# Patient Record
Sex: FEMALE | Race: BLACK OR AFRICAN AMERICAN | Employment: FULL TIME | RURAL
[De-identification: names, ages, dates, MRNs, and addresses within clinical notes are randomized per-mention and may not be internally consistent; named-entity substitution may affect disease eponyms.]

---

## 2017-01-06 ENCOUNTER — OFFICE VISIT (OUTPATIENT)
Dept: FAMILY MEDICINE CLINIC | Age: 32
End: 2017-01-06

## 2017-01-06 VITALS
HEIGHT: 68 IN | TEMPERATURE: 98.7 F | WEIGHT: 236 LBS | DIASTOLIC BLOOD PRESSURE: 67 MMHG | RESPIRATION RATE: 16 BRPM | OXYGEN SATURATION: 99 % | SYSTOLIC BLOOD PRESSURE: 120 MMHG | BODY MASS INDEX: 35.77 KG/M2 | HEART RATE: 59 BPM

## 2017-01-06 DIAGNOSIS — E87.6 HYPOKALEMIA: ICD-10-CM

## 2017-01-06 DIAGNOSIS — Z92.3 HISTORY OF THERAPEUTIC RADIATION: ICD-10-CM

## 2017-01-06 DIAGNOSIS — R53.83 FATIGUE, UNSPECIFIED TYPE: Primary | ICD-10-CM

## 2017-01-06 RX ORDER — PNEUMOCOCCAL VACCINE POLYVALENT 25; 25; 25; 25; 25; 25; 25; 25; 25; 25; 25; 25; 25; 25; 25; 25; 25; 25; 25; 25; 25; 25; 25 UG/.5ML; UG/.5ML; UG/.5ML; UG/.5ML; UG/.5ML; UG/.5ML; UG/.5ML; UG/.5ML; UG/.5ML; UG/.5ML; UG/.5ML; UG/.5ML; UG/.5ML; UG/.5ML; UG/.5ML; UG/.5ML; UG/.5ML; UG/.5ML; UG/.5ML; UG/.5ML; UG/.5ML; UG/.5ML; UG/.5ML
INJECTION, SOLUTION INTRAMUSCULAR; SUBCUTANEOUS
COMMUNITY
Start: 2016-12-15 | End: 2017-01-18 | Stop reason: ALTCHOICE

## 2017-01-06 NOTE — MR AVS SNAPSHOT
Visit Information Date & Time Provider Department Dept. Phone Encounter #  
 1/6/2017  1:40 PM Aileen Nye  [facility] [phone] [id number] Your Appointments 1/10/2017  1:20 PM  
ESTABLISHED PATIENT with Melida Gonzales MD  
Jef Negrete ([facility]) Appt Note: ae  
 [address] [facility] [address] [place] [address]  
[phone]  
  
   
 [address]  
  
    
 1/27/2017  9:00 AM  
ECHO CARDIOGRAMS 2D with ECHONOAH  
[facility] Appt Note: 1 MO FUP ECHO AT 9 AT 10 20  [address] [facility]  
[phone]  
  
   
 [address] [address]  
  
    
 1/27/2017 10:20 AM  
ESTABLISHED PATIENT with Balta Adams MD  
[facility] ([facility]) Appt Note: 1 MO FUP ECHO AT 9 AT 10 20  [address] [address]  
[phone]  
  
   
 [address] [address]  
  
    
 2/13/2017  9:30 AM  
ROUTINE CARE with Willem De Dios MD  
[address] [facility] ([facility]) Appt Note: Hypertension-letter mailed [address]  
[phone]  
  
   
 [place] [id number]  
  
    
 2/24/2017 10:00 AM  
ESTABLISHED PATIENT with Isabel Dukes MD  
[facility]) Appt Note: MIGUE Sanders surveillance [address] [address]  
[phone]  
  
   
 [address] [facility] [address] [place] [address] Upcoming Health Maintenance Date Due Pneumococcal 19-64 Highest Risk (2 of 3 - PCV13) 12/15/2017 PAP AKA CERVICAL CYTOLOGY 11/11/2018 DTaP/Tdap/Td series (2 - Td) 4/12/2024 Allergies as of 1/6/2017  Review Complete On: 1/6/2017 By: Rosalie Mcgraw MD  
  
 Severity Noted Reaction Type Reactions Clonidine (Pf)  11/06/2013   Intolerance Vertigo Current Immunizations  Never Reviewed Name Date Pneumococcal Polysaccharide (PPSV-23) 12/15/2016 Tdap 4/12/2014 12:26 PM  
  
 Not reviewed this visit You Were Diagnosed With   
  
 Codes Comments Fatigue, unspecified type    -  Primary ICD-10-CM: R53.83 ICD-9-CM: 780.79 Hypokalemia     ICD-10-CM: E87.6 ICD-9-CM: 276.8 History of therapeutic radiation     ICD-10-CM: Z92.3 ICD-9-CM: V15.3 Vitals BP Pulse Temp Resp Height(growth percentile) Weight(growth percentile) 120/67 (BP 1 Location: Right arm, BP Patient Position: Sitting) (!) 59 98.7 °F (37.1 °C) (Oral) 16 5' 8\" (1.727 m) 236 lb (107 kg) LMP SpO2 Breastfeeding? BMI OB Status Smoking Status 11/23/2016 99% No 35.88 kg/m2 Having regular periods Never Smoker Vitals History BMI and BSA Data Body Mass Index Body Surface Area  
 35.88 kg/m 2 2.27 m 2 Preferred Pharmacy Pharmacy Name Phone [address] [place], [place] - [address] [phone] Your Updated Medication List  
  
   
This list is accurate as of: 1/6/17  2:06 PM.  Always use your most recent med list.  
  
  
  
  
 bisoprolol-hydroCHLOROthiazide 5-6.25 mg per tablet Commonly known as:  [facility] Take 1 Tab by mouth daily. Iqrqkldd7-Keuqtm5-Zhzvc therm. 112.5 billion cell Cap Take 1 Cap by mouth daily. norethindrone-ethinyl estradiol 1 mg-20 mcg (21)/75 mg (7) Tab Commonly known as:  Manuel Robles FE 1/20 Take 1 Tab by mouth daily. PNEUMOVAX 23 25 mcg/0.5 mL injection Generic drug:  pneumococcal 23-paula ps vaccine XANAX 0.25 mg tablet Generic drug:  ALPRAZolam  
Take  by mouth as needed for Anxiety. We Performed the Following CBC WITH AUTOMATED DIFF [28736 CPT(R)] METABOLIC PANEL, COMPREHENSIVE [23801 CPT(R)] T4, FREE [id number] CPT(R)] TSH 3RD GENERATION [15922 CPT(R)] Introducing [facility]! Dear Carolina Oneill: Thank you for requesting a [employer] account. Our records indicate that you already have an active [employer] account. You can access your account anytime at [email or web address] Did you know that you can access your hospital and ER discharge instructions at any time in [employer]? You can also review all of your test results from your hospital stay or ER visit. Additional Information If you have questions, please visit the Frequently Asked Questions section of the [employer] website at [email or web address] Remember, [employer] is NOT to be used for urgent needs. For medical emergencies, dial 911. Now available from your iPhone and Android! Please provide this summary of care documentation to your next provider. Your primary care clinician is listed as Erik Grates. If you have any questions after today's visit, please call [phone].

## 2017-01-06 NOTE — ACP (ADVANCE CARE PLANNING)
Information was given to pt on Advanced Directives, Living Will  opportunity was given for questions

## 2017-01-06 NOTE — PROGRESS NOTES
Reviewed record in preparation for visit and have necessary documentation  Pt did not bring medication to office visit for review  Information was given to pt on Advanced Directives, Living Will  opportunity was given for questions  Goals that were addressed and/or need to be completed during or after this appointment include   There are no preventive care reminders to display for this patient.

## 2017-01-07 LAB
ALBUMIN SERPL-MCNC: 3.9 G/DL (ref 3.5–5.5)
ALBUMIN/GLOB SERPL: 1.5 {RATIO} (ref 1.1–2.5)
ALP SERPL-CCNC: 76 IU/L (ref 39–117)
ALT SERPL-CCNC: 19 IU/L (ref 0–32)
AST SERPL-CCNC: 17 IU/L (ref 0–40)
BASOPHILS # BLD AUTO: 0 X10E3/UL (ref 0–0.2)
BASOPHILS NFR BLD AUTO: 1 %
BILIRUB SERPL-MCNC: <0.2 MG/DL (ref 0–1.2)
BUN SERPL-MCNC: 8 MG/DL (ref 6–20)
BUN/CREAT SERPL: 10 (ref 8–20)
CALCIUM SERPL-MCNC: 8.9 MG/DL (ref 8.7–10.2)
CHLORIDE SERPL-SCNC: 105 MMOL/L (ref 96–106)
CO2 SERPL-SCNC: 21 MMOL/L (ref 18–29)
CREAT SERPL-MCNC: 0.77 MG/DL (ref 0.57–1)
EOSINOPHIL # BLD AUTO: 0.1 X10E3/UL (ref 0–0.4)
EOSINOPHIL NFR BLD AUTO: 2 %
ERYTHROCYTE [DISTWIDTH] IN BLOOD BY AUTOMATED COUNT: 13.6 % (ref 12.3–15.4)
GLOBULIN SER CALC-MCNC: 2.6 G/DL (ref 1.5–4.5)
GLUCOSE SERPL-MCNC: 74 MG/DL (ref 65–99)
HCT VFR BLD AUTO: 33.9 % (ref 34–46.6)
HGB BLD-MCNC: 12.1 G/DL (ref 11.1–15.9)
IMM GRANULOCYTES # BLD: 0 X10E3/UL (ref 0–0.1)
IMM GRANULOCYTES NFR BLD: 0 %
LYMPHOCYTES # BLD AUTO: 0.8 X10E3/UL (ref 0.7–3.1)
LYMPHOCYTES NFR BLD AUTO: 17 %
MCH RBC QN AUTO: 29.6 PG (ref 26.6–33)
MCHC RBC AUTO-ENTMCNC: 35.7 G/DL (ref 31.5–35.7)
MCV RBC AUTO: 83 FL (ref 79–97)
MONOCYTES # BLD AUTO: 0.4 X10E3/UL (ref 0.1–0.9)
MONOCYTES NFR BLD AUTO: 8 %
NEUTROPHILS # BLD AUTO: 3.3 X10E3/UL (ref 1.4–7)
NEUTROPHILS NFR BLD AUTO: 72 %
PLATELET # BLD AUTO: 334 X10E3/UL (ref 150–379)
POTASSIUM SERPL-SCNC: 3.7 MMOL/L (ref 3.5–5.2)
PROT SERPL-MCNC: 6.5 G/DL (ref 6–8.5)
RBC # BLD AUTO: 4.09 X10E6/UL (ref 3.77–5.28)
SODIUM SERPL-SCNC: 141 MMOL/L (ref 134–144)
T4 FREE SERPL-MCNC: 1.1 NG/DL (ref 0.82–1.77)
TSH SERPL DL<=0.005 MIU/L-ACNC: 1.22 UIU/ML (ref 0.45–4.5)
WBC # BLD AUTO: 4.5 X10E3/UL (ref 3.4–10.8)

## 2017-01-08 NOTE — PROGRESS NOTES
Patient: Jeffery Adrian MRN: [id number]  SSN: [id number]    YOB: 1985  Age: 36 y.o. Sex: female        Subjective:     Chief Complaint   Patient presents with    Thyroid Problem    Fatigue       HPI: she is a 36y.o. year old female who presents with complaint of fatigue and concern about hypothyroidism. Patient with radiation treatment for lymphoma of neck. Patient says she feel fatigued upon awakening and throughout the day. Patient with hx of HTN, palpitations, hypokalemia, depression and anxiety. Patient denies HA, dizziness, SOB, CP, abdominal pain, dysuria, acute myalgias or arthralgias. Encounter Diagnoses   Name Primary?  Fatigue, unspecified type Yes    Hypokalemia     History of therapeutic radiation        BP Readings from Last 3 Encounters:   01/06/17 120/67   12/13/16 106/72   12/12/16 (!) 135/91       Wt Readings from Last 3 Encounters:   01/06/17 236 lb (107 kg)   12/13/16 237 lb 3.2 oz (107.6 kg)   12/12/16 238 lb (108 kg)     Body mass index is 35.88 kg/(m^2). Current and past medical information:    Current Medications after this visit[de-identified]   Current Outpatient Prescriptions   Medication Sig    Sebwyejh1-Oyojhs0-Tokvr therm. 112.5 billion cell cap Take 1 Cap by mouth daily.  bisoprolol-hydroCHLOROthiazide (ZIAC) 5-6.25 mg per tablet Take 1 Tab by mouth daily.  norethindrone-ethinyl estradiol (JUNEL FE 1/20) 1 mg-20 mcg (21)/75 mg (7) tab Take 1 Tab by mouth daily.  ALPRAZolam (XANAX) 0.25 mg tablet Take  by mouth as needed for Anxiety.  PNEUMOVAX 23 25 mcg/0.5 mL injection      No current facility-administered medications for this visit.         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 ([facility] [address] 08/10/2016    Depression 05/12/2015    Headache(784.0) 05/22/2014    HTN (hypertension) 08/27/2012    KAMERON I (cervical intraepithelial neoplasia I) 09/02/2011       Past Medical History   Diagnosis Date    Abnormal Pap smear 2010     FU with colpo, negative per patient    Cancer [facility])     Chest pain     Depression 5/12/2015    Encounter for IUD insertion 3/5/15     Mirena    Essential hypertension     Hypertension     Implanon removal 9/6/2013    Pap smear for cervical cancer screening 11/11/15     Negative    Trouble in sleeping        Allergies   Allergen Reactions    Clonidine (Pf) Vertigo       Past Surgical History   Procedure Laterality Date    Hx colposcopy  2010     per pt WNL    Hx wisdom teeth extraction         Social History     Social History    Marital status:      Spouse name: N/A    Number of children: N/A    Years of education: N/A     Social History Main Topics    Smoking status: Never Smoker    Smokeless tobacco: Never Used    Alcohol use Yes      Comment: occassionally    Drug use: No    Sexual activity: Yes     Partners: Male     Birth control/ protection: Pill     Other Topics Concern    None     Social History Narrative         Objective:     Review of Systems:  Constitutional: Positive for fatigue, denies malaise  Derm: Negative for rash or lesion  HEENT: Negative for acute hearing or vision changes  Cardiovascular: Negative for dizziness, chest pain or palpitations  Respiratory: Negative for cough, wheezing or SOB  Gastreintestinal: Negative for nausea or abdominal pain  Genital/urinary: Negative for dysuria or voiding dysfunction  Muscoloskeletal: Negative for acute myalgias or arthralgias   Neurological: Negative for headache, weakness or paresthesia  Psychological: Negative for depression or anxiety      Vitals:    01/06/17 1344   BP: 120/67   Pulse: (!) 59   Resp: 16   Temp: 98.7 °F (37.1 °C)   TempSrc: Oral   SpO2: 99%   Weight: 236 lb (107 kg)   Height: 5' 8\" (1.727 m)      Body mass index is 35.88 kg/(m^2).     Physical Exam:  Constitutional: well developed, well nourished, in no acute distress  Skin: warm and dry, normal tone and turgor  Head: normocephalic, atraumatic  Eyes: sclera clear, EOMI  Neck: normal range of motion  Cardiovascular: normal S1, S2, regular rate and rhythm  Respiratory: clear to auscultation bilaterally with symmetrical effort  Extremities: full range of motion  Neurology: normal strength and sensation  Psych: active, alert and oriented, affect appropriate       There are no preventive care reminders to display for this patient. Risk, benefits and potential costs of recommended health maintenance discussed. Patient expressed understanding and deferred at this time. Assessment and orders:       ICD-10-CM ICD-9-CM    1. Fatigue, unspecified type R53.83 780.79 CBC WITH AUTOMATED DIFF      TSH 3RD GENERATION      T4, FREE   2. Hypokalemia S72.9 485.0 METABOLIC PANEL, COMPREHENSIVE   3. History of therapeutic radiation I51.5 B64.1 METABOLIC PANEL, COMPREHENSIVE      TSH 3RD GENERATION      T4, FREE         Plan of care:  Diagnoses were discussed in detail with patient. Medication risks/benefits/side effects discussed with patient. All of the patient's questions were addressed and answered to apparent satisfaction. The patient understands and agrees with our plan of care. The patient knows to call back if they have questions about the plan of care or if symptoms change. The patient received an After-Visit Summary which contains VS, diagnoses, orders, allergy and medication lists.       Patient Care Team:  Sharon Paige MD as PCP - General (Family Practice)  Melida Gonzales MD as Physician (Obstetrics & Gynecology)  Isabel Dukes MD (Hematology and Oncology)  Blake Slaughter MD (Otolaryngology)  Balta Adams MD (Cardiology)  Ebonie Garrison MD (Radiation Oncology)    Follow-up Disposition: Not on File    Future Appointments  Date Time Provider Ann Stone   1/10/2017 1:20 PM Melida Gonzales  [facility]   1/27/2017 9:00 AM NOAH MEJIA   1/27/2017 10:20 AM Daryl Wild MD [facility] SCHED   2/13/2017 9:30 AM Arturo Yarbrough MD [facility] SCHED   2/24/2017 10:00 AM Juan Antonio Drake MD [address] [place] [address], [address]       Signed By: Alem Porras MD     January 8, 2017

## 2017-01-18 ENCOUNTER — OFFICE VISIT (OUTPATIENT)
Dept: FAMILY MEDICINE CLINIC | Age: 32
End: 2017-01-18

## 2017-01-18 VITALS
SYSTOLIC BLOOD PRESSURE: 121 MMHG | WEIGHT: 235 LBS | DIASTOLIC BLOOD PRESSURE: 76 MMHG | TEMPERATURE: 97.3 F | HEART RATE: 52 BPM | BODY MASS INDEX: 35.61 KG/M2 | OXYGEN SATURATION: 98 % | HEIGHT: 68 IN | RESPIRATION RATE: 16 BRPM

## 2017-01-18 DIAGNOSIS — Z92.3 HISTORY OF THERAPEUTIC RADIATION: ICD-10-CM

## 2017-01-18 DIAGNOSIS — E66.9 OBESITY, CLASS II, BMI 35-39.9: ICD-10-CM

## 2017-01-18 DIAGNOSIS — Z71.82 EXERCISE COUNSELING: ICD-10-CM

## 2017-01-18 DIAGNOSIS — B00.2 HERPES GINGIVOSTOMATITIS: Primary | ICD-10-CM

## 2017-01-18 DIAGNOSIS — Z71.3 DIETARY COUNSELING AND SURVEILLANCE: ICD-10-CM

## 2017-01-18 RX ORDER — VALACYCLOVIR HYDROCHLORIDE 1 G/1
1000 TABLET, FILM COATED ORAL 3 TIMES DAILY
Qty: 21 TAB | Refills: 2 | Status: SHIPPED | OUTPATIENT
Start: 2017-01-18 | End: 2017-01-25

## 2017-01-18 NOTE — PROGRESS NOTES
Reviewed record in preparation for visit and have obtained necessary documentation. Patient did not bring medications to visit for review. Information provided on Advanced Directive, Living Will. Body mass index is 35.73 kg/(m^2). There are no preventive care reminders to display for this patient.

## 2017-01-18 NOTE — PROGRESS NOTES
Progress Note    Patient: Raúl Payne MRN: [id number]  SSN: [id number]    YOB: 1985  Age: 36 y.o. Sex: female        Chief Complaint   Patient presents with    Vaginal Pain     lesion         Subjective:   Rash  Patient complains of rash involving the genitalia, mouth. Rash started 7 days ago. Appearance of rash at onset: Color of lesion(s): pink, Texture of lesion(s): blistering, Size of lesion(s): size of a pea. Rash has not changed over time Initial distribution: genitalia, mouth. Discomfort associated with rash: painful. Associated symptoms: no associated symptoms. Denies: no associated symptoms. Patient has not had previous evaluation of rash. Patient has not had previous treatment. Response to treatment: N/A. Patient has not had contacts with similar rash. Patient has not identified precipitant. Patient has not had new exposures (soaps, lotions, laundry detergents, foods, medications, plants, insects or animals.)  Patient has a blistering lesion on the genitalia and a lesion on the mouth. Patient has completed her radiation at this visit. States that the lesion on the vagina is painful and burning. Encounter Diagnoses   Name Primary?  Herpes gingivostomatitis Yes    History of therapeutic radiation     Exercise counseling     Dietary counseling and surveillance     Obesity, Class II, BMI 35-39.9 ([facility])              Current and past medical information:    Current Medications after this visit[de-identified]   Current Outpatient Prescriptions   Medication Sig    Qguypvsq6-Xxjrbq0-Zbojo therm. 112.5 billion cell cap Take 1 Cap by mouth daily.  bisoprolol-hydroCHLOROthiazide (ZIAC) 5-6.25 mg per tablet Take 1 Tab by mouth daily.  PNEUMOVAX 23 25 mcg/0.5 mL injection     norethindrone-ethinyl estradiol (JUNEL FE 1/20) 1 mg-20 mcg (21)/75 mg (7) tab Take 1 Tab by mouth daily.  ALPRAZolam (XANAX) 0.25 mg tablet Take  by mouth as needed for Anxiety.      No current facility-administered medications for this visit. 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)     Chest pain     Depression 5/12/2015    Encounter for IUD insertion 3/5/15     Mirena    Essential hypertension     Hypertension     Implanon removal 9/6/2013    Pap smear for cervical cancer screening 11/11/15     Negative    Trouble in sleeping        Allergies   Allergen Reactions    Clonidine (Pf) Vertigo       Past Surgical History   Procedure Laterality Date    Hx colposcopy  2010     per pt WNL    Hx wisdom teeth extraction         Social History     Social History    Marital status:      Spouse name: N/A    Number of children: N/A    Years of education: N/A     Social History Main Topics    Smoking status: Never Smoker    Smokeless tobacco: Never Used    Alcohol use Yes      Comment: occassionally    Drug use: No    Sexual activity: Yes     Partners: Male     Birth control/ protection: Pill     Other Topics Concern    None     Social History Narrative       Review of Systems   Constitutional: Negative for chills, diaphoresis, fever, malaise/fatigue and weight loss. HENT: Negative. Negative for congestion and sore throat. Eyes: Negative. Negative for blurred vision, double vision and photophobia. Respiratory: Negative for cough, hemoptysis, sputum production, shortness of breath and wheezing. Cardiovascular: Negative. Negative for chest pain, palpitations, orthopnea, claudication, leg swelling and PND. Gastrointestinal: Negative.   Negative for abdominal pain, blood in stool, constipation, diarrhea, heartburn, melena, nausea and vomiting. Genitourinary: Negative. Negative for dysuria, flank pain, frequency, hematuria and urgency. Musculoskeletal: Negative. Negative for back pain, falls, joint pain, myalgias and neck pain. Skin: Positive for rash. Negative for itching. Vaginal lesion and mouth lesion   Neurological: Negative. Negative for dizziness, tingling, tremors, sensory change, speech change, focal weakness, seizures, loss of consciousness, weakness and headaches. Endo/Heme/Allergies: Negative. Negative for environmental allergies and polydipsia. Does not bruise/bleed easily. Psychiatric/Behavioral: Negative. Negative for depression, hallucinations, memory loss, substance abuse and suicidal ideas. The patient is not nervous/anxious and does not have insomnia. Objective:     Vitals:    01/18/17 0919   BP: 121/76   Pulse: (!) 52   Resp: 16   Temp: 97.3 °F (36.3 °C)   TempSrc: Oral   SpO2: 98%   Weight: 235 lb (106.6 kg)   Height: 5' 8\" (1.727 m)      Body mass index is 35.73 kg/(m^2). Physical Exam   Constitutional: She is oriented to person, place, and time and well-developed, well-nourished, and in no distress. No distress. HENT:   Head: Normocephalic and atraumatic. Right Ear: External ear normal.   Left Ear: External ear normal.   Nose: Nose normal.   Mouth/Throat: Oropharynx is clear and moist. No oropharyngeal exudate. Eyes: Conjunctivae and EOM are normal. Pupils are equal, round, and reactive to light. Right eye exhibits no discharge. Left eye exhibits no discharge. No scleral icterus. Neck: Trachea normal, normal range of motion and full passive range of motion without pain. Neck supple. Normal carotid pulses, no hepatojugular reflux and no JVD present. Muscular tenderness present. No tracheal tenderness and no spinous process tenderness present. Carotid bruit is not present. No rigidity.  No tracheal deviation, no edema, no erythema and normal range of motion present. No thyroid mass and no thyromegaly present. Cardiovascular: Normal rate, regular rhythm, normal heart sounds and intact distal pulses. Exam reveals no gallop and no friction rub. No murmur heard. Pulmonary/Chest: Effort normal and breath sounds normal. No stridor. No respiratory distress. She has no wheezes. She has no rales. She exhibits no tenderness. Abdominal: Soft. Bowel sounds are normal. She exhibits no distension and no mass. There is no tenderness. There is no rebound and no guarding. Genitourinary: Vulva exhibits lesion (slightly rasied and tender lesion the size of a pea is noted on the right. no drainage is noted. ). Musculoskeletal: Normal range of motion. She exhibits no edema, tenderness or deformity. Lymphadenopathy:     She has no cervical adenopathy. Neurological: She is alert and oriented to person, place, and time. She displays normal reflexes. No cranial nerve deficit. She exhibits normal muscle tone. Gait normal. Coordination normal. GCS score is 15. Skin: Skin is warm and dry. No rash noted. She is not diaphoretic. No erythema. No pallor. Psychiatric: Mood, memory, affect and judgment normal.   Nursing note and vitals reviewed. There are no preventive care reminders to display for this patient. Assessment and orders:       ICD-10-CM ICD-9-CM    1. Herpes gingivostomatitis B00.2 054. 2 Will start on valcyclovir. Patient is in agreement with treatment plan at this time. Information printed and given to the patient for review. 2. History of therapeutic radiation Z92.3 V15.3 Stable at this visit. 3. Exercise counseling Z71.89 V65.41 Physical activity information given to patient and printed for review. 4. Dietary counseling and surveillance Z71.3 V65.3 Dietary information discussed with patient and printed for review.     5. Obesity, Class II, BMI 35-39.9 ([facility]) E66.01 278.01 I have reviewed/discussed the above normal BMI with the patient. I have recommended the following interventions: dietary management education, guidance, and counseling, dietary needs education, encourage exercise, feeding regime, lifestyle education regarding diet, monitor weight and nutrition therapy . The plan is as follows: I have counseled this patient on diet and exercise regimens. Plan of care:  Discussed diagnoses in detail with patient. Medication risks/benefits/side effects discussed with patient. All of the patient's questions were addressed. The patient understands and agrees with our plan of care. The patient knows to call back if they are unsure of or forget any changes we discussed today or if the symptoms change. The patient received an After-Visit Summary which contains VS, orders, medication list and allergy list. This can be used as a \"mini-medical record\" should they have to seek medical care while out of town.     Patient Care Team:  Marie De La Garza MD as PCP - General (Family Practice)  Yg Boo MD as Physician (Obstetrics & Gynecology)  Alka Loera MD (Hematology and Oncology)  Nell Borden MD (Otolaryngology)  Zuleima Quan MD (Cardiology)  Maryam Houston MD (Radiation Oncology)    Follow-up Disposition: Not on File    Future Appointments  Date Time Provider Ann Piersoni   1/24/2017 1:20 PM Yg Boo MD [address] [place] [address]   1/27/2017 9:00 AM NOAH MEJIA [facility] SCH[facility]   1/27/2017 10:20 AM MD ZAID Arizmendi [facility] SCHED   2/13/2017 9:30 AM Vesta Silva MD [facility] SCH[facility]   2/24/2017 10:00 AM Alka Loera MD [address] [place] [address], [place] [address]       Signed By: Khushboo Green NP     January 18, 2017

## 2017-01-18 NOTE — PATIENT INSTRUCTIONS

## 2017-01-18 NOTE — MR AVS SNAPSHOT
Visit Information Date & Time Provider Department Dept. Phone Encounter #  
 1/18/2017  9:20 AM Aubree Shukla  [facility] [phone] [id number] Your Appointments 1/24/2017  1:20 PM  
ESTABLISHED PATIENT with Payton Prado MD  
Fuchs Caden ([facility]) Appt Note: ae  
 [address] [facility] [address]  
[phone]  
  
   
 [address] [address]  
  
    
 1/27/2017  9:00 AM  
ECHO CARDIOGRAMS 2D with ECHONOAH  
[facility] Appt Note: 1 MO FUP ECHO AT 9 AT 10 20  [address] [facility]  
[phone]  
  
   
 [address] [facility] [address] [address]  
  
    
 1/27/2017 10:20 AM  
ESTABLISHED PATIENT with Bebeto Kaufman MD  
[facility] ([facility]) Appt Note: 1 MO FUP ECHO AT 9 AT 10 20  [address] [address]  
[phone]  
  
   
 [address] Foreign [address] [address]  
  
    
 2/13/2017  9:30 AM  
ROUTINE CARE with Fernando Rodriguez MD  
704 [facility] ([facility]) Appt Note: Hypertension-letter mailed 2005 [address]  
[phone]  
  
   
 [facility] [address]  
  
    
 2/24/2017 10:00 AM  
ESTABLISHED PATIENT with Henrique Reyes MD  
[facility] Appt Note: MIGUE Sanders surveillance [address] [facility] [address] [address]  
[phone]  
  
   
 301 [facility] [address] 1007 [facility] Upcoming Health Maintenance Date Due Pneumococcal 19-64 Highest Risk (2 of 3 - PCV13) 12/15/2017 PAP AKA CERVICAL CYTOLOGY 11/11/2018 DTaP/Tdap/Td series (2 - Td) 4/12/2024 Allergies as of 1/18/2017  Review Complete On: 1/18/2017 By: Angelita Vang LPN Severity Noted Reaction Type Reactions Clonidine (Pf)  11/06/2013   Intolerance Vertigo Current Immunizations  Never Reviewed Name Date Pneumococcal Polysaccharide (PPSV-23) 12/15/2016 Tdap 4/12/2014 12:26 PM  
  
 Not reviewed this visit You Were Diagnosed With   
  
 Codes Comments Herpes gingivostomatitis    -  Primary ICD-10-CM: B00.2 ICD-9-CM: 054.2 History of therapeutic radiation     ICD-10-CM: Z92.3 ICD-9-CM: V15.3 Exercise counseling     ICD-10-CM: Z71.89 ICD-9-CM: V65.41 Dietary counseling and surveillance     ICD-10-CM: Z71.3 ICD-9-CM: V65.3 Obesity, Class II, BMI 35-39.9 (HCC)     ICD-10-CM: E66.01 
ICD-9-CM: 278.01 Vitals BP Pulse Temp Resp Height(growth percentile) Weight(growth percentile) 121/76 (BP 1 Location: Right arm, BP Patient Position: Sitting) (!) 52 97.3 °F (36.3 °C) (Oral) 16 5' 8\" (1.727 m) 235 lb (106.6 kg) LMP SpO2 BMI OB Status Smoking Status 11/23/2016 98% 35.73 kg/m2 Having regular periods Never Smoker Vitals History BMI and BSA Data Body Mass Index Body Surface Area 35.73 kg/m 2 2.26 m 2 Preferred Pharmacy Pharmacy Name Phone [address] [address] [facility] [phone] Your Updated Medication List  
  
   
This list is accurate as of: 1/18/17  9:57 AM.  Always use your most recent med list.  
  
  
  
  
 bisoprolol-hydroCHLOROthiazide 5-6.25 mg per tablet Commonly known as:  [facility] Take 1 Tab by mouth daily. Kblgoamw8-Dehtkk7-Ttaea therm. 112.5 billion cell Cap Take 1 Cap by mouth daily. norethindrone-ethinyl estradiol 1 mg-20 mcg (21)/75 mg (7) Tab Commonly known as:  Sabina Moritz FE 1/20 Take 1 Tab by mouth daily. valACYclovir 1 gram tablet Commonly known as:  VALTREX Take 1 Tab by mouth three (3) times daily for 7 days. Indications: SUPPRESSION OF RECURRENT HERPES SIMPLEX INFECTION  
  
 XANAX 0.25 mg tablet Generic drug:  ALPRAZolam  
Take  by mouth as needed for Anxiety. Prescriptions Sent to Pharmacy Refills  
 valACYclovir (VALTREX) 1 gram tablet 2 Sig: Take 1 Tab by mouth three (3) times daily for 7 days. Indications: SUPPRESSION OF RECURRENT HERPES SIMPLEX INFECTION Class: Normal  
 Pharmacy: [address] [place] [address] #: [phone] Route: Oral  
  
Patient Instructions A Healthy Lifestyle: Care Instructions Your Care Instructions A healthy lifestyle can help you feel good, stay at a healthy weight, and have plenty of energy for both work and play. A healthy lifestyle is something you can share with your whole family. A healthy lifestyle also can lower your risk for serious health problems, such as high blood pressure, heart disease, and diabetes. You can follow a few steps listed below to improve your health and the health of your family. Follow-up care is a key part of your treatment and safety. Be sure to make and go to all appointments, and call your doctor if you are having problems. Its also a good idea to know your test results and keep a list of the medicines you take. How can you care for yourself at home? · Do not eat too much sugar, fat, or fast foods. You can still have dessert and treats now and then. The goal is moderation. · Start small to improve your eating habits. Pay attention to portion sizes, drink less juice and soda pop, and eat more fruits and vegetables. ¨ Eat a healthy amount of food. A 3-ounce serving of meat, for example, is about the size of a deck of cards. Fill the rest of your plate with vegetables and whole grains. ¨ Limit the amount of soda and sports drinks you have every day. Drink more water when you are thirsty. ¨ Eat at least 5 servings of fruits and vegetables every day. It may seem like a lot, but it is not hard to reach this goal. A serving or helping is 1 piece of fruit, 1 cup of vegetables, or 2 cups of leafy, raw vegetables. Have an apple or some carrot sticks as an afternoon snack instead of a candy bar. Try to have fruits and/or vegetables at every meal. 
· Make exercise part of your daily routine. You may want to start with simple activities, such as walking, bicycling, or slow swimming. Try to be active 30 to 60 minutes every day. You do not need to do all 30 to 60 minutes all at once. For example, you can exercise 3 times a day for 10 or 20 minutes. Moderate exercise is safe for most people, but it is always a good idea to talk to your doctor before starting an exercise program. 
· Keep moving. Paola Casasthers the lawn, work in the garden, or excentos. Take the stairs instead of the elevator at work. · If you smoke, quit. People who smoke have an increased risk for heart attack, stroke, cancer, and other lung illnesses. Quitting is hard, but there are ways to boost your chance of quitting tobacco for good. ¨ Use nicotine gum, patches, or lozenges. ¨ Ask your doctor about stop-smoking programs and medicines. ¨ Keep trying. In addition to reducing your risk of diseases in the future, you will notice some benefits soon after you stop using tobacco. If you have shortness of breath or asthma symptoms, they will likely get better within a few weeks after you quit. · Limit how much alcohol you drink. Moderate amounts of alcohol (up to 2 drinks a day for men, 1 drink a day for women) are okay. But drinking too much can lead to liver problems, high blood pressure, and other health problems. Family health If you have a family, there are many things you can do together to improve your health. · Eat meals together as a family as often as possible. · Eat healthy foods.  This includes fruits, vegetables, lean meats and dairy, and whole grains. · Include your family in your fitness plan. Most people think of activities such as jogging or tennis as the way to fitness, but there are many ways you and your family can be more active. Anything that makes you breathe hard and gets your heart pumping is exercise. Here are some tips: 
¨ Walk to do errands or to take your child to school or the bus. ¨ Go for a family bike ride after dinner instead of watching TV. Where can you learn more? Go to http://srini-jeny.info/. Enter G252 in the search box to learn more about \"A Healthy Lifestyle: Care Instructions. \" Current as of: July 26, 2016 Content Version: 11.1 © 0622-1093 oDesk. Care instructions adapted under license by Pearl.com (which disclaims liability or warranty for this information). If you have questions about a medical condition or this instruction, always ask your healthcare professional. [place] [address] any warranty or liability for your use of this information. Introducing [facility] & HEALTH SERVICES! Dear Syl Horn: Thank you for requesting a Cargo.[email or web address] account. Our records indicate that you already have an active [email or web address] account. You can access your account anytime at [email or web address] Did you know that you can access your hospital and ER discharge instructions at any time in [email or web address]? You can also review all of your test results from your hospital stay or ER visit. Additional Information If you have questions, please visit the Frequently Asked Questions section of the [email or web address] website at [email or web address] Remember, [email or web address] is NOT to be used for urgent needs. For medical emergencies, dial 911. Now available from your iPhone and Android! Please provide this summary of care documentation to your next provider. Your primary care clinician is listed as Lucita Bryson.  If you have any questions after today's visit, please call [phone].

## 2017-01-22 LAB
A VAGINAE DNA VAG QL NAA+PROBE: NORMAL SCORE
BVAB2 DNA VAG QL NAA+PROBE: NORMAL SCORE
C ALBICANS DNA VAG QL NAA+PROBE: NEGATIVE
C GLABRATA DNA VAG QL NAA+PROBE: NEGATIVE
C TRACH RRNA SPEC QL NAA+PROBE: NEGATIVE
HSV1 DNA SPEC QL NAA+PROBE: NEGATIVE
HSV2 DNA SPEC QL NAA+PROBE: NEGATIVE
MEGA1 DNA VAG QL NAA+PROBE: NORMAL SCORE
N GONORRHOEA RRNA SPEC QL NAA+PROBE: NEGATIVE
T VAGINALIS RRNA SPEC QL NAA+PROBE: NEGATIVE

## 2017-01-24 ENCOUNTER — OFFICE VISIT (OUTPATIENT)
Dept: OBGYN CLINIC | Age: 32
End: 2017-01-24

## 2017-01-24 VITALS
BODY MASS INDEX: 35.46 KG/M2 | DIASTOLIC BLOOD PRESSURE: 77 MMHG | HEIGHT: 68 IN | SYSTOLIC BLOOD PRESSURE: 116 MMHG | WEIGHT: 234 LBS

## 2017-01-24 DIAGNOSIS — Z01.411 ENCOUNTER FOR GYNECOLOGICAL EXAMINATION (GENERAL) (ROUTINE) WITH ABNORMAL FINDINGS: Primary | ICD-10-CM

## 2017-01-24 DIAGNOSIS — N92.6 IRREGULAR MENSES: ICD-10-CM

## 2017-01-24 DIAGNOSIS — R00.2 PALPITATION: ICD-10-CM

## 2017-01-24 DIAGNOSIS — I10 ESSENTIAL HYPERTENSION: ICD-10-CM

## 2017-01-24 DIAGNOSIS — N90.89 VULVAR LESION: ICD-10-CM

## 2017-01-24 DIAGNOSIS — R63.0 DECREASED APPETITE: ICD-10-CM

## 2017-01-24 LAB
HCG URINE, QL. (POC): NEGATIVE
VALID INTERNAL CONTROL?: YES

## 2017-01-24 RX ORDER — MEDROXYPROGESTERONE ACETATE 10 MG/1
10 TABLET ORAL DAILY
Qty: 10 TAB | Refills: 0 | Status: SHIPPED | OUTPATIENT
Start: 2017-01-24 | End: 2017-02-03

## 2017-01-24 NOTE — PATIENT INSTRUCTIONS

## 2017-01-24 NOTE — MR AVS SNAPSHOT
Visit Information Date & Time Provider Department Dept. Phone Encounter #  
 1/24/2017  1:20 PM Brissa Lebron MD Jef Negrete [phone] [id number] Your Appointments 1/27/2017  9:00 AM  
ECHO CARDIOGRAMS 2D with ECHO, STFRANCIS  
[facility] Appt Note: 1 MO FUP ECHO AT 9 AT 10 20 DR Betzy Bagley [address] [facility]  
[phone]  
  
   
 [address] [address]  
  
    
 1/27/2017 10:20 AM  
ESTABLISHED PATIENT with Urmila Pineda MD  
[facility] ([facility] Appt Note: 1 MO FUP ECHO AT 9 AT 10 20 DR Betzy Bagley [address] [facility] [address]  
[phone]  
  
   
 [address] [address]  
  
    
 2/13/2017  9:30 AM  
ROUTINE CARE with Smiley Chacko MD  
[address] [place] [address] ([facility]) Appt Note: Hypertension-letter mailed 2005 [address]   
[phone]  
  
   
 [facility] [address]  
  
    
 2/24/2017 10:00 AM  
ESTABLISHED PATIENT with Mik Manuel MD  
[facility] Appt Note: MIGUE Sanders surveillance [address] [facility], [address] [address]  
[phone]  
  
   
 [address] [facility], [address] 100[address] [facility] Upcoming Health Maintenance Date Due  
 PAP AKA CERVICAL CYTOLOGY 11/11/2018 Allergies as of 1/24/2017  Review Complete On: 1/24/2017 By: Vasu Yadav LPN Severity Noted Reaction Type Reactions Clonidine (Pf)  11/06/2013   Intolerance Vertigo Current Immunizations  Never Reviewed Name Date Pneumococcal Polysaccharide (PPSV-23) 12/15/2016 Tdap 4/12/2014 12:26 PM  
  
 Not reviewed this visit Vitals Height(growth percentile) Weight(growth percentile) LMP BMI OB Status Smoking Status 5' 8\" (1.727 m) 234 lb (106.1 kg) 11/23/2016 35.58 kg/m2 Having regular periods Never Smoker BMI and BSA Data Body Mass Index Body Surface Area 35.58 kg/m 2 2.26 m 2 Preferred Pharmacy Pharmacy Name Phone [address] [facility] [address] [address] [place] [facility] [phone] Your Updated Medication List  
  
   
This list is accurate as of: 1/24/17  1:30 PM.  Always use your most recent med list.  
  
  
  
  
 bisoprolol-hydroCHLOROthiazide 5-6.25 mg per tablet Commonly known as:  [facility] Take 1 Tab by mouth daily. Tcfmdaei3-Bmtyjr0-Uqged therm. 112.5 billion cell Cap Take 1 Cap by mouth daily. norethindrone-ethinyl estradiol 1 mg-20 mcg (21)/75 mg (7) Tab Commonly known as:  Keiko Bruce FE 1/20 Take 1 Tab by mouth daily. valACYclovir 1 gram tablet Commonly known as:  VALTREX Take 1 Tab by mouth three (3) times daily for 7 days. Indications: SUPPRESSION OF RECURRENT HERPES SIMPLEX INFECTION  
  
 XANAX 0.25 mg tablet Generic drug:  ALPRAZolam  
Take  by mouth as needed for Anxiety. Patient Instructions Well Visit, Ages 25 to 48: Care Instructions Your Care Instructions Physical exams can help you stay healthy. Your doctor has checked your overall health and may have suggested ways to take good care of yourself. He or she also may have recommended tests. At home, you can help prevent illness with healthy eating, regular exercise, and other steps. Follow-up care is a key part of your treatment and safety. Be sure to make and go to all appointments, and call your doctor if you are having problems. It's also a good idea to know your test results and keep a list of the medicines you take. How can you care for yourself at home? · Reach and stay at a healthy weight.  This will lower your risk for many problems, such as obesity, diabetes, heart disease, and high blood pressure. · Get at least 30 minutes of physical activity on most days of the week. Walking is a good choice. You also may want to do other activities, such as running, swimming, cycling, or playing tennis or team sports. Discuss any changes in your exercise program with your doctor. · Do not smoke or allow others to smoke around you. If you need help quitting, talk to your doctor about stop-smoking programs and medicines. These can increase your chances of quitting for good. · Talk to your doctor about whether you have any risk factors for sexually transmitted infections (STIs). Having one sex partner (who does not have STIs and does not have sex with anyone else) is a good way to avoid these infections. · Use birth control if you do not want to have children at this time. Talk with your doctor about the choices available and what might be best for you. · Protect your skin from too much sun. When you're outdoors from 10 a.m. to 4 p.m., stay in the shade or cover up with clothing and a hat with a wide brim. Wear sunglasses that block UV rays. Even when it's cloudy, put broad-spectrum sunscreen (SPF 30 or higher) on any exposed skin. · See a dentist one or two times a year for checkups and to have your teeth cleaned. · Wear a seat belt in the car. · Drink alcohol in moderation, if at all. That means no more than 2 drinks a day for men and 1 drink a day for women. Follow your doctor's advice about when to have certain tests. These tests can spot problems early. For everyone · Cholesterol. Have the fat (cholesterol) in your blood tested after age 21. Your doctor will tell you how often to have this done based on your age, family history, or other things that can increase your risk for heart disease. · Blood pressure.  Have your blood pressure checked during a routine doctor visit. Your doctor will tell you how often to check your blood pressure based on your age, your blood pressure results, and other factors. · Vision. Talk with your doctor about how often to have a glaucoma test. 
· Diabetes. Ask your doctor whether you should have tests for diabetes. · Colon cancer. Have a test for colon cancer at age 48. You may have one of several tests. If you are younger than 48, you may need a test earlier if you have any risk factors. Risk factors include whether you already had a precancerous polyp removed from your colon or whether your parent, brother, sister, or child has had colon cancer. For women · Breast exam and mammogram. Talk to your doctor about when you should have a clinical breast exam and a mammogram. Medical experts differ on whether and how often women under 50 should have these tests. Your doctor can help you decide what is right for you. · Pap test and pelvic exam. Begin Pap tests at age 24. A Pap test is the best way to find cervical cancer. The test often is part of a pelvic exam. Ask how often to have this test. 
· Tests for sexually transmitted infections (STIs). Ask whether you should have tests for STIs. You may be at risk if you have sex with more than one person, especially if your partners do not wear condoms. For men · Tests for sexually transmitted infections (STIs). Ask whether you should have tests for STIs. You may be at risk if you have sex with more than one person, especially if you do not wear a condom. · Testicular cancer exam. Ask your doctor whether you should check your testicles regularly. · Prostate exam. Talk to your doctor about whether you should have a blood test (called a PSA test) for prostate cancer. Experts differ on whether and when men should have this test. Some experts suggest it if you are older than 39 and are -American or have a father or brother who got prostate cancer when he was younger than 72. When should you call for help? Watch closely for changes in your health, and be sure to contact your doctor if you have any problems or symptoms that concern you. Where can you learn more? Go to http://srini-jeny.info/. Enter P072 in the search box to learn more about \"Well Visit, Ages 25 to 48: Care Instructions. \" Current as of: July 19, 2016 Content Version: 11.1 © 4095-5484 RealTravel. Care instructions adapted under license by gulu.com (which disclaims liability or warranty for this information). If you have questions about a medical condition or this instruction, always ask your healthcare professional. [address] any warranty or liability for your use of this information. Introducing \[facility]\"" & HEALTH SERVICES! Dear Salena [place]: Thank you for requesting a Excel PharmaStudies account. Our records indicate that you already have an active Excel PharmaStudies account. You can access your account anytime at [email or web address] Did you know that you can access your hospital and ER discharge instructions at any time in Excel PharmaStudies? You can also review all of your test results from your hospital stay or ER visit. Additional Information If you have questions, please visit the Frequently Asked Questions section of the Excel PharmaStudies website at [email or web address] Remember, Excel PharmaStudies is NOT to be used for urgent needs. For medical emergencies, dial 911. Now available from your iPhone and Android! Please provide this summary of care documentation to your next provider. Your primary care clinician is listed as Sebastian Hardwick. If you have any questions after today's visit, please call [phone].

## 2017-01-24 NOTE — PROGRESS NOTES
[address] [facility]  [email or web address]  [phone]    Sidney Lin MD, FACOG       Annual Gynecologic Exam:  [facility] <40  Chief Complaint   Patient presents with    Well Woman    Vulvar Abnormaility    Irregular Menses         Mike Yuen is a 36 y.o.  [address] female who presents for an annual well woman exam.  Patient's last menstrual period was 2016. .    With regard to the Gardisil vaccine, she is older than the FDA approved age to receive it. She does report additional concerns today. Patient reports she found a small bump in vagina area. Did have pain x 1 week. Currently, does not have pain. Reports no issues with change of size or vaginal discharge. Patient also reports lack of appetite, visual changes, irregular heartbeat, back pain, concerns about STD's. She reports no new sexual partners. Has had oral/herpes sore in past.  Did hsv and vaginitis swab on self. Started valtrex. History of Present Illness: The patient is reporting having vulvar lesion  for several weeks. She reports the symptoms are has improved. Aggravating factors include none. And alleviating factors include valtrex. She was worried about STD. She reports no new sexual partners. History of Present Illness: The patient is reporting having irregular menses for several months. She reports the symptoms are is unchanged. Aggravating factors include none/radiation  And alleviating factors include none. Menstrual status:  Her periods are irregular. She does not report dysmenorrhea/painful menses. She does report irregular bleeding. Sexual history and Contraception:  History   Sexual Activity    Sexual activity: Yes    Partners: Male    Birth control/ protection: None     She never use condoms with sexual activity  She does not reports new sexual partner(s) in the last year. The patient does request STD testing.     We recommended testing per CDC guidelines and at patient request.     Preventive Medicine History:  Her last annual GYN exam was about two years ago. Her most recent Pap smear result: normal was obtained in 2015. Her most recent HR HPV screen was Negative obtained 2 year(s) ago. She does not have a history of KAMERON 2, 3 or cervical cancer. Past Medical History   Diagnosis Date    Abnormal Pap smear      FU with colpo, negative per patient    Cancer [facility])      nodular lymphocytic hodgkins lymphoma    Chest pain     Depression 2015    Encounter for IUD insertion 3/5/15     Mirena    Essential hypertension     Hypertension     Implanon removal 2013    Pap smear for cervical cancer screening 11/11/15     Negative    Trouble in sleeping      OB History    Para Term  AB SAB TAB Ectopic Multiple Living   5 3 3 0 2 1 1 0 0 3      # Outcome Date GA Lbr Osorio/2nd Weight Sex Delivery Anes PTL Lv   5 Term 04/10/14 38w4d  7 lb 15.7 oz (3.62 kg) F VAGINAL DELI EPIDURAL AN N Y   4 Term 13 38w0d 08:00 7 lb (3.175 kg) M VAGINAL DELI EPI N Y   3 Term 07 40w0d 12:00 6 lb 7 oz (2.92 kg) F VAGINAL DELI EPI N Y   2 TAB            1 SAB                 Past Surgical History   Procedure Laterality Date    Hx colposcopy       per pt WNL    Hx wisdom teeth extraction       Family History   Problem Relation Age of Onset    HIV/AIDS Mother     Hypertension Maternal Aunt     Hypertension Maternal Grandmother      Social History     Social History    Marital status:      Spouse name: N/A    Number of children: N/A    Years of education: N/A     Occupational History    Not on file.      Social History Main Topics    Smoking status: Never Smoker    Smokeless tobacco: Never Used    Alcohol use Yes      Comment: occassionally    Drug use: No    Sexual activity: Yes     Partners: Male     Birth control/ protection: None     Other Topics Concern    Not on file     Social History Narrative       Allergies   Allergen Reactions    Clonidine (Pf) Vertigo       Current Outpatient Prescriptions   Medication Sig    medroxyPROGESTERone (PROVERA) 10 mg tablet Take 1 Tab by mouth daily for 10 days. Take pregnancy test prior to taking provera as needed.  valACYclovir (VALTREX) 1 gram tablet Take 1 Tab by mouth three (3) times daily for 7 days. Indications: SUPPRESSION OF RECURRENT HERPES SIMPLEX INFECTION    Hwsqywmt4-Bmouop4-Zdtuc therm. 112.5 billion cell cap Take 1 Cap by mouth daily.  bisoprolol-hydroCHLOROthiazide (ZIAC) 5-6.25 mg per tablet Take 1 Tab by mouth daily.  ALPRAZolam (XANAX) 0.25 mg tablet Take  by mouth as needed for Anxiety. No current facility-administered medications for this visit.         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       Review of Systems - History obtained from the patient  Constitutional: negative for weight loss, fever, night sweats  HEENT: negative for hearing loss, earache, congestion, snoring, sorethroat  CV:see HPI  Resp: negative for cough, shortness of breath, wheezing  GI: negative for change in bowel habits, abdominal pain, black or bloody stools  : negative for frequency, dysuria, hematuria  GYN: see HPI  MSK: negative for back pain, joint pain, muscle pain  Breast: negative for breast lumps, nipple discharge, galactorrhea  Skin :negative for itching, rash, hives  Neuro: negative for dizziness, headache, confusion, weakness  Psych: negative for anxiety, depression, change in mood  Heme/lymph: negative for bleeding, bruising, pallor    Physical Exam  Visit Vitals    /77    Ht 5' 8\" (1.727 m)    Wt 234 lb (106.1 kg)    LMP 11/23/2016    BMI 35.58 kg/m2       Constitutional  · Appearance: well-nourished, well developed, alert, in no acute distress    HENT  · Head and Face: appears normal    Neck  · Inspection/Palpation: normal appearance, no masses or tenderness  · Lymph Nodes: no lymphadenopathy present  · Thyroid: gland size normal, nontender, no nodules or masses present on palpation    Chest  · Respiratory Effort: breathing labored  · Auscultation: normal breath sounds    Cardiovascular  · Heart:  · Auscultation: regular rate and rhythm without murmur    Breasts  · Inspection of Breasts: breasts symmetrical, no skin changes, no discharge present, nipple appearance normal, no skin retraction present  · Palpation of Breasts and Axillae: no masses present on palpation, no breast tenderness  · Axillary Lymph Nodes: no lymphadenopathy present    Gastrointestinal  · Abdominal Examination: abdomen non-tender to palpation, normal bowel sounds, no masses present  · Liver and spleen: no hepatomegaly present, spleen not palpable  · Hernias: no hernias identified    Genitourinary  · External Genitalia: normal appearance for age, no discharge present, no tenderness present, small area of ? Erythema left labia, NT, no masses present  · Vagina: normal vaginal vault without central or paravaginal defects, no discharge present, no inflammatory lesions present, no masses present  · Bladder: non-tender to palpation  · Urethra: appears normal  · Cervix: normal   · Uterus: normal size, shape and consistency  · Adnexa: no adnexal tenderness present, no adnexal masses present  · Perineum: perineum within normal limits, no evidence of trauma, no rashes or skin lesions present  · Anus: anus within normal limits, no hemorrhoids present  · Inguinal Lymph Nodes: no lymphadenopathy present    Skin  · General Inspection: no rash, no lesions identified    Neurologic/Psychiatric  · Mental Status:  · Orientation: grossly oriented to person, place and time  · Mood and Affect: mood normal, affect appropriate    Assessment:  36 y.o.   for well woman exam  Encounter Diagnoses   Name Primary?  Irregular menses     Encounter for gynecological examination (general) (routine) with abnormal findings Yes    Essential hypertension     Vulvar lesion     Decreased appetite     Palpitation        Plan:  The patient was counseled about diet, exercise, healthy lifestyle  We discussed self breast exam  We discussed safer sex practices, condom use and risk factors for sexually transmitted diseases. We discussed current pap smear and HR HPV testing guidelines. We recommend follow up one year for routine annual gynecologic exam or sooner prn  We recommend routine follow up with her primary care doctor for management of chronic medical problems and non-gynecologic concerns  Handouts were given to the patient  We discussed calcium/vitamin D/weight bearing exercise and osteoporosis prevention  We discussed progesterone only and non hormonal options for contraception including but not limited to condoms, IUDs, Nexplanon, and depo provera. Discussed risks, benefits and alternatives of OCP: including but not limited to dvt/pe/mi/cva/ca/gi risks.    Disc inc risks with OCP and HTN    Disc causes of cycle changes including radiation/medical conditions  Reviewed prior labs: tsh/vaginitis/hsv negative and prior negative serum STD tests  Provera h/o  Provera rx  RTC on menses, no unprotected intercourse x 2 wks prior  Disc hsv swab neg: disc option of serum testing vs retesting recurrent lesions  Rec PCP fu for palpitations/back pain/appetite changes or to ER if sx severe  Pt declines add'l lab work/std serum testing      Folllow up:  [x] return for annual well woman exam in one year or sooner if she is having problems  [] follow up and ultrasound  [] 6 months  [] 3 months  [] 6 weeks   [] 1 month    Orders Placed This Encounter    AMB POC URINE PREGNANCY TEST, VISUAL COLOR COMPARISON    medroxyPROGESTERone (PROVERA) 10 mg tablet       Results for orders placed or performed in visit on 01/24/17   AMB POC URINE PREGNANCY TEST, VISUAL COLOR COMPARISON   Result Value Ref Range    VALID INTERNAL CONTROL POC Yes     HCG urine, Ql. (POC) Negative Negative

## 2017-01-27 ENCOUNTER — OFFICE VISIT (OUTPATIENT)
Dept: CARDIOLOGY CLINIC | Age: 32
End: 2017-01-27

## 2017-01-27 ENCOUNTER — CLINICAL SUPPORT (OUTPATIENT)
Dept: CARDIOLOGY CLINIC | Age: 32
End: 2017-01-27

## 2017-01-27 VITALS
BODY MASS INDEX: 35.01 KG/M2 | RESPIRATION RATE: 20 BRPM | HEIGHT: 68 IN | SYSTOLIC BLOOD PRESSURE: 120 MMHG | OXYGEN SATURATION: 98 % | HEART RATE: 62 BPM | DIASTOLIC BLOOD PRESSURE: 84 MMHG | WEIGHT: 231 LBS

## 2017-01-27 DIAGNOSIS — R07.9 CHEST PAIN, UNSPECIFIED TYPE: ICD-10-CM

## 2017-01-27 DIAGNOSIS — R06.02 SHORTNESS OF BREATH: Primary | ICD-10-CM

## 2017-01-27 DIAGNOSIS — I10 ESSENTIAL HYPERTENSION: ICD-10-CM

## 2017-01-27 DIAGNOSIS — R00.2 PALPITATIONS: Primary | ICD-10-CM

## 2017-01-27 NOTE — MR AVS SNAPSHOT
Visit Information Date & Time Provider Department Dept. Phone Encounter #  
 1/27/2017 10:20 AM Zuleima Quan MD [facility] [phone] [id number] Your Appointments 2/13/2017  9:30 AM  
ROUTINE CARE with Vesta Silva MD  
[facility] ([facility]) Appt Note: Hypertension-letter mailed 2005 A [facility] [address] [facility]   
[phone]  
  
   
 [place] [address]  
  
    
 2/24/2017 10:00 AM  
ESTABLISHED PATIENT with Alka Loera MD  
[facility] at [address] [facility]) Appt Note: MIGUE Sanders surveillance [address] [facility], [address] [facility] [address] [facility]  
[phone]  
  
   
 [address] [place] [address], [address] [facility],[address]  
  
    
 2/2/2018 10:00 AM  
ESTABLISHED PATIENT with Zuleima Quan MD  
[facility] ([facility]) [address] [facility] [address] [facility]  
[address] [facility] [address] [address] Upcoming Health Maintenance Date Due Pneumococcal 19-64 Highest Risk (2 of 3 - PCV13) 12/15/2017 PAP AKA CERVICAL CYTOLOGY 11/11/2018 DTaP/Tdap/Td series (2 - Td) 4/12/2024 Allergies as of 1/27/2017  Review Complete On: 1/27/2017 By: Facundo Castro LPN Severity Noted Reaction Type Reactions Clonidine (Pf)  11/06/2013   Intolerance Vertigo Current Immunizations  Never Reviewed Name Date Pneumococcal Polysaccharide (PPSV-23) 12/15/2016 Tdap 4/12/2014 12:26 PM  
  
 Not reviewed this visit You Were Diagnosed With   
  
 Codes Comments Palpitations    -  Primary ICD-10-CM: R00.2 ICD-9-CM: 785.1 Essential hypertension     ICD-10-CM: I10 
ICD-9-CM: 401.9 Dizziness     ICD-10-CM: N24 ICD-9-CM: 780.4 Vitals BP Pulse Resp Height(growth percentile) Weight(growth percentile) LMP  
 120/84 (BP Patient Position: Lying left side) 62 20 5' 8\" (1.727 m) 231 lb (104.8 kg) 11/23/2016 SpO2 BMI OB Status Smoking Status 98% 35.12 kg/m2 Having regular periods Never Smoker Vitals History BMI and BSA Data Body Mass Index Body Surface Area  
 35.12 kg/m 2 2.24 m 2 Preferred Pharmacy Pharmacy Name Phone [address] [facility] [place] [phone] Your Updated Medication List  
  
   
This list is accurate as of: 1/27/17 10:22 AM.  Always use your most recent med list.  
  
  
  
  
 bisoprolol-hydroCHLOROthiazide 5-6.25 mg per tablet Commonly known as:  [facility] Take 1 Tab by mouth daily. Uwixlflo8-Cgznyg9-Dqzoq therm. 112.5 billion cell Cap Take 1 Cap by mouth daily. medroxyPROGESTERone 10 mg tablet Commonly known as:  PROVERA Take 1 Tab by mouth daily for 10 days. Take pregnancy test prior to taking provera as needed. XANAX 0.25 mg tablet Generic drug:  ALPRAZolam  
Take  by mouth as needed for Anxiety. Introducing [facility]! Dear Scott Macias: Thank you for requesting a [employer] account. Our records indicate that you already have an active [employer] account. You can access your account anytime at [email or web address]. [email or web address] Did you know that you can access your hospital and ER discharge instructions at any time in [employer]? You can also review all of your test results from your hospital stay or ER visit. Additional Information If you have questions, please visit the Frequently Asked Questions section of the [employer] website at [email or web address] Remember, [employer] is NOT to be used for urgent needs. For medical emergencies, dial 911. Now available from your iPhone and Android! Please provide this summary of care documentation to your next provider. Your primary care clinician is listed as Osman Voss. If you have any questions after today's visit, please call [phone].

## 2017-01-27 NOTE — PROGRESS NOTES
Cesario Russell MD    [address] [place], [address] [facility]    Office [phone],[facility] [phone]  Pager [phone]    Laney Herrera is a 36 y.o. 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 No chemotherapy. Obesity. Hypertension        Plan:     E cardio event monitor -12/2017 ;no significant arrhythmia. Aggressive cardiovascular risk factor modification. Follow-up in 1 year or earlier as needed    Patient understands the plan. All questions were answered to the patient's satisfaction. Medication Side Effects and Warnings were discussed with patient: yes  Patient Labs were reviewed and or requested:  yes  Patient Past Records were reviewed and or requested: yes    I appreciate the opportunity to be involved in Ms. Fung. See note below for details. Please do not hesitate to contact us with questions or concerns. Cesario Russell MD    Cardiac Testing/ Procedures: A. Cardiac Cath/PCI:    B.ECHO/BELINDA: 1/27/17 Left ventricle: Systolic function was normal. Ejection fraction was  estimated in the range of 60 % to 65 %. There were no regional wall motion  abnormalities. Tricuspid valve: There was mild regurgitation. Pulmonic valve: There was mild regurgitation.      9/2016Left ventricle: Systolic function was normal. Ejection fraction was  estimated to be 65 %. There were no regional wall motion abnormalities. Tricuspid valve: There was moderate regurgitation. Pericardium: A small pericardial effusion was identified posterior to the  heart. There was no evidence of hemodynamic compromise. C.StressNuclear/Stress ECHO/Stress test:    D.Vascular:    E. EP: 12/2016  Event monitor - SR/Sinus arrhythmia/Occ transient bradycardia ( 50bpm)    F. Miscellaneous:    Subjective:  Tahira Santoro is a 36 y.o. female who returns for follow up visit. Patient states that she continues to have inermittent episodes of \"fluttering sensation in her heart\". No dizziness, chest pain. No history of syncope. States that she is not on any treatment for her non-Hodgkin's lymphoma currently-in surveillance mode. .      ROS:  (bold if positive, if negative)             Medications before admission:    Current Outpatient Prescriptions   Medication Sig Dispense    medroxyPROGESTERone (PROVERA) 10 mg tablet Take 1 Tab by mouth daily for 10 days. Take pregnancy test prior to taking provera as needed. 10 Tab    Lallcyrw6-Jcqnjw1-Dofet therm. 112.5 billion cell cap Take 1 Cap by mouth daily. 30 Cap    bisoprolol-hydroCHLOROthiazide (ZIAC) 5-6.25 mg per tablet Take 1 Tab by mouth daily. 30 Tab    ALPRAZolam (XANAX) 0.25 mg tablet Take  by mouth as needed for Anxiety. No current facility-administered medications for this visit. Physical Exam:  Visit Vitals    /84 (BP Patient Position: Lying left side)    Pulse 62    Resp 20    Ht 5' 8\" (1.727 m)    Wt 231 lb (104.8 kg)    LMP 11/23/2016    SpO2 98%    BMI 35.12 kg/m2          Gen: Well-developed, well-nourished, in no acute distress  Neck: Supple,No JVD, No Carotid Bruit,   Resp: No accessory muscle use, Clear breath sounds, No rales or rhonchi  Card: Regular Rate,Rythm,Normal S1, S2, No murmurs, rubs or gallop. No thrills.    Abd:  Soft, non-tender, non-distended,BS+,   MSK: No cyanosis  Skin: No rashes Neuro: moving all four extremities , follows commands appropriately  Psych:  Good insight, oriented to person, place , alert, Nml Affect  LE: No edema    EKG:      LABS:        Lab Results   Component Value Date/Time    WBC 4.5 01/06/2017 02:18 PM    HGB 12.1 01/06/2017 02:18 PM    HCT 33.9 01/06/2017 02:18 PM    PLATELET 852 83/77/4976 02:18 PM    MCV 83 01/06/2017 02:18 PM    Hgb, External 12.3 02/07/2014    Hct, External 35.6 02/07/2014    Platelet cnt., External 272 02/07/2014     Lab Results   Component Value Date/Time    Sodium 141 01/06/2017 02:18 PM    Potassium 3.7 01/06/2017 02:18 PM    Chloride 105 01/06/2017 02:18 PM    CO2 21 01/06/2017 02:18 PM    Anion gap 12 11/11/2016 10:35 AM    Glucose 74 01/06/2017 02:18 PM    BUN 8 01/06/2017 02:18 PM    Creatinine 0.77 01/06/2017 02:18 PM    BUN/Creatinine ratio 10 01/06/2017 02:18 PM    GFR est  01/06/2017 02:18 PM    GFR est non- 01/06/2017 02:18 PM    Calcium 8.9 01/06/2017 02:18 PM       No results found for: APTT  No results found for: INR, PTMR, PTP, PT1, PT2  No components found for: Kellee Lopez MD

## 2017-02-03 ENCOUNTER — TELEPHONE (OUTPATIENT)
Dept: OBGYN CLINIC | Age: 32
End: 2017-02-03

## 2017-02-03 NOTE — TELEPHONE ENCOUNTER
36year old patient last seen in the office on 1/24/17. Patient calling to say that she completed the course of Provera 10 mg yesterday and has not had any cramping or bleeding. Patient advised that bleeding may not come immediately. Patient wondering how to proceed . Please advise.

## 2017-02-08 ENCOUNTER — OFFICE VISIT (OUTPATIENT)
Dept: FAMILY MEDICINE CLINIC | Age: 32
End: 2017-02-08

## 2017-02-08 VITALS
WEIGHT: 229 LBS | HEART RATE: 78 BPM | DIASTOLIC BLOOD PRESSURE: 75 MMHG | OXYGEN SATURATION: 97 % | HEIGHT: 68 IN | SYSTOLIC BLOOD PRESSURE: 123 MMHG | TEMPERATURE: 99.2 F | BODY MASS INDEX: 34.71 KG/M2 | RESPIRATION RATE: 16 BRPM

## 2017-02-08 DIAGNOSIS — J10.1 INFLUENZA A: Primary | ICD-10-CM

## 2017-02-08 DIAGNOSIS — R68.89 FLU-LIKE SYMPTOMS: ICD-10-CM

## 2017-02-08 LAB
QUICKVUE INFLUENZA TEST: POSITIVE
VALID INTERNAL CONTROL?: YES

## 2017-02-08 RX ORDER — OSELTAMIVIR PHOSPHATE 75 MG/1
75 CAPSULE ORAL 2 TIMES DAILY
Qty: 10 CAP | Refills: 0 | Status: SHIPPED | OUTPATIENT
Start: 2017-02-08 | End: 2017-02-13

## 2017-02-08 NOTE — LETTER
NOTIFICATION RETURN TO WORK  
 
2/8/2017 2:53 PM 
 
Ms. Castillo [facility] [address] To Whom It May Concern: 
 
Annamarie Neely is currently under the care of Marilia Quijano. She will return to work on: 2/13/2017. If there are questions or concerns please have the patient contact our office. Sincerely, Ariana Lu MD

## 2017-02-08 NOTE — MR AVS SNAPSHOT
Visit Information Date & Time Provider Department Dept. Phone Encounter #  
 2/8/2017  2:20 PM Melissa Dubon  [facility] [phone] [id number] Your Appointments 2/13/2017  9:30 AM  
ROUTINE CARE with Cassell Moritz, MD  
[address] [place] [address] ([address]) Appt Note: Hypertension-letter mailed 2005 [facility] [address] [place] [address]   
[phone]  
  
   
 [facility] [address]  
  
    
 2/24/2017 10:00 AM  
ESTABLISHED PATIENT with Esau Bustamante MD  
[facility] at [address] [facility] [address] [facility]) Appt Note: MIGUE Sanders surveillance [address] [facility], [address] [facility] [address] [facility]  
[phone]  
  
   
 [address] [facility], [address] [facility],4Th [facility]  
  
    
 2/2/2018 10:00 AM  
ESTABLISHED PATIENT with Moe Villa MD  
[facility] ([address]) 354 [facility] [address] [place] [address] Road  
54 [facility] [address] [address] Upcoming Health Maintenance Date Due Pneumococcal 19-64 Highest Risk (2 of 3 - PCV13) 12/15/2017 PAP AKA CERVICAL CYTOLOGY 11/11/2018 DTaP/Tdap/Td series (2 - Td) 4/12/2024 Allergies as of 2/8/2017  Review Complete On: 2/8/2017 By: Armando Wheeler LPN Severity Noted Reaction Type Reactions Clonidine (Pf)  11/06/2013   Intolerance Vertigo Current Immunizations  Never Reviewed Name Date Pneumococcal Polysaccharide (PPSV-23) 12/15/2016 Tdap 4/12/2014 12:26 PM  
  
 Not reviewed this visit You Were Diagnosed With   
  
 Codes Comments Influenza A    -  Primary ICD-10-CM: J10.1 ICD-9-CM: 757.1 Flu-like symptoms     ICD-10-CM: R68.89 ICD-9-CM: 780.99 Vitals BP Pulse Temp Resp Height(growth percentile) Weight(growth percentile) 123/75 (BP 1 Location: Left arm, BP Patient Position: Sitting) 78 99.2 °F (37.3 °C) (Oral) 16 5' 8\" (1.727 m) 229 lb (103.9 kg) SpO2 BMI OB Status Smoking Status 97% 34.82 kg/m2 Having regular periods Never Smoker Vitals History BMI and BSA Data Body Mass Index Body Surface Area 34.82 kg/m 2 2.23 m 2 Preferred Pharmacy Pharmacy Name Phone [address] [place], [place] - [address] -[phone] Your Updated Medication List  
  
   
This list is accurate as of: 2/8/17  2:55 PM.  Always use your most recent med list.  
  
  
  
  
 bisoprolol-hydroCHLOROthiazide 5-6.25 mg per tablet Commonly known as:  [facility] Take 1 Tab by mouth daily. Sfjiepsb0-Xssatv7-Crewc therm. 112.5 billion cell Cap Take 1 Cap by mouth daily. oseltamivir 75 mg capsule Commonly known as:  TAMIFLU Take 1 Cap by mouth two (2) times a day for 5 days. XANAX 0.25 mg tablet Generic drug:  ALPRAZolam  
Take  by mouth as needed for Anxiety. Prescriptions Sent to Pharmacy Refills  
 oseltamivir (TAMIFLU) 75 mg capsule 0 Sig: Take 1 Cap by mouth two (2) times a day for 5 days. Class: Normal  
 Pharmacy: [address] [place] [address] #: [phone] Route: Oral  
  
We Performed the Following AMB POC RAPID INFLUENZA TEST [18736 CPT(R)] Patient Instructions Influenza (Flu): Care Instructions Your Care Instructions Influenza (flu) is an infection in the lungs and breathing passages. It is caused by the influenza virus. There are different strains, or types, of the flu virus from year to year. Unlike the common cold, the flu comes on suddenly and the symptoms, such as a cough, congestion, fever, chills, fatigue, aches, and pains, are more severe. These symptoms may last up to 10 days. Although the flu can make you feel very sick, it usually doesn't cause serious health problems. Home treatment is usually all you need for flu symptoms. But your doctor may prescribe antiviral medicine to prevent other health problems, such as pneumonia, from developing. Older people and those who have a long-term health condition, such as lung disease, are most at risk for having pneumonia or other health problems. Follow-up care is a key part of your treatment and safety. Be sure to make and go to all appointments, and call your doctor if you are having problems. Its also a good idea to know your test results and keep a list of the medicines you take. How can you care for yourself at home? · Get plenty of rest. 
· Drink plenty of fluids, enough so that your urine is light yellow or clear like water. If you have kidney, heart, or liver disease and have to limit fluids, talk with your doctor before you increase the amount of fluids you drink. · Take an over-the-counter pain medicine if needed, such as acetaminophen (Tylenol), ibuprofen (Advil, Motrin), or naproxen (Aleve), to relieve fever, headache, and muscle aches. Read and follow all instructions on the label. No one younger than 20 should take aspirin. It has been linked to Reye syndrome, a serious illness. · Do not smoke. Smoking can make the flu worse. If you need help quitting, talk to your doctor about stop-smoking programs and medicines. These can increase your chances of quitting for good. · Breathe moist air from a hot shower or from a sink filled with hot water to help clear a stuffy nose. · Before you use cough and cold medicines, check the label. These medicines may not be safe for young children or for people with certain health problems. · If the skin around your nose and lips becomes sore, put some petroleum jelly on the area. · To ease coughing: ¨ Drink fluids to soothe a scratchy throat. ¨ Suck on cough drops or plain hard candy.  
¨ Take an over-the-counter cough medicine that contains dextromethorphan to help you get some sleep. Read and follow all instructions on the label. ¨ Raise your head at night with an extra pillow. This may help you rest if coughing keeps you awake. · Take any prescribed medicine exactly as directed. Call your doctor if you think you are having a problem with your medicine. To avoid spreading the flu · Wash your hands regularly, and keep your hands away from your face. · Stay home from school, work, and other public places until you are feeling better and your fever has been gone for at least 24 hours. The fever needs to have gone away on its own without the help of medicine. · Ask people living with you to talk to their doctors about preventing the flu. They may get antiviral medicine to keep from getting the flu from you. · To prevent the flu in the future, get a flu vaccine every fall. Encourage people living with you to get the vaccine. · Cover your mouth when you cough or sneeze. When should you call for help? Call 911 anytime you think you may need emergency care. For example, call if: 
· You have severe trouble breathing. Call your doctor now or seek immediate medical care if: 
· You have new or worse trouble breathing. · You seem to be getting much sicker. · You feel very sleepy or confused. · You have a new or higher fever. · You get a new rash. Watch closely for changes in your health, and be sure to contact your doctor if: 
· You begin to get better and then get worse. · You are not getting better after 1 week. Where can you learn more? Go to http://srini-jeny.info/. Enter W806 in the search box to learn more about \"Influenza (Flu): Care Instructions. \" Current as of: May 23, 2016 Content Version: 11.1 © 1459-0994 [employer]. Care instructions adapted under license by [employer] (which disclaims liability or warranty for this information).  If you have questions about a medical condition or this instruction, always ask your healthcare professional. [address] any warranty or liability for your use of this information. Introducing hospitals & HEALTH SERVICES! Dear Justin Tay: Thank you for requesting a [employer] account. Our records indicate that you already have an active [employer] account. You can access your account anytime at [email or web address] Did you know that you can access your hospital and ER discharge instructions at any time in [employer]? You can also review all of your test results from your hospital stay or ER visit. Additional Information If you have questions, please visit the Frequently Asked Questions section of the [employer] website at [email or web address] Remember, [employer] is NOT to be used for urgent needs. For medical emergencies, dial 911. Now available from your iPhone and Android! Please provide this summary of care documentation to your next provider. Your primary care clinician is listed as Nicolas Drake. If you have any questions after today's visit, please call [phone].

## 2017-02-08 NOTE — PATIENT INSTRUCTIONS
Influenza (Flu): Care Instructions  Your Care Instructions  Influenza (flu) is an infection in the lungs and breathing passages. It is caused by the influenza virus. There are different strains, or types, of the flu virus from year to year. Unlike the common cold, the flu comes on suddenly and the symptoms, such as a cough, congestion, fever, chills, fatigue, aches, and pains, are more severe. These symptoms may last up to 10 days. Although the flu can make you feel very sick, it usually doesn't cause serious health problems. Home treatment is usually all you need for flu symptoms. But your doctor may prescribe antiviral medicine to prevent other health problems, such as pneumonia, from developing. Older people and those who have a long-term health condition, such as lung disease, are most at risk for having pneumonia or other health problems. Follow-up care is a key part of your treatment and safety. Be sure to make and go to all appointments, and call your doctor if you are having problems. Its also a good idea to know your test results and keep a list of the medicines you take. How can you care for yourself at home? · Get plenty of rest.  · Drink plenty of fluids, enough so that your urine is light yellow or clear like water. If you have kidney, heart, or liver disease and have to limit fluids, talk with your doctor before you increase the amount of fluids you drink. · Take an over-the-counter pain medicine if needed, such as acetaminophen (Tylenol), ibuprofen (Advil, Motrin), or naproxen (Aleve), to relieve fever, headache, and muscle aches. Read and follow all instructions on the label. No one younger than 20 should take aspirin. It has been linked to Reye syndrome, a serious illness. · Do not smoke. Smoking can make the flu worse. If you need help quitting, talk to your doctor about stop-smoking programs and medicines. These can increase your chances of quitting for good.   · Breathe moist air from a hot shower or from a sink filled with hot water to help clear a stuffy nose. · Before you use cough and cold medicines, check the label. These medicines may not be safe for young children or for people with certain health problems. · If the skin around your nose and lips becomes sore, put some petroleum jelly on the area. · To ease coughing:  ¨ Drink fluids to soothe a scratchy throat. ¨ Suck on cough drops or plain hard candy. ¨ Take an over-the-counter cough medicine that contains dextromethorphan to help you get some sleep. Read and follow all instructions on the label. ¨ Raise your head at night with an extra pillow. This may help you rest if coughing keeps you awake. · Take any prescribed medicine exactly as directed. Call your doctor if you think you are having a problem with your medicine. To avoid spreading the flu  · Wash your hands regularly, and keep your hands away from your face. · Stay home from school, work, and other public places until you are feeling better and your fever has been gone for at least 24 hours. The fever needs to have gone away on its own without the help of medicine. · Ask people living with you to talk to their doctors about preventing the flu. They may get antiviral medicine to keep from getting the flu from you. · To prevent the flu in the future, get a flu vaccine every fall. Encourage people living with you to get the vaccine. · Cover your mouth when you cough or sneeze. When should you call for help? Call 911 anytime you think you may need emergency care. For example, call if:  · You have severe trouble breathing. Call your doctor now or seek immediate medical care if:  · You have new or worse trouble breathing. · You seem to be getting much sicker. · You feel very sleepy or confused. · You have a new or higher fever. · You get a new rash.   Watch closely for changes in your health, and be sure to contact your doctor if:  · You begin to get better and then get worse. · You are not getting better after 1 week. Where can you learn more? Go to http://srini-jeny.info/. Enter E196 in the search box to learn more about \"Influenza (Flu): Care Instructions. \"  Current as of: May 23, 2016  Content Version: 11.1  © 8391-6093 [employer]. Care instructions adapted under license by [employer] (which disclaims liability or warranty for this information). If you have questions about a medical condition or this instruction, always ask your healthcare professional. [address] any warranty or liability for your use of this information.

## 2017-02-08 NOTE — PROGRESS NOTES
Reviewed record in preparation for visit and have necessary documentation  Goals that were addressed and/or need to be completed after this appointment include   There are no preventive care reminders to display for this patient.

## 2017-02-09 ENCOUNTER — OFFICE VISIT (OUTPATIENT)
Dept: OBGYN CLINIC | Age: 32
End: 2017-02-09

## 2017-02-09 VITALS
DIASTOLIC BLOOD PRESSURE: 70 MMHG | BODY MASS INDEX: 33.95 KG/M2 | WEIGHT: 224 LBS | SYSTOLIC BLOOD PRESSURE: 120 MMHG | HEIGHT: 68 IN

## 2017-02-09 DIAGNOSIS — Z32.02 NEGATIVE PREGNANCY TEST: ICD-10-CM

## 2017-02-09 DIAGNOSIS — Z30.017 NEXPLANON INSERTION: Primary | ICD-10-CM

## 2017-02-09 LAB
HCG URINE, QL. (POC): NEGATIVE
VALID INTERNAL CONTROL?: YES

## 2017-02-09 NOTE — PROGRESS NOTES
[facility]  OFFICE PROCEDURE PROGRESS NOTE        Chart reviewed for the following:   Popeye SIDDIQI MD, have reviewed the History, Physical and updated the Allergic reactions for Tamme 63 performed immediately prior to start of procedure:   Popeye SIDDIQI MD, have performed the following reviews on [address] [place] [address] prior to the start of the procedure:            * Patient was identified by name and date of birth   * Agreement on procedure being performed was verified  * Risks and Benefits explained to the patient  * Procedure site verified and marked as necessary  * Patient was positioned for comfort  * Consent was signed and verified     Time: 1:15pm      Date of procedure: 2017    Procedure performed by: Popeye Dumont MD    Provider assisted by: Harris Hooker MA    Patient assisted by: self    How tolerated by patient: tolerated the procedure well with no complications    Post Procedural Pain Scale: 0 - No Hurt    Comments: none      Procedure note: Nexplanon insertion    [address] [place] [address] is a ,  36 y.o. female [address] whose Patient's last menstrual period was 2017 (exact date). was on 2017 presents for office insertion of an [address] sub-dermal contraceptive implant. She has had an opportunity to read the [address] \"Patient Labeling and Consent Form\". We counseled Herbertcarol about the insertion and removal procedures as well as potential side-effects, benefits and risks. She state she had no further questions and signed the consent form. She has been using none to the present time. She confirmed that she has no allergies to Betadine or Xylocaine. She reclined on the examination table in the supine position with her left arm flexed at the elbow and externally rotated.  The insertion site was identified and marked approximately 6-8 cm proximal to the elbow crease at the inner side of the upper arm overlying the groove between the biceps and triceps muscles. A second natalie was placed 6-8 cm above the first natalie. The insertion site was cleansed with Betadine antiseptic. Approximately 5 ml of 2% xylocaine without epinephrine were injected just under the skin along the planned insertion canal in the LEFT ARM. The NEXPLANON sterile applicator was carefully removed from its blister pack and kept sterile. I removed the needle cap. I visually verified the presence of NEXPLANON inside the needle tip. The skin at the insertion site was then stretched by my thumb and index finger. I then inserted the needle tip through the skin at the appropriate angle to the skin surface, just until the skin has been penetrated. The needle was gently inserted to its full length. The slider was then pushed all the way and then released. I then removed the needle and palpated the implant in the appropriate location. The patient also palpated the implant in place. Both Ena and I were able to confirm the presence of the Dinora Shy in its subdermal location by palpation. I placed a small adhesive bandage over the insertion site then wrapped a pressure bandage with sterile gauze. The patient User Card and Patient Chart Label were filled in. She was given the User Card for her records after explaining it to her in detail. I stressed to her that she must have the Dinora Shy removed before three years from today's date. She was then given the Personal Calendar (Bleeding Diary) with instructions in it's use. The patient received Nexplanon lot number [id number] - [id number] and EXP: 03/2019.

## 2017-02-09 NOTE — PATIENT INSTRUCTIONS
Implant for Birth Control: Care Instructions  Your Care Instructions    The implant is used to prevent pregnancy. It's a thin kasi about the size of a matchstick that is inserted under the skin (subdermal) on the inside of your arm. The implant prevents pregnancy for 3 years. After it is put in, you don't have to do anything else to prevent pregnancy. Follow-up care is a key part of your treatment and safety. Be sure to make and go to all appointments, and call your doctor if you are having problems. It's also a good idea to know your test results and keep a list of the medicines you take. How can you care for yourself at home? How do you use the subdermal implant? · The implant is put in and taken out by your doctor or another trained health professional. This is done in your doctor's office. It only takes a few minutes. · Ask your doctor if you need to use backup birth control, such as a condom. And ask if (and how long) you should avoid intercourse after you get the implant. You may need to do this, depending on where you are in your cycle. What else do you need to know? · The implant has side effects. ¨ You may have changes in your period. Your period may stop. You may also have spotting or bleeding between periods. ¨ You may have mood changes, less interest in sex, or weight gain. · Remember that 3 years after you receive the implant, you must have it removed or get a new one. ¨ If you don't replace the implant and don't use another form of birth control, you could get pregnant. ¨ If you have the implant removed, you'll have to find another method of birth control. If you don't, you may get pregnant. ¨ Even if you are planning to get pregnant, you have to have the implant removed. · Check with your doctor before you use any other medicines. This includes over-the-counter medicines, vitamins, herbal products, and supplements.  Birth control hormones may not work as well to prevent pregnancy when combined with other medicines. · The implant doesn't protect against sexually transmitted infections (STIs), such as herpes or HIV/AIDS. If you're not sure if your sex partner might have an STI, use a condom to protect against infection. When should you call for help? Call your doctor now or seek immediate medical care if:  · You have severe belly pain. Watch closely for changes in your health, and be sure to contact your doctor if:  · You think you might be pregnant. · You have any problems with your birth control method. · You think you may be depressed. · You regularly have spotting. · You think you may have been exposed to or have a sexually transmitted infection. Where can you learn more? Go to http://srini-jeny.info/. Enter M076 in the search box to learn more about \"Implant for Birth Control: Care Instructions. \"  Current as of: May 30, 2016  Content Version: 11.1  © 3687-0151 [employer]. Care instructions adapted under license by [employer] (which disclaims liability or warranty for this information). If you have questions about a medical condition or this instruction, always ask your healthcare professional. [address] any warranty or liability for your use of this information.

## 2017-02-13 ENCOUNTER — OFFICE VISIT (OUTPATIENT)
Dept: FAMILY MEDICINE CLINIC | Age: 32
End: 2017-02-13

## 2017-02-13 VITALS
TEMPERATURE: 97.4 F | WEIGHT: 226 LBS | HEART RATE: 58 BPM | RESPIRATION RATE: 16 BRPM | DIASTOLIC BLOOD PRESSURE: 59 MMHG | HEIGHT: 68 IN | OXYGEN SATURATION: 98 % | SYSTOLIC BLOOD PRESSURE: 111 MMHG | BODY MASS INDEX: 34.25 KG/M2

## 2017-02-13 DIAGNOSIS — L20.9 ATOPIC DERMATITIS, UNSPECIFIED TYPE: ICD-10-CM

## 2017-02-13 DIAGNOSIS — I10 ESSENTIAL HYPERTENSION: Primary | ICD-10-CM

## 2017-02-13 NOTE — PROGRESS NOTES
Reviewed record in preparation for visit and have obtained necessary documentation. Patient did not bring medications to visit for review. Information provided on Advanced Directive, Living Will. Body mass index is 34.36 kg/(m^2). There are no preventive care reminders to display for this patient.

## 2017-02-13 NOTE — MR AVS SNAPSHOT
Visit Information Date & Time Provider Department Dept. Phone Encounter #  
 2/13/2017  9:30 AM Lu Guerra MD [address] [place] [address] [id number] Follow-up Instructions Return in about 6 months (around 8/13/2017) for f/u HTN. Your Appointments 2/24/2017 10:00 AM  
ESTABLISHED PATIENT with Nathan Velázquez MD  
[facility] at [address] [facility] [address] [facility]) Appt Note: MIGUE Sanders surveillance [address] [facility], [address] [facility] [address] [facility]  
[phone]  
  
   
 [address] [facility], [address] [facility],[address]  
  
    
 2/2/2018 10:00 AM  
ESTABLISHED PATIENT with Cesar Cain MD  
[facility] ([address]) [address] [facility] 600 [address] [facility]  
54 [facility] [address] [address] Upcoming Health Maintenance Date Due Pneumococcal 19-64 Highest Risk (2 of 3 - PCV13) 12/15/2017 PAP AKA CERVICAL CYTOLOGY 11/11/2018 DTaP/Tdap/Td series (2 - Td) 4/12/2024 Allergies as of 2/13/2017  Review Complete On: 2/13/2017 By: Bal Hensley LPN Severity Noted Reaction Type Reactions Clonidine (Pf)  11/06/2013   Intolerance Vertigo Current Immunizations  Never Reviewed Name Date Pneumococcal Polysaccharide (PPSV-23) 12/15/2016 Tdap 4/12/2014 12:26 PM  
  
 Not reviewed this visit Vitals BP Pulse Temp Resp Height(growth percentile) Weight(growth percentile) 111/59 (BP 1 Location: Right arm, BP Patient Position: Sitting) (!) 58 97.4 °F (36.3 °C) (Oral) 16 5' 8\" (1.727 m) 226 lb (102.5 kg) LMP SpO2 BMI OB Status Smoking Status 02/04/2017 (Exact Date) 98% 34.36 kg/m2 Having regular periods Never Smoker Vitals History BMI and BSA Data Body Mass Index Body Surface Area  
 34.36 kg/m 2 2.22 m 2 Preferred Pharmacy Pharmacy Name Phone [address] [facility] [address] [place] [address] [address]  [phone] Your Updated Medication List  
  
   
This list is accurate as of: 2/13/17 10:37 AM.  Always use your most recent med list.  
  
  
  
  
 bisoprolol-hydroCHLOROthiazide 5-6.25 mg per tablet Commonly known as:  [facility] Take 1 Tab by mouth daily. Iduqjlce1-Icaaqx0-Ygpfn therm. 112.5 billion cell Cap Take 1 Cap by mouth daily. oseltamivir 75 mg capsule Commonly known as:  TAMIFLU Take 1 Cap by mouth two (2) times a day for 5 days. XANAX 0.25 mg tablet Generic drug:  ALPRAZolam  
Take  by mouth as needed for Anxiety. Follow-up Instructions Return in about 6 months (around 8/13/2017) for f/u HTN. Patient Instructions Hydrocortisone 1% cream. Apply to area of rash twice a day. High Blood Pressure: Care Instructions Your Care Instructions If your blood pressure is usually above 140/90, you have high blood pressure, or hypertension. That means the top number is 140 or higher or the bottom number is 90 or higher, or both. Despite what a lot of people think, high blood pressure usually doesn't cause headaches or make you feel dizzy or lightheaded. It usually has no symptoms. But it does increase your risk for heart attack, stroke, and kidney or eye damage. The higher your blood pressure, the more your risk increases. Your doctor will give you a goal for your blood pressure. Your goal will be based on your health and your age. An example of a goal is to keep your blood pressure below 140/90. Lifestyle changes, such as eating healthy and being active, are always important to help lower blood pressure. You might also take medicine to reach your blood pressure goal. 
Follow-up care is a key part of your treatment and safety.  Be sure to make and go to all appointments, and call your doctor if you are having problems. It's also a good idea to know your test results and keep a list of the medicines you take. How can you care for yourself at home? Medical treatment · If you stop taking your medicine, your blood pressure will go back up. You may take one or more types of medicine to lower your blood pressure. Be safe with medicines. Take your medicine exactly as prescribed. Call your doctor if you think you are having a problem with your medicine. · Talk to your doctor before you start taking aspirin every day. Aspirin can help certain people lower their risk of a heart attack or stroke. But taking aspirin isn't right for everyone, because it can cause serious bleeding. · See your doctor regularly. You may need to see the doctor more often at first or until your blood pressure comes down. · If you are taking blood pressure medicine, talk to your doctor before you take decongestants or anti-inflammatory medicine, such as ibuprofen. Some of these medicines can raise blood pressure. · Learn how to check your blood pressure at home. Lifestyle changes · Stay at a healthy weight. This is especially important if you put on weight around the waist. Losing even 10 pounds can help you lower your blood pressure. · If your doctor recommends it, get more exercise. Walking is a good choice. Bit by bit, increase the amount you walk every day. Try for at least 30 minutes on most days of the week. You also may want to swim, bike, or do other activities. · Avoid or limit alcohol. Talk to your doctor about whether you can drink any alcohol. · Try to limit how much sodium you eat to less than 2,300 milligrams (mg) a day. Your doctor may ask you to try to eat less than 1,500 mg a day. · Eat plenty of fruits (such as bananas and oranges), vegetables, legumes, whole grains, and low-fat dairy products. · Lower the amount of saturated fat in your diet.  Saturated fat is found in animal products such as milk, cheese, and meat. Limiting these foods may help you lose weight and also lower your risk for heart disease. · Do not smoke. Smoking increases your risk for heart attack and stroke. If you need help quitting, talk to your doctor about stop-smoking programs and medicines. These can increase your chances of quitting for good. When should you call for help? Call 911 anytime you think you may need emergency care. This may mean having symptoms that suggest that your blood pressure is causing a serious heart or blood vessel problem. Your blood pressure may be over 180/110. For example, call 911 if: 
· You have symptoms of a heart attack. These may include: ¨ Chest pain or pressure, or a strange feeling in the chest. 
¨ Sweating. ¨ Shortness of breath. ¨ Nausea or vomiting. ¨ Pain, pressure, or a strange feeling in the back, neck, jaw, or upper belly or in one or both shoulders or arms. ¨ Lightheadedness or sudden weakness. ¨ A fast or irregular heartbeat. · You have symptoms of a stroke. These may include: 
¨ Sudden numbness, tingling, weakness, or loss of movement in your face, arm, or leg, especially on only one side of your body. ¨ Sudden vision changes. ¨ Sudden trouble speaking. ¨ Sudden confusion or trouble understanding simple statements. ¨ Sudden problems with walking or balance. ¨ A sudden, severe headache that is different from past headaches. · You have severe back or belly pain. Do not wait until your blood pressure comes down on its own. Get help right away. Call your doctor now or seek immediate care if: 
· Your blood pressure is much higher than normal (such as 180/110 or higher), but you don't have symptoms. · You think high blood pressure is causing symptoms, such as: ¨ Severe headache. ¨ Blurry vision. Watch closely for changes in your health, and be sure to contact your doctor if: · Your blood pressure measures 140/90 or higher at least 2 times. That means the top number is 140 or higher or the bottom number is 90 or higher, or both. · You think you may be having side effects from your blood pressure medicine. · Your blood pressure is usually normal, but it goes above normal at least 2 times. Where can you learn more? Go to http://srini-jeny.info/. Enter B882 in the search box to learn more about \"High Blood Pressure: Care Instructions. \" Current as of: August 8, 2016 Content Version: 11.1 © 9622-1751 [employer]. Care instructions adapted under license by [employer] (which disclaims liability or warranty for this information). If you have questions about a medical condition or this instruction, always ask your healthcare professional. [address] any warranty or liability for your use of this information. Introducing [facility]! Deam[employer]i Acostaing: Thank you for requesting a Sensitive Object account. Our records indicate that you already have an active Sensitive Object account. You can access your account anytime at [email or web address] Did you know that you can access your hospital and ER discharge instructions at any time in Sensitive Object? You can also review all of your test results from your hospital stay or ER visit. Additional Information If you have questions, please visit the Frequently Asked Questions section of the Sensitive Object website at [email or web address] Remember, Sensitive Object is NOT to be used for urgent needs. For medical emergencies, dial 911. Now available from your iPhone and Android! Please provide this summary of care documentation to your next provider. Your primary care clinician is listed as Марина Garrett. If you have any questions after today's visit, please call [phone].

## 2017-02-13 NOTE — PROGRESS NOTES
CC: f/u HTN    HPI: Pt is a 29 y.o. female who presents for f/u HTN. She is doing well with no concerns. She was diagnosed with the flu last week but is feeling much better. She has noticed a dry, mildly red rash on her right shoulder. It is not itchy or painful. HTN:  Checking BPs at home?: NO  Adding salt to foods?: NO  Headaches?: NO  Blurry vision?: NO  Lower extremity edema?: NO  Smoking?: NO      Past Medical History   Diagnosis Date    Abnormal Pap smear 2010     FU with colpo, negative per patient    Cancer ([facility] [address]      nodular lymphocytic hodgkins lymphoma    Chest pain     Depression 5/12/2015    Encounter for IUD insertion 3/5/15     Mirena    Essential hypertension     Hypertension     Implanon removal 9/6/2013    Pap smear for cervical cancer screening 11/11/15     Negative    Trouble in sleeping        Family History   Problem Relation Age of Onset    HIV/AIDS Mother     Hypertension Maternal Aunt     Hypertension Maternal Grandmother        Social History   Substance Use Topics    Smoking status: Never Smoker    Smokeless tobacco: Never Used    Alcohol use Yes      Comment: occassionally       ROS:  Positive only when bolded  Constitutional: HA  Eyes: Changes in vision  Respiratory: SOB, wheezing, cough  Cardiovascular: CP, palpitations  Hematologic/lymphatic: BOBBY    PE:  Visit Vitals    /59 (BP 1 Location: Right arm, BP Patient Position: Sitting)    Pulse (!) 58    Temp 97.4 °F (36.3 °C) (Oral)    Resp 16    Ht 5' 8\" (1.727 m)    Wt 226 lb (102.5 kg)    LMP 02/04/2017 (Exact Date)    SpO2 98%    BMI 34.36 kg/m2     Gen: Pt sitting in chair, in NAD  Head: Normocephalic, atraumatic  Eyes: Sclera anicteric, EOM grossly intact, PERRL  Throat: MMM, normal lips, tongue, teeth and gums  Neck: Supple, no thyromegaly or carotid bruits  CVS: Normal S1, S2, no m/r/g  Resp: CTAB, no wheezes or rales  Extrem: Atraumatic, no cyanosis or edema  Pulses: 2+   Skin: Warm, dry.  2x7cm streak of mildly erythematous macules and dry skin over right shoulder  Neuro: Alert, oriented, appropriate      A/P: Pt is a 29 y.o. female who presents for f/u HTN and rash, likely atopic dermatitis of shoulder  - Hydrocortisone cream for shoulder  - Will skip labs for now as getting CMP for Dr. Loraine Mccullough and lipid panel in August looked good. Will plan to recheck in 6 months  - RTC in 6 months for f/u chronic conditions or sooner prn      Discussed diagnoses in detail with patient. Medication risks/benefits/side effects discussed with patient. All of the patient's questions were addressed. The patient understands and agrees with our plan of care. The patient knows to call back if they are unsure of or forget any changes we discussed today or if the symptoms change. The patient received an After-Visit Summary which contains VS, orders, medication list and allergy list. This can be used as a \"mini-medical record\" should they have to seek medical care while out of town. Current Outpatient Prescriptions on File Prior to Visit   Medication Sig Dispense Refill    Lfdmjmlp4-Muxkqa8-Obfyj therm. 112.5 billion cell cap Take 1 Cap by mouth daily. 30 Cap 6    bisoprolol-hydroCHLOROthiazide (ZIAC) 5-6.25 mg per tablet Take 1 Tab by mouth daily. 30 Tab 3    oseltamivir (TAMIFLU) 75 mg capsule Take 1 Cap by mouth two (2) times a day for 5 days. 10 Cap 0    ALPRAZolam (XANAX) 0.25 mg tablet Take  by mouth as needed for Anxiety. No current facility-administered medications on file prior to visit.

## 2017-02-13 NOTE — PATIENT INSTRUCTIONS
Hydrocortisone 1% cream. Apply to area of rash twice a day. High Blood Pressure: Care Instructions  Your Care Instructions  If your blood pressure is usually above 140/90, you have high blood pressure, or hypertension. That means the top number is 140 or higher or the bottom number is 90 or higher, or both. Despite what a lot of people think, high blood pressure usually doesn't cause headaches or make you feel dizzy or lightheaded. It usually has no symptoms. But it does increase your risk for heart attack, stroke, and kidney or eye damage. The higher your blood pressure, the more your risk increases. Your doctor will give you a goal for your blood pressure. Your goal will be based on your health and your age. An example of a goal is to keep your blood pressure below 140/90. Lifestyle changes, such as eating healthy and being active, are always important to help lower blood pressure. You might also take medicine to reach your blood pressure goal.  Follow-up care is a key part of your treatment and safety. Be sure to make and go to all appointments, and call your doctor if you are having problems. It's also a good idea to know your test results and keep a list of the medicines you take. How can you care for yourself at home? Medical treatment  · If you stop taking your medicine, your blood pressure will go back up. You may take one or more types of medicine to lower your blood pressure. Be safe with medicines. Take your medicine exactly as prescribed. Call your doctor if you think you are having a problem with your medicine. · Talk to your doctor before you start taking aspirin every day. Aspirin can help certain people lower their risk of a heart attack or stroke. But taking aspirin isn't right for everyone, because it can cause serious bleeding. · See your doctor regularly. You may need to see the doctor more often at first or until your blood pressure comes down.   · If you are taking blood pressure medicine, talk to your doctor before you take decongestants or anti-inflammatory medicine, such as ibuprofen. Some of these medicines can raise blood pressure. · Learn how to check your blood pressure at home. Lifestyle changes  · Stay at a healthy weight. This is especially important if you put on weight around the waist. Losing even 10 pounds can help you lower your blood pressure. · If your doctor recommends it, get more exercise. Walking is a good choice. Bit by bit, increase the amount you walk every day. Try for at least 30 minutes on most days of the week. You also may want to swim, bike, or do other activities. · Avoid or limit alcohol. Talk to your doctor about whether you can drink any alcohol. · Try to limit how much sodium you eat to less than 2,300 milligrams (mg) a day. Your doctor may ask you to try to eat less than 1,500 mg a day. · Eat plenty of fruits (such as bananas and oranges), vegetables, legumes, whole grains, and low-fat dairy products. · Lower the amount of saturated fat in your diet. Saturated fat is found in animal products such as milk, cheese, and meat. Limiting these foods may help you lose weight and also lower your risk for heart disease. · Do not smoke. Smoking increases your risk for heart attack and stroke. If you need help quitting, talk to your doctor about stop-smoking programs and medicines. These can increase your chances of quitting for good. When should you call for help? Call 911 anytime you think you may need emergency care. This may mean having symptoms that suggest that your blood pressure is causing a serious heart or blood vessel problem. Your blood pressure may be over 180/110. For example, call 911 if:  · You have symptoms of a heart attack. These may include:  ¨ Chest pain or pressure, or a strange feeling in the chest.  ¨ Sweating. ¨ Shortness of breath. ¨ Nausea or vomiting.   ¨ Pain, pressure, or a strange feeling in the back, neck, jaw, or upper belly or in one or both shoulders or arms. ¨ Lightheadedness or sudden weakness. ¨ A fast or irregular heartbeat. · You have symptoms of a stroke. These may include:  ¨ Sudden numbness, tingling, weakness, or loss of movement in your face, arm, or leg, especially on only one side of your body. ¨ Sudden vision changes. ¨ Sudden trouble speaking. ¨ Sudden confusion or trouble understanding simple statements. ¨ Sudden problems with walking or balance. ¨ A sudden, severe headache that is different from past headaches. · You have severe back or belly pain. Do not wait until your blood pressure comes down on its own. Get help right away. Call your doctor now or seek immediate care if:  · Your blood pressure is much higher than normal (such as 180/110 or higher), but you don't have symptoms. · You think high blood pressure is causing symptoms, such as:  ¨ Severe headache. ¨ Blurry vision. Watch closely for changes in your health, and be sure to contact your doctor if:  · Your blood pressure measures 140/90 or higher at least 2 times. That means the top number is 140 or higher or the bottom number is 90 or higher, or both. · You think you may be having side effects from your blood pressure medicine. · Your blood pressure is usually normal, but it goes above normal at least 2 times. Where can you learn more? Go to http://srini-jeny.info/. Enter N867 in the search box to learn more about \"High Blood Pressure: Care Instructions. \"  Current as of: August 8, 2016  Content Version: 11.1  © 7351-8631 [employer]. Care instructions adapted under license by [employer] (which disclaims liability or warranty for this information). If you have questions about a medical condition or this instruction, always ask your healthcare professional. [address] any warranty or liability for your use of this information.

## 2017-02-24 ENCOUNTER — OFFICE VISIT (OUTPATIENT)
Dept: ONCOLOGY | Age: 32
End: 2017-02-24

## 2017-02-24 VITALS
OXYGEN SATURATION: 99 % | BODY MASS INDEX: 33.83 KG/M2 | TEMPERATURE: 98.4 F | HEIGHT: 68 IN | SYSTOLIC BLOOD PRESSURE: 120 MMHG | DIASTOLIC BLOOD PRESSURE: 56 MMHG | WEIGHT: 223.2 LBS | HEART RATE: 66 BPM

## 2017-02-24 DIAGNOSIS — D72.819 LEUKOPENIA, UNSPECIFIED TYPE: ICD-10-CM

## 2017-02-24 DIAGNOSIS — C81.01 NODULAR LYMPHOCYTE PREDOMINANT HODGKIN LYMPHOMA OF LYMPH NODES OF NECK (HCC): Primary | ICD-10-CM

## 2017-02-24 DIAGNOSIS — R53.83 FATIGUE, UNSPECIFIED TYPE: ICD-10-CM

## 2017-02-24 DIAGNOSIS — J11.1 INFLUENZA: ICD-10-CM

## 2017-02-24 NOTE — PROGRESS NOTES
[address] [facility] at [address] [place] [address]  [phone]    Hematology / Oncology Followup    Reason for Visit:   Osvaldo Olivo is a 29 y.o. female who is seen for follow up of lymphoma. Treatment History:   · US neck 5/26/2016: 2.5cm right posterior auricular mass  · US guided biopsy cervical node 6/30/2016: suspicious for large b-cell lymphoma  · CT Neck/C/A/P 7/25/2016: Enlarged right level 5 lymph node measuring 2 x 1 cm and slightly inferior to this there is a slightly enlarged lymph node measuring 1.2 x 0.8 cm. There is a slightly enlarged right level 2 lymph node measuring 1.3 x 0.9 cm. There is a slightly enlarged left level 2 lymph node measuring 1.7 x 1.1 cm. Lymph nodes more inferior at level 3 and level 4 are  normal in size. No supraclavicular adenopathy is identified. No adenopathy in chest, abdomen, or pelvis  · PET-CT 7/27/2016: No areas of hypermetabolic activity  · Excisional biopsy of right level 2 cervical node by Dr. 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 Aden Crandall completed 10/10/2016    History of Present Illness:   Earlier this month she had influenza, treated with tamiflu. Most symptoms have improved, but still some myalgias and cough. No fevers. No night sweats. No adenopathy. PAST HISTORY: The following sections were reviewed and updated in the EMR as appropriate: PMH, SH, FH, Medications, Allergies. Allergies   Allergen Reactions    Clonidine (Pf) Vertigo      Review of Systems: A complete review of systems was obtained, reviewed, and scanned into the EMR. Pertinent findings reviewed above.     Physical Exam:     Visit Vitals    /56 (BP 1 Location: Right arm, BP Patient Position: Sitting)    Pulse 66    Temp 98.4 °F (36.9 °C) (Oral)    Ht 5' 8\" (1.727 m)    Wt 223 lb 3.2 oz (101.2 kg)    LMP 02/09/2017    SpO2 99%  BMI 33.94 kg/m2     ECOG PS: 0  General: No distress  Eyes: PERRLA, anicteric sclerae  HENT: Atraumatic, OP clear  Neck: Supple  Lymphatic: No palpable cervical, supraclavicular, or inguinal adenopathy. Respiratory: CTAB, normal respiratory effort  CV: Normal rate, regular rhythm, no murmurs, no peripheral edema  GI: Soft, nontender, nondistended, no masses, no hepatomegaly, no splenomegaly  MS: Normal gait and station. Digits without clubbing or cyanosis. Skin: No rashes, ecchymoses, or petechiae. Normal temperature, turgor, and texture. Psych: Alert, oriented, appropriate affect, normal judgment/insight    Results:     Lab Results   Component Value Date/Time    WBC 3.1 02/13/2017 10:56 AM    HGB 12.5 02/13/2017 10:56 AM    HCT 36.2 02/13/2017 10:56 AM    PLATELET 965 61/78/0167 10:56 AM    MCV 86 02/13/2017 10:56 AM    ABS. NEUTROPHILS 1.9 02/13/2017 10:56 AM    Hgb, External 12.3 02/07/2014    Hct, External 35.6 02/07/2014    Platelet cnt., External 272 02/07/2014     Lab Results   Component Value Date/Time    Sodium 141 02/13/2017 10:56 AM    Potassium 4.0 02/13/2017 10:56 AM    Chloride 104 02/13/2017 10:56 AM    CO2 24 02/13/2017 10:56 AM    Glucose 75 02/13/2017 10:56 AM    BUN 11 02/13/2017 10:56 AM    Creatinine 0.74 02/13/2017 10:56 AM    GFR est  02/13/2017 10:56 AM    GFR est non- 02/13/2017 10:56 AM    Calcium 9.3 02/13/2017 10:56 AM     Lab Results   Component Value Date/Time    Bilirubin, total <0.2 02/13/2017 10:56 AM    ALT (SGPT) 14 02/13/2017 10:56 AM    AST (SGOT) 17 02/13/2017 10:56 AM    Alk.  phosphatase 77 02/13/2017 10:56 AM    Protein, total 6.5 02/13/2017 10:56 AM    Albumin 4.1 02/13/2017 10:56 AM    Globulin 4.1 11/11/2016 10:35 AM     Sed rate (ESR) (mm/hr)   Date Value   02/13/2017 6     Sed rate, automated (mm/hr)   Date Value   11/11/2016 34 (H)   08/10/2016 44 (H)         CT N/C/A/P 11/18/16:  Resolved cervical lymph lymphadenopathy with no enlarged lymph nodes on the current exam.  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 Dorn Olszewski with resolution of cervical adenopathy on post-treatment CT scan. She is now in remission. She has no evidence of disease recurrence at this time, based on history, exam, and labwork. I will continue to follow with surveillance. Follow up after completion of therapy for Hodgkin Lymphoma, per NCCN:  · Year 1&2: H&P every 3-6 months  · Year 3: H&P every 6-12 months  · Year 4+: H&P every 12 months  · CBC, CMP, ESR as indicated  · TSH annually if RT to neck  · CT at 6, 12, and 24 months, or as clinically indicated  · Annual influenza vaccine    I have cautioned her to ensure she is not pregnant at the time of her surveillance CT scans. She is currently on birth control. 2) Leukopenia  Mild, with normal differential.  Uncertain etiology, normal ESR. Possible from recent influenza. Monitor for now, further evaluation if this persists/worsens. 3) Fatigue  Resolved    4) Influenza  Symptoms gradually resolving. Monitor.     Plan:     · Labs in 3 months: CBC, CMP, ESR  · CT N/C/A/P in 3 months  · Return to see me in 3 months      Signed By: Pranav Bowie MD     February 24, 2017

## 2017-02-24 NOTE — PROGRESS NOTES
Patient: Concepcion Merino MRN: [id number]  SSN: [id number]    YOB: 1985  Age: 29 y.o. Sex: female      Chief Complaint   Patient presents with    Generalized Body Aches     fatigue, chills, cough x2 days    Dizziness     Concepcion Merino is a 29 y.o. female presents with complaints of dry cough, myalgias, fever and chills for 2 days. There has been no nausea and no vomiting . she has not had  sore throat and headache. Symptoms are moderate. Patient is drinking plenty of fluids. There is not a hx of asthma. There is not a hx of allergic rhinitis. There is not a hx of tobacco use. There have not been contacts with similar infections. Medications:     Current Outpatient Prescriptions   Medication Sig    Sphslnft6-Qsogcl1-Jaasn therm. 112.5 billion cell cap Take 1 Cap by mouth daily.  bisoprolol-hydroCHLOROthiazide (ZIAC) 5-6.25 mg per tablet Take 1 Tab by mouth daily. No current facility-administered medications for this visit. 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 ([facility] 08/10/2016    Depression 05/12/2015    Headache(784.0) 05/22/2014    HTN (hypertension) 08/27/2012    KAMERON I (cervical intraepithelial neoplasia I) 09/02/2011       Medical History:     Past Medical History:   Diagnosis Date    Abnormal Pap smear 2010    FU with colpo, negative per patient    Cancer ([facility]     nodular lymphocytic hodgkins lymphoma    Chest pain     Depression 5/12/2015    Encounter for IUD insertion 3/5/15    Mirena    Essential hypertension     Hypertension     Implanon removal 9/6/2013    Pap smear for cervical cancer screening 11/11/15    Negative    Trouble in sleeping        Allergies:      Allergies   Allergen Reactions    Clonidine (Pf) Vertigo       Surgical History:     Past Surgical History:   Procedure Laterality Date    HX COLPOSCOPY  2010    per pt WNL    HX WISDOM TEETH EXTRACTION         Social History:     Social History     Social History    Marital status:      Spouse name: N/A    Number of children: N/A    Years of education: N/A     Social History Main Topics    Smoking status: Never Smoker    Smokeless tobacco: Never Used    Alcohol use Yes      Comment: occassionally    Drug use: No    Sexual activity: Yes     Partners: Male     Birth control/ protection: None     Other Topics Concern    None     Social History Narrative       Review of Symptoms:  Constitutional: c/o malaise, denies fever or chills  Skin: Negative for rash or lesion  Head: Negative for facial swelling or tenderness  Eyes: Negative for redness or discharge  Ears: Negative for otalgia or decreased hearing  Nose: c/o nasal congestion, denies sinus pressure  Neck: c/o sore throat, denies lymphadenopathy   Cardiovascular: Negative for chest pain or palpitations  Respiratory: c/o non-productive cough, denies wheezing or SOB  Gastrointestinal: Negative for nausea or abdominal pain  Neurologic: Negative for headache or dizziness      Visit Vitals    /75 (BP 1 Location: Left arm, BP Patient Position: Sitting)    Pulse 78    Temp 99.2 °F (37.3 °C) (Oral)    Resp 16    Ht 5' 8\" (1.727 m)    Wt 229 lb (103.9 kg)    SpO2 97%    BMI 34.82 kg/m2       Physical Examination:  General: Well developed, well nourished, in no acute distress  Skin: Warm and dry sans rash or lesion  Head: Normocephalic, atraumatic  Eyes: Sclera clear, EOMI, PERRL  Ears: tympanic membranes normal in appearance  Nose: mucosal edema with rhinorrhea  Oropharynx: posterior erythema, no exudate   Neck: Normal range of motion, no lymphadenopathy  Cardiovascular: normal S1, S2, regular rate and rhythm  Respiratory: Clear to auscultation bilaterally with symmetrical, unlabored effort  Abdomen: Soft, Normal BS  Extremities: Full range of motion  Neurologic: Active, alert and oriented      Ena was seen today for generalized body aches and dizziness. Diagnoses and all orders for this visit:    Influenza A  -     oseltamivir (TAMIFLU) 75 mg capsule; Take 1 Cap by mouth two (2) times a day for 5 days. Flu-like symptoms  -     AMB POC RAPID INFLUENZA TEST        Symptomatic therapy suggested: rest, increase fluids and call prn if symptoms persist or worsen. I have discussed the diagnosis with the patient and the intended plan as seen in the above orders. The patient expresses understanding and agreement with our plan of care. All of the patient's questions were answered to apparent satisfaction. The patient has received an after-visit summary. The patient knows to call our office if there are any questions or concerns regarding diagnosis and treatment plans. I have discussed medication side effects and warnings with the patient as well. Follow-up Disposition:  Return if symptoms worsen or fail to improve.

## 2017-02-24 NOTE — MR AVS SNAPSHOT
Visit Information Date & Time Provider Department Dept. Phone Encounter #  
 2/24/2017 10:00 AM Caroline Eason MD [facility] at [address] [place] [address] Rd [id number] Follow-up Instructions Return in about 6 months (around 8/24/2017) for surya Sanders HL. Follow-up and Disposition History Your Appointments 2/2/2018 10:00 AM  
ESTABLISHED PATIENT with Cesario Russell MD  
[facility] ([facility] [address] [facility] 600 1007 [facility]  
54 [facility] [address] [address] Upcoming Health Maintenance Date Due Pneumococcal 19-64 Highest Risk (2 of 3 - PCV13) 12/15/2017 PAP AKA CERVICAL CYTOLOGY 11/11/2018 DTaP/Tdap/Td series (2 - Td) 4/12/2024 Allergies as of 2/24/2017  Review Complete On: 2/24/2017 By: Caroline Eason MD  
  
 Severity Noted Reaction Type Reactions Clonidine (Pf)  11/06/2013   Intolerance Vertigo Current Immunizations  Never Reviewed Name Date Pneumococcal Polysaccharide (PPSV-23) 12/15/2016 Tdap 4/12/2014 12:26 PM  
  
 Not reviewed this visit You Were Diagnosed With   
  
 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 [address]    -  Primary ICD-10-CM: C81.01 
ICD-9-CM: 202.41 Leukopenia, unspecified type     ICD-10-CM: D72.819 ICD-9-CM: 288.50 Fatigue, unspecified type     ICD-10-CM: R53.83 ICD-9-CM: 780.79 Influenza     ICD-10-CM: J11.1 ICD-9-CM: 487. 1 Vitals BP  
  
  
  
  
  
 120/56 (BP 1 Location: Right arm, BP Patient Position: Sitting) Vitals History BMI and BSA Data Body Mass Index Body Surface Area 33.94 kg/m 2 2.2 m 2 Preferred Pharmacy Pharmacy Name Phone [address] [place], [place] - [address] [phone] Your Updated Medication List  
  
   
 This list is accurate as of: 2/24/17 10:28 AM.  Always use your most recent med list.  
  
  
  
  
 bisoprolol-hydroCHLOROthiazide 5-6.25 mg per tablet Commonly known as:  [facility] Take 1 Tab by mouth daily. Tjxsldcv8-Atgxvn3-Mmncn therm. 112.5 billion cell Cap Take 1 Cap by mouth daily. We Performed the Following CBC WITH AUTOMATED DIFF [41259 CPT(R)] METABOLIC PANEL, COMPREHENSIVE [60043 CPT(R)] SED RATE (ESR) [id number] CPT(R)] Follow-up Instructions Return in about 6 months (around 8/24/2017) for surya Sanders HL. To-Do List   
 Around 05/22/2017 Imaging:  CT CHEST ABD PELV W CONT Around 05/22/2017 Imaging:  CT NECK SOFT TISSUE W CONT Introducing [facility] & [facility] SERVICES! Dear Bisi Shi: Thank you for requesting a Web[employer] account. Our records indicate that you already have an active Webalo account. You can access your account anytime at [email or web address] Did you know that you can access your hospital and ER discharge instructions at any time in Webalo? You can also review all of your test results from your hospital stay or ER visit. Additional Information If you have questions, please visit the Frequently Asked Questions section of the Webalo website at [email or web address] Remember, Webalo is NOT to be used for urgent needs. For medical emergencies, dial 911. Now available from your iPhone and Android! Please provide this summary of care documentation to your next provider. Your primary care clinician is listed as Kellie Salas. If you have any questions after today's visit, please call [phone].

## 2017-04-20 ENCOUNTER — OFFICE VISIT (OUTPATIENT)
Dept: FAMILY MEDICINE CLINIC | Age: 32
End: 2017-04-20

## 2017-04-20 VITALS
HEART RATE: 62 BPM | SYSTOLIC BLOOD PRESSURE: 135 MMHG | BODY MASS INDEX: 33.37 KG/M2 | HEIGHT: 68 IN | DIASTOLIC BLOOD PRESSURE: 83 MMHG | RESPIRATION RATE: 20 BRPM | OXYGEN SATURATION: 99 % | TEMPERATURE: 98.7 F | WEIGHT: 220.2 LBS

## 2017-04-20 DIAGNOSIS — H65.93 MIDDLE EAR EFFUSION, BILATERAL: Primary | ICD-10-CM

## 2017-04-20 RX ORDER — FLUTICASONE PROPIONATE 50 MCG
2 SPRAY, SUSPENSION (ML) NASAL DAILY
Qty: 1 BOTTLE | Refills: 2 | Status: SHIPPED | OUTPATIENT
Start: 2017-04-20 | End: 2017-10-25

## 2017-04-20 RX ORDER — CETIRIZINE HCL 10 MG
10 TABLET ORAL DAILY
Qty: 30 TAB | Refills: 2 | Status: SHIPPED | OUTPATIENT
Start: 2017-04-20 | End: 2017-10-25

## 2017-04-20 NOTE — MR AVS SNAPSHOT
Visit Information Date & Time Provider Department Dept. Phone Encounter #  
 4/20/2017 11:00 AM [facility] Drake [facility] [place] [id number] Follow-up Instructions Return if symptoms worsen or fail to improve. Your Appointments 2/2/2018 10:00 AM  
ESTABLISHED PATIENT with Christiano Álvarez MD  
[facility] ([facility]) [address] [facility] 600 [address]7 [address] [address] [address] Upcoming Health Maintenance Date Due Pneumococcal 19-64 Highest Risk (2 of 3 - PCV13) 12/15/2017 PAP AKA CERVICAL CYTOLOGY 11/11/2018 DTaP/Tdap/Td series (2 - Td) 4/12/2024 Allergies as of 4/20/2017  Review Complete On: 4/20/2017 By: Hilda Drake MD  
  
 Severity Noted Reaction Type Reactions Clonidine (Pf)  11/06/2013   Intolerance Vertigo Current Immunizations  Never Reviewed Name Date Pneumococcal Polysaccharide (PPSV-23) 12/15/2016 Tdap 4/12/2014 12:26 PM  
  
 Not reviewed this visit You Were Diagnosed With   
  
 Codes Comments Middle ear effusion, bilateral    -  Primary ICD-10-CM: H65.93 
ICD-9-CM: 381. 4 Vitals BP Pulse Temp Resp Height(growth percentile) Weight(growth percentile) 135/83 62 98.7 °F (37.1 °C) (Oral) 20 5' 8\" (1.727 m) 220 lb 3.2 oz (99.9 kg) SpO2 BMI OB Status Smoking Status 99% 33.48 kg/m2 Implant Never Smoker Vitals History BMI and BSA Data Body Mass Index Body Surface Area  
 33.48 kg/m 2 2.19 m 2 Preferred Pharmacy Pharmacy Name Phone [address] [address] [facility] [phone] Your Updated Medication List  
  
   
This list is accurate as of: 4/20/17 11:22 AM.  Always use your most recent med list.  
  
  
  
  
 bisoprolol-hydroCHLOROthiazide 5-6.25 mg per tablet Commonly known as:  [facility] Take 1 Tab by mouth daily. cetirizine 10 mg tablet Commonly known as:  ZYRTEC Take 1 Tab by mouth daily. fluticasone 50 mcg/actuation nasal spray Commonly known as:  Roderick Godinezver 2 Sprays by Both Nostrils route daily. Zipkpjax2-Hkcmmk1-Cqhvi therm. 112.5 billion cell Cap Take 1 Cap by mouth daily. Prescriptions Sent to Pharmacy Refills  
 fluticasone (FLONASE) 50 mcg/actuation nasal spray 2 Si Sprays by Both Nostrils route daily. Class: Normal  
 Pharmacy: [address] [place] [address] #: [phone] Route: Both Nostrils  
 cetirizine (ZYRTEC) 10 mg tablet 2 Sig: Take 1 Tab by mouth daily. Class: Normal  
 Pharmacy: [address] [place] [address] Ph #: [phone] Route: Oral  
  
Follow-up Instructions Return if symptoms worsen or fail to improve. To-Do List   
 2017 12:00 PM  
  Appointment with [facility] CT 1 at [facility] ([phone]) CONTRAST STUDY: 1. The patient should not eat solid food four hours before the appointment but should be encouraged to drink clear liquids. 2.  If you have to drink oral contrast, please pick it up any weekday prior to your appointment, if you cannot please check in 2 hrs before appt time. 3.  The patient will require IV access for contrast administration. 4.  The patient should not take Ibuprofen (Advil, Motrin, etc.) and Naproxen Sodium (Aleve, etc.)  on the day of the exam. Stopping non-steroidal anti-inflammatory agents (NSAIDs) like Ibuprofen decreases the risk of kidney damage from the x-ray contrast (dye). 5.  Bring any non [facility] facility films/images pertaining to the area of interest with you on the day of appointment.  6.  Bring current lab work if available (within last 90 days [facility]) ***If scheduled at [facility], iSTAT is not available, labs will need to be done before appointment*** 7. Check in at registration at least 30 minutes before appt time unless you were instructed to do otherwise. 05/04/2017 12:30 PM  
  Appointment [facility] CT 1 at [facility] ([phone]) CONTRAST STUDY: 1. The patient should not eat solid food four hours before the appointment but should be encouraged to drink clear liquids. 2. The patient will require IV access for contrast administration. 3. The patient should not take  Ibuprofen (Advil, Motrin, etc.) and Naproxen Sodium (Aleve, etc.)  on the day of the exam. Stopping non-steroidal anti-inflammatory agents (NSAIDs) like Ibuprofen decreases the risk of kidney damage from the x-ray contrast (dye). 4. Bring any non [facility] facility films/images pertaining to the area of interest with you on the day of appointment. 5. Bring current lab work if available(within last 90 days [facility]) ***[facility], labs will need to be done before appointment*** 6. Check in at registration at least 30 minutes before appt time unless you were instructed to do otherwise. 7. If you have to drink oral contrast please pick it up any weekday prior to your appointment, if you cannot please check in 2 hrs  before appt time. Patient Instructions Try medication for 2 weeks if symptoms have not improved please return to the office. Middle Ear Fluid: Care Instructions Your Care Instructions Fluid often builds up inside the ear during a cold or allergies. Usually the fluid drains away, but sometimes a small tube in the ear, called the eustachian tube, stays blocked for months. Symptoms of fluid buildup may include: · Popping, ringing, or a feeling of fullness or pressure in the ear. · Trouble hearing. · Balance problems and dizziness. In most cases, you can treat yourself at home. Follow-up care is a key part of your treatment and safety.  Be sure to make and go to all appointments, and call your doctor if you are having problems. It's also a good idea to know your test results and keep a list of the medicines you take. How can you care for yourself at home? · In most cases, the fluid clears up within a few months without treatment. You may need more tests if the fluid does not clear up after 3 months. · If your doctor prescribed antibiotics, take them as directed. Do not stop taking them just because you feel better. You need to take the full course of antibiotics. When should you call for help? Watch closely for changes in your health, and be sure to contact your doctor if: 
· You have pain or a fever. · You have any new symptoms, such as hearing problems. · You do not get better as expected. Where can you learn more? Go to http://srini-jeny.info/. Enter V331 in the search box to learn more about \"Middle Ear Fluid: Care Instructions. \" Current as of: July 29, 2016 Content Version: 11.2 © 2862-8856 [employer]. Care instructions adapted under license by [employer] (which disclaims liability or warranty for this information). If you have questions about a medical condition or this instruction, always ask your healthcare professional. [address] any warranty or liability for your use of this information. Introducing [facility] & HEALTH SERVICES! Dear [place] Oliva: Thank you for requesting a [employer] account. Our records indicate that you already have an active [employer] account. You can access your account anytime at [email or web address] Did you know that you can access your hospital and ER discharge instructions at any time in [employer] ONE [employer]? You can also review all of your test results from your hospital stay or ER visit. Additional Information If you have questions, please visit the Frequently Asked Questions section of the [employer] website at [email or web address] Remember, [employer] is NOT to be used for urgent needs. For medical emergencies, dial 911. Now available from your iPhone and Android! Please provide this summary of care documentation to your next provider. Your primary care clinician is listed as Grecia Jett. If you have any questions after today's visit, please call [phone].

## 2017-04-20 NOTE — PROGRESS NOTES
Progress Note    Patient: Adalberto Her MRN: [id number]  SSN: [id number]    YOB: 1985  Age: 29 y.o. Sex: female        Chief Complaint   Patient presents with    Ear Fullness         Subjective:     Encounter Diagnoses   Name Primary?  Middle ear effusion, bilateral Yes       Bilateral ear fullness  For the past several weeks, patient has noted bilateral ear fullness and feels that she unable to \"pop\" them. Has not tried an OTC remedies. Denies N/V/D, ear discharge. Concerned that ear fullness as it is a symptoms that she had been diagnosed with Hogkins lymphoma now currently in remission. Denies fever, chills. Current and past medical information:    Current Medications after this visit[de-identified]     Current Outpatient Prescriptions   Medication Sig    fluticasone (FLONASE) 50 mcg/actuation nasal spray 2 Sprays by Both Nostrils route daily.  cetirizine (ZYRTEC) 10 mg tablet Take 1 Tab by mouth daily.  Wplccolh6-Ulavkf2-Fiuxb therm. 112.5 billion cell cap Take 1 Cap by mouth daily.  bisoprolol-hydroCHLOROthiazide (ZIAC) 5-6.25 mg per tablet Take 1 Tab by mouth daily. No current facility-administered medications for this visit.         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 08/10[facility]2016    Depression 05/12/2015    Headache 05/22/2014    HTN (hypertension) 08/27/2012    KAMERON I (cervical intraepithelial neoplasia I) 09/02/2011       Past Medical History:   Diagnosis Date    Abnormal Pap smear 2010    FU with colpo, negative per patient    Cancer ([facility] [address]     nodular lymphocytic hodgkins lymphoma    Chest pain     Depression 5/12/2015    Encounter for IUD insertion 3/5/15    Mirena    Essential hypertension     Hypertension     Implanon removal 9/6/2013    Pap smear for cervical cancer screening 11/11/15    Negative    Trouble in sleeping        Allergies   Allergen Reactions    Clonidine (Pf) Vertigo       Past Surgical History:   Procedure Laterality Date    HX COLPOSCOPY  2010    per pt WNL    HX WISDOM TEETH EXTRACTION         Social History     Social History    Marital status:      Spouse name: N/A    Number of children: N/A    Years of education: N/A     Social History Main Topics    Smoking status: Never Smoker    Smokeless tobacco: Never Used    Alcohol use Yes      Comment: occassionally    Drug use: No    Sexual activity: Yes     Partners: Male     Birth control/ protection: None     Other Topics Concern    None     Social History Narrative       {Review of Systems   Constitutional: Negative for chills and fever. HENT: Negative for congestion, ear discharge, ear pain, hearing loss, sore throat and tinnitus. Eyes: Negative for pain, discharge and redness. Objective:     Vitals:    04/20/17 1059   BP: 135/83   Pulse: 62   Resp: 20   Temp: 98.7 °F (37.1 °C)   TempSrc: Oral   SpO2: 99%   Weight: 220 lb 3.2 oz (99.9 kg)   Height: 5' 8\" (1.727 m)      Body mass index is 33.48 kg/(m^2). Physical Exam   Constitutional: She appears well-developed and well-nourished. HENT:   Head: Normocephalic and atraumatic. Right Ear: No drainage, swelling or tenderness. No mastoid tenderness. A middle ear effusion is present. No hemotympanum. No decreased hearing is noted. Left Ear: No drainage, swelling or tenderness. No mastoid tenderness. Tympanic membrane mobility is abnormal. A middle ear effusion is present. No hemotympanum. No decreased hearing is noted. Nose: Mucosal edema present. No rhinorrhea, nose lacerations or sinus tenderness. Right sinus exhibits no maxillary sinus tenderness and no frontal sinus tenderness. Left sinus exhibits no maxillary sinus tenderness and no frontal sinus tenderness. Mouth/Throat: Oropharynx is clear and moist. No oropharyngeal exudate. Tympanogram completed in both ears. Left side flat and normal on right.     Eyes: Conjunctivae and EOM are normal. Pupils are equal, round, and reactive to light. Right eye exhibits no discharge. Left eye exhibits no discharge. Lymphadenopathy:     She has no cervical adenopathy. There are no preventive care reminders to display for this patient. Assessment and orders:     Encounter Diagnoses     ICD-10-CM ICD-9-CM   1. Middle ear effusion, bilateral H65.93 381.4       1. Middle ear effusion, bilateral  Will treat ffor allergic component of effusion if does not clear in 2 weeks and after follow up scans with Oncology for surveillance will consider other ddx. - fluticasone (FLONASE) 50 mcg/actuation nasal spray; 2 Sprays by Both Nostrils route daily. Dispense: 1 Bottle; Refill: 2  - cetirizine (ZYRTEC) 10 mg tablet; Take 1 Tab by mouth daily. Dispense: 30 Tab; Refill: 2        Plan of care:  Discussed diagnoses in detail with patient. Medication risks/benefits/side effects discussed with patient. All of the patient's questions were addressed. The patient understands and agrees with our plan of care. The patient knows to call back if they are unsure of or forget any changes we discussed today or if the symptoms change. The patient received an After-Visit Summary which contains VS, orders, medication list and allergy list. This can be used as a \"mini-medical record\" should they have to seek medical care while out of town. Follow-up Disposition:  Return if symptoms worsen or fail to improve.     Future Appointments  Date Time Provider Ann Stone   5/4/2017 12:00 PM [facility] CT 1 [facility]CT [facility]   5/4/2017 12:30 PM [facility] CT 1 SFMRCT [facility] Estella Lucinda   2/2/2018 10:00 AM Tobi Valdez MD [facility] SCHED       Signed By: Madi Mandel MD     April 20, 2017

## 2017-04-20 NOTE — PROGRESS NOTES
Progress Note    Patient: Lili Kulkarni MRN: [id number]  SSN: [id number]    YOB: 1985  Age: 29 y.o. Sex: female        Chief Complaint   Patient presents with    Ear Fullness         Subjective:     No diagnosis found. Tobacco Use {Blank Single Select Template:20061::\"yes\",\"no\"}  The patient {Blank Single Select Template:20061::\"does not use tobacco products. \",\"does use tobacco products. \"} {If yes, qmtobacocessation}      BMI  Discussed the patient's {MU BMI REASON:037662248} BMI with her. I have recommended the following interventions: {MU High/Low BMI Interventions:936372068}. The BMI follow up plan is as follows: {Document your plan here:20552}          Current and past medical information:    Current Medications after this visit[de-identified]   Current Outpatient Prescriptions   Medication Sig    Estpkkhd2-Ddgjog0-Xwztl therm. 112.5 billion cell cap Take 1 Cap by mouth daily.  bisoprolol-hydroCHLOROthiazide (ZIAC) 5-6.25 mg per tablet Take 1 Tab by mouth daily. No current facility-administered medications for this visit.         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 ([facility] [address] 08/10/2016    Depression 05/12/2015    Headache 05/22/2014    HTN (hypertension) 08/27/2012    KAMERON I (cervical intraepithelial neoplasia I) 09/02/2011       Past Medical History:   Diagnosis Date    Abnormal Pap smear 2010    FU with colpo, negative per patient    Cancer ([facility] [address]     nodular lymphocytic hodgkins lymphoma    Chest pain     Depression 5/12/2015    Encounter for IUD insertion 3/5/15    Mirena    Essential hypertension     Hypertension     Implanon removal 9/6/2013    Pap smear for cervical cancer screening 11/11/15    Negative    Trouble in sleeping        Allergies   Allergen Reactions    Clonidine (Pf) Vertigo       Past Surgical History:   Procedure Laterality Date    HX COLPOSCOPY  2010    per pt WNL    HX WISDOM TEETH EXTRACTION         Social History     Social History    Marital status:      Spouse name: N/A    Number of children: N/A    Years of education: N/A     Social History Main Topics    Smoking status: Never Smoker    Smokeless tobacco: Never Used    Alcohol use Yes      Comment: occassionally    Drug use: No    Sexual activity: Yes     Partners: Male     Birth control/ protection: None     Other Topics Concern    None     Social History Narrative       {ROS} ***    Objective:     Vitals:    04/20/17 1059   BP: 135/83   Pulse: 62   Resp: 20   Temp: 98.7 °F (37.1 °C)   TempSrc: Oral   SpO2: 99%   Weight: 220 lb 3.2 oz (99.9 kg)   Height: 5' 8\" (1.727 m)      Body mass index is 33.48 kg/(m^2). Physical Exam ***    There are no preventive care reminders to display for this patient. Assessment and orders:   No diagnosis found. There are no diagnoses linked to this encounter. Plan of care:  Discussed diagnoses in detail with patient. Medication risks/benefits/side effects discussed with patient. All of the patient's questions were addressed. The patient understands and agrees with our plan of care. The patient knows to call back if they are unsure of or forget any changes we discussed today or if the symptoms change. The patient received an After-Visit Summary which contains VS, orders, medication list and allergy list. This can be used as a \"mini-medical record\" should they have to seek medical care while out of town.     Follow-up Disposition: Not on File    Future Appointments  Date Time Provider Ann Stone   5/4/2017 12:00 PM [facility] CT 1 [facility]   5/4/2017 12:30 PM [facility] CT 1 SFMRCT [facility] Fallon Adams   2/2/2018 10:00 AM MD [facility]AID Jaime SCHED       Signed By: Juani Acosta MD     April 20, 2017

## 2017-04-20 NOTE — PATIENT INSTRUCTIONS
Try medication for 2 weeks if symptoms have not improved please return to the office. Middle Ear Fluid: Care Instructions  Your Care Instructions    Fluid often builds up inside the ear during a cold or allergies. Usually the fluid drains away, but sometimes a small tube in the ear, called the eustachian tube, stays blocked for months. Symptoms of fluid buildup may include:  · Popping, ringing, or a feeling of fullness or pressure in the ear. · Trouble hearing. · Balance problems and dizziness. In most cases, you can treat yourself at home. Follow-up care is a key part of your treatment and safety. Be sure to make and go to all appointments, and call your doctor if you are having problems. It's also a good idea to know your test results and keep a list of the medicines you take. How can you care for yourself at home? · In most cases, the fluid clears up within a few months without treatment. You may need more tests if the fluid does not clear up after 3 months. · If your doctor prescribed antibiotics, take them as directed. Do not stop taking them just because you feel better. You need to take the full course of antibiotics. When should you call for help? Watch closely for changes in your health, and be sure to contact your doctor if:  · You have pain or a fever. · You have any new symptoms, such as hearing problems. · You do not get better as expected. Where can you learn more? Go to http://srini-jeny.info/. Enter V813 in the search box to learn more about \"Middle Ear Fluid: Care Instructions. \"  Current as of: July 29, 2016  Content Version: 11.2  © 9893-3352 [employer]. Care instructions adapted under license by [employer] (which disclaims liability or warranty for this information).  If you have questions about a medical condition or this instruction, always ask your healthcare professional. Joelle Quezada disclaims any warranty or liability for your use of this information.

## 2017-04-20 NOTE — PROGRESS NOTES
I saw and evaluated the patient idependently, performing the key elements of the exam and service. I discussed the findings, assessment and plan with the resident and agree with the resident's findings and plan as documented in the resident's note. Deysi Dean M.D.

## 2017-05-10 ENCOUNTER — HOSPITAL ENCOUNTER (OUTPATIENT)
Dept: CT IMAGING | Age: 32
Discharge: HOME | End: 2017-05-10
Attending: INTERNAL MEDICINE
Payer: COMMERCIAL

## 2017-05-10 ENCOUNTER — TELEPHONE (OUTPATIENT)
Dept: ONCOLOGY | Age: 32
End: 2017-05-10

## 2017-05-10 DIAGNOSIS — C81.01 NODULAR LYMPHOCYTE PREDOMINANT HODGKIN LYMPHOMA OF LYMPH NODES OF NECK (HCC): ICD-10-CM

## 2017-05-10 PROCEDURE — 74011636320 HC RX REV CODE- 636/320: Performed by: INTERNAL MEDICINE

## 2017-05-10 PROCEDURE — 74177 CT ABD & PELVIS W/CONTRAST: CPT

## 2017-05-10 PROCEDURE — 70491 CT SOFT TISSUE NECK W/DYE: CPT

## 2017-05-10 RX ADMIN — IOPAMIDOL 99 ML: 755 INJECTION, SOLUTION INTRAVENOUS at 12:16

## 2017-05-10 NOTE — TELEPHONE ENCOUNTER
Called pt and left a voicemail requesting a return call to scheduled her follow up appointment with Dr. Parvin Cowart to review results.

## 2017-05-10 NOTE — TELEPHONE ENCOUNTER
Pt called back and is now scheduled for 5/17 at 1045 with Dr. Haley Winn. No further questions or concerns.

## 2017-05-17 ENCOUNTER — HOSPITAL ENCOUNTER (OUTPATIENT)
Dept: INFUSION THERAPY | Age: 32
Discharge: HOME | End: 2017-05-17
Payer: COMMERCIAL

## 2017-05-17 ENCOUNTER — OFFICE VISIT (OUTPATIENT)
Dept: ONCOLOGY | Age: 32
End: 2017-05-17

## 2017-05-17 VITALS
DIASTOLIC BLOOD PRESSURE: 79 MMHG | OXYGEN SATURATION: 99 % | RESPIRATION RATE: 20 BRPM | HEIGHT: 68 IN | HEART RATE: 51 BPM | BODY MASS INDEX: 33.49 KG/M2 | SYSTOLIC BLOOD PRESSURE: 124 MMHG | TEMPERATURE: 96.7 F | WEIGHT: 221 LBS

## 2017-05-17 VITALS
TEMPERATURE: 98.3 F | HEART RATE: 48 BPM | RESPIRATION RATE: 16 BRPM | SYSTOLIC BLOOD PRESSURE: 119 MMHG | DIASTOLIC BLOOD PRESSURE: 77 MMHG

## 2017-05-17 DIAGNOSIS — R61 NIGHT SWEAT: ICD-10-CM

## 2017-05-17 DIAGNOSIS — C81.01 NODULAR LYMPHOCYTE PREDOMINANT HODGKIN LYMPHOMA OF LYMPH NODES OF NECK (HCC): Primary | ICD-10-CM

## 2017-05-17 LAB
ALBUMIN SERPL BCP-MCNC: 3.5 G/DL (ref 3.5–5)
ALBUMIN/GLOB SERPL: 1 {RATIO} (ref 1.1–2.2)
ALP SERPL-CCNC: 80 U/L (ref 45–117)
ALT SERPL-CCNC: 21 U/L (ref 12–78)
ANION GAP BLD CALC-SCNC: 8 MMOL/L (ref 5–15)
AST SERPL W P-5'-P-CCNC: 17 U/L (ref 15–37)
BASOPHILS # BLD AUTO: 0 K/UL (ref 0–0.1)
BASOPHILS # BLD: 0 % (ref 0–1)
BILIRUB SERPL-MCNC: 0.2 MG/DL (ref 0.2–1)
BUN SERPL-MCNC: 10 MG/DL (ref 6–20)
BUN/CREAT SERPL: 12 (ref 12–20)
CALCIUM SERPL-MCNC: 8.7 MG/DL (ref 8.5–10.1)
CHLORIDE SERPL-SCNC: 107 MMOL/L (ref 97–108)
CO2 SERPL-SCNC: 26 MMOL/L (ref 21–32)
CREAT SERPL-MCNC: 0.83 MG/DL (ref 0.55–1.02)
DIFFERENTIAL METHOD BLD: NORMAL
EOSINOPHIL # BLD: 0.1 K/UL (ref 0–0.4)
EOSINOPHIL NFR BLD: 2 % (ref 0–7)
ERYTHROCYTE [DISTWIDTH] IN BLOOD BY AUTOMATED COUNT: 12.5 % (ref 11.5–14.5)
ERYTHROCYTE [SEDIMENTATION RATE] IN BLOOD: 12 MM/HR (ref 0–20)
GLOBULIN SER CALC-MCNC: 3.4 G/DL (ref 2–4)
GLUCOSE SERPL-MCNC: 81 MG/DL (ref 65–100)
HCT VFR BLD AUTO: 37.1 % (ref 35–47)
HGB BLD-MCNC: 12.7 G/DL (ref 11.5–16)
LYMPHOCYTES # BLD AUTO: 23 % (ref 12–49)
LYMPHOCYTES # BLD: 1.1 K/UL (ref 0.8–3.5)
MCH RBC QN AUTO: 29.8 PG (ref 26–34)
MCHC RBC AUTO-ENTMCNC: 34.2 G/DL (ref 30–36.5)
MCV RBC AUTO: 87.1 FL (ref 80–99)
MONOCYTES # BLD: 0.4 K/UL (ref 0–1)
MONOCYTES NFR BLD AUTO: 8 % (ref 5–13)
NEUTS SEG # BLD: 3.3 K/UL (ref 1.8–8)
NEUTS SEG NFR BLD AUTO: 67 % (ref 32–75)
PLATELET # BLD AUTO: 315 K/UL (ref 150–400)
POTASSIUM SERPL-SCNC: 3.8 MMOL/L (ref 3.5–5.1)
PROT SERPL-MCNC: 6.9 G/DL (ref 6.4–8.2)
RBC # BLD AUTO: 4.26 M/UL (ref 3.8–5.2)
RBC MORPH BLD: NORMAL
SODIUM SERPL-SCNC: 141 MMOL/L (ref 136–145)
WBC # BLD AUTO: 4.9 K/UL (ref 3.6–11)
WBC MORPH BLD: NORMAL

## 2017-05-17 PROCEDURE — 85652 RBC SED RATE AUTOMATED: CPT | Performed by: INTERNAL MEDICINE

## 2017-05-17 PROCEDURE — 80053 COMPREHEN METABOLIC PANEL: CPT | Performed by: INTERNAL MEDICINE

## 2017-05-17 PROCEDURE — 85025 COMPLETE CBC W/AUTO DIFF WBC: CPT | Performed by: INTERNAL MEDICINE

## 2017-05-17 PROCEDURE — 36415 COLL VENOUS BLD VENIPUNCTURE: CPT | Performed by: INTERNAL MEDICINE

## 2017-05-17 NOTE — PROGRESS NOTES
Blood pressure 119/77, pulse (!) 48, temperature 98.3 °F (36.8 °C), resp. rate 16, not currently breastfeeding. Pt arrived at 1150,labs drawn peripherally from left [facility]. Pt tolerated well and left at 1159.

## 2017-05-17 NOTE — MR AVS SNAPSHOT
Visit Information Date & Time Provider Department Dept. Phone Encounter #  
 5/17/2017 10:45 AM Breanna Newell NP [facility] [phone] [id number] Follow-up Instructions Return in about 3 months (around 8/17/2017) for Marilyn. Your Appointments 2/2/2018 10:00 AM  
ESTABLISHED PATIENT with Franklin Rinne, MD  
[facility] ([address]) 354 [facility] 600 1007 [facility]  
54 [facility] [id number] [place] [address] Upcoming Health Maintenance Date Due INFLUENZA AGE 9 TO ADULT 8/1/2017 Pneumococcal 19-64 Highest Risk (2 of 3 - PCV13) 12/15/2017 PAP AKA CERVICAL CYTOLOGY 11/11/2018 DTaP/Tdap/Td series (2 - Td) 4/12/2024 Allergies as of 5/17/2017  Review Complete On: 5/17/2017 By: Breanna Newell NP Severity Noted Reaction Type Reactions Clonidine (Pf)  11/06/2013   Intolerance Vertigo Current Immunizations  Never Reviewed Name Date Pneumococcal Polysaccharide (PPSV-23) 12/15/2016 Tdap 4/12/2014 12:26 PM  
  
 Not reviewed this visit You Were Diagnosed With   
  
 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 
ICD-9-CM: 202.41 Night sweat     ICD-10-CM: R61 
ICD-9-CM: 780.8 Vitals BP Pulse Temp Resp Height(growth percentile) 124/79 (BP 1 Location: Right arm, BP Patient Position: Sitting) (!) 51 96.7 °F (35.9 °C) (Temporal) 20 5' 8\" (1.727 m) Weight(growth percentile) SpO2 BMI OB Status Smoking Status 221 lb (100.2 kg) 99% 33.6 kg/m2 Implant Never Smoker Vitals History BMI and BSA Data Body Mass Index Body Surface Area  
 33.6 kg/m 2 2.19 m 2 Preferred Pharmacy Pharmacy Name Phone [address] [place], [place] - [address] -[phone] Your Updated Medication List  
  
   
This list is accurate as of: 5/17/17 11:33 AM.  Always use your most recent med list.  
  
  
  
  
 bisoprolol-hydroCHLOROthiazide 5-6.25 mg per tablet Commonly known as:  [facility] Take 1 Tab by mouth daily. cetirizine 10 mg tablet Commonly known as:  ZYRTEC Take 1 Tab by mouth daily. fluticasone 50 mcg/actuation nasal spray Commonly known as:  Dea Bough 2 Sprays by Both Nostrils route daily. Vvbtqxun4-Qboczy6-Fzpoy therm. 112.5 billion cell Cap Take 1 Cap by mouth daily. We Performed the Following CBC WITH AUTOMATED DIFF [84180 CPT(R)] METABOLIC PANEL, COMPREHENSIVE [88345 CPT(R)] SED RATE (ESR) [id number] CPT(R)] Follow-up Instructions Return in about 3 months (around 8/17/2017) for Marilyn. Introducing [facility] SERVICES! Dear Neisha Dalton: Thank you for requesting a [facility] account. Our records indicate that you already have an active [facility] account. You can access your account anytime at [email or web address] Did you know that you can access your hospital and ER discharge instructions at any time in [facility]? You can also review all of your test results from your hospital stay or ER visit. Additional Information If you have questions, please visit the Frequently Asked Questions section of the [facility] website at [email or web address]/. Remember, [facility] is NOT to be used for urgent needs. For medical emergencies, dial 911. Now available from your iPhone and Android! Please provide this summary of care documentation to your next provider. Your primary care clinician is listed as Alexandra Perez. If you have any questions after today's visit, please call [phone].

## 2017-05-17 NOTE — PROGRESS NOTES
[address] [facility] Oncology at [address] [place] [address]  [phone]    Hematology / Oncology Followup    Reason for Visit:   Lili Kulkarni is a 29 y.o. female who is seen for follow up of lymphoma. Treatment History:   · US neck 5/26/2016: 2.5cm right posterior auricular mass  · US guided biopsy cervical node 6/30/2016: suspicious for large b-cell lymphoma  · CT Neck/C/A/P 7/25/2016: Enlarged right level 5 lymph node measuring 2 x 1 cm and slightly inferior to this there is a slightly enlarged lymph node measuring 1.2 x 0.8 cm. There is a slightly enlarged right level 2 lymph node measuring 1.3 x 0.9 cm. There is a slightly enlarged left level 2 lymph node measuring 1.7 x 1.1 cm. Lymph nodes more inferior at level 3 and level 4 are  normal in size. No supraclavicular adenopathy is identified. No adenopathy in chest, abdomen, or pelvis  · PET-CT 7/27/2016: No areas of hypermetabolic activity  · Excisional biopsy of right level 2 cervical node by Dr. Macario Oden 8/3/2016: Nodular lymphocyte predominant Hodgkin Lymphoma  · Stage IIA Nodular lymphocyte predominant Hodgkin Lymphoma  · Radiation to cervical nodes by Dr. Ingris Ornelas completed 10/10/2016    History of Present Illness:   Here today for follow up. She reports feeling well. Flu symptoms all resolved after last visit. Reports one episode of night sweats 3 night ago and states she thought initially that one of her children had wet the bed but she realized it was from her sweating. Denies feeling sick or additional symptoms of illness at the time. Denies fevers, chills, weight loss, adenopathy. PAST HISTORY: The following sections were reviewed and updated in the EMR as appropriate: PMH, SH, FH, Medications, Allergies. Allergies   Allergen Reactions    Clonidine (Pf) Vertigo      Review of Systems: A complete review of systems was obtained, reviewed, and scanned into the EMR. Pertinent findings reviewed above.     Physical Exam:     Visit Vitals  /79 (BP 1 Location: Right arm, BP Patient Position: Sitting)    Pulse (!) 51    Temp 96.7 °F (35.9 °C) (Temporal)    Resp 20    Ht 5' 8\" (1.727 m)    Wt 221 lb (100.2 kg)    SpO2 99%    BMI 33.6 kg/m2     ECOG PS: 0  General: No distress  Eyes: PERRLA, anicteric sclerae  HENT: Atraumatic, OP clear  Neck: Supple  Lymphatic: No palpable cervical, supraclavicular, or inguinal adenopathy. Respiratory: CTAB, normal respiratory effort  CV: Normal rate, regular rhythm, no murmurs, no peripheral edema  GI: Soft, nontender, nondistended, no masses, no hepatomegaly, no splenomegaly  MS: Normal gait and station. Digits without clubbing or cyanosis. Skin: No rashes, ecchymoses, or petechiae. Normal temperature, turgor, and texture. Psych: Alert, oriented, appropriate affect, normal judgment/insight    Results:     Lab Results   Component Value Date/Time    WBC 4.9 05/17/2017 11:56 AM    HGB 12.7 05/17/2017 11:56 AM    HCT 37.1 05/17/2017 11:56 AM    PLATELET 059 53/01/9253 11:56 AM    MCV 87.1 05/17/2017 11:56 AM    ABS. NEUTROPHILS 3.3 05/17/2017 11:56 AM    Hgb, External 12.3 02/07/2014    Hct, External 35.6 02/07/2014    Platelet cnt., External 272 02/07/2014     Lab Results   Component Value Date/Time    Sodium 141 05/17/2017 11:56 AM    Potassium 3.8 05/17/2017 11:56 AM    Chloride 107 05/17/2017 11:56 AM    CO2 26 05/17/2017 11:56 AM    Glucose 81 05/17/2017 11:56 AM    BUN 10 05/17/2017 11:56 AM    Creatinine 0.83 05/17/2017 11:56 AM    GFR est AA >60 05/17/2017 11:56 AM    GFR est non-AA >60 05/17/2017 11:56 AM    Calcium 8.7 05/17/2017 11:56 AM     Lab Results   Component Value Date/Time    Bilirubin, total 0.2 05/17/2017 11:56 AM    ALT (SGPT) 21 05/17/2017 11:56 AM    AST (SGOT) 17 05/17/2017 11:56 AM    Alk.  phosphatase 80 05/17/2017 11:56 AM    Protein, total 6.9 05/17/2017 11:56 AM    Albumin 3.5 05/17/2017 11:56 AM    Globulin 3.4 05/17/2017 11:56 AM     Sed rate (ESR) (mm/hr)   Date Value 02/13/2017 6     Sed rate, automated (mm/hr)   Date Value   05/17/2017 12   11/11/2016 34 (H)   08/10/2016 44 (H)         CT N/C/A/P 11/18/16: Resolved cervical lymph lymphadenopathy with no enlarged lymph nodes on the current exam.  No evidence for any recurrent or metastatic disease in the chest, abdomen, or pelvis    CT N/C/A/P 5/10/2017: No evidence for recurrent cervical adenopathy. Stable CT of the chest, abdomen, and pelvis. Assessment:   1) Nodular lymphocyte predominant Hodgkin Lymphoma  Stage IIA  She is s/p radiation with Dr. Live Lozada with resolution of cervical adenopathy on post-treatment CT scan. She is now in remission. She is feeling well and has no evidence of disease recurrence at this time, based on history, exam, and CT scans. We will get labs today and call her with results. I will continue to follow with surveillance. Follow up after completion of therapy for Hodgkin Lymphoma, per NCCN:  · Year 1&2: H&P every 3-6 months  · Year 3: H&P every 6-12 months  · Year 4+: H&P every 12 months  · CBC, CMP, ESR as indicated  · TSH annually if RT to neck  · CT at 6, 12, and 24 months, or as clinically indicated  · Annual influenza vaccine    I have cautioned her to ensure she is not pregnant at the time of her surveillance CT scans. She is currently on birth control. 2) Leukopenia  Mild on last check, with normal differential.  Uncertain etiology, normal ESR. Possible from influenza. Monitor for now, further evaluation if this persists/worsens. Labs today and we will call her with results. 3) Influenza  Resolved. 4) Night Sweat   One episode. Monitor and patient to notify us if this recurs.      Plan:     · Labs today in [facility]: CBC, CMP, ESR   · Labs in 3 months: CBC, CMP, ESR ([facility])   · CT N/C/A/P in 6 months  · Return to clinic in 3 months      Signed By: Alesha Prieto MD     May 17, 2017

## 2017-07-17 RX ORDER — BISOPROLOL FUMARATE AND HYDROCHLOROTHIAZIDE 5; 6.25 MG/1; MG/1
TABLET ORAL
Qty: 30 TAB | Refills: 3 | Status: SHIPPED | OUTPATIENT
Start: 2017-07-17 | End: 2017-10-16 | Stop reason: SDUPTHER

## 2017-08-09 ENCOUNTER — OFFICE VISIT (OUTPATIENT)
Dept: FAMILY MEDICINE CLINIC | Age: 32
End: 2017-08-09

## 2017-08-09 VITALS
HEIGHT: 68 IN | WEIGHT: 228 LBS | RESPIRATION RATE: 16 BRPM | HEART RATE: 62 BPM | OXYGEN SATURATION: 99 % | DIASTOLIC BLOOD PRESSURE: 75 MMHG | SYSTOLIC BLOOD PRESSURE: 122 MMHG | TEMPERATURE: 98.6 F | BODY MASS INDEX: 34.56 KG/M2

## 2017-08-09 DIAGNOSIS — Z11.3 SCREEN FOR STD (SEXUALLY TRANSMITTED DISEASE): ICD-10-CM

## 2017-08-09 DIAGNOSIS — T39.394A: ICD-10-CM

## 2017-08-09 DIAGNOSIS — N76.0 ACUTE VAGINITIS: Primary | ICD-10-CM

## 2017-08-09 RX ORDER — IBUPROFEN 800 MG/1
TABLET ORAL AS NEEDED
COMMUNITY
Start: 2017-06-26 | End: 2018-09-12

## 2017-08-09 RX ORDER — PHENOL/SODIUM PHENOLATE
20 AEROSOL, SPRAY (ML) MUCOUS MEMBRANE DAILY
Qty: 30 TAB | Refills: 6 | Status: SHIPPED | OUTPATIENT
Start: 2017-08-09 | End: 2018-08-30 | Stop reason: SDUPTHER

## 2017-08-09 NOTE — PROGRESS NOTES
Progress Note    Patient: Ariana Bo MRN: [id number]  SSN: [id number]    YOB: 1985  Age: 89[address] [place] y.o. Sex: female        Chief Complaint   Patient presents with    Vaginal Discharge         Subjective:     Encounter Diagnoses   Name Primary?  Acute vaginitis Yes    Screen for STD (sexually transmitted disease)     NSAID induced gastritis, undetermined intent, initial encounter        Vaginal Discharge  Patient endorses vaginal discharge and odor x 1 month ago after stopping probiotics. Had tried pHresh insert which cause cramping intermittently. Taking IBU for cramps but has difficultly with stomach/heart burn after taking IBU. Patient also request STD testing. Endorses unprotected sex with single male partner (). Has implanon. Current and past medical information:    Current Medications after this visit[de-identified]     Current Outpatient Prescriptions   Medication Sig    ibuprofen (MOTRIN) 800 mg tablet     Omeprazole delayed release (PRILOSEC D/R) 20 mg tablet Take 1 Tab by mouth daily.  bisoprolol-hydroCHLOROthiazide (ZIAC) 5-6.25 mg per tablet TAKE 1 TABLET BY MOUTH ONCE A DAY    fluticasone (FLONASE) 50 mcg/actuation nasal spray 2 Sprays by Both Nostrils route daily.  cetirizine (ZYRTEC) 10 mg tablet Take 1 Tab by mouth daily.  Neljiilv6-Dhimyz4-Isvgp therm. 112.5 billion cell cap Take 1 Cap by mouth daily. No current facility-administered medications for this visit.         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 ([facility] [address] 08/10/2016    Depression 05/12/2015    Headache 05/22/2014    HTN (hypertension) 08/27/2012    KAMERON I (cervical intraepithelial neoplasia I) 09/02/2011       Past Medical History:   Diagnosis Date    Abnormal Pap smear 2010    FU with colpo, negative per patient    Cancer ([facility] [address]     nodular lymphocytic hodgkins lymphoma    Chest pain     Depression 5/12/2015  Encounter for IUD insertion 3/5/15    Mirena    Essential hypertension     Hypertension     Implanon removal 9/6/2013    Pap smear for cervical cancer screening 11/11/15    Negative    Trouble in sleeping        Allergies   Allergen Reactions    Clonidine (Pf) Vertigo       Past Surgical History:   Procedure Laterality Date    HX COLPOSCOPY  2010    per pt WNL    HX WISDOM TEETH EXTRACTION         Social History     Social History    Marital status:      Spouse name: N/A    Number of children: N/A    Years of education: N/A     Social History Main Topics    Smoking status: Never Smoker    Smokeless tobacco: Never Used    Alcohol use Yes      Comment: occassionally    Drug use: No    Sexual activity: Yes     Partners: Male     Birth control/ protection: None     Other Topics Concern    None     Social History Narrative       {Review of Systems   Constitutional: Negative for fever. Gastrointestinal: Negative for abdominal pain, blood in stool, constipation, diarrhea and melena. Genitourinary: Negative for dysuria, frequency, hematuria and urgency. Skin: Negative for itching and rash. Objective:     Vitals:    08/09/17 1020   BP: 122/75   Pulse: 62   Resp: 16   Temp: 98.6 °F (37 °C)   TempSrc: Oral   SpO2: 99%   Weight: 228 lb (103.4 kg)   Height: 5' 8\" (1.727 m)      Body mass index is 34.67 kg/(m^2). Physical Exam   Constitutional: She appears well-developed and well-nourished. Cardiovascular: Normal rate and regular rhythm. Pulmonary/Chest: Effort normal and breath sounds normal.   Genitourinary: There is no rash, tenderness, lesion or injury on the right labia. There is no rash, tenderness, lesion or injury on the left labia. No erythema, tenderness or bleeding in the vagina. No foreign body in the vagina. No signs of injury around the vagina. No vaginal discharge found. Genitourinary Comments: Chaperoned by Vickie Casillas LPN   Skin: Skin is warm and dry. Health Maintenance Due   Topic Date Due    INFLUENZA AGE 9 TO ADULT  08/01/2017       Assessment and orders:     Encounter Diagnoses     ICD-10-CM ICD-9-CM   1. Acute vaginitis N76.0 616.10   2. Screen for STD (sexually transmitted disease) Z11.3 V74.5   3. NSAID induced gastritis, undetermined intent, initial encounter T39.394A 965.61     E980.0       1. Acute vaginitis  Minimal vaginal discharge seen on exam. Advised that she should resume probiotics as she reports that it improved odor in the past and that she only stopped because she forgot.   - NUSWAB VAGINITIS PLUS    2. Screen for STD (sexually transmitted disease)  - NUSWAB VAGINITIS PLUS  - T PALLIDUM SCREEN W/REFLEX  - HIV 1/2 AG/AB, 4TH GENERATION,W RFLX CONFIRM  - HEP B SURFACE AG    3. NSAID induced gastritis, undetermined intent, initial encounter  Advised to use PPI while taking IBU for NSAIDs  - Omeprazole delayed release (PRILOSEC D/R) 20 mg tablet; Take 1 Tab by mouth daily. Dispense: 30 Tab; Refill: 0 - [address] of care:  Discussed diagnoses in detail with patient. Medication risks/benefits/side effects discussed with patient. All of the patient's questions were addressed. The patient understands and agrees with our plan of care. The patient knows to call back if they are unsure of or forget any changes we discussed today or if the symptoms change. The patient received an After-Visit Summary which contains VS, orders, medication list and allergy list. This can be used as a \"mini-medical record\" should they have to seek medical care while out of town. Follow-up Disposition:  Return if symptoms worsen or fail to improve.     Future Appointments  Date Time Provider Ann Stone   8/24/2017 9:30 AM Zaynab Lockwood MD [facility] SCHED   2/2/2018 10:00 AM Bushra Bolden MD [address] [place] [address]       Signed By: Jayme Rodriges MD     August 9, 2017

## 2017-08-09 NOTE — MR AVS SNAPSHOT
Visit Information Date & Time Provider Department Dept. Phone Encounter #  
 8/9/2017 10:40 AM Daya Farmer  [facility] [phone] [id number] Follow-up Instructions Return if symptoms worsen or fail to improve. Your Appointments 8/24/2017  9:30 AM  
ESTABLISHED PATIENT with Esperanza Davies MD  
[facility]) Appt Note: 3 mo fu, Marilyn [address] [facility], [address] [facility] [address] [facility]  
[phone]  
  
   
 [address] [facility], [address] [facility],[address]  
  
    
 2/2/2018 10:00 AM  
ESTABLISHED PATIENT with Amanda Orozco MD  
[facility] ([facility]) [address] [facility] [phone] [facility]  
[address] [facility] [phone] [address] Upcoming Health Maintenance Date Due INFLUENZA AGE 9 TO ADULT 8/1/2017 Pneumococcal 19-64 Highest Risk (2 of 3 - PCV13) 12/15/2017 PAP AKA CERVICAL CYTOLOGY 11/11/2018 DTaP/Tdap/Td series (2 - Td) 4/12/2024 Allergies as of 8/9/2017  Review Complete On: 8/9/2017 By: Daya Farmer MD  
  
 Severity Noted Reaction Type Reactions Clonidine (Pf)  11/06/2013   Intolerance Vertigo Current Immunizations  Never Reviewed Name Date Pneumococcal Polysaccharide (PPSV-23) 12/15/2016 Tdap 4/12/2014 12:26 PM  
  
 Not reviewed this visit You Were Diagnosed With   
  
 Codes Comments Acute vaginitis    -  Primary ICD-10-CM: N76.0 ICD-9-CM: 616.10 Screen for STD (sexually transmitted disease)     ICD-10-CM: Z11.3 ICD-9-CM: V74.5 NSAID induced gastritis, undetermined intent, initial encounter     ICD-10-CM: T39.394A ICD-9-CM: 965.61, E980.0 Vitals BP Pulse Temp Resp Height(growth percentile) Weight(growth percentile) 122/75 (BP 1 Location: Right arm, BP Patient Position: Sitting) 62 98.6 °F (37 °C) (Oral) 16 5' 8\" (1.727 m) 228 lb (103.4 kg) SpO2 BMI OB Status Smoking Status 99% 34.67 kg/m2 Implant Never Smoker Vitals History BMI and BSA Data Body Mass Index Body Surface Area  
 34.67 kg/m 2 2.23 m 2 Preferred Pharmacy Pharmacy Name Phone [address] [place], [place] - [address] [phone] Your Updated Medication List  
  
   
This list is accurate as of: 8/9/17 10:45 AM.  Always use your most recent med list.  
  
  
  
  
 bisoprolol-hydroCHLOROthiazide 5-6.25 mg per tablet Commonly known as:  [facility] TAKE 1 TABLET BY MOUTH ONCE A DAY  
  
 cetirizine 10 mg tablet Commonly known as:  ZYRTEC Take 1 Tab by mouth daily. fluticasone 50 mcg/actuation nasal spray Commonly known as:  Arlys Pock 2 Sprays by Both Nostrils route daily. ibuprofen 800 mg tablet Commonly known as:  MOTRIN Sooftmrx9-Zzmodd8-Pgjut therm. 112.5 billion cell Cap Take 1 Cap by mouth daily. Omeprazole delayed release 20 mg tablet Commonly known as:  PRILOSEC D/R Take 1 Tab by mouth daily. Prescriptions Sent to Pharmacy Refills Omeprazole delayed release (PRILOSEC D/R) 20 mg tablet 6 Sig: Take 1 Tab by mouth daily. Class: Normal  
 Pharmacy: [address] [place] [address] #: [phone] Route: Oral  
  
We Performed the Following HEP B SURFACE AG [id number] CPT(R)] HIV 1/2 AG/AB, 4TH GENERATION,W RFLX CONFIRM [[id number] Custom] [address] [id number] Custom] T PALLIDUM SCREEN W/REFLEX [[id number] Custom] Follow-up Instructions Return if symptoms worsen or fail to improve. Patient Instructions Omeprazole (By mouth) Omeprazole (dj-SEZ-mi-zole) Treats heartburn, a damaged esophagus, stomach ulcers, and gastroesophageal reflux disease (GERD). This medicine is a proton pump inhibitor (PPI). Brand Name(s): First-Omeprazole, [facility] Omeprazole, Good Sense Omeprazole, Leader Omeprazole, Omeclamox-Jace, PrevidolRx Analgesic Jace, PriLOSEC, PriLOSEC OTC, Quality Choice Omeprazole Magnesium, [facility] Omeprazole, Sunmark Omeprazole, TopCare Omeprazole There may be other brand names for this medicine. When This Medicine Should Not Be Used: This medicine is not right for everyone. Do not use it if you had an allergic reaction to omeprazole or similar medicines. How to Use This Medicine:  
Delayed Release Capsule, Packet, Powder for Suspension, Delayed Release Tablet · Your doctor will tell you how much medicine to use. Do not use more than directed. · This medicine should come with a Medication Guide. Ask your pharmacist for a copy if you do not have one. · Follow the instructions on the medicine label if you are using this medicine without a prescription. If you are using the over-the-counter medicine, do not take it for more than 14 days or use the treatment more often than every 4 months unless your doctor tells you to. · Take this medicine at least 1 hour before a meal and for the full time of treatment, even if you begin to feel better after a few days. · Capsule or tablet: ¨ Swallow whole. Do not crush or chew it. ¨ If you cannot swallow the capsule, you may open it and pour the medicine into a small amount of applesauce. Stir this mixture well and swallow it without chewing. To help make sure you get the full dose, drink a full glass of water. · Oral packet: ¨ Mix the contents of a 2.5-milligram (mg) packet with 5 milliliters (mL) of water or mix the contents of a 10-mg packet with 15 mL of water. Do not use other liquids or food. ¨ Stir well. Let the mixture thicken for 2 to 3 minutes. ¨ Stir again and drink the medicine within 30 minutes. ¨ If there is any medicine left, add more water, stir, and drink immediately. § To prepare the oral packet for a feeding tube: § Add 5 mL of water to a catheter-tipped syringe. Then add the contents of a 2.5-mg packet (or use 15 mL of water for the 10-mg packet). § Shake the syringe right away. Let the mixture thicken for 2 to 3 minutes. § Shake the syringe once more. Give the medicine through the tube within 30 minutes. § Refill the syringe with an equal amount of water and shake it. Use the water to flush the tube to make sure all the medicine is given. · Missed dose: Take a dose as soon as you remember. If it is almost time for your next dose, wait until then and take a regular dose. Do not take extra medicine to make up for a missed dose. · Store the medicine in a closed container at room temperature, away from heat, moisture, and direct light. Drugs and Foods to Avoid: Ask your doctor or pharmacist before using any other medicine, including over-the-counter medicines, vitamins, and herbal products. · Do not use omeprazole if you are also using medicines that contain rilpivirine. · Some foods and medicines can affect how omeprazole works. Tell your doctor if you are using any of the following: ¨ Amoxicillin, atazanavir, cilostazol, citalopram, clarithromycin, clopidogrel, cyclosporine, dasatinib, digoxin, disulfiram, erlotinib, itraconazole, ketoconazole, methotrexate, mycophenolate mofetil, nelfinavir, nilotinib, phenytoin, rifampin, saquinavir, Karen's wort, tacrolimus, voriconazole ¨ Benzodiazepine medicine (including diazepam) ¨ Blood thinner (including warfarin) ¨ Diuretic (water pill) ¨ Iron supplements Warnings While Using This Medicine: · Tell your doctor if you are pregnant or breastfeeding, or if you have liver disease, lupus, or osteoporosis. · This medicine may cause the following problems: ¨ Kidney problems ¨ Low vitamin B12 or magnesium levels in the blood ¨ Increased risk of broken bones in the hip, wrist, or spine · This medicine can cause diarrhea. Call your doctor if the diarrhea becomes severe, does not stop, or is bloody. Do not take any medicine to stop diarrhea until you have talked to your doctor. Diarrhea can occur 2 months or more after you stop taking this medicine. · Tell any doctor or dentist who treats you that you are using this medicine. This medicine may affect certain medical test results. · Your doctor will check your progress and the effects of this medicine at regular visits. Keep all appointments. · Keep all medicine out of the reach of children. Never share your medicine with anyone. Possible Side Effects While Using This Medicine:  
Call your doctor right away if you notice any of these side effects: · Allergic reaction: Itching or hives, swelling in your face or hands, swelling or tingling in your mouth or throat, chest tightness, trouble breathing · Blistering, peeling, red skin rash · Fever, joint pain, swelling in the body, unusual weight gain, change in how much or how often you urinate · Joint pain, rash on your cheeks or arms that gets worse in the sun · Seizures, dizziness, uneven heartbeat, muscle cramps or twitching · Severe diarrhea, stomach cramps, fever · Stomach pain, nausea, vomiting, weight loss If you notice these less serious side effects, talk with your doctor:  
· Headache · Mild diarrhea or stomach pain If you notice other side effects that you think are caused by this medicine, tell your doctor. Call your doctor for medical advice about side effects. You may report side effects to FDA at 5-414-KEI-6480 © 2017 [address] Information is for End User's use only and may not be sold, redistributed or otherwise used for commercial purposes. The above information is an  only. It is not intended as medical advice for individual conditions or treatments.  Talk to your doctor, nurse or pharmacist before following any medical regimen to see if it is safe and effective for you. Introducing \[facility]\"" & HEALTH SERVICES! Dear Hallie Tapia: Thank you for requesting a [employer] account. Our records indicate that you already have an active [employer] account. You can access your account anytime at [email or web address] Did you know that you can access your hospital and ER discharge instructions at any time in [employer]? You can also review all of your test results from your hospital stay or ER visit. Additional Information If you have questions, please visit the Frequently Asked Questions section of the [employer] website at [email or web address] Remember, [employer] is NOT to be used for urgent needs. For medical emergencies, dial 911. Now available from your iPhone and Android! Please provide this summary of care documentation to your next provider. Your primary care clinician is listed as Jim Grimm. If you have any questions after today's visit, please call [phone].

## 2017-08-09 NOTE — PATIENT INSTRUCTIONS
Omeprazole (By mouth)   Omeprazole (do-NMR-gr-zole)  Treats heartburn, a damaged esophagus, stomach ulcers, and gastroesophageal reflux disease (GERD). This medicine is a proton pump inhibitor (PPI). Brand Name(s): First-Omeprazole, [facility] Omeprazole, Good Sense Omeprazole, Leader Omeprazole, Omeclamox-Jace, PrevidolRx Analgesic Jace, PriLOSEC, PriLOSEC OTC, Quality Choice Omeprazole Magnesium, [facility] Omeprazole, Sunmark Omeprazole, TopCare Omeprazole   There may be other brand names for this medicine. When This Medicine Should Not Be Used: This medicine is not right for everyone. Do not use it if you had an allergic reaction to omeprazole or similar medicines. How to Use This Medicine:   Delayed Release Capsule, Packet, Powder for Suspension, Delayed Release Tablet  · Your doctor will tell you how much medicine to use. Do not use more than directed. · This medicine should come with a Medication Guide. Ask your pharmacist for a copy if you do not have one. · Follow the instructions on the medicine label if you are using this medicine without a prescription. If you are using the over-the-counter medicine, do not take it for more than 14 days or use the treatment more often than every 4 months unless your doctor tells you to. · Take this medicine at least 1 hour before a meal and for the full time of treatment, even if you begin to feel better after a few days. · Capsule or tablet:   ¨ Swallow whole. Do not crush or chew it. ¨ If you cannot swallow the capsule, you may open it and pour the medicine into a small amount of applesauce. Stir this mixture well and swallow it without chewing. To help make sure you get the full dose, drink a full glass of water. · Oral packet:   ¨ Mix the contents of a 2.5-milligram (mg) packet with 5 milliliters (mL) of water or mix the contents of a 10-mg packet with 15 mL of water. Do not use other liquids or food. ¨ Stir well.  Let the mixture thicken for 2 to 3 minutes. ¨ Stir again and drink the medicine within 30 minutes. ¨ If there is any medicine left, add more water, stir, and drink immediately. § To prepare the oral packet for a feeding tube:   § Add 5 mL of water to a catheter-tipped syringe. Then add the contents of a 2.5-mg packet (or use 15 mL of water for the 10-mg packet). § Shake the syringe right away. Let the mixture thicken for 2 to 3 minutes. § Shake the syringe once more. Give the medicine through the tube within 30 minutes. § Refill the syringe with an equal amount of water and shake it. Use the water to flush the tube to make sure all the medicine is given. · Missed dose: Take a dose as soon as you remember. If it is almost time for your next dose, wait until then and take a regular dose. Do not take extra medicine to make up for a missed dose. · Store the medicine in a closed container at room temperature, away from heat, moisture, and direct light. Drugs and Foods to Avoid:   Ask your doctor or pharmacist before using any other medicine, including over-the-counter medicines, vitamins, and herbal products. · Do not use omeprazole if you are also using medicines that contain rilpivirine. · Some foods and medicines can affect how omeprazole works. Tell your doctor if you are using any of the following:  ¨ Amoxicillin, atazanavir, cilostazol, citalopram, clarithromycin, clopidogrel, cyclosporine, dasatinib, digoxin, disulfiram, erlotinib, itraconazole, ketoconazole, methotrexate, mycophenolate mofetil, nelfinavir, nilotinib, phenytoin, rifampin, saquinavir, Karen's wort, tacrolimus, voriconazole  ¨ Benzodiazepine medicine (including diazepam)  ¨ Blood thinner (including warfarin)  ¨ Diuretic (water pill)  ¨ Iron supplements  Warnings While Using This Medicine:   · Tell your doctor if you are pregnant or breastfeeding, or if you have liver disease, lupus, or osteoporosis.   · This medicine may cause the following problems:   ¨ Kidney problems  ¨ Low vitamin B12 or magnesium levels in the blood  ¨ Increased risk of broken bones in the hip, wrist, or spine  · This medicine can cause diarrhea. Call your doctor if the diarrhea becomes severe, does not stop, or is bloody. Do not take any medicine to stop diarrhea until you have talked to your doctor. Diarrhea can occur 2 months or more after you stop taking this medicine. · Tell any doctor or dentist who treats you that you are using this medicine. This medicine may affect certain medical test results. · Your doctor will check your progress and the effects of this medicine at regular visits. Keep all appointments. · Keep all medicine out of the reach of children. Never share your medicine with anyone. Possible Side Effects While Using This Medicine:   Call your doctor right away if you notice any of these side effects:  · Allergic reaction: Itching or hives, swelling in your face or hands, swelling or tingling in your mouth or throat, chest tightness, trouble breathing  · Blistering, peeling, red skin rash  · Fever, joint pain, swelling in the body, unusual weight gain, change in how much or how often you urinate  · Joint pain, rash on your cheeks or arms that gets worse in the sun  · Seizures, dizziness, uneven heartbeat, muscle cramps or twitching  · Severe diarrhea, stomach cramps, fever  · Stomach pain, nausea, vomiting, weight loss  If you notice these less serious side effects, talk with your doctor:   · Headache  · Mild diarrhea or stomach pain  If you notice other side effects that you think are caused by this medicine, tell your doctor. Call your doctor for medical advice about side effects. You may report side effects to FDA at 0-204-FDA-4763  © 2017 [address] [employer] Information is for End User's use only and may not be sold, redistributed or otherwise used for commercial purposes. The above information is an  only.  It is not intended as medical advice for individual conditions or treatments. Talk to your doctor, nurse or pharmacist before following any medical regimen to see if it is safe and effective for you.

## 2017-08-09 NOTE — PROGRESS NOTES
Reviewed record in preparation for visit and have necessary documentation  Pt did not bring medication to office visit for review    Goals that were addressed and/or need to be completed during or after this appointment include   Health Maintenance Due   Topic Date Due    INFLUENZA AGE 9 TO ADULT  08/01/2017

## 2017-08-11 LAB
HBV SURFACE AG SERPL QL IA: NEGATIVE
HIV 1+2 AB+HIV1 P24 AG SERPL QL IA: NON REACTIVE
T PALLIDUM AB SER QL IA: NEGATIVE

## 2017-08-12 LAB
A VAGINAE DNA VAG QL NAA+PROBE: ABNORMAL SCORE
BVAB2 DNA VAG QL NAA+PROBE: ABNORMAL SCORE
C ALBICANS DNA VAG QL NAA+PROBE: NEGATIVE
C GLABRATA DNA VAG QL NAA+PROBE: NEGATIVE
C TRACH RRNA SPEC QL NAA+PROBE: NEGATIVE
MEGA1 DNA VAG QL NAA+PROBE: ABNORMAL SCORE
N GONORRHOEA RRNA SPEC QL NAA+PROBE: NEGATIVE
T VAGINALIS RRNA SPEC QL NAA+PROBE: NEGATIVE

## 2017-08-14 ENCOUNTER — TELEPHONE (OUTPATIENT)
Dept: ONCOLOGY | Age: 32
End: 2017-08-14

## 2017-08-14 DIAGNOSIS — B96.89 BACTERIAL VAGINOSIS: Primary | ICD-10-CM

## 2017-08-14 DIAGNOSIS — N76.0 BACTERIAL VAGINOSIS: Primary | ICD-10-CM

## 2017-08-14 RX ORDER — METRONIDAZOLE 500 MG/1
500 TABLET ORAL 2 TIMES DAILY
Qty: 14 TAB | Refills: 0 | Status: SHIPPED | OUTPATIENT
Start: 2017-08-14 | End: 2017-08-21

## 2017-08-14 NOTE — TELEPHONE ENCOUNTER
[employer]  Medical Oncology at [facility]    08/14/17- Phone call placed to pt to remind pt to have labs drawn prior to her follow up appointment with . Vm left for patient.

## 2017-08-29 LAB
ALBUMIN SERPL-MCNC: 4.1 G/DL (ref 3.5–5.5)
ALBUMIN/GLOB SERPL: 1.6 {RATIO} (ref 1.2–2.2)
ALP SERPL-CCNC: 77 IU/L (ref 39–117)
ALT SERPL-CCNC: 12 IU/L (ref 0–32)
AST SERPL-CCNC: 17 IU/L (ref 0–40)
BASOPHILS # BLD AUTO: 0 X10E3/UL (ref 0–0.2)
BASOPHILS NFR BLD AUTO: 0 %
BILIRUB SERPL-MCNC: <0.2 MG/DL (ref 0–1.2)
BUN SERPL-MCNC: 11 MG/DL (ref 6–20)
BUN/CREAT SERPL: 15 (ref 9–23)
CALCIUM SERPL-MCNC: 9 MG/DL (ref 8.7–10.2)
CHLORIDE SERPL-SCNC: 106 MMOL/L (ref 96–106)
CO2 SERPL-SCNC: 21 MMOL/L (ref 18–29)
CREAT SERPL-MCNC: 0.74 MG/DL (ref 0.57–1)
EOSINOPHIL # BLD AUTO: 0.1 X10E3/UL (ref 0–0.4)
EOSINOPHIL NFR BLD AUTO: 1 %
ERYTHROCYTE [DISTWIDTH] IN BLOOD BY AUTOMATED COUNT: 13.6 % (ref 12.3–15.4)
ERYTHROCYTE [SEDIMENTATION RATE] IN BLOOD BY WESTERGREN METHOD: 3 MM/HR (ref 0–32)
GLOBULIN SER CALC-MCNC: 2.6 G/DL (ref 1.5–4.5)
GLUCOSE SERPL-MCNC: 94 MG/DL (ref 65–99)
HCT VFR BLD AUTO: 37.1 % (ref 34–46.6)
HGB BLD-MCNC: 12.7 G/DL (ref 11.1–15.9)
IMM GRANULOCYTES # BLD: 0 X10E3/UL (ref 0–0.1)
IMM GRANULOCYTES NFR BLD: 0 %
LYMPHOCYTES # BLD AUTO: 1.2 X10E3/UL (ref 0.7–3.1)
LYMPHOCYTES NFR BLD AUTO: 24 %
MCH RBC QN AUTO: 29.4 PG (ref 26.6–33)
MCHC RBC AUTO-ENTMCNC: 34.2 G/DL (ref 31.5–35.7)
MCV RBC AUTO: 86 FL (ref 79–97)
MONOCYTES # BLD AUTO: 0.4 X10E3/UL (ref 0.1–0.9)
MONOCYTES NFR BLD AUTO: 8 %
NEUTROPHILS # BLD AUTO: 3.4 X10E3/UL (ref 1.4–7)
NEUTROPHILS NFR BLD AUTO: 67 %
PLATELET # BLD AUTO: 325 X10E3/UL (ref 150–379)
POTASSIUM SERPL-SCNC: 3.8 MMOL/L (ref 3.5–5.2)
PROT SERPL-MCNC: 6.7 G/DL (ref 6–8.5)
RBC # BLD AUTO: 4.32 X10E6/UL (ref 3.77–5.28)
SODIUM SERPL-SCNC: 141 MMOL/L (ref 134–144)
WBC # BLD AUTO: 5.1 X10E3/UL (ref 3.4–10.8)

## 2017-09-01 ENCOUNTER — TELEPHONE (OUTPATIENT)
Dept: ONCOLOGY | Age: 32
End: 2017-09-01

## 2017-09-01 NOTE — TELEPHONE ENCOUNTER
Called patient and left a voicemail requesting a return call. Called patient at the request of the NP to inform patient that Dr. Renate Hall had an opening for Tuesday 9/5 and see if patient would move to that day.

## 2017-09-06 ENCOUNTER — OFFICE VISIT (OUTPATIENT)
Dept: ONCOLOGY | Age: 32
End: 2017-09-06

## 2017-09-06 VITALS
RESPIRATION RATE: 18 BRPM | OXYGEN SATURATION: 100 % | HEART RATE: 55 BPM | BODY MASS INDEX: 34.56 KG/M2 | HEIGHT: 68 IN | DIASTOLIC BLOOD PRESSURE: 83 MMHG | SYSTOLIC BLOOD PRESSURE: 131 MMHG | WEIGHT: 228 LBS

## 2017-09-06 DIAGNOSIS — C81.01 NODULAR LYMPHOCYTE PREDOMINANT HODGKIN LYMPHOMA OF LYMPH NODES OF NECK (HCC): Primary | ICD-10-CM

## 2017-09-06 NOTE — PROGRESS NOTES
Identified pt with two pt identifiers(name and ). Reviewed record in preparation for visit and have obtained necessary documentation. Chief Complaint   Patient presents with   [place] Maintenance Due   Topic    INFLUENZA AGE 9 TO ADULT      HM reviewed w/patient    Coordination of Care Questionnaire:  :   1) Have you been to an emergency room, urgent care clinic since your last visit? no   Hospitalized since your last visit? no             2. Have seen or consulted any other health care provider since your last visit? NO  If yes, where when, and reason for visit? 3) Do you have an Advanced Directive/ Living Will in place? NO  If yes, do we have a copy on file N/A  If no, would you like information NO    Patient is accompanied by daughter I have received verbal consent from Everardo Ramirez to discuss any/all medical information while they are present in the room.

## 2017-09-28 RX ORDER — FLUCONAZOLE 150 MG/1
150 TABLET ORAL DAILY
Qty: 1 TAB | Refills: 0 | Status: SHIPPED | OUTPATIENT
Start: 2017-09-28 | End: 2017-09-29

## 2017-09-28 NOTE — TELEPHONE ENCOUNTER
Please find out what abx, who prescribed it and for what the patient was taking the abx for?     Didi Bianchi MD

## 2017-10-04 ENCOUNTER — TELEPHONE (OUTPATIENT)
Dept: FAMILY MEDICINE CLINIC | Age: 32
End: 2017-10-04

## 2017-10-04 ENCOUNTER — OFFICE VISIT (OUTPATIENT)
Dept: FAMILY MEDICINE CLINIC | Age: 32
End: 2017-10-04

## 2017-10-04 VITALS
DIASTOLIC BLOOD PRESSURE: 85 MMHG | HEIGHT: 68 IN | TEMPERATURE: 98.8 F | RESPIRATION RATE: 16 BRPM | SYSTOLIC BLOOD PRESSURE: 133 MMHG | BODY MASS INDEX: 34.56 KG/M2 | WEIGHT: 228 LBS | OXYGEN SATURATION: 99 % | HEART RATE: 61 BPM

## 2017-10-04 DIAGNOSIS — N76.0 ACUTE VAGINITIS: ICD-10-CM

## 2017-10-04 DIAGNOSIS — N76.0 BV (BACTERIAL VAGINOSIS): ICD-10-CM

## 2017-10-04 DIAGNOSIS — N89.8 VAGINAL DISCHARGE: Primary | ICD-10-CM

## 2017-10-04 DIAGNOSIS — R30.0 DYSURIA: ICD-10-CM

## 2017-10-04 DIAGNOSIS — B96.89 BV (BACTERIAL VAGINOSIS): ICD-10-CM

## 2017-10-04 LAB
BILIRUB UR QL STRIP: NEGATIVE
GLUCOSE UR-MCNC: NEGATIVE MG/DL
KETONES P FAST UR STRIP-MCNC: NEGATIVE MG/DL
PH UR STRIP: 7 [PH] (ref 4.6–8)
PROT UR QL STRIP: NEGATIVE MG/DL
SP GR UR STRIP: 1.01 (ref 1–1.03)
UA UROBILINOGEN AMB POC: NORMAL (ref 0.2–1)
URINALYSIS CLARITY POC: CLEAR
URINALYSIS COLOR POC: YELLOW
URINE BLOOD POC: NEGATIVE
URINE LEUKOCYTES POC: NEGATIVE
URINE NITRITES POC: NEGATIVE

## 2017-10-04 RX ORDER — CLINDAMYCIN HYDROCHLORIDE 300 MG/1
300 CAPSULE ORAL 2 TIMES DAILY
Qty: 14 CAP | Refills: 0 | Status: SHIPPED | OUTPATIENT
Start: 2017-10-04 | End: 2017-10-11

## 2017-10-04 RX ORDER — OMEPRAZOLE 20 MG/1
CAPSULE, DELAYED RELEASE ORAL
COMMUNITY
Start: 2017-08-09 | End: 2017-11-03 | Stop reason: SDUPTHER

## 2017-10-04 RX ORDER — FLUCONAZOLE 150 MG/1
150 TABLET ORAL EVERY OTHER DAY
Qty: 3 TAB | Refills: 0 | Status: SHIPPED | OUTPATIENT
Start: 2017-10-04 | End: 2017-10-09

## 2017-10-04 NOTE — MR AVS SNAPSHOT
Visit Information Date & Time Provider Department Dept. Phone Encounter #  
 10/4/2017  3:40 PM Erik Gilliam MD 7 [facility] [id number] Follow-up Instructions Return if symptoms worsen or fail to improve. Your Appointments 12/7/2017  9:30 AM  
ESTABLISHED PATIENT with Anand Jason MD  
[facility] at [facility]) Appt Note: [place] - lymphoma f/u.  
 [address] [facility], 2[address] [facility] [id number] [place] [address]  
[phone]  
  
   
 [address] [facility], 8[address] [facility],4Th Floor  
  
    
 2/2/2018 10:00 AM  
ESTABLISHED PATIENT with Martínez Jerez MD  
[facility] ([facility]) [address] [facility] 1007 [facility]  
54 [facility] [address] [address] Upcoming Health Maintenance Date Due Pneumococcal 19-64 Highest Risk (2 of 3 - PCV13) 12/15/2017 PAP AKA CERVICAL CYTOLOGY 11/11/2018 DTaP/Tdap/Td series (2 - Td) 4/12/2024 Allergies as of 10/4/2017  Review Complete On: 10/4/2017 By: Erik Gilliam MD  
  
 Severity Noted Reaction Type Reactions Clonidine (Pf)  11/06/2013   Intolerance Vertigo Current Immunizations  Never Reviewed Name Date Pneumococcal Polysaccharide (PPSV-23) 12/15/2016 Tdap 4/12/2014 12:26 PM  
  
 Not reviewed this visit You Were Diagnosed With   
  
 Codes Comments Vaginal discharge    -  Primary ICD-10-CM: N89.8 ICD-9-CM: 623.5 Acute vaginitis     ICD-10-CM: N76.0 ICD-9-CM: 616.10 Dysuria     ICD-10-CM: R30.0 ICD-9-CM: 788.1 BV (bacterial vaginosis)     ICD-10-CM: N76.0, B96.89 
ICD-9-CM: 616.10, 041.9 Vitals BP Pulse Temp Resp Height(growth percentile) Weight(growth percentile)  133/85 (BP 1 Location: Right arm, BP Patient Position: Sitting) 61 98.8 °F (37.1 °C) (Oral) 16 5' 8\" (1.727 m) 228 lb (103.4 kg) SpO2 BMI OB Status Smoking Status 99% 34.67 kg/m2 Implant Never Smoker Vitals History BMI and BSA Data Body Mass Index Body Surface Area  
 34.67 kg/m 2 2.23 m 2 Preferred Pharmacy Pharmacy Name Phone [address] [facility] [address] [phone] Your Updated Medication List  
  
   
This list is accurate as of: 10/4/17  4:14 PM.  Always use your most recent med list.  
  
  
  
  
 bisoprolol-hydroCHLOROthiazide 5-6.25 mg per tablet Commonly known as:  LIFECARE [facility] TAKE 1 TABLET BY MOUTH ONCE A DAY  
  
 cetirizine 10 mg tablet Commonly known as:  ZYRTEC Take 1 Tab by mouth daily. clindamycin 300 mg capsule Commonly known as:  CLEOCIN Take 1 Cap by mouth two (2) times a day for 7 days. fluconazole 150 mg tablet Commonly known as:  DIFLUCAN Take 1 Tab by mouth every other day for 3 doses. FDA advises cautious prescribing of oral fluconazole in pregnancy. fluticasone 50 mcg/actuation nasal spray Commonly known as:  Dean Blizzard 2 Sprays by Both Nostrils route daily. ibuprofen 800 mg tablet Commonly known as:  MOTRIN Vfbewzvh2-Juerwn0-Dpzzw therm. 112.5 billion cell Cap Take 1 Cap by mouth daily. * Omeprazole delayed release 20 mg tablet Commonly known as:  PRILOSEC D/R Take 1 Tab by mouth daily. * omeprazole 20 mg capsule Commonly known as:  PRILOSEC * Notice: This list has 2 medication(s) that are the same as other medications prescribed for you. Read the directions carefully, and ask your doctor or other care provider to review them with you. Prescriptions Sent to Pharmacy Refills  
 clindamycin (CLEOCIN) 300 mg capsule 0 Sig: Take 1 Cap by mouth two (2) times a day for 7 days. Class: Normal  
 Pharmacy: [facility] #: [phone] Route: Oral  
 fluconazole (DIFLUCAN) 150 mg tablet 0 Sig: Take 1 Tab by mouth every other day for 3 doses. FDA advises cautious prescribing of oral fluconazole in pregnancy. Class: Normal  
 Pharmacy: [address] [place] [address] #: [phone] Route: Oral  
  
We Performed the Following AMB POC URINALYSIS DIP STICK AUTO W/O MICRO [31752 CPT(R)] [address] [id number] Custom] Follow-up Instructions Return if symptoms worsen or fail to improve. To-Do List   
 12/04/2017 12:00 PM  
  [facility] at [facility] RAD [facility] ([phone]) CONTRAST STUDY: 1. The patient should not eat solid food four hours before the appointment but should be encouraged to drink clear liquids. 2.  If you have to drink oral contrast, please pick it up any weekday prior to your appointment, if you cannot please check in 2 hrs before appt time. 3.  The patient will require IV access for contrast administration. 4.  The patient should not take Ibuprofen (Advil, Motrin, etc.) and Naproxen Sodium (Aleve, etc.)  on the day of the exam. Stopping non-steroidal anti-inflammatory agents (NSAIDs) like Ibuprofen decreases the risk of kidney damage from the x-ray contrast (dye). 5.  Bring any non [facility] facility films/images pertaining to the area of interest with you on the day of appointment. 6.  Bring current lab work if available (within last 90 days CMP) ***If scheduled at [facility], iSTAT is not available, labs will need to be done before appointment*** 7. Check in at registration at least 30 minutes before appt time unless you were instructed to do otherwise. 12/04/2017 12:30 PM  
  [facility]oint[facility] at [facility] RAD CT ([phone]) CONTRAST STUDY: 1. The patient should not eat solid food four hours before the appointment but should be encouraged to drink clear liquids.  2. The patient will require IV access for contrast administration. 3. The patient should not take  Ibuprofen (Advil, Motrin, etc.) and Naproxen Sodium (Aleve, etc.)  on the day of the exam. Stopping non-steroidal anti-inflammatory agents (NSAIDs) like Ibuprofen decreases the risk of kidney damage from the x-ray contrast (dye). 4. Bring any non [facility] facility films/images pertaining to the area of interest with you on the day of appointment. 5. Bring current lab work if available(within last 90 days CMP) ***If scheduled at [facility], iSTAT is not available, labs will need to be done before appointment*** 6. Check in at registration at least 30 minutes before appt time unless you were instructed to do otherwise. 7. If you have to drink oral contrast please pick it up any weekday prior to your appointment, if you cannot please check in 2 hrs  before appt time. Patient Instructions Bacterial Vaginosis: Care Instructions Your Care Instructions Bacterial vaginosis is a type of vaginal infection. It is caused by excess growth of certain bacteria that are normally found in the vagina. Symptoms can include itching, swelling, pain when you urinate or have sex, and a gray or yellow discharge with a \"fishy\" odor. It is not considered an infection that is spread through sexual contact. Although symptoms can be annoying and uncomfortable, bacterial vaginosis does not usually cause other health problems. However, if you have it while you are pregnant, it can cause complications. While the infection may go away on its own, most doctors use antibiotics to treat it. You may have been prescribed pills or vaginal cream. With treatment, bacterial vaginosis usually clears up in 5 to 7 days. Follow-up care is a key part of your treatment and safety. Be sure to make and go to all appointments, and call your doctor if you are having problems.  It's also a good idea to know your test results and keep a list of the medicines you take. How can you care for yourself at home? · Take your antibiotics as directed. Do not stop taking them just because you feel better. You need to take the full course of antibiotics. · Do not eat or drink anything that contains alcohol if you are taking metronidazole (Flagyl). · Keep using your medicine if you start your period. Use pads instead of tampons while using a vaginal cream or suppository. Tampons can absorb the medicine. · Wear loose cotton clothing. Do not wear nylon and other materials that hold body heat and moisture close to the skin. · Do not scratch. Relieve itching with a cold pack or a cool bath. · Do not wash your vaginal area more than once a day. Use plain water or a mild, unscented soap. Do not douche. When should you call for help? Watch closely for changes in your health, and be sure to contact your doctor if: 
· You have unexpected vaginal bleeding. · You have a fever. · You have new or increased pain in your vagina or pelvis. · You are not getting better after 1 week. · Your symptoms return after you finish the course of your medicine. Where can you learn more? Go to http://srini-jeny.info/. Sally Garrett in the search box to learn more about \"Bacterial Vaginosis: Care Instructions. \" Current as of: October 13, 2016 Content Version: 11.3 © 5872-0455 [employer]. Care instructions adapted under license by [employer] (which disclaims liability or warranty for this information). If you have questions about a medical condition or this instruction, always ask your healthcare professional. [place] [address] any warranty or liability for your use of this information. Introducing [facility]! Dear Jeanne Cartwright: Thank you for requesting a [email or web address] account. Our records indicate that you already have an active [email or web address] account.   You can access your account anytime at [email or web address] Did you know that you can access your hospital and ER discharge instructions at any time in [employer]? You can also review all of your test results from your hospital stay or ER visit. Additional Information If you have questions, please visit the Frequently Asked Questions section of the [employer] website at [email or web address] Remember, [employer] is NOT to be used for urgent needs. For medical emergencies, dial 911. Now available from your iPhone and Android! Please provide this summary of care documentation to your next provider. Your primary care clinician is listed as Ankit Waters. If you have any questions after today's visit, please call [phone].

## 2017-10-04 NOTE — PROGRESS NOTES
Diana Brock  29 y.o. female  1985  [address] [facility] [address]  [address] [address]  [id number]     [facility]: Progress Note       Encounter Date: 10/4/2017    Chief Complaint   Patient presents with    Vaginal Itching    Vaginal Discharge     History of Present Illness   Diana Brock is a 29 y.o. female who presents to clinic today for    Vaginal Discharge  Patient reported approx 1.5 weeks ago started to have copious milky discharge with no odor or itching. Gradually developed pruritis and then took monitstat without relief. After monistat, pruritus worsening and burning started. She called this office and received Rx for diflucan. Diflucan did not improve symptoms. Also endorsing dysuria and noted spotting in discharge. Currently vaginal discharge is minimal clear discharge with foul odor. Health Maintenance  There are no preventive care reminders to display for this patient. Review of Systems   Review of Systems   Constitutional: Negative for chills, fever and weight loss. Gastrointestinal: Negative for abdominal pain, constipation, diarrhea, nausea and vomiting. Genitourinary: Positive for dysuria. Negative for flank pain, frequency, hematuria and urgency. Skin: Negative for itching and rash. Vitals/Objective:     Vitals:    10/04/17 1536   BP: 133/85   Pulse: 61   Resp: 16   Temp: 98.8 °F (37.1 °C)   TempSrc: Oral   SpO2: 99%   Weight: 228 lb (103.4 kg)   Height: 5' 8\" (1.727 m)     Body mass index is 34.67 kg/(m^2). Physical Exam   Constitutional: She appears well-developed and well-nourished. Cardiovascular: Normal rate and regular rhythm. Exam reveals no friction rub. No murmur heard. Pulmonary/Chest: Effort normal and breath sounds normal.   Genitourinary: There is no rash, tenderness, lesion or injury on the right labia. There is no rash, tenderness, lesion or injury on the left labia. No erythema or bleeding in the vagina.  No foreign body in the vagina. No signs of injury around the vagina. Vaginal discharge (copious thick green/yellow with foul odor .) found. Musculoskeletal: Normal range of motion. Recent Results (from the past 24 hour(s))   AMB POC URINALYSIS DIP STICK AUTO W/O MICRO    Collection Time: 10/04/17  4:05 PM   Result Value Ref Range    Color (UA POC) Yellow     Clarity (UA POC) Clear     Glucose (UA POC) Negative Negative    Bilirubin (UA POC) Negative Negative    Ketones (UA POC) Negative Negative    Specific gravity (UA POC) 1.015 1.001 - 1.035    Blood (UA POC) Negative Negative    pH (UA POC) 7.0 4.6 - 8.0    Protein (UA POC) Negative Negative mg/dL    Urobilinogen (UA POC) 0.2 mg/dL 0.2 - 1    Nitrites (UA POC) Negative Negative    Leukocyte esterase (UA POC) Negative Negative     Assessment and Plan:     Encounter Diagnoses     ICD-10-CM ICD-9-CM   1. Vaginal discharge N89.8 623.5   2. Acute vaginitis N76.0 616.10   3. Dysuria R30.0 788.1   4. BV (bacterial vaginosis) N76.0 616.10    B96.89 041.9       1. Vaginal discharge  2. Acute vaginitis  - NUSWAB VAGINITIS PLUS    3. Dysuria  - AMB POC URINALYSIS DIP STICK AUTO W/O MICRO    4. BV (bacterial vaginosis)    Already done course of flagyl without improvement. Will try clindamycin. - clindamycin (CLEOCIN) 300 mg capsule; Take 1 Cap by mouth two (2) times a day for 7 days. Dispense: 14 Cap; Refill: 0  - fluconazole (DIFLUCAN) 150 mg tablet; Take 1 Tab by mouth every other day for 3 doses. FDA advises cautious prescribing of oral fluconazole in pregnancy. Dispense: 3 Tab; Refill: 0    I have discussed the diagnosis with the patient and the intended plan as seen in the above orders. she has expressed understanding. The patient has received an after-visit summary and questions were answered concerning future plans. I have discussed medication side effects and warnings with the patient as well.     Electronically Signed: Angella Vasquez MD     History/Allergies   Patients past medical, surgical and family histories were reviewed and updated. Past Medical History:   Diagnosis Date    Abnormal Pap smear 2010    FU with colpo, negative per patient    Cancer [facility])     nodular lymphocytic hodgkins lymphoma    Chest pain     Depression 5/12/2015    Encounter for IUD insertion 3/5/15    Mirena    Essential hypertension     Hypertension     Implanon removal 9/6/2013    Pap smear for cervical cancer screening 11/11/15    Negative    Trouble in sleeping       Past Surgical History:   Procedure Laterality Date    HX COLPOSCOPY  2010    per pt WNL    HX WISDOM TEETH EXTRACTION       Family History   Problem Relation Age of Onset    HIV/AIDS Mother     Hypertension Maternal Aunt     Hypertension Maternal Grandmother      Social History     Social History    Marital status:      Spouse name: N/A    Number of children: N/A    Years of education: N/A     Occupational History    Not on file. Social History Main Topics    Smoking status: Never Smoker    Smokeless tobacco: Never Used    Alcohol use Yes      Comment: occassionally    Drug use: No    Sexual activity: Yes     Partners: Male     Birth control/ protection: None     Other Topics Concern    Not on file     Social History Narrative         Allergies   Allergen Reactions    Clonidine (Pf) Vertigo       Disposition     Follow-up Disposition:  Return if symptoms worsen or fail to improve. Future Appointments  Date Time Provider Ann Sotne   12/4/2017 12:00 PM [facility] 2 [facility]   12/4/2017 12:30 PM [facility]   12/7/2017 9:30 AM Prosper Wilson MD [facility] SCHED   2/2/2018 10:00 AM Andrew Leyva MD [facility] SCHED            Current Medications after this visit     Current Outpatient Prescriptions   Medication Sig    clindamycin (CLEOCIN) 300 mg capsule Take 1 Cap by mouth two (2) times a day for 7 days.     fluconazole (DIFLUCAN) 150 mg tablet Take 1 Tab by mouth every other day for 3 doses. FDA advises cautious prescribing of oral fluconazole in pregnancy.  ibuprofen (MOTRIN) 800 mg tablet     Omeprazole delayed release (PRILOSEC D/R) 20 mg tablet Take 1 Tab by mouth daily.  bisoprolol-hydroCHLOROthiazide (ZIAC) 5-6.25 mg per tablet TAKE 1 TABLET BY MOUTH ONCE A DAY    cetirizine (ZYRTEC) 10 mg tablet Take 1 Tab by mouth daily.  Flaavjuv3-Hbfnqm0-Vgvln therm. 112.5 billion cell cap Take 1 Cap by mouth daily.  omeprazole (PRILOSEC) 20 mg capsule     fluticasone (FLONASE) 50 mcg/actuation nasal spray 2 Sprays by Both Nostrils route daily. No current facility-administered medications for this visit. There are no discontinued medications.

## 2017-10-04 NOTE — PATIENT INSTRUCTIONS
Bacterial Vaginosis: Care Instructions  Your Care Instructions    Bacterial vaginosis is a type of vaginal infection. It is caused by excess growth of certain bacteria that are normally found in the vagina. Symptoms can include itching, swelling, pain when you urinate or have sex, and a gray or yellow discharge with a \"fishy\" odor. It is not considered an infection that is spread through sexual contact. Although symptoms can be annoying and uncomfortable, bacterial vaginosis does not usually cause other health problems. However, if you have it while you are pregnant, it can cause complications. While the infection may go away on its own, most doctors use antibiotics to treat it. You may have been prescribed pills or vaginal cream. With treatment, bacterial vaginosis usually clears up in 5 to 7 days. Follow-up care is a key part of your treatment and safety. Be sure to make and go to all appointments, and call your doctor if you are having problems. It's also a good idea to know your test results and keep a list of the medicines you take. How can you care for yourself at home? · Take your antibiotics as directed. Do not stop taking them just because you feel better. You need to take the full course of antibiotics. · Do not eat or drink anything that contains alcohol if you are taking metronidazole (Flagyl). · Keep using your medicine if you start your period. Use pads instead of tampons while using a vaginal cream or suppository. Tampons can absorb the medicine. · Wear loose cotton clothing. Do not wear nylon and other materials that hold body heat and moisture close to the skin. · Do not scratch. Relieve itching with a cold pack or a cool bath. · Do not wash your vaginal area more than once a day. Use plain water or a mild, unscented soap. Do not douche. When should you call for help?   Watch closely for changes in your health, and be sure to contact your doctor if:  · You have unexpected vaginal bleeding. · You have a fever. · You have new or increased pain in your vagina or pelvis. · You are not getting better after 1 week. · Your symptoms return after you finish the course of your medicine. Where can you learn more? Go to http://srini-jeny.info/. Pramod Scrape in the search box to learn more about \"Bacterial Vaginosis: Care Instructions. \"  Current as of: October 13, 2016  Content Version: 11.3  © 5735-5071 [employer]. Care instructions adapted under license by [employer] (which disclaims liability or warranty for this information). If you have questions about a medical condition or this instruction, always ask your healthcare professional. [address] any warranty or liability for your use of this information.

## 2017-10-04 NOTE — TELEPHONE ENCOUNTER
----- Message from Tello Lance sent at 10/4/2017 10:55 AM EDT -----  Regarding: Dr. Toscano Slider  Pt stated she keeps getting bacteria infections and just completed antibiotics in August and would like to know if she needs to be seen again. Best contact number [phone].

## 2017-10-25 ENCOUNTER — OFFICE VISIT (OUTPATIENT)
Dept: FAMILY MEDICINE CLINIC | Age: 32
End: 2017-10-25

## 2017-10-25 VITALS
HEIGHT: 68 IN | HEART RATE: 63 BPM | RESPIRATION RATE: 16 BRPM | SYSTOLIC BLOOD PRESSURE: 137 MMHG | BODY MASS INDEX: 35.46 KG/M2 | WEIGHT: 234 LBS | TEMPERATURE: 98.6 F | OXYGEN SATURATION: 98 % | DIASTOLIC BLOOD PRESSURE: 87 MMHG

## 2017-10-25 DIAGNOSIS — N93.9 ABNORMAL VAGINAL BLEEDING: ICD-10-CM

## 2017-10-25 DIAGNOSIS — R00.1 BRADYCARDIA: ICD-10-CM

## 2017-10-25 DIAGNOSIS — I10 ESSENTIAL HYPERTENSION: Primary | ICD-10-CM

## 2017-10-25 RX ORDER — HYDROCHLOROTHIAZIDE 12.5 MG/1
12.5 CAPSULE ORAL DAILY
Qty: 90 CAP | Refills: 1 | Status: SHIPPED | OUTPATIENT
Start: 2017-10-25 | End: 2017-11-01 | Stop reason: SDUPTHER

## 2017-10-25 RX ORDER — ESTRADIOL 2 MG/1
2 TABLET ORAL DAILY
Qty: 5 TAB | Refills: 0 | Status: SHIPPED | OUTPATIENT
Start: 2017-10-25 | End: 2017-10-30

## 2017-10-25 NOTE — PROGRESS NOTES
Reviewed record in preparation for visit and have obtained necessary documentation. Patient did not bring medications to visit for review. Information provided on Advanced Directive, Living Will. Body mass index is 35.58 kg/(m^2). There are no preventive care reminders to display for this patient. 1. Have you been to the ER, urgent care clinic since your last visit? Hospitalized since your last visit? no    2. Have you seen or consulted any other health care providers outside of the [address] [place] [address] since your last visit? Include any pap smears or colon screening.  no

## 2017-10-25 NOTE — PROGRESS NOTES
Ramsey Cassidy  29 y.o. female  1985  2385 [id number] [facility] [address]  [address] [address]  [id number]     [facility] Practice: Progress Note       Encounter Date: 10/25/2017    Chief Complaint   Patient presents with    Headache    Other     lightheaded     History of Present Illness   Ramsey Cassidy is a 29 y.o. female who presents to clinic today for:    Headache and lightheadedness  Patient presents with chief complaint of lightheadedness x 1 week. It is intermittent but daily and gradually worsening. Happens while sitting or standing. Denies vertigo. Associated with occipital HA and blurred vision. Attempted to correct by wearing her glasses. Denies N/V/D    Vaginal bleeding  For the past 2 weeks, had been very heavy flow initially and associated with cramping. Has taken motrin for relief which has helped and also improved her above HA. Health Maintenance  There are no preventive care reminders to display for this patient. Review of Systems   Review of Systems   Constitutional: Negative for chills, diaphoresis, fever and weight loss. HENT: Negative for congestion and sinus pain. Eyes: Positive for blurred vision. Negative for photophobia, pain and discharge. Respiratory: Negative for cough, shortness of breath and wheezing. Cardiovascular: Negative for palpitations. Gastrointestinal: Positive for nausea. Negative for abdominal pain, blood in stool, constipation and diarrhea. Skin: Negative for itching and rash. Neurological: Positive for headaches. Negative for dizziness, tingling, tremors, sensory change, speech change and focal weakness.        Vitals/Objective:     Vitals:    10/25/17 0908 10/25/17 0940 10/25/17 0941 10/25/17 0942   BP: 131/84 140/87 139/85 137/87   Pulse: (!) 52 (!) 53 (!) 57 63   Resp: 16      Temp: 98.6 °F (37 °C)      TempSrc: Oral      SpO2: 98%      Weight: 234 lb (106.1 kg)      Height: 5' 8\" (1.727 m)        Body mass index is 35.58 kg/(m^2). Physical Exam   Constitutional: She is oriented to person, place, and time. She appears well-developed and well-nourished. HENT:   Head: Normocephalic and atraumatic. Right Ear: External ear normal.   Left Ear: External ear normal.   Mouth/Throat: Oropharynx is clear and moist. No oropharyngeal exudate. Eyes: Conjunctivae are normal. Pupils are equal, round, and reactive to light. Right eye exhibits no discharge. Left eye exhibits no discharge. Neck: Normal range of motion. Cardiovascular: Normal rate and regular rhythm. Pulmonary/Chest: Effort normal and breath sounds normal.   Abdominal: Soft. Bowel sounds are normal.   Neurological: She is alert and oriented to person, place, and time. No cranial nerve deficit. Skin: Skin is warm and dry. No results found for this or any previous visit (from the past 24 hour(s)). Assessment and Plan:     Encounter Diagnoses     ICD-10-CM ICD-9-CM   1. Essential hypertension I10 401.9   2. Bradycardia R00.1 427.89   3. Abnormal vaginal bleeding N93.9 623.8       1. Essential hypertension  2. Bradycardia  Will stop beta blocker as possible contributing factor to lightheadedness. Will see back for close f/u in 1-2 weeks. - hydroCHLOROthiazide (MICROZIDE) 12.5 mg capsule; Take 1 Cap by mouth daily. Dispense: 90 Cap; Refill: 1    3. Abnormal vaginal bleeding  On implanted contraceptive. Trial of estrogen to stop bleeding.   - estradiol (ESTRACE) 2 mg tablet; Take 1 Tab by mouth daily for 5 days. Dispense: 5 Tab; Refill: 0    I have discussed the diagnosis with the patient and the intended plan as seen in the above orders. she has expressed understanding. The patient has received an after-visit summary and questions were answered concerning future plans. I have discussed medication side effects and warnings with the patient as well.     Electronically Signed: Lucita Bryson MD     History/Allergies   Patients past medical, surgical and family histories were reviewed and updated. Past Medical History:   Diagnosis Date    Abnormal Pap smear 2010    FU with colpo, negative per patient    Cancer [facility])     nodular lymphocytic hodgkins lymphoma    Chest pain     Depression 5/12/2015    Encounter for IUD insertion 3/5/15    Mirena    Essential hypertension     Hypertension     Implanon removal 9/6/2013    Pap smear for cervical cancer screening 11/11/15    Negative    Trouble in sleeping       Past Surgical History:   Procedure Laterality Date    HX COLPOSCOPY  2010    per pt WNL    HX WISDOM TEETH EXTRACTION       Family History   Problem Relation Age of Onset    HIV/AIDS Mother     Hypertension Maternal Aunt     Hypertension Maternal Grandmother      Social History     Social History    Marital status:      Spouse name: N/A    Number of children: N/A    Years of education: N/A     Occupational History    Not on file. Social History Main Topics    Smoking status: Never Smoker    Smokeless tobacco: Never Used    Alcohol use Yes      Comment: occassionally    Drug use: No    Sexual activity: Yes     Partners: Male     Birth control/ protection: None     Other Topics Concern    Not on file     Social History Narrative         Allergies   Allergen Reactions    Clonidine (Pf) Vertigo       Disposition     Follow-up Disposition:  Return in about 1 week (around 11/1/2017) for Lightheadedness. Future Appointments  Date Time Provider Ann Stone   12/4/2017 12:00 PM [facility] CT 2 [facility]   12/4/2017 12:30 PM [facility] CT 2 SFMRCT STOmer NguyenAjayluis e Whitakerrohan   12/7/2017 9:30 AM Catherine Chicas MD [facility] SCHED   2/2/2018 10:00 AM Marybel Pink MD [facility] SCHED            Current Medications after this visit     Current Outpatient Prescriptions   Medication Sig    hydroCHLOROthiazide (MICROZIDE) 12.5 mg capsule Take 1 Cap by mouth daily.  estradiol (ESTRACE) 2 mg tablet Take 1 Tab by mouth daily for 5 days.     omeprazole (PRILOSEC) 20 mg capsule     ibuprofen (MOTRIN) 800 mg tablet     Omeprazole delayed release (PRILOSEC D/R) 20 mg tablet Take 1 Tab by mouth daily.  Fjxunbnq9-Izwffg2-Xhvfi therm. 112.5 billion cell cap Take 1 Cap by mouth daily. No current facility-administered medications for this visit.       Medications Discontinued During This Encounter   Medication Reason    cetirizine (ZYRTEC) 10 mg tablet Not A Current Medication    fluticasone (FLONASE) 50 mcg/actuation nasal spray Not A Current Medication    bisoprolol-hydroCHLOROthiazide (ZIAC) 5-6.25 mg per tablet Side Effects

## 2017-10-25 NOTE — MR AVS SNAPSHOT
Visit Information Date & Time Provider Department Dept. Phone Encounter #  
 10/25/2017  9:10 AM Sharon Paige MD [address] [place] [address] [id number] Follow-up Instructions Return in about 1 week (around 11/1/2017) for Lightheadedness. Your Appointments 12/7/2017  9:30 AM  
ESTABLISHED PATIENT with Isabel Dukes MD  
[facility] [address] [facility]) Appt Note: [place] - lymphoma f/u.  
 [address] [facility], [address] [facility] [address] [address]   
[phone]  
  
   
 [address] [facility], [address] [facility],[address] Floor  
  
    
 2/2/2018 10:00 AM  
ESTABLISHED PATIENT with Balta Adams MD  
[facility] ([address]) [address] [facility] Foreign 600 1007 [address] [address] [address] Upcoming Health Maintenance Date Due Pneumococcal 19-64 Highest Risk (2 of 3 - PCV13) 12/15/2017 PAP AKA CERVICAL CYTOLOGY 11/11/2018 DTaP/Tdap/Td series (2 - Td) 4/12/2024 Allergies as of 10/25/2017  Review Complete On: 10/25/2017 By: Sharon Paige MD  
  
 Severity Noted Reaction Type Reactions Clonidine (Pf)  11/06/2013   Intolerance Vertigo Current Immunizations  Never Reviewed Name Date Pneumococcal Polysaccharide (PPSV-23) 12/15/2016 Tdap 4/12/2014 12:26 PM  
  
 Not reviewed this visit You Were Diagnosed With   
  
 Codes Comments Essential hypertension    -  Primary ICD-10-CM: I10 
ICD-9-CM: 401.9 Bradycardia     ICD-10-CM: R00.1 ICD-9-CM: 427.89 Abnormal vaginal bleeding     ICD-10-CM: N93.9 ICD-9-CM: 623.8 Vitals BP Pulse Temp Resp Height(growth percentile) Weight(growth percentile) 137/87 (BP 1 Location: Right arm, BP Patient Position: Standing) 63 98.6 °F (37 °C) (Oral) 16 5' 8\" (1.727 m) 234 lb (106.1 kg) SpO2 BMI OB Status Smoking Status 98% 35.58 kg/m2 Implant Never Smoker Vitals History BMI and BSA Data Body Mass Index Body Surface Area 35.58 kg/m 2 2.26 m 2 Preferred Pharmacy Pharmacy Name Phone [address] [place], [place] - [address]  [phone] Your Updated Medication List  
  
   
This list is accurate as of: 10/25/17  9:50 AM.  Always use your most recent med list.  
  
  
  
  
 estradiol 2 mg tablet Commonly known as:  ESTRACE Take 1 Tab by mouth daily for 5 days. hydroCHLOROthiazide 12.5 mg capsule Commonly known as:  [place] Take 1 Cap by mouth daily. ibuprofen 800 mg tablet Commonly known as:  MOTRIN Thssugiu8-Aiolzv4-Mgcud therm. 112.5 billion cell Cap Take 1 Cap by mouth daily. * Omeprazole delayed release 20 mg tablet Commonly known as:  PRILOSEC D/R Take 1 Tab by mouth daily. * omeprazole 20 mg capsule Commonly known as:  PRILOSEC * Notice: This list has 2 medication(s) that are the same as other medications prescribed for you. Read the directions carefully, and ask your doctor or other care provider to review them with you. Prescriptions Sent to Pharmacy Refills  
 hydroCHLOROthiazide (MICROZIDE) 12.5 mg capsule 1 Sig: Take 1 Cap by mouth daily. Class: Normal  
 Pharmacy: [address] [place] [address] #: [phone] Route: Oral  
 estradiol (ESTRACE) 2 mg tablet 0 Sig: Take 1 Tab by mouth daily for 5 days. Class: Normal  
 Pharmacy: [address] [place] [address] #: [phone] Route: Oral  
  
Follow-up Instructions Return in about 1 week (around 11/1/2017) for Lightheadedness. To-Do List   
 12/04/2017 12:00 PM  
  Appointment with [facility] CT 2 at [facility] ([phone]) CONTRAST STUDY: 1.   The patient should not eat solid food four hours before the appointment but should be encouraged to drink clear liquids. 2.  If you have to drink oral contrast, please pick it up any weekday prior to your appointment, if you cannot please check in 2 hrs before appt time. 3.  The patient will require IV access for contrast administration. 4.  The patient should not take Ibuprofen (Advil, Motrin, etc.) and Naproxen Sodium (Aleve, etc.)  on the day of the exam. Stopping non-steroidal anti-inflammatory agents (NSAIDs) like Ibuprofen decreases the risk of kidney damage from the x-ray contrast (dye). 5.  Bring any non [facility] facility films/images pertaining to the area of interest with you on the day of appointment. 6.  Bring current lab work if available (within last 90 days [facility]) ***If scheduled at [facility], iSTAT is not available, labs will need to be done before appointment*** 7. Check in at registration at least 30 minutes before appt time unless you were instructed to do otherwise. 12/04/2017 12:30 PM  
  Appointment with [facility] CT 2 at [facility] CT ([phone]) CONTRAST STUDY: 1. The patient should not eat solid food four hours before the appointment but should be encouraged to drink clear liquids. 2. The patient will require IV access for contrast administration. 3. The patient should not take  Ibuprofen (Advil, Motrin, etc.) and Naproxen Sodium (Aleve, etc.)  on the day of the exam. Stopping non-steroidal anti-inflammatory agents (NSAIDs) like Ibuprofen decreases the risk of kidney damage from the x-ray contrast (dye). 4. Bring any non [facility] facility films/images pertaining to the area of interest with you on the day of appointment. 5. Bring current lab work if available(within last 90 days [facility]) ***If scheduled at [facility], iSTAT is not available, labs will need to be done before appointment*** 6. Check in at registration at least 30 minutes before appt time unless you were instructed to do otherwise.  7. If you have to drink oral contrast please pick it up any weekday prior to your appointment, if you cannot please check in 2 hrs  before appt time. Introducing [facility]! Dear Wilber Sheikh: Thank you for requesting a [employer] account. Our records indicate that you already have an active [employer] account. You can access your account anytime at [email or web address] Did you know that you can access your hospital and ER discharge instructions at any time in [employer]? You can also review all of your test results from your hospital stay or ER visit. Additional Information If you have questions, please visit the Frequently Asked Questions section of the [employer] website at [email or web address] Remember, [employer] is NOT to be used for urgent needs. For medical emergencies, dial 911. Now available from your iPhone and Android! Please provide this summary of care documentation to your next provider. Your primary care clinician is listed as Matty Osorio. If you have any questions after today's visit, please call [phone].

## 2017-10-27 ENCOUNTER — TELEPHONE (OUTPATIENT)
Dept: FAMILY MEDICINE CLINIC | Age: 32
End: 2017-10-27

## 2017-10-27 NOTE — LETTER
10/27/2017 8:36 AM 
 
Ms. Theo Mejia 62 [address] [address] To Whom It May Concern: According to our front office staff, patient tried to get an appointment with a physician on October 24, 2017. There were not available appointments on October 24, 2017. Patient was given first available appointment on October 25, 2017. Please excuse for those two days. If there are questions or concerns please have the patient contact our office.  
 
 
 
Sincerely, 
 
 
Po Pulido MD

## 2017-10-27 NOTE — TELEPHONE ENCOUNTER
Pt called and tried to get an appointment on Tuesday October 24 for lightheaded and dizziness. We did not have anything for that day. I took her number if something became available and I did not get to give her an appointment for Tuesday. So she took the next available which was  Wednesday, October 25, 2017 09:10 AM  [facility], Routine Care, 20min.lightheaded. Please call  [phone]  Fax number [phone]

## 2017-11-01 ENCOUNTER — OFFICE VISIT (OUTPATIENT)
Dept: FAMILY MEDICINE CLINIC | Age: 32
End: 2017-11-01

## 2017-11-01 VITALS
BODY MASS INDEX: 34.45 KG/M2 | HEIGHT: 68 IN | RESPIRATION RATE: 16 BRPM | DIASTOLIC BLOOD PRESSURE: 92 MMHG | WEIGHT: 227.3 LBS | OXYGEN SATURATION: 99 % | TEMPERATURE: 98.2 F | SYSTOLIC BLOOD PRESSURE: 140 MMHG | HEART RATE: 78 BPM

## 2017-11-01 DIAGNOSIS — I10 ESSENTIAL HYPERTENSION: ICD-10-CM

## 2017-11-01 DIAGNOSIS — R51.9 NONINTRACTABLE HEADACHE, UNSPECIFIED CHRONICITY PATTERN, UNSPECIFIED HEADACHE TYPE: Primary | ICD-10-CM

## 2017-11-01 RX ORDER — AMITRIPTYLINE HYDROCHLORIDE 10 MG/1
10 TABLET, FILM COATED ORAL
Qty: 30 TAB | Refills: 1 | Status: SHIPPED | OUTPATIENT
Start: 2017-11-01 | End: 2017-11-03 | Stop reason: SINTOL

## 2017-11-01 RX ORDER — HYDROCHLOROTHIAZIDE 12.5 MG/1
25 CAPSULE ORAL DAILY
Qty: 90 CAP | Refills: 1
Start: 2017-11-01 | End: 2017-12-12 | Stop reason: SDUPTHER

## 2017-11-01 NOTE — PATIENT INSTRUCTIONS

## 2017-11-01 NOTE — PROGRESS NOTES
Corine Devlin  29 y.o. female  1985  2385 [address] [facility] [address]  [address] [address]  [id number]     [facility]: Progress Note       Encounter Date: 11/1/2017    Chief Complaint   Patient presents with    Hypertension     History of Present Illness   Corine Devlin is a 29 y.o. female who presents to clinic today for:    Hypertension: Uncontrolled Patient is still having dizzy spells. Worst episodes occurred Monday morning and she had to take  mg and food. Episodes of dizziness only occur with HA though HA occur more frequently. In the past week reports >10 HA though only 3 with associated dizziness/lightheadedness. BP Readings from Last 3 Encounters:   11/01/17 (!) 140/92   10/25/17 137/87   10/04/17 133/85     The patient reports:  taking medications as instructed, no TIA's, no chest pain on exertion, no dyspnea on exertion, no swelling of ankles. Lab Results   Component Value Date/Time    Sodium 141 08/28/2017 11:43 AM    Potassium 3.8 08/28/2017 11:43 AM    Chloride 106 08/28/2017 11:43 AM    CO2 21 08/28/2017 11:43 AM    Anion gap 8 05/17/2017 11:56 AM    Glucose 94 08/28/2017 11:43 AM    BUN 11 08/28/2017 11:43 AM    Creatinine 0.74 08/28/2017 11:43 AM    BUN/Creatinine ratio 15 08/28/2017 11:43 AM    GFR est  08/28/2017 11:43 AM    GFR est non- 08/28/2017 11:43 AM    Calcium 9.0 08/28/2017 11:43 AM    Bilirubin, total <0.2 08/28/2017 11:43 AM    AST (SGOT) 17 08/28/2017 11:43 AM    Alk. phosphatase 77 08/28/2017 11:43 AM    Protein, total 6.7 08/28/2017 11:43 AM    Albumin 4.1 08/28/2017 11:43 AM    Globulin 3.4 05/17/2017 11:56 AM    A-G Ratio 1.6 08/28/2017 11:43 AM    ALT (SGPT) 12 08/28/2017 11:43 AM     Our goal is to normalize the blood pressure to decrease the risks of strokes and heart attacks. The patient is in agreement with the plan. Health Maintenance  There are no preventive care reminders to display for this patient.      Review of Systems Review of Systems   Constitutional: Negative for chills, diaphoresis, fever and malaise/fatigue. Eyes: Negative for photophobia, pain and discharge. Cardiovascular: Negative for chest pain and palpitations. Gastrointestinal: Negative for abdominal pain, diarrhea, nausea and vomiting. Skin: Negative for rash. Neurological: Positive for dizziness and headaches. Negative for tingling and tremors. Vitals/Objective:     Vitals:    11/01/17 1025   BP: (!) 140/92   Pulse: 78   Resp: 16   Temp: 98.2 °F (36.8 °C)   TempSrc: Oral   SpO2: 99%   Weight: 227 lb 4.8 oz (103.1 kg)   Height: 5' 8\" (1.727 m)     Body mass index is 34.56 kg/(m^2). Physical Exam   Constitutional: She is oriented to person, place, and time. She appears well-developed and well-nourished. No distress. HENT:   Right Ear: External ear normal.   Left Ear: External ear normal.   Eyes: Conjunctivae and EOM are normal. Pupils are equal, round, and reactive to light. Right eye exhibits no discharge. Left eye exhibits no discharge. Cardiovascular: Normal rate and regular rhythm. No murmur heard. Pulmonary/Chest: Effort normal and breath sounds normal. No respiratory distress. She has no wheezes. Musculoskeletal: She exhibits no edema. Neurological: She is alert and oriented to person, place, and time. No results found for this or any previous visit (from the past 24 hour(s)). Assessment and Plan:     Encounter Diagnoses     ICD-10-CM ICD-9-CM   1. Nonintractable headache, unspecified chronicity pattern, unspecified headache type R51 784.0   2. Essential hypertension I10 401.9       1. Nonintractable headache, unspecified chronicity pattern, unspecified headache type  Discussed with patient starting a HA diary in order to determine triggers including stress, foods, and sleep deprivation. Patient is willing to be a trial of the TCA as preventative medication. - amitriptyline (ELAVIL) 10 mg tablet;  Take 1 Tab by mouth nightly. Dispense: 30 Tab; Refill: 1    2. Essential hypertension  Will double dose of HCTZ as BP is still uncontrolled. - hydroCHLOROthiazide (MICROZIDE) 12.5 mg capsule; Take 2 Caps by mouth daily. Dispense: 90 Cap; Refill: 1    I have discussed the diagnosis with the patient and the intended plan as seen in the above orders. she has expressed understanding. The patient has received an after-visit summary and questions were answered concerning future plans. I have discussed medication side effects and warnings with the patient as well. Electronically Signed: Maryellen Denney MD     History/Allergies   Patients past medical, surgical and family histories were reviewed and updated. Past Medical History:   Diagnosis Date    Abnormal Pap smear 2010    FU with colpo, negative per patient    Cancer [facility])     nodular lymphocytic hodgkins lymphoma    Chest pain     Depression 5/12/2015    Encounter for IUD insertion 3/5/15    Mirena    Essential hypertension     Hypertension     Implanon removal 9/6/2013    Pap smear for cervical cancer screening 11/11/15    Negative    Trouble in sleeping       Past Surgical History:   Procedure Laterality Date    HX COLPOSCOPY  2010    per pt WNL    HX WISDOM TEETH EXTRACTION       Family History   Problem Relation Age of Onset    HIV/AIDS Mother     Hypertension Maternal Aunt     Hypertension Maternal Grandmother      Social History     Social History    Marital status:      Spouse name: N/A    Number of children: N/A    Years of education: N/A     Occupational History    Not on file.      Social History Main Topics    Smoking status: Never Smoker    Smokeless tobacco: Never Used    Alcohol use Yes      Comment: occassionally    Drug use: No    Sexual activity: Yes     Partners: Male     Birth control/ protection: None     Other Topics Concern    Not on file     Social History Narrative         Allergies   Allergen Reactions    Clonidine (Pf) Vertigo       Disposition     Follow-up Disposition:  Return in about 2 weeks (around 11/15/2017) for HA and HTN. Future Appointments  Date Time Provider Ann Bertha   12/4/2017 12:00 PM [facility] CT 2 [facility]MRCT [facility]   12/4/2017 12:30 PM [facility] CT 2 SFMRCT STOmer Benson Chi   12/7/2017 9:30 AM Henrique Reyes MD [facility] SCHED   2/2/2018 10:00 AM Bebeto Kaufman MD [facility] SCHED            Current Medications after this visit     Current Outpatient Prescriptions   Medication Sig    amitriptyline (ELAVIL) 10 mg tablet Take 1 Tab by mouth nightly.  hydroCHLOROthiazide (MICROZIDE) 12.5 mg capsule Take 2 Caps by mouth daily.  ibuprofen (MOTRIN) 800 mg tablet     Omeprazole delayed release (PRILOSEC D/R) 20 mg tablet Take 1 Tab by mouth daily.  Xqoqqmii8-Ddhcju3-Spvmk therm. 112.5 billion cell cap Take 1 Cap by mouth daily.  omeprazole (PRILOSEC) 20 mg capsule      No current facility-administered medications for this visit.       Medications Discontinued During This Encounter   Medication Reason    hydroCHLOROthiazide (MICROZIDE) 12.5 mg capsule Reorder

## 2017-11-01 NOTE — MR AVS SNAPSHOT
Visit Information Date & Time Provider Department Dept. Phone Encounter #  
 11/1/2017 10:20 AM Matty Osorio  [facility] [phone] [id number] Follow-up Instructions Return in about 2 weeks (around 11/15/2017) for HA and HTN. Your Appointments 12/7/2017  9:30 AM  
ESTABLISHED PATIENT with Negin Kong MD  
[facility] [address]) Appt Note: [place] - lymphoma f/u.  
 [address] [facility], [address] [place] [address] [address]  
[phone]  
  
   
 [facility], [address] [facility]  
  
    
 2/2/2018 10:00 AM  
ESTABLISHED PATIENT with Chitra Urban MD  
[facility] ([address]) [address] [facility] [address]  
54 [facility] [address] [address] Upcoming Health Maintenance Date Due Pneumococcal 19-64 Highest Risk (2 of 3 - PCV13) 12/15/2017 PAP AKA CERVICAL CYTOLOGY 11/11/2018 DTaP/Tdap/Td series (2 - Td) 4/12/2024 Allergies as of 11/1/2017  Review Complete On: 11/1/2017 By: Matty Osorio MD  
  
 Severity Noted Reaction Type Reactions Clonidine (Pf)  11/06/2013   Intolerance Vertigo Current Immunizations  Never Reviewed Name Date Pneumococcal Polysaccharide (PPSV-23) 12/15/2016 Tdap 4/12/2014 12:26 PM  
  
 Not reviewed this visit You Were Diagnosed With   
  
 Codes Comments Nonintractable headache, unspecified chronicity pattern, unspecified headache type    -  Primary ICD-10-CM: R51 ICD-9-CM: 784.0 Essential hypertension     ICD-10-CM: I10 
ICD-9-CM: 401.9 Vitals BP Pulse Temp Resp Height(growth percentile) Weight(growth percentile) (!) 140/92 (BP 1 Location: Left arm, BP Patient Position: Sitting) 78 98.2 °F (36.8 °C) (Oral) 16 5' 8\" (1.727 m) 227 lb 4.8 oz (103.1 kg) SpO2 BMI OB Status Smoking Status 99% 34.56 kg/m2 Implant Never Smoker Vitals History BMI and BSA Data Body Mass Index Body Surface Area 34.56 kg/m 2 2.22 m 2 Preferred Pharmacy Pharmacy Name Phone [address] [place], [place] - [address] [facility] [phone] Your Updated Medication List  
  
   
This list is accurate as of: 11/1/17 10:55 AM.  Always use your most recent med list.  
  
  
  
  
 amitriptyline 10 mg tablet Commonly known as:  ELAVIL Take 1 Tab by mouth nightly. hydroCHLOROthiazide 12.5 mg capsule Commonly known as:  Hedwig Doing Take 2 Caps by mouth daily. ibuprofen 800 mg tablet Commonly known as:  MOTRIN Uozintih7-Ebagot4-Eqsgs therm. 112.5 billion cell Cap Take 1 Cap by mouth daily. * Omeprazole delayed release 20 mg tablet Commonly known as:  PRILOSEC D/R Take 1 Tab by mouth daily. * omeprazole 20 mg capsule Commonly known as:  PRILOSEC * Notice: This list has 2 medication(s) that are the same as other medications prescribed for you. Read the directions carefully, and ask your doctor or other care provider to review them with you. Prescriptions Sent to Pharmacy Refills  
 amitriptyline (ELAVIL) 10 mg tablet 1 Sig: Take 1 Tab by mouth nightly. Class: Normal  
 Pharmacy: [facility] #: [phone] Route: Oral  
  
Follow-up Instructions Return in about 2 weeks (around 11/15/2017) for HA and HTN. To-Do List   
 12/04/2017 12:00 PM  
  Appointment with [facility] at [facility] ([phone]) CONTRAST STUDY: 1. The patient should not eat solid food four hours before the appointment but should be encouraged to drink clear liquids. 2.  If you have to drink oral contrast, please pick it up any weekday prior to your appointment, if you cannot please check in 2 hrs before appt time. 3.  The patient will require IV access for contrast administration. 4.  The patient should not take Ibuprofen (Advil, Motrin, etc.) and Naproxen Sodium (Aleve, etc.)  on the day of the exam. Stopping non-steroidal anti-inflammatory agents (NSAIDs) like Ibuprofen decreases the risk of kidney damage from the x-ray contrast (dye). 5.  Bring any non [facility] facility films/images pertaining to the area of interest with you on the day of appointment. 6.  Bring current lab work if available (within last 90 days [facility]) ***If scheduled at [facility], iSTAT is not available, labs will need to be done before appointment*** 7. Check in at registration at least 30 minutes before appt time unless you were instructed to do otherwise. 12/04/2017 12:30 PM  
  Appointment with [facility] CT 2 [facility] ([phone]) CONTRAST STUDY: 1. The patient should not eat solid food four hours before the appointment but should be encouraged to drink clear liquids. 2. The patient will require IV access for contrast administration. 3. The patient should not take  Ibuprofen (Advil, Motrin, etc.) and Naproxen Sodium (Aleve, etc.)  on the day of the exam. Stopping non-steroidal anti-inflammatory agents (NSAIDs) like Ibuprofen decreases the risk of kidney damage from the x-ray contrast (dye). 4. Bring any non [facility] facility films/images pertaining to the area of interest with you on the day of appointment. 5. Bring current lab work if available(within last 90 days [facility]) ***If scheduled at [facility], iSTAT is not available, labs will need to be done before appointment*** 6. Check in at registration at least 30 minutes before appt time unless you were instructed to do otherwise. 7. If you have to drink oral contrast please pick it up any weekday prior to your appointment, if you cannot please check in 2 hrs  before appt time. Patient Instructions Headache: Care Instructions Your Care Instructions Headaches have many possible causes. Most headaches aren't a sign of a more serious problem, and they will get better on their own. Home treatment may help you feel better faster. The doctor has checked you carefully, but problems can develop later. If you notice any problems or new symptoms, get medical treatment right away. Follow-up care is a key part of your treatment and safety. Be sure to make and go to all appointments, and call your doctor if you are having problems. It's also a good idea to know your test results and keep a list of the medicines you take. How can you care for yourself at home? · Do not drive if you have taken a prescription pain medicine. · Rest in a quiet, dark room until your headache is gone. Close your eyes and try to relax or go to sleep. Don't watch TV or read. · Put a cold, moist cloth or cold pack on the painful area for 10 to 20 minutes at a time. Put a thin cloth between the cold pack and your skin. · Use a warm, moist towel or a heating pad set on low to relax tight shoulder and neck muscles. · Have someone gently massage your neck and shoulders. · Take pain medicines exactly as directed. ¨ If the doctor gave you a prescription medicine for pain, take it as prescribed. ¨ If you are not taking a prescription pain medicine, ask your doctor if you can take an over-the-counter medicine. · Be careful not to take pain medicine more often than the instructions allow, because you may get worse or more frequent headaches when the medicine wears off. · Do not ignore new symptoms that occur with a headache, such as a fever, weakness or numbness, vision changes, or confusion. These may be signs of a more serious problem. To prevent headaches · Keep a headache diary so you can figure out what triggers your headaches. Avoiding triggers may help you prevent headaches.  Record when each headache began, how long it lasted, and what the pain was like (throbbing, aching, stabbing, or dull). Write down any other symptoms you had with the headache, such as nausea, flashing lights or dark spots, or sensitivity to bright light or loud noise. Note if the headache occurred near your period. List anything that might have triggered the headache, such as certain foods (chocolate, cheese, wine) or odors, smoke, bright light, stress, or lack of sleep. · Find healthy ways to deal with stress. Headaches are most common during or right after stressful times. Take time to relax before and after you do something that has caused a headache in the past. 
· Try to keep your muscles relaxed by keeping good posture. Check your jaw, face, neck, and shoulder muscles for tension, and try relaxing them. When sitting at a desk, change positions often, and stretch for 30 seconds each hour. · Get plenty of sleep and exercise. · Eat regularly and well. Long periods without food can trigger a headache. · Treat yourself to a massage. Some people find that regular massages are very helpful in relieving tension. · Limit caffeine by not drinking too much coffee, tea, or soda. But don't quit caffeine suddenly, because that can also give you headaches. · Reduce eyestrain from computers by blinking frequently and looking away from the computer screen every so often. Make sure you have proper eyewear and that your monitor is set up properly, about an arm's length away. · Seek help if you have depression or anxiety. Your headaches may be linked to these conditions. Treatment can both prevent headaches and help with symptoms of anxiety or depression. When should you call for help? Call 911 anytime you think you may need emergency care. For example, call if: 
? · You have signs of a stroke. These may include: 
¨ Sudden numbness, paralysis, or weakness in your face, arm, or leg, especially on only one side of your body. ¨ Sudden vision changes. ¨ Sudden trouble speaking. ¨ Sudden confusion or trouble understanding simple statements. ¨ Sudden problems with walking or balance. ¨ A sudden, severe headache that is different from past headaches. ?Call your doctor now or seek immediate medical care if: 
? · You have a new or worse headache. ? · Your headache gets much worse. Where can you learn more? Go to http://srini-jeny.info/. Enter M271 in the search box to learn more about \"Headache: Care Instructions. \" Current as of: October 14, 2016 Content Version: 11.4 © 1132-4802 [employer]. Care instructions adapted under license by [employer] (which disclaims liability or warranty for this information). If you have questions about a medical condition or this instruction, always ask your healthcare professional. [address] any warranty or liability for your use of this information. Introducing [facility] & HEALTH SERVICES! Dear Syl Horn: Thank you for requesting a Fashfix account. Our records indicate that you already have an active Fashfix account. You can access your account anytime at [email or web address] Did you know that you can access your hospital and ER discharge instructions at any time in Fashfix? You can also review all of your test results from your hospital stay or ER visit. Additional Information If you have questions, please visit the Frequently Asked Questions section of the Fashfix website at [email or web address] Remember, Fashfix is NOT to be used for urgent needs. For medical emergencies, dial 911. Now available from your iPhone and Android! Please provide this summary of care documentation to your next provider. Your primary care clinician is listed as Lucita Bryson. If you have any questions after today's visit, please call [phone].

## 2017-11-01 NOTE — PROGRESS NOTES
Chief Complaint   Patient presents with    Hypertension     Body mass index is 34.56 kg/(m^2). 1. Have you been to the ER, urgent care clinic since your last visit? Hospitalized since your last visit? No    2. Have you seen or consulted any other health care providers outside of the [address] [place] [address] since your last visit? Include any pap smears or colon screening. No    Reviewed record in preparation for visit and have necessary documentation  Pt did not bring medication to office visit for review  Information was given to pt on Advanced Directives, Living Will  Information was given on Shingles Vaccine  Opportunity was given for questions  Goals that were addressed and/or need to be completed after this appointment include: There are no preventive care reminders to display for this patient.

## 2017-11-03 ENCOUNTER — OFFICE VISIT (OUTPATIENT)
Dept: FAMILY MEDICINE CLINIC | Age: 32
End: 2017-11-03

## 2017-11-03 VITALS
TEMPERATURE: 98 F | OXYGEN SATURATION: 99 % | HEIGHT: 68 IN | BODY MASS INDEX: 34.25 KG/M2 | DIASTOLIC BLOOD PRESSURE: 101 MMHG | RESPIRATION RATE: 18 BRPM | WEIGHT: 226 LBS | HEART RATE: 101 BPM | SYSTOLIC BLOOD PRESSURE: 152 MMHG

## 2017-11-03 DIAGNOSIS — T50.905A ADVERSE EFFECT OF DRUG, INITIAL ENCOUNTER: Primary | ICD-10-CM

## 2017-11-03 DIAGNOSIS — F32.9 SINGLE CURRENT EPISODE OF MAJOR DEPRESSIVE DISORDER, UNSPECIFIED DEPRESSION EPISODE SEVERITY: ICD-10-CM

## 2017-11-03 DIAGNOSIS — R00.0 TACHYCARDIA: ICD-10-CM

## 2017-11-03 LAB — GLUCOSE POC: 76 MG/DL

## 2017-11-03 NOTE — PROGRESS NOTES
Reviewed record in preparation for visit and have necessary documentation  Pt did not bring medication to office visit for review    Goals that were addressed and/or need to be completed during or after this appointment include   There are no preventive care reminders to display for this patient.

## 2017-11-03 NOTE — PATIENT INSTRUCTIONS
Recovering From Depression: Care Instructions  Your Care Instructions    Taking good care of yourself is important as you recover from depression. In time, your symptoms will fade as your treatment takes hold. Do not give up. Instead, focus your energy on getting better. Your mood will improve. It just takes some time. Focus on things that can help you feel better, such as being with friends and family, eating well, and getting enough rest. But take things slowly. Do not do too much too soon. You will begin to feel better gradually. Follow-up care is a key part of your treatment and safety. Be sure to make and go to all appointments, and call your doctor if you are having problems. It's also a good idea to know your test results and keep a list of the medicines you take. How can you care for yourself at home? Be realistic  · If you have a large task to do, break it up into smaller steps you can handle, and just do what you can. · You may want to put off important decisions until your depression has lifted. If you have plans that will have a major impact on your life, such as marriage, divorce, or a job change, try to wait a bit. Talk it over with friends and loved ones who can help you look at the overall picture first.  · Reaching out to people for help is important. Do not isolate yourself. Let your family and friends help you. Find someone you can trust and confide in, and talk to that person. · Be patient, and be kind to yourself. Remember that depression is not your fault and is not something you can overcome with willpower alone. Treatment is necessary for depression, just like for any other illness. Feeling better takes time, and your mood will improve little by little. Stay active  · Stay busy and get outside. Take a walk, or try some other light exercise. · Talk with your doctor about an exercise program. Exercise can help with mild depression. · Go to a movie or concert.  Take part in a Mandaen activity or other social gathering. Go to a Peek game. · Ask a friend to have dinner with you. Take care of yourself  · Eat a balanced diet with plenty of fresh fruits and vegetables, whole grains, and lean protein. If you have lost your appetite, eat small snacks rather than large meals. · Avoid drinking alcohol or using illegal drugs. Do not take medicines that have not been prescribed for you. They may interfere with medicines you may be taking for depression, or they may make your depression worse. · Take your medicines exactly as they are prescribed. You may start to feel better within 1 to 3 weeks of taking antidepressant medicine. But it can take as many as 6 to 8 weeks to see more improvement. If you have questions or concerns about your medicines, or if you do not notice any improvement by 3 weeks, talk to your doctor. · If you have any side effects from your medicine, tell your doctor. Antidepressants can make you feel tired, dizzy, or nervous. Some people have dry mouth, constipation, headaches, sexual problems, or diarrhea. Many of these side effects are mild and will go away on their own after you have been taking the medicine for a few weeks. Some may last longer. Talk to your doctor if side effects are bothering you too much. You might be able to try a different medicine. · Get enough sleep. If you have problems sleeping:  ¨ Go to bed at the same time every night, and get up at the same time every morning. ¨ Keep your bedroom dark and quiet. ¨ Do not exercise after 5:00 p.m. ¨ Avoid drinks with caffeine after 5:00 p.m. · Avoid sleeping pills unless they are prescribed by the doctor treating your depression. Sleeping pills may make you groggy during the day, and they may interact with other medicine you are taking. · If you have any other illnesses, such as diabetes, heart disease, or high blood pressure, make sure to continue with your treatment.  Tell your doctor about all of the medicines you take, including those with or without a prescription. · Keep the numbers for these national suicide hotlines: 3-673-284-TALK (5-527.322.6380) and 3-842-KDTHLPM (2-733.465.5545). If you or someone you know talks about suicide or feeling hopeless, get help right away. When should you call for help? Call 911 anytime you think you may need emergency care. For example, call if:  ? · You feel like hurting yourself or someone else. ? · Someone you know has depression and is about to attempt or is attempting suicide. ?Call your doctor now or seek immediate medical care if:  ? · You hear voices. ? · Someone you know has depression and:  ¨ Starts to give away his or her possessions. ¨ Uses illegal drugs or drinks alcohol heavily. ¨ Talks or writes about death, including writing suicide notes or talking about guns, knives, or pills. ¨ Starts to spend a lot of time alone. ¨ Acts very aggressively or suddenly appears calm. ? Watch closely for changes in your health, and be sure to contact your doctor if:  ? · You do not get better as expected. Where can you learn more? Go to http://srini-jeny.info/. Enter B571 in the search box to learn more about \"Recovering From Depression: Care Instructions. \"  Current as of: May 12, 2017  Content Version: 11.4  © 0184-7106 [employer]. Care instructions adapted under license by [employer] (which disclaims liability or warranty for this information). If you have questions about a medical condition or this instruction, always ask your healthcare professional. [address] any warranty or liability for your use of this information.

## 2017-11-03 NOTE — LETTER
11/3/2017 3:29 PM 
 
Ms. Concepcion [address] [address] Dear Ms. Parramelissa Yoandy: 
 
I have faxed your information to [facility], the group I discussed with you. They should contact you to set up an appointment. If not, please contact them. See the attached information. If you have any questions, please contact our office. Sincerely, Jared Guzman

## 2017-11-03 NOTE — PROGRESS NOTES
Kirsten Kaplan  29 y.o. female  1985  [address]  [address] [address]  [id number]     [facility]: Progress Note       Encounter Date: 11/3/2017    Chief Complaint   Patient presents with    Medication Evaluation     History of Present Illness   Kirsten Kaplan is a 29 y.o. female who presents to clinic today for:    Tachycardiac/jittery  Patient is presenting with cc of \"not feeling right. \" she endorses tachycardia, jittery. She is extremely anxious is concerned that her testing in February will be wrong/abnormal as well as feeling more emotionally labile. Noted episode of back pain and she is concerned that pain was a sign of underlying \"serious\" issue.     -Patient has numerous stressors including current divorce, children, first scans since she became cancer free and home that she can no longer afford. She does not feel that she has many people she can go to for support as she is concern that they would share what she says. Health Maintenance  There are no preventive care reminders to display for this patient. Review of Systems   Review of Systems   Constitutional: Negative for chills and fever. Cardiovascular: Positive for palpitations. Negative for chest pain and leg swelling. Musculoskeletal: Positive for back pain. Skin: Negative for itching and rash. Neurological: Positive for dizziness and headaches. Psychiatric/Behavioral: The patient is nervous/anxious. Vitals/Objective:     Vitals:    11/03/17 1322 11/03/17 1403   BP: (!) 152/101 (P) 132/90   Pulse: (!) 101    Resp: 18    Temp: 98 °F (36.7 °C)    TempSrc: Oral    SpO2: 99%    Weight: 226 lb (102.5 kg)    Height: 5' 8\" (1.727 m)      Body mass index is 34.36 kg/(m^2). Physical Exam   Constitutional: She is oriented to person, place, and time. She appears well-developed and well-nourished. She appears distressed. HENT:   Head: Normocephalic and atraumatic. Neck: Normal range of motion. Neck supple. Cardiovascular: Regular rhythm. Tachycardia present. Pulmonary/Chest: Effort normal and breath sounds normal. No respiratory distress. She has no wheezes. Musculoskeletal: Normal range of motion. She exhibits no edema or tenderness. Neurological: She is alert and oriented to person, place, and time. Skin: Skin is warm and dry. No rash noted. She is not diaphoretic. No erythema. Recent Results (from the past 24 hour(s))   AMB POC GLUCOSE BLOOD, BY GLUCOSE MONITORING DEVICE    Collection Time: 11/03/17  1:35 PM   Result Value Ref Range    Glucose POC 76 mg/dL     Assessment and Plan:     Encounter Diagnoses     ICD-10-CM ICD-9-CM   1. Adverse effect of drug, initial encounter T88. 7XXA E947.9   2. Tachycardia R00.0 785.0   3. Single current episode of major depressive disorder, unspecified depression episode severity F32.9 296.20       1. Adverse effect of drug, initial encounter  2. Tachycardia  EKG was normal sinus rhythm and POC glucose was normal. After being informed of results and normal finding, patient was able to calm down considerably and her BP improved. As symptoms sharply worsened since starting Elavil, will discontinue t this time and hold on starting an anti-depressant. Will send referral to psychologist.   - AMB POC EKG ROUTINE W/ 12 LEADS, INTER & REP  - COLLECTION CAPILLARY BLOOD SPECIMEN  - AMB POC GLUCOSE BLOOD, BY GLUCOSE MONITORING DEVICE    3. Single current episode of major depressive disorder, unspecified depression episode severity  - REFERRAL TO PSYCHOLOGY      I have discussed the diagnosis with the patient and the intended plan as seen in the above orders. she has expressed understanding. The patient has received an after-visit summary and questions were answered concerning future plans. I have discussed medication side effects and warnings with the patient as well.     Electronically Signed: Jose Antonio Redmond MD     History/Allergies   Patients past medical, surgical and family histories were reviewed and updated. Past Medical History:   Diagnosis Date    Abnormal Pap smear 2010    FU with colpo, negative per patient    Cancer [facility])     nodular lymphocytic hodgkins lymphoma    Chest pain     Depression 5/12/2015    Encounter for IUD insertion 3/5/15    Mirena    Essential hypertension     Hypertension     Implanon removal 9/6/2013    Pap smear for cervical cancer screening 11/11/15    Negative    Trouble in sleeping       Past Surgical History:   Procedure Laterality Date    HX COLPOSCOPY  2010    per pt WNL    HX WISDOM TEETH EXTRACTION       Family History   Problem Relation Age of Onset    HIV/AIDS Mother     Hypertension Maternal Aunt     Hypertension Maternal Grandmother      Social History     Social History    Marital status:      Spouse name: N/A    Number of children: N/A    Years of education: N/A     Occupational History    Not on file. Social History Main Topics    Smoking status: Never Smoker    Smokeless tobacco: Never Used    Alcohol use Yes      Comment: occassionally    Drug use: No    Sexual activity: Yes     Partners: Male     Birth control/ protection: None     Other Topics Concern    Not on file     Social History Narrative         Allergies   Allergen Reactions    Clonidine (Pf) Vertigo       Disposition     Follow-up Disposition:  Return in about 2 weeks (around 11/17/2017) for HTN, mood.     Future Appointments  Date Time Provider Ann Stone   11/10/2017 10:40 AM Eliseo Hastings MD [facility] [place] [facility]   12/4/2017 12:00 PM [facility] 2 [facility] [place]   12/4/2017 12:30 PM [facility] CT 2 [facility]MRCT [facility] [place]   12/7/2017 9:30 AM Kyrie Sibley MD [facility] [place] SCHED   2/2/2018 10:00 AM Latrell Schulz MD [facility] [place] SCHED            Current Medications after this visit     Current Outpatient Prescriptions   Medication Sig    hydroCHLOROthiazide (MICROZIDE) 12.5 mg capsule Take 2 Caps by mouth daily.    ibuprofen (MOTRIN) 800 mg tablet     Omeprazole delayed release (PRILOSEC D/R) 20 mg tablet Take 1 Tab by mouth daily.  Giuplcmh9-Juydtg8-Wpqyb therm. 112.5 billion cell cap Take 1 Cap by mouth daily. No current facility-administered medications for this visit.       Medications Discontinued During This Encounter   Medication Reason    omeprazole (PRILOSEC) 20 mg capsule Duplicate Order    amitriptyline (ELAVIL) 10 mg tablet Side Effects

## 2017-11-03 NOTE — LETTER
NOTIFICATION RETURN TO WORK / SCHOOL 
 
11/3/2017 2:01 PM 
 
Ms. Edmond Deras [place] [address] [address] [address] To Whom It May Concern: 
 
Edmond Deras is currently under the care of Marilia Quijano. She will return to work/school on: 11/7/2017 If there are questions or concerns please have the patient contact our office.  
 
 
 
Sincerely, 
 
 
Song Lambert MD

## 2017-11-03 NOTE — MR AVS SNAPSHOT
Visit Information Date & Time Provider Department Dept. Phone Encounter #  
 11/3/2017  1:20 PM Rosamaria Bustillos MD [address] [place] [address] [id number] Follow-up Instructions Return in about 2 weeks (around 11/17/2017) for HTN, mood. Your Appointments 12/7/2017  9:30 AM  
ESTABLISHED PATIENT with Todd Ambrose MD  
[facility] at [address] [facility] Appt Note: [place] - lymphoma f/u.  
 [address] [facility], [address] [facility] [address]  
[phone]  
  
   
 [address] [facility], [address] [facility]  
  
    
 2/2/2018 10:00 AM  
ESTABLISHED PATIENT with Dea Munoz MD  
[facility]) [address] [facility] [address] [address] [address] Upcoming Health Maintenance Date Due Pneumococcal 19-64 Highest Risk (2 of 3 - PCV13) 12/15/2017 PAP AKA CERVICAL CYTOLOGY 11/11/2018 DTaP/Tdap/Td series (2 - Td) 4/12/2024 Allergies as of 11/3/2017  Review Complete On: 11/3/2017 By: Rosamaria Bustillos MD  
  
 Severity Noted Reaction Type Reactions Clonidine (Pf)  11/06/2013   Intolerance Vertigo Current Immunizations  Never Reviewed Name Date Pneumococcal Polysaccharide (PPSV-23) 12/15/2016 Tdap 4/12/2014 12:26 PM  
  
 Not reviewed this visit You Were Diagnosed With   
  
 Codes Comments Tachycardia    -  Primary ICD-10-CM: R00.0 ICD-9-CM: 785.0 Single current episode of major depressive disorder, unspecified depression episode severity     ICD-10-CM: F32.9 ICD-9-CM: 296.20 Vitals BP Pulse Temp Resp Height(growth percentile) Weight(growth percentile) (!) 152/101 (BP 1 Location: Left arm, BP Patient Position: Sitting) (!) 101 98 °F (36.7 °C) (Oral) 18 5' 8\" (1.727 m) 226 lb (102.5 kg) SpO2 BMI OB Status Smoking Status 99% 34.36 kg/m2 Implant Never Smoker Vitals History BMI and BSA Data Body Mass Index Body Surface Area  
 34.36 kg/m 2 2.22 m 2 Preferred Pharmacy Pharmacy Name Phone [address] [place], [place] - [facility]y [facility]ES [phone] Your Updated Medication List  
  
   
This list is accurate as of: 11/3/17  1:57 PM.  Always use your most recent med list.  
  
  
  
  
 hydroCHLOROthiazide 12.5 mg capsule Commonly known as:  Isaura Bachelor Take 2 Caps by mouth daily. ibuprofen 800 mg tablet Commonly known as:  MOTRIN Zqmupqoe9-Wxxrub4-Lmwhq therm. 112.5 billion cell Cap Take 1 Cap by mouth daily. Omeprazole delayed release 20 mg tablet Commonly known as:  PRILOSEC D/R Take 1 Tab by mouth daily. We Performed the Following AMB POC EKG ROUTINE W/ 12 LEADS, INTER & REP [65168 CPT(R)] AMB POC GLUCOSE BLOOD, BY GLUCOSE MONITORING DEVICE [79870 CPT(R)] COLLECTION CAPILLARY BLOOD SPECIMEN [71151 CPT(R)] REFERRAL TO PSYCHOLOGY [ZJI29 Custom] Comments:  
 Olena. Follow-up Instructions Return in about 2 weeks (around 11/17/2017) for HTN, mood. To-Do List   
 12/04/2017 12:00 PM  
  Appointment with [facility] CT 2 [facility] ([phone]) CONTRAST STUDY: 1. The patient should not eat solid food four hours before the appointment but should be encouraged to drink clear liquids. 2.  If you have to drink oral contrast, please pick it up any weekday prior to your appointment, if you cannot please check in 2 hrs before appt time. 3.  The patient will require IV access for contrast administration.  4.  The patient should not take Ibuprofen (Advil, Motrin, etc.) and Naproxen Sodium (Aleve, etc.)  on the day of the exam. Stopping non-steroidal anti-inflammatory agents (NSAIDs) like Ibuprofen decreases the risk of kidney damage from the x-ray contrast (dye). 5.  Bring any non [facility] facility films/images pertaining to the area of interest with you on the day of appointment. 6.  Bring current lab work if available (within last 90 days [facility]) ***If scheduled at [facility], iSTAT is not available, labs will need to be done before appointment*** 7. Check in at registration at least 30 minutes before appt time unless you were instructed to do otherwise. 12/04/2017 12:30 PM  
  Appointment with [facility] CT 2 at [facility] CT ([phone]) CONTRAST STUDY: 1. The patient should not eat solid food four hours before the appointment but should be encouraged to drink clear liquids. 2. The patient will require IV access for contrast administration. 3. The patient should not take  Ibuprofen (Advil, Motrin, etc.) and Naproxen Sodium (Aleve, etc.)  on the day of the exam. Stopping non-steroidal anti-inflammatory agents (NSAIDs) like Ibuprofen decreases the risk of kidney damage from the x-ray contrast (dye). 4. Bring any non [facility] facility films/images pertaining to the area of interest with you on the day of appointment. 5. Bring current lab work if available(within last 90 days [facility]) ***If scheduled at [facility], iSTAT is not available, labs will need to be done before appointment*** 6. Check in at registration at least 30 minutes before appt time unless you were instructed to do otherwise. 7. If you have to drink oral contrast please pick it up any weekday prior to your appointment, if you cannot please check in 2 hrs  before appt time. Referral Information Referral ID Referred By Referred To  
  
 [id number] [facility] Not Available Visits Status Start Date End Date 1 New Request 11/3/17 11/3/18 If your referral has a status of pending review or denied, additional information will be sent to support the outcome of this decision. Patient Instructions Recovering From Depression: Care Instructions Your Care Instructions Taking good care of yourself is important as you recover from depression. In time, your symptoms will fade as your treatment takes hold. Do not give up. Instead, focus your energy on getting better. Your mood will improve. It just takes some time. Focus on things that can help you feel better, such as being with friends and family, eating well, and getting enough rest. But take things slowly. Do not do too much too soon. You will begin to feel better gradually. Follow-up care is a key part of your treatment and safety. Be sure to make and go to all appointments, and call your doctor if you are having problems. It's also a good idea to know your test results and keep a list of the medicines you take. How can you care for yourself at home? Be realistic · If you have a large task to do, break it up into smaller steps you can handle, and just do what you can. · You may want to put off important decisions until your depression has lifted. If you have plans that will have a major impact on your life, such as marriage, divorce, or a job change, try to wait a bit. Talk it over with friends and loved ones who can help you look at the overall picture first. 
· Reaching out to people for help is important. Do not isolate yourself. Let your family and friends help you. Find someone you can trust and confide in, and talk to that person. · Be patient, and be kind to yourself. Remember that depression is not your fault and is not something you can overcome with willpower alone. Treatment is necessary for depression, just like for any other illness. Feeling better takes time, and your mood will improve little by little. Stay active · Stay busy and get outside. Take a walk, or try some other light exercise. · Talk with your doctor about an exercise program. Exercise can help with mild depression. · Go to a movie or concert. Take part in a Gnosticist activity or other social gathering. Go to a ball game. · Ask a friend to have dinner with you. Take care of yourself · Eat a balanced diet with plenty of fresh fruits and vegetables, whole grains, and lean protein. If you have lost your appetite, eat small snacks rather than large meals. · Avoid drinking alcohol or using illegal drugs. Do not take medicines that have not been prescribed for you. They may interfere with medicines you may be taking for depression, or they may make your depression worse. · Take your medicines exactly as they are prescribed. You may start to feel better within 1 to 3 weeks of taking antidepressant medicine. But it can take as many as 6 to 8 weeks to see more improvement. If you have questions or concerns about your medicines, or if you do not notice any improvement by 3 weeks, talk to your doctor. · If you have any side effects from your medicine, tell your doctor. Antidepressants can make you feel tired, dizzy, or nervous. Some people have dry mouth, constipation, headaches, sexual problems, or diarrhea. Many of these side effects are mild and will go away on their own after you have been taking the medicine for a few weeks. Some may last longer. Talk to your doctor if side effects are bothering you too much. You might be able to try a different medicine. · Get enough sleep. If you have problems sleeping: ¨ Go to bed at the same time every night, and get up at the same time every morning. ¨ Keep your bedroom dark and quiet. ¨ Do not exercise after 5:00 p.m. ¨ Avoid drinks with caffeine after 5:00 p.m. · Avoid sleeping pills unless they are prescribed by the doctor treating your depression. Sleeping pills may make you groggy during the day, and they may interact with other medicine you are taking.  
· If you have any other illnesses, such as diabetes, heart disease, or high blood pressure, make sure to continue with your treatment. Tell your doctor about all of the medicines you take, including those with or without a prescription. · Keep the numbers for these national suicide hotlines: 1-894-617-TALK (0-593.901.6268) and 2-071-YXCABRU (0-840.631.8394). If you or someone you know talks about suicide or feeling hopeless, get help right away. When should you call for help? Call 911 anytime you think you may need emergency care. For example, call if: 
? · You feel like hurting yourself or someone else. ? · Someone you know has depression and is about to attempt or is attempting suicide. ?Call your doctor now or seek immediate medical care if: 
? · You hear voices. ? · Someone you know has depression and: 
¨ Starts to give away his or her possessions. ¨ Uses illegal drugs or drinks alcohol heavily. ¨ Talks or writes about death, including writing suicide notes or talking about guns, knives, or pills. ¨ Starts to spend a lot of time alone. ¨ Acts very aggressively or suddenly appears calm. ? Watch closely for changes in your health, and be sure to contact your doctor if: 
? · You do not get better as expected. Where can you learn more? Go to http://srini-jeny.info/. Enter W392 in the search box to learn more about \"Recovering From Depression: Care Instructions. \" Current as of: May 12, 2017 Content Version: 11.4 © 0426-4532 [employer]wise[employer]. Care instructions adapted under license by [employer] (which disclaims liability or warranty for this information). If you have questions about a medical condition or this instruction, always ask your healthcare professional. [address] any warranty or liability for your use of this information. Introducing [facility]! Dear Ramírez Mail: Thank you for requesting a [employer] account.   Our records indicate that you already have an active [employer] account. You can access your account anytime at [email or web address] Did you know that you can access your hospital and ER discharge instructions at any time in [employer]? You can also review all of your test results from your hospital stay or ER visit. Additional Information If you have questions, please visit the Frequently Asked Questions section of the [employer] website at [email or web address] Remember, [employer] is NOT to be used for urgent needs. For medical emergencies, dial 911. Now available from your iPhone and Android! Please provide this summary of care documentation to your next provider. Your primary care clinician is listed as Angella Vasquez. If you have any questions after today's visit, please call [phone].

## 2017-11-10 ENCOUNTER — OFFICE VISIT (OUTPATIENT)
Dept: FAMILY MEDICINE CLINIC | Age: 32
End: 2017-11-10

## 2017-11-10 VITALS
BODY MASS INDEX: 34.56 KG/M2 | RESPIRATION RATE: 18 BRPM | SYSTOLIC BLOOD PRESSURE: 148 MMHG | WEIGHT: 228 LBS | OXYGEN SATURATION: 99 % | HEART RATE: 85 BPM | DIASTOLIC BLOOD PRESSURE: 88 MMHG | HEIGHT: 68 IN | TEMPERATURE: 98.4 F

## 2017-11-10 DIAGNOSIS — I10 ESSENTIAL HYPERTENSION: Primary | ICD-10-CM

## 2017-11-10 DIAGNOSIS — F41.9 ANXIETY: ICD-10-CM

## 2017-11-10 RX ORDER — ALPRAZOLAM 0.5 MG/1
0.5 TABLET ORAL AS NEEDED
Qty: 10 TAB | Refills: 0 | Status: SHIPPED | OUTPATIENT
Start: 2017-11-10 | End: 2019-01-16

## 2017-11-10 NOTE — PROGRESS NOTES
Mine Kelley  29 y.o. female  1985  [address]  [address] [address]  [id number]     [facility]: Progress Note       Encounter Date: 11/10/2017    Chief Complaint   Patient presents with    Hypertension     History of Present Illness   Mine Kelley is a 29 y.o. female who presents to clinic today for:    Hypertension  Patient report that she is feeling much better but states that Saturday morning had an episode of palpitations. Had HA on Tuesday lasting 30 minutes resolved with IBU. Endorsed the episode of palpitations was associated with foreboding feeling, racing thoughts.   - Notes that \"jittery\" feeling that has started after elavil as resolved and she is feeling more stable. Health Maintenance  There are no preventive care reminders to display for this patient. Review of Systems   Review of Systems   Constitutional: Negative for chills, diaphoresis and fever. Cardiovascular: Positive for palpitations. Negative for chest pain and leg swelling. Gastrointestinal: Negative for abdominal pain, nausea and vomiting. Genitourinary: Negative for dysuria and urgency. Neurological: Positive for headaches. Negative for dizziness, tremors, focal weakness and weakness. Psychiatric/Behavioral: Negative for depression and suicidal ideas. The patient is nervous/anxious. The patient does not have insomnia. Vitals/Objective:     Vitals:    11/10/17 1044   BP: 148/88   Pulse: 85   Resp: 18   Temp: 98.4 °F (36.9 °C)   TempSrc: Oral   SpO2: 99%   Weight: 228 lb (103.4 kg)   Height: 5' 8\" (1.727 m)     Body mass index is 34.67 kg/(m^2). Physical Exam   Constitutional: She is oriented to person, place, and time. No distress. HENT:   Head: Normocephalic and atraumatic. Cardiovascular: Normal rate and regular rhythm. No murmur heard. Pulmonary/Chest: Effort normal and breath sounds normal.   Musculoskeletal: Normal range of motion. She exhibits no edema. Neurological: She is alert and oriented to person, place, and time. Skin: She is not diaphoretic. No results found for this or any previous visit (from the past 24 hour(s)). Assessment and Plan:     Encounter Diagnoses     ICD-10-CM ICD-9-CM   1. Essential hypertension I10 401.9   2. Anxiety F41.9 300.00       1. Essential hypertension  Patient has home monitoring kit and will start monitoring. If SBP >130, she will call clinic for medication adjustment. 2. Anxiety  Will hold on starting ssri or snri as patient is cautious given recent adverse reaction to Elavil for HA. Discussed that benzo is for panic attacks and not a daily medication.   - ALPRAZolam (XANAX) 0.5 mg tablet; Take 1 Tab by mouth as needed for Anxiety. Dispense: 10 Tab; Refill: 0    I have discussed the diagnosis with the patient and the intended plan as seen in the above orders. she has expressed understanding. The patient has received an after-visit summary and questions were answered concerning future plans. I have discussed medication side effects and warnings with the patient as well. Electronically Signed: Rosamaria Bustillos MD     History/Allergies   Patients past medical, surgical and family histories were reviewed and updated.     Past Medical History:   Diagnosis Date    Abnormal Pap smear 2010    FU with colpo, negative per patient    Cancer [facility])     nodular lymphocytic hodgkins lymphoma    Chest pain     Depression 5/12/2015    Encounter for IUD insertion 3/5/15    Mirena    Essential hypertension     Hypertension     Implanon removal 9/6/2013    Pap smear for cervical cancer screening 11/11/15    Negative    Trouble in sleeping       Past Surgical History:   Procedure Laterality Date    HX COLPOSCOPY  2010    per pt WNL    HX WISDOM TEETH EXTRACTION       Family History   Problem Relation Age of Onset    HIV/AIDS Mother     Hypertension Maternal Aunt     Hypertension Maternal Grandmother      Social History     Social History    Marital status:      Spouse name: N/A    Number of children: N/A    Years of education: N/A     Occupational History    Not on file. Social History Main Topics    Smoking status: Never Smoker    Smokeless tobacco: Never Used    Alcohol use Yes      Comment: occassionally    Drug use: No    Sexual activity: Yes     Partners: Male     Birth control/ protection: None     Other Topics Concern    Not on file     Social History Narrative         Allergies   Allergen Reactions    Clonidine (Pf) Vertigo       Disposition     Follow-up Disposition:  Return in about 3 months (around 2/10/2018) for HTN. Future Appointments  Date Time Provider Ann Stone   12/4/2017 12:00 PM [facility] CT 2 SFMR[facility] STOmer WRIGHT   12/4/2017 12:30 PM [facility] CT 2 SFMRCT STOmer Faustin   12/7/2017 9:30 AM Doroteo Wang MD [facility]   2/2/2018 10:00 AM Becki Foster MD [facility] SCH[facility]            Current Medications after this visit     Current Outpatient Prescriptions   Medication Sig    ALPRAZolam (XANAX) 0.5 mg tablet Take 1 Tab by mouth as needed for Anxiety.  hydroCHLOROthiazide (MICROZIDE) 12.5 mg capsule Take 2 Caps by mouth daily.  ibuprofen (MOTRIN) 800 mg tablet     Omeprazole delayed release (PRILOSEC D/R) 20 mg tablet Take 1 Tab by mouth daily.  Zemzogvm9-Ewztes4-Xfsuv therm. 112.5 billion cell cap Take 1 Cap by mouth daily. No current facility-administered medications for this visit. There are no discontinued medications.

## 2017-11-10 NOTE — PATIENT INSTRUCTIONS
Panic Attacks: Care Instructions  Your Care Instructions    During a panic attack, you may have a feeling of intense fear or terror, trouble breathing, chest pain or tightness, heartbeat changes, dizziness, sweating, and shaking. A panic attack starts suddenly and usually lasts from 5 to 20 minutes but may last even longer. You have the most anxiety about 10 minutes after the attack starts. An attack can begin with a stressful event, or it can happen without a cause. Although panic attacks can cause scary symptoms, you can learn to manage them with self-care, counseling, and medicine. Follow-up care is a key part of your treatment and safety. Be sure to make and go to all appointments, and call your doctor if you are having problems. It's also a good idea to know your test results and keep a list of the medicines you take. How can you care for yourself at home? · Take your medicine exactly as directed. Call your doctor if you think you are having a problem with your medicine. · Go to your counseling sessions and follow-up appointments. · Recognize and accept your anxiety. Then, when you are in a situation that makes you anxious, say to yourself, \"This is not an emergency. I feel uncomfortable, but I am not in danger. I can keep going even if I feel anxious. \"  · Be kind to your body:  ¨ Relieve tension with exercise or a massage. ¨ Get enough rest.  ¨ Avoid alcohol, caffeine, nicotine, and illegal drugs. They can increase your anxiety level, cause sleep problems, or trigger a panic attack. ¨ Learn and do relaxation techniques. See below for more about these techniques. · Engage your mind. Get out and do something you enjoy. Go to a funny movie, or take a walk or hike. Plan your day. Having too much or too little to do can make you anxious. · Keep a record of your symptoms. Discuss your fears with a good friend or family member, or join a support group for people with similar problems.  Talking to others sometimes relieves stress. · Get involved in social groups, or volunteer to help others. Being alone sometimes makes things seem worse than they are. · Get at least 30 minutes of exercise on most days of the week to relieve stress. Walking is a good choice. You also may want to do other activities, such as running, swimming, cycling, or playing tennis or team sports. Relaxation techniques  Do relaxation exercises for 10 to 20 minutes a day. You can play soothing, relaxing music while you do them, if you wish. · Tell others in your house that you are going to do your relaxation exercises. Ask them not to disturb you. · Find a comfortable place, away from all distractions and noise. · Lie down on your back, or sit with your back straight. · Focus on your breathing. Make it slow and steady. · Breathe in through your nose. Breathe out through either your nose or mouth. · Breathe deeply, filling up the area between your navel and your rib cage. Breathe so that your belly goes up and down. · Do not hold your breath. · Breathe like this for 5 to 10 minutes. Notice the feeling of calmness throughout your whole body. As you continue to breathe slowly and deeply, relax by doing the following for another 5 to 10 minutes:  · Tighten and relax each muscle group in your body. You can begin at your toes and work your way up to your head. · Imagine your muscle groups relaxing and becoming heavy. · Empty your mind of all thoughts. · Let yourself relax more and more deeply. · Become aware of the state of calmness that surrounds you. · When your relaxation time is over, you can bring yourself back to alertness by moving your fingers and toes and then your hands and feet and then stretching and moving your entire body. Sometimes people fall asleep during relaxation, but they usually wake up shortly afterward.   · Always give yourself time to return to full alertness before you drive a car or do anything that might cause an accident if you are not fully alert. Never play a relaxation tape while driving a car. When should you call for help? Call 911 anytime you think you may need emergency care. For example, call if:  ? · You feel you cannot stop from hurting yourself or someone else. ? Watch closely for changes in your health, and be sure to contact your doctor if:  ? · Your panic attacks get worse. ? · You have new or different anxiety. ? · You are not getting better as expected. Where can you learn more? Go to http://srini-jeny.info/. Enter H601 in the search box to learn more about \"Panic Attacks: Care Instructions. \"  Current as of: May 12, 2017  Content Version: 11.4  © 9838-8637 Healthwise, Incorporated. Care instructions adapted under license by [employer] (which disclaims liability or warranty for this information). If you have questions about a medical condition or this instruction, always ask your healthcare professional. [place] [address] any warranty or liability for your use of this information.

## 2017-11-10 NOTE — MR AVS SNAPSHOT
Visit Information Date & Time Provider Department Dept. Phone Encounter #  
 11/10/2017 10:40 AM Elinor Boone MD [address] [place] [address] [id number] Follow-up Instructions Return in about 3 months (around 2/10/2018) for HTN. Your Appointments 12/7/2017  9:30 AM  
ESTABLISHED PATIENT with Saad Corbin MD  
[facility] [employer] [facility]) Appt Note: [place] - lymphoma f/u.  
 [address] [facility], [address] [place] [address] [address]  
[phone]  
  
   
 [address] [facility], [address] [facility]  
  
    
 2/2/2018 10:00 AM  
ESTABLISHED PATIENT with Paul Gusman MD  
[facility] ([facility]) [address] [facility] [address] [address] [address] Upcoming Health Maintenance Date Due Pneumococcal 19-64 Highest Risk (2 of 3 - PCV13) 12/15/2017 PAP AKA CERVICAL CYTOLOGY 11/11/2018 DTaP/Tdap/Td series (2 - Td) 4/12/2024 Allergies as of 11/10/2017  Review Complete On: 11/10/2017 By: Elinor Boone MD  
  
 Severity Noted Reaction Type Reactions Clonidine (Pf)  11/06/2013   Intolerance Vertigo Current Immunizations  Never Reviewed Name Date Pneumococcal Polysaccharide (PPSV-23) 12/15/2016 Tdap 4/12/2014 12:26 PM  
  
 Not reviewed this visit You Were Diagnosed With   
  
 Codes Comments Essential hypertension    -  Primary ICD-10-CM: I10 
ICD-9-CM: 401.9 Anxiety     ICD-10-CM: F41.9 ICD-9-CM: 300.00 Vitals BP Pulse Temp Resp Height(growth percentile) Weight(growth percentile) 148/88 85 98.4 °F (36.9 °C) (Oral) 18 5' 8\" (1.727 m) 228 lb (103.4 kg) SpO2 BMI OB Status Smoking Status 99% 34.67 kg/m2 Implant Never Smoker Vitals History BMI and BSA Data Body Mass Index Body Surface Area  
 34.67 kg/m 2 2.23 m 2 Preferred Pharmacy Pharmacy Name Phone [address] [facility] [place] [address] [address]  [phone] Your Updated Medication List  
  
   
This list is accurate as of: 11/10/17 11:09 AM.  Always use your most recent med list.  
  
  
  
  
 ALPRAZolam 0.5 mg tablet Commonly known as:  Laurance Kaska Take 1 Tab by mouth as needed for Anxiety. hydroCHLOROthiazide 12.5 mg capsule Commonly known as:  Isaura Bachelor Take 2 Caps by mouth daily. ibuprofen 800 mg tablet Commonly known as:  MOTRIN Omlqiamg9-Cxemfn2-Xqjfl therm. 112.5 billion cell Cap Take 1 Cap by mouth daily. Omeprazole delayed release 20 mg tablet Commonly known as:  PRILOSEC D/R Take 1 Tab by mouth daily. Prescriptions Printed Refills ALPRAZolam (XANAX) 0.5 mg tablet 0 Sig: Take 1 Tab by mouth as needed for Anxiety. Class: Print Route: Oral  
  
Follow-up Instructions Return in about 3 months (around 2/10/2018) for HTN. To-Do List   
 12/04/2017 12:00 PM  
  Appointment with [facility] CT 2 at [facility] CT ([phone]) CONTRAST STUDY: 1. The patient should not eat solid food four hours before the appointment but should be encouraged to drink clear liquids. 2.  If you have to drink oral contrast, please pick it up any weekday prior to your appointment, if you cannot please check in 2 hrs before appt time. 3.  The patient will require IV access for contrast administration. 4.  The patient should not take Ibuprofen (Advil, Motrin, etc.) and Naproxen Sodium (Aleve, etc.)  on the day of the exam. Stopping non-steroidal anti-inflammatory agents (NSAIDs) like Ibuprofen decreases the risk of kidney damage from the x-ray contrast (dye).  5.  Bring any non [facility] facility films/images pertaining to the area of interest with you on the day of appointment. 6.  Bring current lab work if available (within last 90 days [facility]) ***If scheduled at [facility], iSTAT is not available, labs will need to be done before appointment*** 7. Check in at registration at least 30 minutes before appt time unless you were instructed to do otherwise. 12/04/2017 12:30 PM  
  Appointment with [facility] CT 2 at [facility] CT ([phone]) CONTRAST STUDY: 1. The patient should not eat solid food four hours before the appointment but should be encouraged to drink clear liquids. 2. The patient will require IV access for contrast administration. 3. The patient should not take  Ibuprofen (Advil, Motrin, etc.) and Naproxen Sodium (Aleve, etc.)  on the day of the exam. Stopping non-steroidal anti-inflammatory agents (NSAIDs) like Ibuprofen decreases the risk of kidney damage from the x-ray contrast (dye). 4. Bring any non [facility] facility films/images pertaining to the area of interest with you on the day of appointment. 5. Bring current lab work if available(within last 90 days [facility]) ***If scheduled at [facility], iSTAT is not available, labs will need to be done before appointment*** 6. Check in at registration at least 30 minutes before appt time unless you were instructed to do otherwise. 7. If you have to drink oral contrast please pick it up any weekday prior to your appointment, if you cannot please check in 2 hrs  before appt time. Patient Instructions Panic Attacks: Care Instructions Your Care Instructions During a panic attack, you may have a feeling of intense fear or terror, trouble breathing, chest pain or tightness, heartbeat changes, dizziness, sweating, and shaking. A panic attack starts suddenly and usually lasts from 5 to 20 minutes but may last even longer. You have the most anxiety about 10 minutes after the attack starts. An attack can begin with a stressful event, or it can happen without a cause. Although panic attacks can cause scary symptoms, you can learn to manage them with self-care, counseling, and medicine. Follow-up care is a key part of your treatment and safety. Be sure to make and go to all appointments, and call your doctor if you are having problems. It's also a good idea to know your test results and keep a list of the medicines you take. How can you care for yourself at home? · Take your medicine exactly as directed. Call your doctor if you think you are having a problem with your medicine. · Go to your counseling sessions and follow-up appointments. · Recognize and accept your anxiety. Then, when you are in a situation that makes you anxious, say to yourself, \"This is not an emergency. I feel uncomfortable, but I am not in danger. I can keep going even if I feel anxious. \" · Be kind to your body: ¨ Relieve tension with exercise or a massage. ¨ Get enough rest. 
¨ Avoid alcohol, caffeine, nicotine, and illegal drugs. They can increase your anxiety level, cause sleep problems, or trigger a panic attack. ¨ Learn and do relaxation techniques. See below for more about these techniques. · Engage your mind. Get out and do something you enjoy. Go to a funny movie, or take a walk or hike. Plan your day. Having too much or too little to do can make you anxious. · Keep a record of your symptoms. Discuss your fears with a good friend or family member, or join a support group for people with similar problems. Talking to others sometimes relieves stress. · Get involved in social groups, or volunteer to help others. Being alone sometimes makes things seem worse than they are. · Get at least 30 minutes of exercise on most days of the week to relieve stress. Walking is a good choice. You also may want to do other activities, such as running, swimming, cycling, or playing tennis or team sports. Relaxation techniques Do relaxation exercises for 10 to 20 minutes a day.  You can play soothing, relaxing music while you do them, if you wish. · Tell others in your house that you are going to do your relaxation exercises. Ask them not to disturb you. · Find a comfortable place, away from all distractions and noise. · Lie down on your back, or sit with your back straight. · Focus on your breathing. Make it slow and steady. · Breathe in through your nose. Breathe out through either your nose or mouth. · Breathe deeply, filling up the area between your navel and your rib cage. Breathe so that your belly goes up and down. · Do not hold your breath. · Breathe like this for 5 to 10 minutes. Notice the feeling of calmness throughout your whole body. As you continue to breathe slowly and deeply, relax by doing the following for another 5 to 10 minutes: · Tighten and relax each muscle group in your body. You can begin at your toes and work your way up to your head. · Imagine your muscle groups relaxing and becoming heavy. · Empty your mind of all thoughts. · Let yourself relax more and more deeply. · Become aware of the state of calmness that surrounds you. · When your relaxation time is over, you can bring yourself back to alertness by moving your fingers and toes and then your hands and feet and then stretching and moving your entire body. Sometimes people fall asleep during relaxation, but they usually wake up shortly afterward. · Always give yourself time to return to full alertness before you drive a car or do anything that might cause an accident if you are not fully alert. Never play a relaxation tape while driving a car. When should you call for help? Call 911 anytime you think you may need emergency care. For example, call if: 
? · You feel you cannot stop from hurting yourself or someone else. ? Watch closely for changes in your health, and be sure to contact your doctor if: 
? · Your panic attacks get worse. ? · You have new or different anxiety. ? · You are not getting better as expected. Where can you learn more? Go to http://srini-jeny.info/. Enter H601 in the search box to learn more about \"Panic Attacks: Care Instructions. \" Current as of: May 12, 2017 Content Version: 11.4 © 9305-6634 [employer]. Care instructions adapted under license by [employer] (which disclaims liability or warranty for this information). If you have questions about a medical condition or this instruction, always ask your healthcare professional. [address] any warranty or liability for your use of this information. Introducing [facility]! Dear Monserrat Show: Thank you for requesting a DoNanza account. Our records indicate that you already have an active DoNanza account. You can access your account anytime at [email or web address] Did you know that you can access your hospital and ER discharge instructions at any time in DoNanza? You can also review all of your test results from your hospital stay or ER visit. Additional Information If you have questions, please visit the Frequently Asked Questions section of the DoNanza website at [email or web address] Remember, DoNanza is NOT to be used for urgent needs. For medical emergencies, dial 911. Now available from your iPhone and Android! Please provide this summary of care documentation to your next provider. Your primary care clinician is listed as Jose Antonio Redmond. If you have any questions after today's visit, please call [phone].

## 2017-12-01 ENCOUNTER — TELEPHONE (OUTPATIENT)
Dept: OBGYN CLINIC | Age: 32
End: 2017-12-01

## 2017-12-04 ENCOUNTER — HOSPITAL ENCOUNTER (OUTPATIENT)
Dept: CT IMAGING | Age: 32
Discharge: HOME | End: 2017-12-04
Attending: NURSE PRACTITIONER
Payer: COMMERCIAL

## 2017-12-04 DIAGNOSIS — C81.01 NODULAR LYMPHOCYTE PREDOMINANT HODGKIN LYMPHOMA OF LYMPH NODES OF NECK (HCC): ICD-10-CM

## 2017-12-04 PROCEDURE — 70491 CT SOFT TISSUE NECK W/DYE: CPT

## 2017-12-04 PROCEDURE — 74011636320 HC RX REV CODE- 636/320: Performed by: RADIOLOGY

## 2017-12-04 PROCEDURE — 74177 CT ABD & PELVIS W/CONTRAST: CPT

## 2017-12-04 RX ADMIN — IOPAMIDOL 98 ML: 755 INJECTION, SOLUTION INTRAVENOUS at 11:53

## 2017-12-07 ENCOUNTER — OFFICE VISIT (OUTPATIENT)
Dept: OBGYN CLINIC | Age: 32
End: 2017-12-07

## 2017-12-07 ENCOUNTER — OFFICE VISIT (OUTPATIENT)
Dept: ONCOLOGY | Age: 32
End: 2017-12-07

## 2017-12-07 VITALS
BODY MASS INDEX: 34.56 KG/M2 | SYSTOLIC BLOOD PRESSURE: 126 MMHG | HEIGHT: 68 IN | RESPIRATION RATE: 16 BRPM | DIASTOLIC BLOOD PRESSURE: 84 MMHG | WEIGHT: 228 LBS

## 2017-12-07 VITALS
SYSTOLIC BLOOD PRESSURE: 130 MMHG | HEIGHT: 68 IN | TEMPERATURE: 98.7 F | BODY MASS INDEX: 33.8 KG/M2 | DIASTOLIC BLOOD PRESSURE: 79 MMHG | OXYGEN SATURATION: 96 % | RESPIRATION RATE: 20 BRPM | WEIGHT: 223 LBS | HEART RATE: 60 BPM

## 2017-12-07 DIAGNOSIS — C81.01 NODULAR LYMPHOCYTE PREDOMINANT HODGKIN LYMPHOMA OF LYMPH NODES OF NECK (HCC): Primary | ICD-10-CM

## 2017-12-07 DIAGNOSIS — N89.8 VAGINAL DISCHARGE: ICD-10-CM

## 2017-12-07 DIAGNOSIS — N93.9 ABNORMAL UTERINE BLEEDING: Primary | ICD-10-CM

## 2017-12-07 LAB
BACTERIA, POC WET MOUNT: NORMAL
CLUE, POC WET MOUNT: NORMAL
RBC, POC WET MOUNT: NORMAL
TRICH, POC WET MOUNT: NORMAL
WBC, POC WET MOUNT: NORMAL
WET MOUNT POCT, WMPOCT: NORMAL
YEAST, POC WET MOUNT: NORMAL

## 2017-12-07 RX ORDER — METRONIDAZOLE 500 MG/1
500 TABLET ORAL 2 TIMES DAILY
Qty: 14 TAB | Refills: 0 | Status: SHIPPED | OUTPATIENT
Start: 2017-12-07 | End: 2017-12-14

## 2017-12-07 NOTE — PATIENT INSTRUCTIONS
Pelvic Exam: Care Instructions  Your Care Instructions    When your doctor examines all of your pelvic organs, it's called a pelvic exam. Two good reasons to have this kind of exam are to check for sexually transmitted infections (STIs) and to get a Pap test. A Pap test is also called a Pap smear. It checks for early changes that can lead to cancer of the cervix. Sometimes a pelvic exam is part of a regular checkup. In this case, you can do some things to make your test results as accurate as possible. · Try to schedule the exam when you don't have your period. · Don't use douches, tampons, or vaginal medicines, sprays, or powders for 24 hours before your exam.  · Don't have sex for 24 hours before your exam.  Other times, women have this kind of exam at any time of the month. This is because they have pelvic pain, bleeding, or discharge. Or they may have another pelvic problem. Before your exam, it's important to share some information with your doctor. For example, if you are a survivor of rape or sexual abuse, you can talk about any concerns you may have. Your doctor will also want to know if you are pregnant or use birth control. And he or she will want to hear about any problems, surgeries, or procedures you have had in your pelvic area. You will also need to tell your doctor when your last period was. Follow-up care is a key part of your treatment and safety. Be sure to make and go to all appointments, and call your doctor if you are having problems. It's also a good idea to know your test results and keep a list of the medicines you take. How is a pelvic exam done? · During a pelvic exam, you will:  ¨ Take off your clothes below the waist. You will get a paper or cloth cover to put over the lower half of your body. Bri Ashley on your back on an exam table. Your feet will be raised above you. Stirrups will support your feet. · The doctor will:  Stalin Jest you to relax your knees.  Your knees need to lean out, toward the walls. ¨ Check the opening of your vagina for sores or swelling. ¨ Gently put a tool called a speculum into your vagina. It opens the vagina a little bit. You will feel some pressure. But if you are relaxed, it will not hurt. It lets your doctor see inside the vagina. ¨ Use a small brush, spatula, or swab to get a sample of cells, if you are having a Pap test or culture. The doctor then removes the speculum. ¨ Put on gloves and put one or two fingers of one hand into your vagina. The other hand goes on your lower belly. This lets your doctor feel your pelvic organs. You will probably feel some pressure. Try to stay relaxed. ¨ Put one gloved finger into your rectum and one into your vagina, if needed. This can also help check your pelvic organs. This exam takes about 10 minutes. At the end, you will get a washcloth or tissue to clean your vaginal area. It's normal to have some discharge after this exam. You can then get dressed. Some test results may be ready right away. But results from a culture or a Pap test may take several days or a few weeks. Why should you have a pelvic exam?  · You want to have recommended screening tests. This includes a Pap test.  · You think you have a vaginal infection. Signs include itching, burning, or unusual discharge. · You might have been exposed to a sexually transmitted infection (STI), such as chlamydia or herpes. · You have vaginal bleeding that is not part of your normal menstrual period. · You have pain in your belly or pelvis. · You have been sexually assaulted. A pelvic exam lets your doctor collect evidence and check for STIs. · You are pregnant. · You are having trouble getting pregnant. What are the risks of a pelvic exam?  There are no risks from a pelvic exam.  When should you call for help? Watch closely for changes in your health, and be sure to contact your doctor if you have any problems. Where can you learn more?   Go to http://srini-jeny.info/. Enter K373 in the search box to learn more about \"Pelvic Exam: Care Instructions. \"  Current as of: October 13, 2016  Content Version: 11.4  © 0431-7129 Healthwise, [employer]. Care instructions adapted under license by [employer] (which disclaims liability or warranty for this information). If you have questions about a medical condition or this instruction, always ask your healthcare professional. [place] [address] any warranty or liability for your use of this information.

## 2017-12-07 NOTE — MR AVS SNAPSHOT
Visit Information Date & Time Provider Department Dept. Phone Encounter #  
 12/7/2017 10:50 AM Isiah Duran MD [facility] [phone] [id number] Your Appointments 2/2/2018 10:00 AM  
ESTABLISHED PATIENT with Aidan [place] MD  
[facility] ([address]) 320 [facility] [address] 1007 [facility]  
[phone]  
  
   
 [address] [address]  
  
    
 4/12/2018  9:30 AM  
ESTABLISHED PATIENT with Jamin Walter MD  
[facility] at [facility] [address]) Appt Note: 4 mo fu, [place] [address] [facility], [address] [facility] 99 [address]  
[phone]  
  
   
 301 [facility], [address] [place] [address] Upcoming Health Maintenance Date Due  
 PAP AKA CERVICAL CYTOLOGY 11/11/2018 Allergies as of 12/7/2017  Review Complete On: 12/7/2017 By: Natalie Chris RN Severity Noted Reaction Type Reactions Clonidine (Pf)  11/06/2013   Intolerance Vertigo Current Immunizations  Never Reviewed Name Date Pneumococcal Polysaccharide (PPSV-23) 12/15/2016 Tdap 4/12/2014 12:26 PM  
  
 Not reviewed this visit Vitals BP Resp Height(growth percentile) Weight(growth percentile) LMP BMI  
 126/84 (BP 1 Location: Left arm, BP Patient Position: Sitting) 16 5' 8\" (1.727 m) 228 lb (103.4 kg) 11/23/2017 (Approximate) 34.67 kg/m2 OB Status Smoking Status Implant Never Smoker BMI and BSA Data Body Mass Index Body Surface Area  
 34.67 kg/m 2 2.23 m 2 Preferred Pharmacy Pharmacy Name Phone [address] [place] [place] [facility] [phone] Your Updated Medication List  
  
   
This list is accurate as of: 12/7/17 10:58 AM.  Always use your most recent med list.  
  
  
  
  
 ALPRAZolam 0.5 mg tablet Commonly known as:  Chandler Rogers Take 1 Tab by mouth as needed for Anxiety. hydroCHLOROthiazide 12.5 mg capsule Commonly known as:  Gale Turton Take 2 Caps by mouth daily. ibuprofen 800 mg tablet Commonly known as:  MOTRIN Darrbgiq5-Csvquc1-Klfdv therm. 112.5 billion cell Cap Take 1 Cap by mouth daily. Omeprazole delayed release 20 mg tablet Commonly known as:  PRILOSEC D/R Take 1 Tab by mouth daily. Patient Instructions Pelvic Exam: Care Instructions Your Care Instructions When your doctor examines all of your pelvic organs, it's called a pelvic exam. Two good reasons to have this kind of exam are to check for sexually transmitted infections (STIs) and to get a Pap test. A Pap test is also called a Pap smear. It checks for early changes that can lead to cancer of the cervix. Sometimes a pelvic exam is part of a regular checkup. In this case, you can do some things to make your test results as accurate as possible. · Try to schedule the exam when you don't have your period. · Don't use douches, tampons, or vaginal medicines, sprays, or powders for 24 hours before your exam. 
· Don't have sex for 24 hours before your exam. 
Other times, women have this kind of exam at any time of the month. This is because they have pelvic pain, bleeding, or discharge. Or they may have another pelvic problem. Before your exam, it's important to share some information with your doctor. For example, if you are a survivor of rape or sexual abuse, you can talk about any concerns you may have. Your doctor will also want to know if you are pregnant or use birth control. And he or she will want to hear about any problems, surgeries, or procedures you have had in your pelvic area. You will also need to tell your doctor when your last period was. Follow-up care is a key part of your treatment and safety.  Be sure to make and go to all appointments, and call your doctor if you are having problems. It's also a good idea to know your test results and keep a list of the medicines you take. How is a pelvic exam done? · During a pelvic exam, you will: ¨ Take off your clothes below the waist. You will get a paper or cloth cover to put over the lower half of your body. Clovia Evener on your back on an exam table. Your feet will be raised above you. Stirrups will support your feet. · The doctor will: ¨ Ask you to relax your knees. Your knees need to lean out, toward the walls. ¨ Check the opening of your vagina for sores or swelling. ¨ Gently put a tool called a speculum into your vagina. It opens the vagina a little bit. You will feel some pressure. But if you are relaxed, it will not hurt. It lets your doctor see inside the vagina. ¨ Use a small brush, spatula, or swab to get a sample of cells, if you are having a Pap test or culture. The doctor then removes the speculum. ¨ Put on gloves and put one or two fingers of one hand into your vagina. The other hand goes on your lower belly. This lets your doctor feel your pelvic organs. You will probably feel some pressure. Try to stay relaxed. ¨ Put one gloved finger into your rectum and one into your vagina, if needed. This can also help check your pelvic organs. This exam takes about 10 minutes. At the end, you will get a washcloth or tissue to clean your vaginal area. It's normal to have some discharge after this exam. You can then get dressed. Some test results may be ready right away. But results from a culture or a Pap test may take several days or a few weeks. Why should you have a pelvic exam? 
· You want to have recommended screening tests. This includes a Pap test. 
· You think you have a vaginal infection. Signs include itching, burning, or unusual discharge. · You might have been exposed to a sexually transmitted infection (STI), such as chlamydia or herpes. · You have vaginal bleeding that is not part of your normal menstrual period. · You have pain in your belly or pelvis. · You have been sexually assaulted. A pelvic exam lets your doctor collect evidence and check for STIs. · You are pregnant. · You are having trouble getting pregnant. What are the risks of a pelvic exam? 
There are no risks from a pelvic exam. 
When should you call for help? Watch closely for changes in your health, and be sure to contact your doctor if you have any problems. Where can you learn more? Go to http://srini-jeny.info/. Enter M141 in the search box to learn more about \"Pelvic Exam: Care Instructions. \" Current as of: October 13, 2016 Content Version: 11.4 © 1317-9427 [employer]. Care instructions adapted under license by [facility] (which disclaims liability or warranty for this information). If you have questions about a medical condition or this instruction, always ask your healthcare professional. [place] [address] any warranty or liability for your use of this information. Introducing [facility]! Dear Shankar Short: Thank you for requesting a [employer] account. Our records indicate that you already have an active [employer] account. You can access your account anytime at [email or web address] Did you know that you can access your hospital and ER discharge instructions at any time in [employer]? You can also review all of your test results from your hospital stay or ER visit. Additional Information If you have questions, please visit the Frequently Asked Questions section of the [employer] website at [email or web address] Remember, [employer] is NOT to be used for urgent needs. For medical emergencies, dial 911. Now available from your iPhone and Android! Please provide this summary of care documentation to your next provider. Your primary care clinician is listed as Raúl Kaplan.  If you have any questions after today's visit, please call [phone].

## 2017-12-07 NOTE — PROGRESS NOTES
[facility]  [email or web address]  [phone]    Merlin Hampton MD, FACOG       OB/GYN Problem visit    Chief Complaint:   Chief Complaint   Patient presents with    Vaginal Bleeding    Vaginitis       History of Present Illness: This is a new problem being evaluated by this provider. The patient is a 29 y.o.  female who reports having abnormal cycles and vaginal odor for several months. Patient states she has reccurring BV. She was last treated for BV by her PCP on 10/04/17 and symptoms are unchanged. She currently has Nexplanon and has encountered irregular cycles for the past two months. She does note new sexual partners. She reports the symptoms are is unchanged. Aggravating factors include none. Alleviating factors include none. She notes new partner is \"longer. \"    BV 3/x six months. Uses condoms. She does not have other concerns. LMP: Patient's last menstrual period was 2017 (approximate).     PFSH:  Past Medical History:   Diagnosis Date    Abnormal Pap smear     FU with colpo, negative per patient    Cancer ([address]     nodular lymphocytic hodgkins lymphoma    Chest pain     Depression 2015    Encounter for IUD insertion 3/5/15    Mirena    Encounter for IUD removal 2016    Essential hypertension     Hypertension     Implanon removal 2013    Nexplanon insertion 2017    Pap smear for cervical cancer screening 11/11/15    Negative    Trouble in sleeping      Past Surgical History:   Procedure Laterality Date    HX COLPOSCOPY      per pt WNL    HX WISDOM TEETH EXTRACTION       Family History   Problem Relation Age of Onset    HIV/AIDS Mother     Hypertension Maternal Aunt     Hypertension Maternal Grandmother      Social History   Substance Use Topics    Smoking status: Never Smoker    Smokeless tobacco: Never Used    Alcohol use Yes      Comment: occassionally     Allergies   Allergen Reactions    Clonidine (Pf) Vertigo     Current Outpatient Prescriptions   Medication Sig    metroNIDAZOLE (FLAGYL) 500 mg tablet Take 1 Tab by mouth two (2) times a day for 7 days.  ibuprofen (MOTRIN) 800 mg tablet     Dlzutroa2-Ufnqaf0-Kihkz therm. 112.5 billion cell cap Take 1 Cap by mouth daily.  ALPRAZolam (XANAX) 0.5 mg tablet Take 1 Tab by mouth as needed for Anxiety.  hydroCHLOROthiazide (MICROZIDE) 12.5 mg capsule Take 2 Caps by mouth daily.  Omeprazole delayed release (PRILOSEC D/R) 20 mg tablet Take 1 Tab by mouth daily. No current facility-administered medications for this visit. Review of Systems:  History obtained from the patient  Constitutional: negative for fevers, chills and weight loss  ENT ROS: negative for - hearing change, oral lesions or visual changes  Respiratory: negative for cough, wheezing or dyspnea on exertion  Cardiovascular: negative for chest pain, irregular heart beats, exertional chest pressure/discomfort  Gastrointestinal: negative for dysphagia, nausea and vomiting  Genito-Urinary ROS:  see HPI  Inteument/breast: negative for rash, breast lump and nipple discharge  Musculoskeletal:negative for stiff joints, neck pain and muscle weakness  Endocrine ROS: negative for - breast changes, galactorrhea or temperature intolerance  Hematological and Lymphatic ROS: negative for - blood clots, bruising or swollen lymph nodes    Physical Exam:  Visit Vitals    /84 (BP 1 Location: Left arm, BP Patient Position: Sitting)    Resp 16    Ht 5' 8\" (1.727 m)    Wt 228 lb (103.4 kg)    BMI 34.67 kg/m2       GENERAL: alert, well appearing, and in no distress  HEAD: normocephalic, atraumatic.    PULM: clear to auscultation, no wheezes, rales or rhonchi, symmetric air entry   COR: normal rate and regular rhythm, S1 and S2 normal   ABDOMEN: soft, nontender, nondistended, no masses or organomegaly   EGBUS: no lesions, no inflammation, no masses  VULVA: normal appearing vulva with no masses, tenderness or lesions  VAGINA: normal appearing vagina with normal color, no lesions, thin and pink discharge  CERVIX: normal appearing cervix without discharge or lesions, non tender  UTERUS: uterus is normal size, shape, consistency and nontender   ADNEXA: normal adnexa in size, nontender and no masses  NEURO: alert, oriented, normal speech    Assessment:  Encounter Diagnoses   Name Primary?  Abnormal uterine bleeding Yes    Vaginal discharge    recurrent BV    Plan:  The patient is advised that she should contact the office if she does not note improvement or if symptoms recur  Recommend follow up with PCP for non-gynecologic complaints and chronic medical problems. She should contact our office with any questions or concerns  She could keep her routine annual exam appointment. We discussed potential causes of vaginal discharge/irritation. We discussed good vulvar hygiene. Recommended avoid vaginal irritants. Discussed use of mild soaps/detergents. Follow up if NI. We we reviewed wet prep findings with the patient at her visit. Patient aware she will be notified about swab results and prescription sent, if indicated.    Disc AUB and nexplanon  Consider metrogel suppression: await swab  FU if AUB persists and NI or if wants removal    Orders Placed This Encounter    NUSWAB VAGINITIS PLUS    AMB POC SMEAR, STAIN & INTERPRET, WET MOUNT SC    AMB POC SMEAR, STAIN & INTERPRET, WET MOUNT    metroNIDAZOLE (FLAGYL) 500 mg tablet       Results for orders placed or performed in visit on 12/07/17   AMB POC SMEAR, STAIN & INTERPRET, WET MOUNT SC   Result Value Ref Range    RBC, POC WET MOUNT      WBC, POC WET MOUNT      CLUE, POC WET MOUNT  Negative    BACTERIA, POC WET MOUNT      YEAST, POC WE MOUNT  Negative    TRICH, POC WET MOUNT  Negative   AMB POC SMEAR, STAIN & INTERPRET, WET MOUNT   Result Value Ref Range    Wet mount (POC)      Narrative    SVEN    Hypae: negative  Buds: negative    Wet Prep:  Trich: negative  Clue cells: negative  Hyphae: negative  Buds: negative  WBC's: normal

## 2017-12-07 NOTE — PROGRESS NOTES
[facility] [place] [facility] [place] [address]  [address] [facility], [facility]  W: [phone]  F: [phone]      Reason for Visit:   Raúl Payne is a 29 y.o. female who is seen for follow up of lymphoma. Treatment History:   · US neck 5/26/2016: 2.5cm right posterior auricular mass  · US guided biopsy cervical node 6/30/2016: suspicious for large b-cell lymphoma  · CT Neck/C/A/P 7/25/2016: Enlarged right level 5 lymph node measuring 2 x 1 cm and slightly inferior to this there is a slightly enlarged lymph node measuring 1.2 x 0.8 cm. There is a slightly enlarged right level 2 lymph node measuring 1.3 x 0.9 cm. There is a slightly enlarged left level 2 lymph node measuring 1.7 x 1.1 cm. Lymph nodes more inferior at level 3 and level 4 are  normal in size. No supraclavicular adenopathy is identified. No adenopathy in chest, abdomen, or pelvis  · PET-CT 7/27/2016: No areas of hypermetabolic activity  · Excisional biopsy of right level 2 cervical node by Dr. 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 Sandro Caceres completed 10/10/2016    History of Present Illness:   Overall feeling quite well. No fevers, chills, night sweats, unintentional weight loss, adenopathy. She has been having some pelvic pain, seeing GYN later today to discuss this. Some irregular menses as well. Some constipation. PAST HISTORY: The following sections were reviewed and updated in the EMR as appropriate: PMH, SH, FH, Medications, Allergies. Allergies   Allergen Reactions    Clonidine (Pf) Vertigo      Review of Systems: A complete review of systems was obtained, reviewed, and scanned into the EMR. Pertinent findings reviewed above. Physical Exam:     Visit Vitals    /79 (BP 1 Location: Right arm, BP Patient Position: Sitting)  Comment: .     Pulse 60    Temp 98.7 °F (37.1 °C) (Temporal)    Resp 20    Ht 5' 8\" (1.727 m)    Wt 223 lb (101.2 kg)    SpO2 96%    BMI 33.91 kg/m2     ECOG PS: 0  General: No distress  Eyes: PERRLA, anicteric sclerae  HENT: Atraumatic, OP clear  Neck: Supple  Lymphatic: No palpable cervical, supraclavicular, or inguinal adenopathy. Respiratory: CTAB, normal respiratory effort  CV: Normal rate, regular rhythm, no murmurs, no peripheral edema  GI: Soft, nontender, nondistended, no masses, no hepatomegaly, no splenomegaly  MS: Normal gait and station. Digits without clubbing or cyanosis. Skin: No rashes, ecchymoses, or petechiae. Normal temperature, turgor, and texture. Psych: Alert, oriented, appropriate affect, normal judgment/insight    Results:     Lab Results   Component Value Date/Time    WBC 5.1 08/28/2017 11:43 AM    HGB 12.7 08/28/2017 11:43 AM    HCT 37.1 08/28/2017 11:43 AM    PLATELET 555 82/73/5906 11:43 AM    MCV 86 08/28/2017 11:43 AM    ABS. NEUTROPHILS 3.4 08/28/2017 11:43 AM    Hgb, External 12.3 02/07/2014    Hct, External 35.6 02/07/2014    Platelet cnt., External 272 02/07/2014     Lab Results   Component Value Date/Time    Sodium 141 08/28/2017 11:43 AM    Potassium 3.8 08/28/2017 11:43 AM    Chloride 106 08/28/2017 11:43 AM    CO2 21 08/28/2017 11:43 AM    Glucose 94 08/28/2017 11:43 AM    BUN 11 08/28/2017 11:43 AM    Creatinine 0.74 08/28/2017 11:43 AM    GFR est  08/28/2017 11:43 AM    GFR est non- 08/28/2017 11:43 AM    Calcium 9.0 08/28/2017 11:43 AM    Glucose POC 76 11/03/2017 01:35 PM     Lab Results   Component Value Date/Time    Bilirubin, total <0.2 08/28/2017 11:43 AM    ALT (SGPT) 12 08/28/2017 11:43 AM    AST (SGOT) 17 08/28/2017 11:43 AM    Alk.  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 Vasile Baez with resolution of cervical adenopathy on post-treatment CT scan. She is now in remission. She continues feeling well and is without evidence of disease recurrence at this time, based on history and exam. We will continue to follow with surveillance. Follow up after completion of therapy for Hodgkin Lymphoma, per NCCN:  · Year 1&2: H&P every 3-6 months  · Year 3: H&P every 6-12 months  · Year 4+: H&P every 12 months  · CBC, CMP, ESR as indicated  · TSH annually if RT to neck  · CT at 6, 12, and 24 months, or as clinically indicated  · Annual influenza vaccine    I have cautioned her to ensure she is not pregnant at the time of her surveillance CT scan. She is currently on birth control. 2) Leukopenia  Resolved on last check. Possible from influenza. Monitor.     3) Pelvic pain, irregular menses  Seeing GYN later today    4) Constipation  Discussed OTC measures    Plan:     · Labs today: CBC, CMP, ESR  · Labs in 4 months: CBC, CMP, ESR ([facility])   · CT N/C/A/P due 12/2018  · Return to clinic in 4 months      Signed By: Anand Jason MD

## 2017-12-07 NOTE — MR AVS SNAPSHOT
Visit Information Date & Time Provider Department Dept. Phone Encounter #  
 12/7/2017  9:30 AM Todd Ambrose MD [facility] at [address] [id number] Follow-up Instructions Return in about 4 months (around 4/7/2018) for surya Sanders HL. Follow-up and Disposition History Your Appointments 12/7/2017 10:50 AM  
ESTABLISHED PATIENT with Popeye Dumont MD  
[facility] ([address]) Appt Note: recurring bacterial infections [address] [facility]  
[phone]  
  
   
 [address]  
  
    
 2/2/2018 10:00 AM  
ESTABLISHED PATIENT with Dea Munoz MD  
[facility] ([address]) [address] [facility]  
[address] [facility] [address] [address] Upcoming Health Maintenance Date Due Pneumococcal 19-64 Highest Risk (2 of 3 - PCV13) 12/15/2017 PAP AKA CERVICAL CYTOLOGY 11/11/2018 DTaP/Tdap/Td series (2 - Td) 4/12/2024 Allergies as of 12/7/2017  Review Complete On: 12/7/2017 By: Todd Ambrose MD  
  
 Severity Noted Reaction Type Reactions Clonidine (Pf)  11/06/2013   Intolerance Vertigo Current Immunizations  Never Reviewed Name Date Pneumococcal Polysaccharide (PPSV-23) 12/15/2016 Tdap 4/12/2014 12:26 PM  
  
 Not reviewed this visit You Were Diagnosed With   
  
 Codes Comments Nodular lymphocyte predominant Hodgkin lymphoma of lymph nodes of neck ([facility] [address]    -  Primary ICD-10-CM: C81.01 
ICD-9-CM: 202.41 Vitals BP Pulse Temp Resp Height(growth percentile) 130/79 (BP 1 Location: Right arm, BP Patient Position: Sitting) 60 98.7 °F (37.1 °C) (Temporal) 20 5' 8\" (1.727 m) Weight(growth percentile) SpO2 BMI OB Status Smoking Status 223 lb (101.2 kg) 96% 33.91 kg/m2 Implant Never Smoker Vitals History BMI and BSA Data Body Mass Index Body Surface Area  
 33.91 kg/m 2 2.2 m 2 Preferred Pharmacy Pharmacy Name Phone [address] [place], [place] - [address] [phone] Your Updated Medication List  
  
   
This list is accurate as of: 12/7/17 10:13 AM.  Always use your most recent med list.  
  
  
  
  
 ALPRAZolam 0.5 mg tablet Commonly known as:  Helga Garrett Take 1 Tab by mouth as needed for Anxiety. hydroCHLOROthiazide 12.5 mg capsule Commonly known as:  Araceli Ode Take 2 Caps by mouth daily. ibuprofen 800 mg tablet Commonly known as:  MOTRIN Pwvusqdx8-Prjage0-Gcunp therm. 112.5 billion cell Cap Take 1 Cap by mouth daily. Omeprazole delayed release 20 mg tablet Commonly known as:  PRILOSEC D/R Take 1 Tab by mouth daily. We Performed the Following CBC WITH AUTOMATED DIFF [45591 CPT(R)] CBC WITH AUTOMATED DIFF [88987 CPT(R)] METABOLIC PANEL, COMPREHENSIVE [54492 CPT(R)] METABOLIC PANEL, COMPREHENSIVE [27779 CPT(R)] SED RATE (ESR) [id number] CPT(R)] SED RATE (ESR) [id number] CPT(R)] Follow-up Instructions Return in about 4 months (around 4/7/2018) for surya Sanders Introducing [facility]! Dear Deja Lyon: Thank you for requesting a Xanitos account. Our records indicate that you already have an active Xanitos account. You can access your account anytime at [email or web address] Did you know that you can access your hospital and ER discharge instructions at any time in Xanitos? You can also review all of your test results from your hospital stay or ER visit. Additional Information If you have questions, please visit the Frequently Asked Questions section of the Xanitos website at [email or web address] Remember, [email or web address] is NOT to be used for urgent needs. For medical emergencies, dial 911. Now available from your iPhone and Android! Please provide this summary of care documentation to your next provider. Your primary care clinician is listed as Po Pulido. If you have any questions after today's visit, please call [phone].

## 2017-12-12 ENCOUNTER — OFFICE VISIT (OUTPATIENT)
Dept: FAMILY MEDICINE CLINIC | Age: 32
End: 2017-12-12

## 2017-12-12 VITALS
DIASTOLIC BLOOD PRESSURE: 75 MMHG | OXYGEN SATURATION: 97 % | HEART RATE: 81 BPM | BODY MASS INDEX: 34.1 KG/M2 | HEIGHT: 68 IN | SYSTOLIC BLOOD PRESSURE: 118 MMHG | TEMPERATURE: 98 F | RESPIRATION RATE: 16 BRPM | WEIGHT: 225 LBS

## 2017-12-12 DIAGNOSIS — I10 ESSENTIAL HYPERTENSION: ICD-10-CM

## 2017-12-12 DIAGNOSIS — J06.9 VIRAL URI WITH COUGH: Primary | ICD-10-CM

## 2017-12-12 NOTE — MR AVS SNAPSHOT
Visit Information Date & Time Provider Department Dept. Phone Encounter #  
 12/12/2017  3:30 PM Rivera Chavez MD [address] [place] [address] [id number] Your Appointments 2/2/2018 10:00 AM  
ESTABLISHED PATIENT with Dea Munoz MD  
[facility] ([facility]) [address]  
[phone]  
  
   
 [address] [place] [address]  
  
    
 4/12/2018  9:30 AM  
ESTABLISHED PATIENT with Todd Ambrose MD  
[facility] at [address] [facility] Appt Note: 4 mo fu, [place] [facility] [address] [facility] [address]  
[phone]  
  
   
 [address] [facility], [address] [facility] Upcoming Health Maintenance Date Due Pneumococcal 19-64 Highest Risk (2 of 3 - PCV13) 12/15/2017 PAP AKA CERVICAL CYTOLOGY 11/11/2018 DTaP/Tdap/Td series (2 - Td) 4/12/2024 Allergies as of 12/12/2017  Review Complete On: 12/12/2017 By: Gabriella Finley LPN Severity Noted Reaction Type Reactions Clonidine (Pf)  11/06/2013   Intolerance Vertigo Current Immunizations  Never Reviewed Name Date Pneumococcal Polysaccharide (PPSV-23) 12/15/2016 Tdap 4/12/2014 12:26 PM  
  
 Not reviewed this visit Vitals BP Pulse Temp Resp Height(growth percentile) Weight(growth percentile) 118/75 (BP 1 Location: Left arm, BP Patient Position: Sitting) 81 98 °F (36.7 °C) (Oral) 16 5' 8\" (1.727 m) 225 lb (102.1 kg) LMP SpO2 BMI OB Status Smoking Status 11/23/2017 (Approximate) 97% 34.21 kg/m2 Implant Never Smoker Vitals History BMI and BSA Data Body Mass Index Body Surface Area  
 34.21 kg/m 2 2.21 m 2 Preferred Pharmacy Pharmacy Name Phone [address] [place] [facility] [phone] Your Updated Medication List  
 This list is accurate as of: 12/12/17  3:47 PM.  Always use your most recent med list.  
  
  
  
  
 ALPRAZolam 0.5 mg tablet Commonly known as:  Ace Sa Take 1 Tab by mouth as needed for Anxiety. hydroCHLOROthiazide 12.5 mg capsule Commonly known as:  Leita Grade Take 2 Caps by mouth daily. ibuprofen 800 mg tablet Commonly known as:  MOTRIN Yvxfzhdb3-Ucligr7-Xbkfh therm. 112.5 billion cell Cap Take 1 Cap by mouth daily. metroNIDAZOLE 500 mg tablet Commonly known as:  FLAGYL Take 1 Tab by mouth two (2) times a day for 7 days. Omeprazole delayed release 20 mg tablet Commonly known as:  PRILOSEC D/R Take 1 Tab by mouth daily. Patient Instructions Tea with honey Tylenol  
phenylephine or pseudoephedrine for 3 days max Upper Respiratory Infection (Cold): Care Instructions Your Care Instructions An upper respiratory infection, or URI, is an infection of the nose, sinuses, or throat. URIs are spread by coughs, sneezes, and direct contact. The common cold is the most frequent kind of URI. The flu and sinus infections are other kinds of URIs. Almost all URIs are caused by viruses. Antibiotics won't cure them. But you can treat most infections with home care. This may include drinking lots of fluids and taking over-the-counter pain medicine. You will probably feel better in 4 to 10 days. The doctor has checked you carefully, but problems can develop later. If you notice any problems or new symptoms, get medical treatment right away. Follow-up care is a key part of your treatment and safety. Be sure to make and go to all appointments, and call your doctor if you are having problems. It's also a good idea to know your test results and keep a list of the medicines you take. How can you care for yourself at home?  
· To prevent dehydration, drink plenty of fluids, enough so that your urine is light yellow or clear like water. Choose water and other caffeine-free clear liquids until you feel better. If you have kidney, heart, or liver disease and have to limit fluids, talk with your doctor before you increase the amount of fluids you drink. · Take an over-the-counter pain medicine, such as acetaminophen (Tylenol), ibuprofen (Advil, Motrin), or naproxen (Aleve). Read and follow all instructions on the label. · Before you use cough and cold medicines, check the label. These medicines may not be safe for young children or for people with certain health problems. · Be careful when taking over-the-counter cold or flu medicines and Tylenol at the same time. Many of these medicines have acetaminophen, which is Tylenol. Read the labels to make sure that you are not taking more than the recommended dose. Too much acetaminophen (Tylenol) can be harmful. · Get plenty of rest. 
· Do not smoke or allow others to smoke around you. If you need help quitting, talk to your doctor about stop-smoking programs and medicines. These can increase your chances of quitting for good. When should you call for help? Call 911 anytime you think you may need emergency care. For example, call if: 
? · You have severe trouble breathing. ?Call your doctor now or seek immediate medical care if: 
? · You seem to be getting much sicker. ? · You have new or worse trouble breathing. ? · You have a new or higher fever. ? · You have a new rash. ? Watch closely for changes in your health, and be sure to contact your doctor if: 
? · You have a new symptom, such as a sore throat, an earache, or sinus pain. ? · You cough more deeply or more often, especially if you notice more mucus or a change in the color of your mucus. ? · You do not get better as expected. Where can you learn more? Go to http://srini-jeny.info/.  
Enter J300 in the search box to learn more about \"Upper Respiratory Infection (Cold): Care Instructions. \" Current as of: May 12, 2017 Content Version: 11.4 © 9827-0599 SwapBeats. Care instructions adapted under license by [employer]CaliforniaCabs.[employer] (which disclaims liability or warranty for this information). If you have questions about a medical condition or this instruction, always ask your healthcare professional. [address] any warranty or liability for your use of this information. Introducing [facility]! Dear Antionette Kirkland: Thank you for requesting a Lithera account. Our records indicate that you already have an active Lithera account. You can access your account anytime at [email or web address] Did you know that you can access your hospital and ER discharge instructions at any time in Lithera? You can also review all of your test results from your hospital stay or ER visit. Additional Information If you have questions, please visit the Frequently Asked Questions section of the Lithera website at [email or web address] Remember, Lithera is NOT to be used for urgent needs. For medical emergencies, dial 911. Now available from your iPhone and Android! Please provide this summary of care documentation to your next provider. Your primary care clinician is listed as Eliud Fung. If you have any questions after today's visit, please call [phone].

## 2017-12-12 NOTE — LETTER
NOTIFICATION RETURN TO WORK / SCHOOL 
 
12/12/2017 3:52 PM 
 
Ms. Ovi Rodriguez [facility] [address] [address] To Whom It May Concern: 
 
Ovi Rodriguez is currently under the care of Marilia Quijano. She will return to work/school on: 12/13/17 If there are questions or concerns please have the patient contact our office.  
 
 
 
Sincerely, 
 
 
Mj Montoya MD

## 2017-12-12 NOTE — PATIENT INSTRUCTIONS
Tea with honey  Tylenol   phenylephine or pseudoephedrine for 3 days max     Upper Respiratory Infection (Cold): Care Instructions  Your Care Instructions    An upper respiratory infection, or URI, is an infection of the nose, sinuses, or throat. URIs are spread by coughs, sneezes, and direct contact. The common cold is the most frequent kind of URI. The flu and sinus infections are other kinds of URIs. Almost all URIs are caused by viruses. Antibiotics won't cure them. But you can treat most infections with home care. This may include drinking lots of fluids and taking over-the-counter pain medicine. You will probably feel better in 4 to 10 days. The doctor has checked you carefully, but problems can develop later. If you notice any problems or new symptoms, get medical treatment right away. Follow-up care is a key part of your treatment and safety. Be sure to make and go to all appointments, and call your doctor if you are having problems. It's also a good idea to know your test results and keep a list of the medicines you take. How can you care for yourself at home? · To prevent dehydration, drink plenty of fluids, enough so that your urine is light yellow or clear like water. Choose water and other caffeine-free clear liquids until you feel better. If you have kidney, heart, or liver disease and have to limit fluids, talk with your doctor before you increase the amount of fluids you drink. · Take an over-the-counter pain medicine, such as acetaminophen (Tylenol), ibuprofen (Advil, Motrin), or naproxen (Aleve). Read and follow all instructions on the label. · Before you use cough and cold medicines, check the label. These medicines may not be safe for young children or for people with certain health problems. · Be careful when taking over-the-counter cold or flu medicines and Tylenol at the same time. Many of these medicines have acetaminophen, which is Tylenol.  Read the labels to make sure that you are not taking more than the recommended dose. Too much acetaminophen (Tylenol) can be harmful. · Get plenty of rest.  · Do not smoke or allow others to smoke around you. If you need help quitting, talk to your doctor about stop-smoking programs and medicines. These can increase your chances of quitting for good. When should you call for help? Call 911 anytime you think you may need emergency care. For example, call if:  ? · You have severe trouble breathing. ?Call your doctor now or seek immediate medical care if:  ? · You seem to be getting much sicker. ? · You have new or worse trouble breathing. ? · You have a new or higher fever. ? · You have a new rash. ? Watch closely for changes in your health, and be sure to contact your doctor if:  ? · You have a new symptom, such as a sore throat, an earache, or sinus pain. ? · You cough more deeply or more often, especially if you notice more mucus or a change in the color of your mucus. ? · You do not get better as expected. Where can you learn more? Go to http://srini-jeny.info/. Enter C428 in the search box to learn more about \"Upper Respiratory Infection (Cold): Care Instructions. \"  Current as of: May 12, 2017  Content Version: 11.4  © 3385-8888 Healthwise, Incorporated. Care instructions adapted under license by [employer] Domain [employer] (which disclaims liability or warranty for this information). If you have questions about a medical condition or this instruction, always ask your healthcare professional. [place] [address] any warranty or liability for your use of this information.

## 2017-12-12 NOTE — PROGRESS NOTES
1. Have you been to the ER, urgent care clinic since your last visit? Hospitalized since your last visit? No    2. Have you seen or consulted any other health care providers outside of the [address] [place] [address] since your last visit? Include any pap smears or colon screening. No  Reviewed record in preparation for visit and have necessary documentation  Pt did not bring medication to office visit for review    Goals that were addressed and/or need to be completed during or after this appointment include   There are no preventive care reminders to display for this patient.

## 2017-12-12 NOTE — PROGRESS NOTES
Progress Note    Patient: Annamarie Neely MRN: [id number]  SSN: [id number]    YOB: 1985  Age: 29 y.o. Sex: female        Chief Complaint   Patient presents with    Cold Symptoms     cough, green sputum         Subjective:       4 days of moderate cough with green sputum, no hemoptysis  Associated hoarse voice  Some chest discomfort (burning, mild) when having a coughing fit, no other times  No sore throat  No SOB  Some muscle aches  Kids are sick contacts and both have cough at this time    Hx hodgkins lymphoma in remission  Clear pan scan 12/4      Current and past medical information:    Current Medications after this visit[de-identified]     Current Outpatient Prescriptions   Medication Sig    metroNIDAZOLE (FLAGYL) 500 mg tablet Take 1 Tab by mouth two (2) times a day for 7 days.  ALPRAZolam (XANAX) 0.5 mg tablet Take 1 Tab by mouth as needed for Anxiety.  hydroCHLOROthiazide (MICROZIDE) 12.5 mg capsule Take 2 Caps by mouth daily.  ibuprofen (MOTRIN) 800 mg tablet     Omeprazole delayed release (PRILOSEC D/R) 20 mg tablet Take 1 Tab by mouth daily.  Utyzoaup0-Zltdnw2-Ueybo therm. 112.5 billion cell cap Take 1 Cap by mouth daily. No current facility-administered medications for this visit.         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)     nodular lymphocytic hodgkins lymphoma    Chest pain     Depression 5/12/2015    Encounter for IUD insertion 3/5/15    Mirena    Encounter for IUD removal 06/24/2016    Essential hypertension     Hypertension     Implanon removal 9/6/2013    Nexplanon insertion 02/09/2017    Pap smear for cervical cancer screening 11/11/15    Negative    Trouble in sleeping        Allergies   Allergen Reactions    Clonidine (Pf) Vertigo       Past Surgical History:   Procedure Laterality Date    HX COLPOSCOPY  2010    per pt WNL    HX WISDOM TEETH EXTRACTION         Social History     Social History    Marital status:      Spouse name: N/A    Number of children: N/A    Years of education: N/A     Social History Main Topics    Smoking status: Never Smoker    Smokeless tobacco: Never Used    Alcohol use Yes      Comment: occassionally    Drug use: No    Sexual activity: Yes     Partners: Male     Birth control/ protection: Condom, Implant     Other Topics Concern    None     Social History Narrative       ROS  No nausea or vomiting    Objective:     Vitals:    12/12/17 1521   BP: 118/75   Pulse: 81   Resp: 16   Temp: 98 °F (36.7 °C)   TempSrc: Oral   SpO2: 97%   Weight: 225 lb (102.1 kg)   Height: 5' 8\" (1.727 m)      Body mass index is 34.21 kg/(m^2). Gen: NAD  HEENT; pharynx clear, no exudate or erythema, hoarse voice  Neck: no cervical lymphadenopathy  CV: rrr nor mrg  Lung: CTAB normal depth and effort  Ext: no edema or erythema  Neuro: A&Ox3, no slurring of speech, strength 5/5 in each ext, CNII-XII intact grossly        Assessment and Plan:       ICD-10-CM ICD-9-CM    1. Viral URI with cough J06.9 465.9     B97.89       She does have a story that is very consistent with viral illness  Based on her normal vital signs, we feel PE is very unlikely at this time  Based on hx of lymphoma will advise to take any new symptoms very seriously and call us  Please call clinic with chest pain, SOB, light headedness, fever    Plan of care:  Discussed diagnoses in detail with patient. Medication risks/benefits/side effects discussed with patient. All of the patient's questions were addressed. The patient understands and agrees with our plan of care.     The patient knows to call back if they are unsure of or forget any changes we discussed today or if the symptoms change. The patient received an After-Visit Summary which contains VS, orders, medication list and allergy list. This can be used as a \"mini-medical record\" should they have to seek medical care while out of town.     Patient Care Team:  Carmella Walker MD as PCP - General (Family Practice)  Brando Chen MD as Physician (Obstetrics & Gynecology)  Johnny Callaway MD (Hematology and Oncology)  Saritha Galdamez MD (Otolaryngology)  Jignesh Chamberlain MD (Cardiology)  Theodis Fleischer, MD (Radiation Oncology)    Follow-up Disposition: Not on File    Future Appointments  Date Time Provider Ann Bertha   2/2/2018 10:00 AM Jignesh Chamberlain MD [address]   4/12/2018 9:30 AM Johnny Callaway MD [address] [place] [address],  [address] [place] [address]       Signed By: Sultana Brooks MD     December 12, 2017

## 2017-12-13 LAB
ALBUMIN SERPL-MCNC: 4.3 G/DL (ref 3.5–5.5)
ALBUMIN/GLOB SERPL: 1.7 {RATIO} (ref 1.2–2.2)
ALP SERPL-CCNC: 83 IU/L (ref 39–117)
ALT SERPL-CCNC: 17 IU/L (ref 0–32)
AST SERPL-CCNC: 20 IU/L (ref 0–40)
BASOPHILS # BLD AUTO: 0 X10E3/UL (ref 0–0.2)
BASOPHILS NFR BLD AUTO: 1 %
BILIRUB SERPL-MCNC: <0.2 MG/DL (ref 0–1.2)
BUN SERPL-MCNC: 8 MG/DL (ref 6–20)
BUN/CREAT SERPL: 10 (ref 9–23)
CALCIUM SERPL-MCNC: 9.3 MG/DL (ref 8.7–10.2)
CHLORIDE SERPL-SCNC: 99 MMOL/L (ref 96–106)
CO2 SERPL-SCNC: 26 MMOL/L (ref 18–29)
CREAT SERPL-MCNC: 0.83 MG/DL (ref 0.57–1)
EOSINOPHIL # BLD AUTO: 0.3 X10E3/UL (ref 0–0.4)
EOSINOPHIL NFR BLD AUTO: 6 %
ERYTHROCYTE [DISTWIDTH] IN BLOOD BY AUTOMATED COUNT: 13.5 % (ref 12.3–15.4)
ERYTHROCYTE [SEDIMENTATION RATE] IN BLOOD BY WESTERGREN METHOD: 8 MM/HR (ref 0–32)
GFR SERPLBLD CREATININE-BSD FMLA CKD-EPI: 109 ML/MIN/1.73
GFR SERPLBLD CREATININE-BSD FMLA CKD-EPI: 95 ML/MIN/1.73
GLOBULIN SER CALC-MCNC: 2.6 G/DL (ref 1.5–4.5)
GLUCOSE SERPL-MCNC: 89 MG/DL (ref 65–99)
HCT VFR BLD AUTO: 36.8 % (ref 34–46.6)
HGB BLD-MCNC: 13.2 G/DL (ref 11.1–15.9)
IMM GRANULOCYTES # BLD: 0 X10E3/UL (ref 0–0.1)
IMM GRANULOCYTES NFR BLD: 0 %
LYMPHOCYTES # BLD AUTO: 1.2 X10E3/UL (ref 0.7–3.1)
LYMPHOCYTES NFR BLD AUTO: 26 %
MCH RBC QN AUTO: 30.8 PG (ref 26.6–33)
MCHC RBC AUTO-ENTMCNC: 35.9 G/DL (ref 31.5–35.7)
MCV RBC AUTO: 86 FL (ref 79–97)
MONOCYTES # BLD AUTO: 0.6 X10E3/UL (ref 0.1–0.9)
MONOCYTES NFR BLD AUTO: 13 %
NEUTROPHILS # BLD AUTO: 2.4 X10E3/UL (ref 1.4–7)
NEUTROPHILS NFR BLD AUTO: 54 %
PLATELET # BLD AUTO: 300 X10E3/UL (ref 150–379)
POTASSIUM SERPL-SCNC: 3.5 MMOL/L (ref 3.5–5.2)
PROT SERPL-MCNC: 6.9 G/DL (ref 6–8.5)
RBC # BLD AUTO: 4.29 X10E6/UL (ref 3.77–5.28)
SODIUM SERPL-SCNC: 141 MMOL/L (ref 134–144)
WBC # BLD AUTO: 4.5 X10E3/UL (ref 3.4–10.8)

## 2017-12-13 RX ORDER — HYDROCHLOROTHIAZIDE 12.5 MG/1
25 CAPSULE ORAL DAILY
Qty: 90 CAP | Refills: 1 | Status: SHIPPED | OUTPATIENT
Start: 2017-12-13 | End: 2018-02-06 | Stop reason: SDUPTHER

## 2017-12-21 NOTE — PROGRESS NOTES
C/w bv, treated at visit:    Rec suppression for bacterial vaginosis (BV)  Rx:  0.75% metronidazole vaginal gel   one applicator per vagina   twice weekly (BIW)  for 4months.   Follow up 5-6 months and if sx recur while on suppression

## 2017-12-28 ENCOUNTER — TELEPHONE (OUTPATIENT)
Dept: OBGYN CLINIC | Age: 32
End: 2017-12-28

## 2017-12-28 RX ORDER — METRONIDAZOLE 7.5 MG/G
1 GEL VAGINAL 2 TIMES WEEKLY
Qty: 300 MG | Refills: 2 | Status: SHIPPED | OUTPATIENT
Start: 2017-12-29 | End: 2018-04-16 | Stop reason: ALTCHOICE

## 2017-12-28 NOTE — TELEPHONE ENCOUNTER
Patient advised of results and recommendations. Verbalized understanding.     ----- Message from Brando Chen MD sent at 12/20/2017 10:19 PM EST -----  C/w bv, treated at visit:    Rec suppression for bacterial vaginosis (BV)  Rx:  0.75% metronidazole vaginal gel   one applicator per vagina   twice weekly (BIW)  for 4months.   Follow up 5-6 months and if sx recur while on suppression

## 2018-02-02 ENCOUNTER — OFFICE VISIT (OUTPATIENT)
Dept: CARDIOLOGY CLINIC | Age: 33
End: 2018-02-02

## 2018-02-02 VITALS
OXYGEN SATURATION: 98 % | DIASTOLIC BLOOD PRESSURE: 80 MMHG | WEIGHT: 230.2 LBS | RESPIRATION RATE: 16 BRPM | HEIGHT: 68 IN | HEART RATE: 68 BPM | BODY MASS INDEX: 34.89 KG/M2 | SYSTOLIC BLOOD PRESSURE: 112 MMHG

## 2018-02-02 DIAGNOSIS — I10 ESSENTIAL HYPERTENSION: Primary | ICD-10-CM

## 2018-02-02 NOTE — MR AVS SNAPSHOT
[address] [facility] 600 1007 [facility] 
[phone] Patient: Holly Gutierres MRN: [id number] :1985 Visit Information Date & Time Provider Department Dept. Phone Encounter #  
 2018  2:20 PM Melanie Roth MD [facility] [phone] [id number] Your Appointments 2018  9:30 AM  
ESTABLISHED PATIENT with Carole May MD  
[facility]) Appt Note: 4 mo fu, [place] [address] [facility] [id number]  
[phone]  
  
   
 [address] [facility], [address] 100[address] [facility] Upcoming Health Maintenance Date Due Pneumococcal 19-64 Highest Risk (2 of 3 - PCV13) 12/15/2017 PAP AKA CERVICAL CYTOLOGY 2018 DTaP/Tdap/Td series (2 - Td) 2024 Allergies as of 2018  Review Complete On: 2018 By: Meena eLo Severity Noted Reaction Type Reactions Clonidine (Pf)  2013   Intolerance Vertigo Current Immunizations  Never Reviewed Name Date Pneumococcal Polysaccharide (PPSV-23) 12/15/2016 Tdap 2014 12:26 PM  
  
 Not reviewed this visit Vitals BP Pulse Resp Height(growth percentile) Weight(growth percentile) SpO2  
 112/80 (BP 1 Location: Left arm) 68 16 5' 8\" (1.727 m) 230 lb 3.2 oz (104.4 kg) 98% BMI OB Status Smoking Status 35 kg/m2 Implant Never Smoker Vitals History BMI and BSA Data Body Mass Index Body Surface Area 35 kg/m 2 2.24 m 2 Preferred Pharmacy Pharmacy Name Phone [address] [address] [facility] [phone] Your Updated Medication List  
  
   
This list is accurate as of: 18  2:35 PM.  Always use your most recent med list.  
  
  
  
  
 ALPRAZolam 0.5 mg tablet Commonly known as:  Obi Urena Take 1 Tab by mouth as needed for Anxiety. hydroCHLOROthiazide 12.5 mg capsule Commonly known as:  Leighton Formosa Take 2 Caps by mouth daily. ibuprofen 800 mg tablet Commonly known as:  MOTRIN  
as needed. Racfweap7-Nabdku5-Eovtq therm. 112.5 billion cell Cap Take 1 Cap by mouth daily. metroNIDAZOLE 0.75 % vaginal gel Commonly known as:  Anthony Medina Insert 1 Applicator into vagina two (2) times a week for 32 doses. Omeprazole delayed release 20 mg tablet Commonly known as:  PRILOSEC D/R Take 1 Tab by mouth daily. Introducing [facility]! Dear Lala Ruff: Thank you for requesting a [employer] account. Our records indicate that you already have an active [employer] account. You can access your account anytime at [email or web address] Did you know that you can access your hospital and ER discharge instructions at any time in [employer]? You can also review all of your test results from your hospital stay or ER visit. Additional Information If you have questions, please visit the Frequently Asked Questions section of the [employer] website at [email or web address] Remember, [employer] is NOT to be used for urgent needs. For medical emergencies, dial 911. Now available from your iPhone and Android! Please provide this summary of care documentation to your next provider. Your primary care clinician is listed as Stan Joseph. If you have any questions after today's visit, please call [phone].

## 2018-02-02 NOTE — PROGRESS NOTES
Gerson Knight MD    [address] [place], [address] [facility]    Office [phone],U [phone]  Pager [phone]    Louie Wright is a 29 y.o. female is here for f/u visit. 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 No chemotherapy. Obesity. Hypertension        Plan:     BP controlled  E cardio event monitor -12/2017 ;no significant arrhythmia. Aggressive cardiovascular risk factor modification. Follow-up prn    Patient understands the plan. All questions were answered to the patient's satisfaction. Medication Side Effects and Warnings were discussed with patient: yes  Patient Labs were reviewed and or requested:  yes  Patient Past Records were reviewed and or requested: yes    I appreciate the opportunity to be involved in Ms. Fung. See note below for details. Please do not hesitate to contact us with questions or concerns. Gerson Knight MD    Cardiac Testing/ Procedures: A. Cardiac Cath/PCI:    B.ECHO/BELINDA: 1/27/17 Left ventricle: Systolic function was normal. Ejection fraction was  estimated in the range of 60 % to 65 %. There were no regional wall motion  abnormalities. Tricuspid valve: There was mild regurgitation. Pulmonic valve: There was mild regurgitation.      9/2016Left ventricle: Systolic function was normal. Ejection fraction was  estimated to be 65 %. There were no regional wall motion abnormalities. Tricuspid valve: There was moderate regurgitation. Pericardium: A small pericardial effusion was identified posterior to the  heart. There was no evidence of hemodynamic compromise. C.StressNuclear/Stress ECHO/Stress test:    D.Vascular:    E. EP: 12/2016  Event monitor - SR/Sinus arrhythmia/Occ transient bradycardia ( 50bpm)    F. Miscellaneous:    Subjective:  Jane Isaacs is a 29 y.o. female who returns for follow up visit. No palpitations. No dizziness, chest pain. No history of syncope. States that she is not on any treatment for her non-Hodgkin's lymphoma currently-in surveillance mode. .      ROS:  (bold if positive, if negative)             Medications before admission:    Current Outpatient Prescriptions   Medication Sig Dispense    metroNIDAZOLE (METROGEL) 0.75 % vaginal gel Insert 1 Applicator into vagina two (2) times a week for 32 doses. 300 mg    hydroCHLOROthiazide (MICROZIDE) 12.5 mg capsule Take 2 Caps by mouth daily. 90 Cap    ibuprofen (MOTRIN) 800 mg tablet as needed.  Omeprazole delayed release (PRILOSEC D/R) 20 mg tablet Take 1 Tab by mouth daily. 30 Tab    Iclnuouu6-Exuirq7-Iujum therm. 112.5 billion cell cap Take 1 Cap by mouth daily. 30 Cap    ALPRAZolam (XANAX) 0.5 mg tablet Take 1 Tab by mouth as needed for Anxiety. 10 Tab     No current facility-administered medications for this visit. Physical Exam:  Visit Vitals    /80 (BP 1 Location: Left arm)    Pulse 68    Resp 16    Ht 5' 8\" (1.727 m)    Wt 230 lb 3.2 oz (104.4 kg)    SpO2 98%    BMI 35 kg/m2          Gen: Well-developed, well-nourished, in no acute distress  Neck: Supple,No JVD, No Carotid Bruit,   Resp: No accessory muscle use, Clear breath sounds, No rales or rhonchi  Card: Regular Rate,Rythm,Normal S1, S2, No murmurs, rubs or gallop. No thrills.    Abd:  Soft, non-tender, non-distended,BS+,   MSK: No cyanosis  Skin: No rashes    Neuro: moving all four extremities , follows commands appropriately  Psych:  Good insight, oriented to person, place , alert, Nml Affect  LE: No edema    EKG:      LABS:        Lab Results   Component Value Date/Time    WBC 4.5 12/12/2017 04:40 PM    HGB 13.2 12/12/2017 04:40 PM    HCT 36.8 12/12/2017 04:40 PM    PLATELET 944 99/32/5256 04:40 PM    MCV 86 12/12/2017 04:40 PM    Hgb, External 12.3 02/07/2014    Hct, External 35.6 02/07/2014    Platelet cnt., External 272 02/07/2014     Lab Results   Component Value Date/Time    Sodium 141 12/12/2017 04:40 PM    Potassium 3.5 12/12/2017 04:40 PM    Chloride 99 12/12/2017 04:40 PM    CO2 26 12/12/2017 04:40 PM    Anion gap 8 05/17/2017 11:56 AM    Glucose 89 12/12/2017 04:40 PM    BUN 8 12/12/2017 04:40 PM    Creatinine 0.83 12/12/2017 04:40 PM    BUN/Creatinine ratio 10 12/12/2017 04:40 PM    GFR est  12/12/2017 04:40 PM    GFR est non-AA 95 12/12/2017 04:40 PM    Calcium 9.3 12/12/2017 04:40 PM       No results found for: APTT  No results found for: INR, PTMR, PTP, PT1, PT2  No components found for: Delia Dolan MD

## 2018-02-06 DIAGNOSIS — I10 ESSENTIAL HYPERTENSION: ICD-10-CM

## 2018-02-06 RX ORDER — HYDROCHLOROTHIAZIDE 12.5 MG/1
25 CAPSULE ORAL DAILY
Qty: 90 CAP | Refills: 1 | Status: SHIPPED | OUTPATIENT
Start: 2018-02-06 | End: 2018-05-10 | Stop reason: CLARIF

## 2018-03-30 ENCOUNTER — OFFICE VISIT (OUTPATIENT)
Dept: FAMILY MEDICINE CLINIC | Age: 33
End: 2018-03-30

## 2018-03-30 VITALS
DIASTOLIC BLOOD PRESSURE: 84 MMHG | TEMPERATURE: 98.8 F | SYSTOLIC BLOOD PRESSURE: 131 MMHG | HEIGHT: 68 IN | RESPIRATION RATE: 16 BRPM | WEIGHT: 231 LBS | OXYGEN SATURATION: 99 % | BODY MASS INDEX: 35.01 KG/M2 | HEART RATE: 63 BPM

## 2018-03-30 DIAGNOSIS — B37.31 VAGINAL YEAST INFECTION: ICD-10-CM

## 2018-03-30 DIAGNOSIS — B37.9 YEAST INFECTION: Primary | ICD-10-CM

## 2018-03-30 RX ORDER — FLUCONAZOLE 150 MG/1
150 TABLET ORAL DAILY
Qty: 1 TAB | Refills: 0 | Status: SHIPPED | OUTPATIENT
Start: 2018-03-30 | End: 2018-03-31

## 2018-03-30 NOTE — PROGRESS NOTES
1. Have you been to the ER, urgent care clinic, or been hospitalized since your last visit? No     2. Have you seen or consulted any other health care providers outside of the [address] [place] [address] since your last visit?   No       Reviewed record in preparation for visit and have necessary documentation  opportunity was given for questions  Goals that were addressed and/or need to be completed during or after this appointment include     Health Maintenance Due   Topic Date Due    Pneumococcal 19-64 Highest Risk (2 of 3 - PCV13) 12/15/2017

## 2018-03-30 NOTE — PROGRESS NOTES
Pastor Grimaldo  34 y.o. female  1985  [address]  [address] [address]  [id number]     [facility] Progress [facility]       Encounter Date: 3/30/2018    Chief Complaint   Patient presents with    Vaginal Discharge     \"cottage cheese\"     History of Present Illness   Pastor Grimaldo is a 34 y.o. female who presents to clinic today for:  3 days ago she noticed a \"white thick cottage cheese\" vaginal discharge. She denies vaginal and surrounding skin/ramana area itching & skin irritation. She is unsure of Metronidazole end date (Insert 1 Applicator into vagina two (2) times a week for 32 doses. - Vaginal). No STI expsure and sexual intercourse. She also denies UTI symptoms-dysuria, odor, discoloration, fever, & back pain. Health Maintenance    Health Maintenance Due   Topic Date Due    Pneumococcal 19-64 Highest Risk (2 of 3 - PCV13) 12/15/2017     Review of Systems   Review of Systems   All other systems reviewed and are negative. Vitals/Objective:     Vitals:    03/30/18 0955   BP: 131/84   Pulse: 63   Resp: 16   Temp: 98.8 °F (37.1 °C)   TempSrc: Oral   SpO2: 99%   Weight: 231 lb (104.8 kg)   Height: 5' 8\" (1.727 m)     Body mass index is 35.12 kg/(m^2). Physical Exam   Constitutional: She is oriented to person, place, and time. She appears well-developed and well-nourished. She is active and cooperative. Genitourinary:       Pelvic exam was performed with patient supine. There is erythema in the vagina. Genitourinary Comments: Vaginal orifice with noted erythema. Denies pain or tenderness. Labia, ramana area and surrounding skin without lesions and intact. Neurological: She is alert and oriented to person, place, and time. Skin: Skin is warm, dry and intact. Psychiatric: She has a normal mood and affect. Her speech is normal and behavior is normal.       No results found for this or any previous visit (from the past 24 hour(s)). Assessment and Plan:   1.  Yeast infection      2. Vaginal yeast infection    - fluconazole (DIFLUCAN) 150 mg tablet; Take 1 Tab by mouth daily for 1 day. FDA advises cautious prescribing of oral fluconazole in pregnancy. Dispense: 1 Tab; Refill: 0      I have discussed the diagnosis with the patient and the intended plan as seen in the above orders. she has expressed understanding. The patient has received an after-visit summary and questions were answered concerning future plans. I have discussed medication side effects and warnings with the patient as well. Electronically Signed: Dilan Carmona NP     History/Allergies   Patients past medical, surgical and family histories were reviewed and updated. Past Medical History:   Diagnosis Date    Abnormal Pap smear 2010    FU with colpo, negative per patient    Cancer [facility])     nodular lymphocytic hodgkins lymphoma    Chest pain     Depression 5/12/2015    Encounter for IUD insertion 3/5/15    Mirena    Encounter for IUD removal 06/24/2016    Essential hypertension     Hypertension     Implanon removal 9/6/2013    Nexplanon insertion 02/09/2017    Pap smear for cervical cancer screening 11/11/15    Negative    Trouble in sleeping       Past Surgical History:   Procedure Laterality Date    HX COLPOSCOPY  2010    per pt WNL    HX WISDOM TEETH EXTRACTION       Family History   Problem Relation Age of Onset    HIV/AIDS Mother     Hypertension Maternal Aunt     Hypertension Maternal Grandmother      Social History     Social History    Marital status:      Spouse name: N/A    Number of children: N/A    Years of education: N/A     Occupational History    Not on file.      Social History Main Topics    Smoking status: Never Smoker    Smokeless tobacco: Never Used    Alcohol use Yes      Comment: occassionally    Drug use: No    Sexual activity: Yes     Partners: Male     Birth control/ protection: Condom, Implant     Other Topics Concern    Not on file Social History Narrative         Allergies   Allergen Reactions    Clonidine (Pf) Vertigo       Disposition     Follow-up Disposition:  Return if symptoms worsen or fail to improve. Future Appointments  Date Time Provider Ann Stone   4/12/2018 9:30 AM MD NELDA RiveraENA CED            Current Medications after this visit     Current Outpatient Prescriptions   Medication Sig    fluconazole (DIFLUCAN) 150 mg tablet Take 1 Tab by mouth daily for 1 day. FDA advises cautious prescribing of oral fluconazole in pregnancy.  hydroCHLOROthiazide (MICROZIDE) 12.5 mg capsule Take 2 Caps by mouth daily.  metroNIDAZOLE (METROGEL) 0.75 % vaginal gel Insert 1 Applicator into vagina two (2) times a week for 32 doses.  ALPRAZolam (XANAX) 0.5 mg tablet Take 1 Tab by mouth as needed for Anxiety.  ibuprofen (MOTRIN) 800 mg tablet as needed.  Omeprazole delayed release (PRILOSEC D/R) 20 mg tablet Take 1 Tab by mouth daily.  Dhxfcqma3-Pwwgnr9-Taylp therm. 112.5 billion cell cap Take 1 Cap by mouth daily. No current facility-administered medications for this visit. There are no discontinued medications.

## 2018-03-30 NOTE — PATIENT INSTRUCTIONS
Candidiasis: Care Instructions  Your Care Instructions  Candidiasis (say \"gvl-frm-QV-uh-xu\") is a yeast infection. Yeast normally lives in your body. But it can cause problems if your body's defenses don't work as they should. Some medicines can increase your chance of getting a yeast infection. These include antibiotics, steroids, and cancer drugs. And some diseases like AIDS and diabetes can make you more likely to get yeast infections. There are different types of yeast infections. Edis Calero is a yeast infection in the mouth. It usually occurs in people with weak immune systems. It causes white patches inside the mouth and throat. Yeast infections of the skin usually occur in skin folds where the skin stays moist. They cause red, oozing patches on your skin. Babies can get these infections under the diaper. People who often wear gloves can get them on their hands. Many women get vaginal yeast infections. They are most common when women take antibiotics. These infections can cause the vagina to itch and burn. They also cause white discharge that looks like cottage cheese. In rare cases, yeast infects the blood. This can cause serious disease. This kind of infection is treated with medicine given through a needle into a vein (IV). After you start treatment, a yeast infection usually goes away quickly. But if your immune system is weak, the infection may come back. Tell your doctor if you get yeast infections often. Follow-up care is a key part of your treatment and safety. Be sure to make and go to all appointments, and call your doctor if you are having problems. It's also a good idea to know your test results and keep a list of the medicines you take. How can you care for yourself at home? · Take your medicines exactly as prescribed. Call your doctor if you think you are having a problem with your medicine. · Use antibiotics only as directed by your doctor. · Eat yogurt with live cultures.  It has bacteria called lactobacillus. It may help prevent some types of yeast infections. · Keep your skin clean and dry. Put powder on moist places. · If you are using a cream or suppository to treat a vaginal yeast infection, don't use condoms or a diaphragm. Use a different type of birth control. · Eat a healthy diet and get regular exercise. This will help keep your immune system strong. When should you call for help? Watch closely for changes in your health, and be sure to contact your doctor if:  ? · You do not get better as expected. Where can you learn more? Go to http://srini-jeny.info/. Enter D018 in the search box to learn more about \"Candidiasis: Care Instructions. \"  Current as of: October 13, 2016  Content Version: 11.4  © 7154-5880 [employer]. Care instructions adapted under license by [employer] (which disclaims liability or warranty for this information). If you have questions about a medical condition or this instruction, always ask your healthcare professional. [address] any warranty or liability for your use of this information.

## 2018-03-30 NOTE — MR AVS SNAPSHOT
[address] [address] 
[phone] Patient: Dixie Dong MRN: [id number] :1985 Visit Information Date & Time Provider Department Dept. Phone Encounter #  
 3/30/2018 10:00 AM Dominique Gallegos  [facility] [phone] [id number] Follow-up Instructions Return if symptoms worsen or fail to improve. Your Appointments 2018  9:30 AM  
ESTABLISHED PATIENT with Valerie Le MD  
[facility] at [address] [place] [address]) Appt Note: 4 mo fu, [place] [address] [facility], [address] [facility] [address]  
[phone]  
  
   
 [address] [facility], [address] [place] [address] Upcoming Health Maintenance Date Due Pneumococcal 19-64 Highest Risk (2 of 3 - PCV13) 12/15/2017 PAP AKA CERVICAL CYTOLOGY 2018 DTaP/Tdap/Td series (2 - Td) 2024 Allergies as of 3/30/2018  Review Complete On: 3/30/2018 By: Dominique Gallegos NP Severity Noted Reaction Type Reactions Clonidine (Pf)  2013   Intolerance Vertigo Current Immunizations  Never Reviewed Name Date Pneumococcal Polysaccharide (PPSV-23) 12/15/2016 Tdap 2014 12:26 PM  
  
 Not reviewed this visit You Were Diagnosed With   
  
 Codes Comments Yeast infection    -  Primary ICD-10-CM: B37.9 ICD-9-CM: 112.9 Vaginal yeast infection     ICD-10-CM: B37.3 ICD-9-CM: 112.1 Vitals BP Pulse Temp Resp Height(growth percentile) Weight(growth percentile) 131/84 63 98.8 °F (37.1 °C) (Oral) 16 5' 8\" (1.727 m) 231 lb (104.8 kg) SpO2 BMI OB Status Smoking Status 99% 35.12 kg/m2 Implant Never Smoker Vitals History BMI and BSA Data Body Mass Index Body Surface Area  
 35.12 kg/m 2 2.24 m 2 Preferred Pharmacy Pharmacy Name Phone [address] [place], [place] - [address] [phone] Your Updated Medication List  
  
   
This list is accurate as of 3/30/18 10:15 AM.  Always use your most recent med list.  
  
  
  
  
 ALPRAZolam 0.5 mg tablet Commonly known as:  Lugenia Mohs Take 1 Tab by mouth as needed for Anxiety. fluconazole 150 mg tablet Commonly known as:  DIFLUCAN Take 1 Tab by mouth daily for 1 day. FDA advises cautious prescribing of oral fluconazole in pregnancy. hydroCHLOROthiazide 12.5 mg capsule Commonly known as:  Alvin Kimmy Take 2 Caps by mouth daily. ibuprofen 800 mg tablet Commonly known as:  MOTRIN  
as needed. Oophajqg4-Feuqca2-Kxxor therm. 112.5 billion cell Cap Take 1 Cap by mouth daily. metroNIDAZOLE 0.75 % vaginal gel Commonly known as:  Eulis Miu Insert 1 Applicator into vagina two (2) times a week for 32 doses. Omeprazole delayed release 20 mg tablet Commonly known as:  PRILOSEC D/R Take 1 Tab by mouth daily. Prescriptions Sent to Pharmacy Refills  
 fluconazole (DIFLUCAN) 150 mg tablet 0 Sig: Take 1 Tab by mouth daily for 1 day. FDA advises cautious prescribing of oral fluconazole in pregnancy. Class: Normal  
 Pharmacy: [facility] #: [phone] Route: Oral  
  
Follow-up Instructions Return if symptoms worsen or fail to improve. Patient Instructions Candidiasis: Care Instructions Your Care Instructions Candidiasis (say \"twt-amm-KH-uh-xu\") is a yeast infection. Yeast normally lives in your body. But it can cause problems if your body's defenses don't work as they should. Some medicines can increase your chance of getting a yeast infection. These include antibiotics, steroids, and cancer drugs. And some diseases like AIDS and diabetes can make you more likely to get yeast infections. There are different types of yeast infections. Caroline Berger is a yeast infection in the mouth. It usually occurs in people with weak immune systems. It causes white patches inside the mouth and throat. Yeast infections of the skin usually occur in skin folds where the skin stays moist. They cause red, oozing patches on your skin. Babies can get these infections under the diaper. People who often wear gloves can get them on their hands. Many women get vaginal yeast infections. They are most common when women take antibiotics. These infections can cause the vagina to itch and burn. They also cause white discharge that looks like cottage cheese. In rare cases, yeast infects the blood. This can cause serious disease. This kind of infection is treated with medicine given through a needle into a vein (IV). After you start treatment, a yeast infection usually goes away quickly. But if your immune system is weak, the infection may come back. Tell your doctor if you get yeast infections often. Follow-up care is a key part of your treatment and safety. Be sure to make and go to all appointments, and call your doctor if you are having problems. It's also a good idea to know your test results and keep a list of the medicines you take. How can you care for yourself at home? · Take your medicines exactly as prescribed. Call your doctor if you think you are having a problem with your medicine. · Use antibiotics only as directed by your doctor. · Eat yogurt with live cultures. It has bacteria called lactobacillus. It may help prevent some types of yeast infections. · Keep your skin clean and dry. Put powder on moist places. · If you are using a cream or suppository to treat a vaginal yeast infection, don't use condoms or a diaphragm. Use a different type of birth control. · Eat a healthy diet and get regular exercise. This will help keep your immune system strong. When should you call for help?  
Watch closely for changes in your health, and be sure to contact your doctor if: 
? · You do not get better as expected. Where can you learn more? Go to http://srini-jeny.info/. Enter Q014 in the search box to learn more about \"Candidiasis: Care Instructions. \" Current as of: October 13, 2016 Content Version: 11.4 © 6039-2341 AgileMesh. Care instructions adapted under license by charming charlie (which disclaims liability or warranty for this information). If you have questions about a medical condition or this instruction, always ask your healthcare professional. [address] any warranty or liability for your use of this information. Introducing [facility] & HEALTH SERVICES! Dear Judi Gleason: Thank you for requesting a Agilyx account. Our records indicate that you already have an active Agilyx account. You can access your account anytime at [email or web address].. [email or web address] Inc. Did you know that you can access your hospital and ER discharge instructions at any time in Agilyx? You can also review all of your test results from your hospital stay or ER visit. Additional Information If you have questions, please visit the Frequently Asked Questions section of the Agilyx website at [email or web address] [email or web address]./. Remember, Agilyx is NOT to be used for urgent needs. For medical emergencies, dial 911. Now available from your iPhone and Android! Please provide this summary of care documentation to your next provider. Your primary care clinician is listed as Jessie Morris. If you have any questions after today's visit, please call [phone].

## 2018-04-03 ENCOUNTER — TELEPHONE (OUTPATIENT)
Dept: ONCOLOGY | Age: 33
End: 2018-04-03

## 2018-04-03 NOTE — TELEPHONE ENCOUNTER
[address] at [address] [address]  [phone]    04/03/18- Phone call placed to pt to remind pt to have labs drawn prior to her follow up appointment with .  left for patient.

## 2018-04-16 ENCOUNTER — OFFICE VISIT (OUTPATIENT)
Dept: FAMILY MEDICINE CLINIC | Age: 33
End: 2018-04-16

## 2018-04-16 VITALS
SYSTOLIC BLOOD PRESSURE: 124 MMHG | BODY MASS INDEX: 34.86 KG/M2 | HEART RATE: 79 BPM | HEIGHT: 68 IN | WEIGHT: 230 LBS | TEMPERATURE: 98.3 F | OXYGEN SATURATION: 99 % | DIASTOLIC BLOOD PRESSURE: 71 MMHG

## 2018-04-16 DIAGNOSIS — M54.41 ACUTE RIGHT-SIDED LOW BACK PAIN WITH RIGHT-SIDED SCIATICA: Primary | ICD-10-CM

## 2018-04-16 DIAGNOSIS — I10 ESSENTIAL HYPERTENSION: ICD-10-CM

## 2018-04-16 DIAGNOSIS — C81.01 NODULAR LYMPHOCYTE PREDOMINANT HODGKIN LYMPHOMA OF LYMPH NODES OF NECK (HCC): ICD-10-CM

## 2018-04-16 RX ORDER — CYCLOBENZAPRINE HCL 5 MG
5 TABLET ORAL
Qty: 30 TAB | Refills: 0 | Status: SHIPPED | OUTPATIENT
Start: 2018-04-16 | End: 2019-06-11

## 2018-04-16 NOTE — ASSESSMENT & PLAN NOTE
This condition is managed by Specialist.  Key Oncology Meds     Patient is on no Oncologic meds. Key Pain Meds             ibuprofen (MOTRIN) 800 mg tablet as needed. Lab Results   Component Value Date/Time    WBC 4.5 12/12/2017 04:40 PM    ABS.  NEUTROPHILS 2.4 12/12/2017 04:40 PM    HGB 13.2 12/12/2017 04:40 PM    HCT 36.8 12/12/2017 04:40 PM    PLATELET 046 62/25/2134 04:40 PM    Creatinine 0.83 12/12/2017 04:40 PM    BUN 8 12/12/2017 04:40 PM    ALT (SGPT) 17 12/12/2017 04:40 PM    AST (SGOT) 20 12/12/2017 04:40 PM    Albumin 4.3 12/12/2017 04:40 PM

## 2018-04-16 NOTE — MR AVS SNAPSHOT
[address] [address] 
[phone] Patient: Rito January MRN: [id number] :1985 Visit Information Date & Time Provider Department Dept. Phone Encounter #  
 2018 10:40 AM Taj Templeton MD [address] [place] [address] [id number] Follow-up Instructions Return in about 3 months (around 2018) for Routine. Your Appointments 2018  8:30 AM  
ESTABLISHED PATIENT with Linda Peters MD  
[facility] at [facility]) Appt Note: 4 mo Marilyn tovar; 4 mo Marilyn tovar [address] [place] [address], [address] [place] [address]  
[phone]  
  
   
 [address] [place] [address], [address] [place] [address] Upcoming Health Maintenance Date Due Pneumococcal 19-64 Highest Risk (2 of 3 - PCV13) 12/15/2017 PAP AKA CERVICAL CYTOLOGY 2018 DTaP/Tdap/Td series (2 - Td) 2024 Allergies as of 2018  Review Complete On: 2018 By: Derek Morris LPN Severity Noted Reaction Type Reactions Clonidine (Pf)  2013   Intolerance Vertigo Current Immunizations  Never Reviewed Name Date Pneumococcal Polysaccharide (PPSV-23) 12/15/2016 Tdap 2014 12:26 PM  
  
 Not reviewed this visit You Were Diagnosed With   
  
 Codes Comments Acute right-sided low back pain with right-sided sciatica    -  Primary ICD-10-CM: M54.41 
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 
ICD-9-CM: 202.41 Essential hypertension     ICD-10-CM: I10 
ICD-9-CM: 401.9 Vitals BP Pulse Temp Height(growth percentile) Weight(growth percentile) SpO2  
 124/71 (BP 1 Location: Right arm, BP Patient Position: Sitting) 79 98.3 °F (36.8 °C) (Oral) 5' 8\" (1.727 m) 230 lb (104.3 kg) 99% BMI OB Status Smoking Status 34.97 kg/m2 Implant Never Smoker Vitals History BMI and BSA Data Body Mass Index Body Surface Area 34.97 kg/m 2 2.24 m 2 Preferred Pharmacy Pharmacy Name Phone [address] [facility] [phone] Your Updated Medication List  
  
   
This list is accurate as of 4/16/18 11:06 AM.  Always use your most recent med list.  
  
  
  
  
 ALPRAZolam 0.5 mg tablet Commonly known as:  Donata Carton Take 1 Tab by mouth as needed for Anxiety. cyclobenzaprine 5 mg tablet Commonly known as:  FLEXERIL Take 1 Tab by mouth nightly as needed for Muscle Spasm(s). hydroCHLOROthiazide 12.5 mg capsule Commonly known as:  Daniella Blazing Take 2 Caps by mouth daily. ibuprofen 800 mg tablet Commonly known as:  MOTRIN  
as needed. Nlzrdxyg1-Wjdfwh6-Ujnag therm. 112.5 billion cell Cap Take 1 Cap by mouth daily. Omeprazole delayed release 20 mg tablet Commonly known as:  PRILOSEC D/R Take 1 Tab by mouth daily. Prescriptions Sent to Pharmacy Refills  
 cyclobenzaprine (FLEXERIL) 5 mg tablet 0 Sig: Take 1 Tab by mouth nightly as needed for Muscle Spasm(s). Class: Normal  
 Pharmacy: [address] [facility] #: [phone] Route: Oral  
  
Follow-up Instructions Return in about 3 months (around 7/16/2018) for Routine. Patient Instructions Back Stretches: Exercises Your Care Instructions Here are some examples of exercises for stretching your back. Start each exercise slowly. Ease off the exercise if you start to have pain. Your doctor or physical therapist will tell you when you can start these exercises and which ones will work best for you. How to do the exercises Overhead stretch 1. Stand comfortably with your feet shoulder-width apart.  
2. Looking straight ahead, raise both arms over your head and reach toward the ceiling. Do not allow your head to tilt back. 3. Hold for 15 to 30 seconds, then lower your arms to your sides. 4. Repeat 2 to 4 times. Side stretch 1. Stand comfortably with your feet shoulder-width apart. 2. Raise one arm over your head, and then lean to the other side. 3. Slide your hand down your leg as you let the weight of your arm gently stretch your side muscles. Hold for 15 to 30 seconds. 4. Repeat 2 to 4 times on each side. Press-up 1. Lie on your stomach, supporting your body with your forearms. 2. Press your elbows down into the floor to raise your upper back. As you do this, relax your stomach muscles and allow your back to arch without using your back muscles. As your press up, do not let your hips or pelvis come off the floor. 3. Hold for 15 to 30 seconds, then relax. 4. Repeat 2 to 4 times. Relax and rest 
 
1. Lie on your back with a rolled towel under your neck and a pillow under your knees. Extend your arms comfortably to your sides. 2. Relax and breathe normally. 3. Remain in this position for about 10 minutes. 4. If you can, do this 2 or 3 times each day. Follow-up care is a key part of your treatment and safety. Be sure to make and go to all appointments, and call your doctor if you are having problems. It's also a good idea to know your test results and keep a list of the medicines you take. Where can you learn more? Go to http://srini-jeny.info/. Enter O066 in the search box to learn more about \"Back Stretches: Exercises. \" Current as of: March 21, 2017 Content Version: 11.4 © 7200-4654 [employer]UX[employer]. Care instructions adapted under license by Netview Technologies (which disclaims liability or warranty for this information). If you have questions about a medical condition or this instruction, always ask your healthcare professional. [address] any warranty or liability for your use of this information. Sciatica: Exercises Your Care Instructions Here are some examples of typical rehabilitation exercises for your condition. Start each exercise slowly. Ease off the exercise if you start to have pain. Your doctor or physical therapist will tell you when you can start these exercises and which ones will work best for you. When you are not being active, find a comfortable position for rest. Some people are comfortable on the floor or a medium-firm bed with a small pillow under their head and another under their knees. Some people prefer to lie on their side with a pillow between their knees. Don't stay in one position for too long. Take short walks (10 to 20 minutes) every 2 to 3 hours. Avoid slopes, hills, and stairs until you feel better. Walk only distances you can manage without pain, especially leg pain. How to do the exercises Back stretches 5. Get down on your hands and knees on the floor. 6. Relax your head and allow it to droop. Round your back up toward the ceiling until you feel a nice stretch in your upper, middle, and lower back. Hold this stretch for as long as it feels comfortable, or about 15 to 30 seconds. 7. Return to the starting position with a flat back while you are on your hands and knees. 8. Let your back sway by pressing your stomach toward the floor. Lift your buttocks toward the ceiling. 9. Hold this position for 15 to 30 seconds. 10. Repeat 2 to 4 times. Follow-up care is a key part of your treatment and safety. Be sure to make and go to all appointments, and call your doctor if you are having problems. It's also a good idea to know your test results and keep a list of the medicines you take. Where can you learn more? Go to http://srini-jeny.info/. Enter R303 in the search box to learn more about \"Sciatica: Exercises. \" Current as of: March 21, 2017 Content Version: 11.4 © 2941-6333 Healthwise, Incorporated.  Care instructions adapted under license by [address] (which disclaims liability or warranty for this information). If you have questions about a medical condition or this instruction, always ask your healthcare professional. [address] any warranty or liability for your use of this information. Introducing [facility] & HEALTH SERVICES! Dear Jazlyn Nash: Thank you for requesting a [email or web address] account. Our records indicate that you already have an active [email or web address] account. You can access your account anytime at [email or web address] Max Did you know that you can access your hospital and ER discharge instructions at any time in [email or web address]? You can also review all of your test results from your hospital stay or ER visit. Additional Information If you have questions, please visit the Frequently Asked Questions section of the [email or web address] website at [email or web address] Ou Sai [email or web address] [email or web address]/. Remember, [email or web address] is NOT to be used for urgent needs. For medical emergencies, dial 911. Now available from your iPhone and Android! Please provide this summary of care documentation to your next provider. Your primary care clinician is listed as Vish Sandra. If you have any questions after today's visit, please call [phone].

## 2018-04-16 NOTE — PATIENT INSTRUCTIONS
Back Stretches: Exercises  Your Care Instructions  Here are some examples of exercises for stretching your back. Start each exercise slowly. Ease off the exercise if you start to have pain. Your doctor or physical therapist will tell you when you can start these exercises and which ones will work best for you. How to do the exercises  Overhead stretch    1. Stand comfortably with your feet shoulder-width apart. 2. Looking straight ahead, raise both arms over your head and reach toward the ceiling. Do not allow your head to tilt back. 3. Hold for 15 to 30 seconds, then lower your arms to your sides. 4. Repeat 2 to 4 times. Side stretch    1. Stand comfortably with your feet shoulder-width apart. 2. Raise one arm over your head, and then lean to the other side. 3. Slide your hand down your leg as you let the weight of your arm gently stretch your side muscles. Hold for 15 to 30 seconds. 4. Repeat 2 to 4 times on each side. Press-up    1. Lie on your stomach, supporting your body with your forearms. 2. Press your elbows down into the floor to raise your upper back. As you do this, relax your stomach muscles and allow your back to arch without using your back muscles. As your press up, do not let your hips or pelvis come off the floor. 3. Hold for 15 to 30 seconds, then relax. 4. Repeat 2 to 4 times. Relax and rest    1. Lie on your back with a rolled towel under your neck and a pillow under your knees. Extend your arms comfortably to your sides. 2. Relax and breathe normally. 3. Remain in this position for about 10 minutes. 4. If you can, do this 2 or 3 times each day. Follow-up care is a key part of your treatment and safety. Be sure to make and go to all appointments, and call your doctor if you are having problems. It's also a good idea to know your test results and keep a list of the medicines you take. Where can you learn more? Go to http://srini-jeny.info/.   Enter J072 in the search box to learn more about \"Back Stretches: Exercises. \"  Current as of: March 21, 2017  Content Version: 11.4  © 6696-1487 Veveo. Care instructions adapted under license by Rypple (which disclaims liability or warranty for this information). If you have questions about a medical condition or this instruction, always ask your healthcare professional. [address] any warranty or liability for your use of this information. Sciatica: Exercises  Your Care Instructions  Here are some examples of typical rehabilitation exercises for your condition. Start each exercise slowly. Ease off the exercise if you start to have pain. Your doctor or physical therapist will tell you when you can start these exercises and which ones will work best for you. When you are not being active, find a comfortable position for rest. Some people are comfortable on the floor or a medium-firm bed with a small pillow under their head and another under their knees. Some people prefer to lie on their side with a pillow between their knees. Don't stay in one position for too long. Take short walks (10 to 20 minutes) every 2 to 3 hours. Avoid slopes, hills, and stairs until you feel better. Walk only distances you can manage without pain, especially leg pain. How to do the exercises  Back stretches    5. Get down on your hands and knees on the floor. 6. Relax your head and allow it to droop. Round your back up toward the ceiling until you feel a nice stretch in your upper, middle, and lower back. Hold this stretch for as long as it feels comfortable, or about 15 to 30 seconds. 7. Return to the starting position with a flat back while you are on your hands and knees. 8. Let your back sway by pressing your stomach toward the floor. Lift your buttocks toward the ceiling. 9. Hold this position for 15 to 30 seconds. 10. Repeat 2 to 4 times.   Follow-up care is a key part of your treatment and safety. Be sure to make and go to all appointments, and call your doctor if you are having problems. It's also a good idea to know your test results and keep a list of the medicines you take. Where can you learn more? Go to http://srini-jeny.info/. Enter Q068 in the search box to learn more about \"Sciatica: Exercises. \"  Current as of: March 21, 2017  Content Version: 11.4  © 3921-5265 Healthwise, Tickade. Care instructions adapted under license by NextSpace (which disclaims liability or warranty for this information). If you have questions about a medical condition or this instruction, always ask your healthcare professional. [address] any warranty or liability for your use of this information.

## 2018-04-16 NOTE — PROGRESS NOTES
Blessing Amanda  34 y.o. female  1985  [address]85 [address] [place] [address],[address]  [address] [address]  [id number]     [id number]: Progress Note       Encounter Date: 4/16/2018    Chief Complaint   Patient presents with    Back Pain       History provided by patient  History of Present Illness   Blessing Amanda is a 34 y.o. female who presents to clinic today for:    Back spasm  Patient present with cc of back spasm x 1 day. Patient reports history of back pain with sciatic nerve involvement. Pain is located in the middle of her lower back and radiating down the right leg. Pain worsens with prolonged sitting. Patient has taken Aleve for the pain with moderate relief. Denies numbness or weakness in the leg    Hypertension: Controlled   BP Readings from Last 3 Encounters:   04/16/18 124/71   03/30/18 131/84   02/02/18 112/80     The patient reports:  taking medications as instructed, no medication side effects noted, no TIA's, no chest pain on exertion, no dyspnea on exertion, no swelling of ankles.      Sodium   Date Value Ref Range Status   12/12/2017 141 134 - 144 mmol/L Final     Potassium   Date Value Ref Range Status   12/12/2017 3.5 3.5 - 5.2 mmol/L Final     Chloride   Date Value Ref Range Status   12/12/2017 99 96 - 106 mmol/L Final     Creatinine   Date Value Ref Range Status   12/12/2017 0.83 0.57 - 1.00 mg/dL Final   08/28/2017 0.74 0.57 - 1.00 mg/dL Final   05/17/2017 0.83 0.55 - 1.02 MG/DL Final     GFR est AA   Date Value Ref Range Status   12/12/2017 109 >59 mL/min/1.73 Final   08/28/2017 126 >59 mL/min/1.73 Final   05/17/2017 >60 >60 ml/min/1.73m2 Final     GFR est non-AA   Date Value Ref Range Status   12/12/2017 95 >59 mL/min/1.73 Final   08/28/2017 109 >59 mL/min/1.73 Final   05/17/2017 >60 >60 ml/min/1.73m2 Final     Comment:     Estimated GFR is calculated using the IDMS-traceable Modification of Diet in Renal Disease (MDRD) Study equation, reported for both  Americans (GFRAA) and non- Americans (GFRNA), and normalized to 1.73m2 body surface area. The physician must decide which value applies to the patient. The MDRD study equation should only be used in individuals age 25 or older. It has not been validated for the following: pregnant women, patients with serious comorbid conditions, or on certain medications, or persons with extremes of body size, muscle mass, or nutritional status. Our goal is to normalize the blood pressure to decrease the risks of strokes and heart attacks. The patient is in agreement with the plan. Review of Systems   Review of Systems   Constitutional: Negative for chills and fever. Genitourinary: Negative for dysuria and urgency. Musculoskeletal: Positive for back pain. Negative for falls, myalgias and neck pain. Skin: Negative for itching and rash. Neurological: Negative for dizziness, tingling, tremors, focal weakness and headaches. Vitals/Objective:     Vitals:    04/16/18 1052   BP: 124/71   Pulse: 79   Temp: 98.3 °F (36.8 °C)   TempSrc: Oral   SpO2: 99%   Weight: 230 lb (104.3 kg)   Height: 5' 8\" (1.727 m)     Body mass index is 34.97 kg/(m^2). Wt Readings from Last 3 Encounters:   04/16/18 230 lb (104.3 kg)   03/30/18 231 lb (104.8 kg)   02/02/18 230 lb 3.2 oz (104.4 kg)       Physical Exam   Constitutional: She appears well-developed and well-nourished. HENT:   Head: Normocephalic and atraumatic. Cardiovascular: Normal rate and regular rhythm. No murmur heard.   Pulmonary/Chest: Effort normal and breath sounds normal.   .MSK:    Posture: Normal   Deformity: None    ROM:     Flexion: Normal    Extension: Normal     Lateral bending: Normal      Gait: Normal       Palpation:    L1-L5: No tenderness    Sacrum: No tenderness    Coccyx: No tenderness    Left Paraspinal: No tenderness    Right Paraspinal: No tenderness     Strength (0-5/5)    Hip Flexion:   Left: 5/5  Right: 5/5    Hip Extension:  Left: 5/5  Right: 5/5    Hip Abduction:  Left: 5/5  Right: 5/5    Hip Adduction:  Left: 5/5  Right: 5/5    Knee Extension:  Left: 5/5  Right: 5/5    Knee Flexion:   Left: 5/5  Right: 5/5    Ankle dorsiflexion:  Left: 5/5  Right: 5/5    Ankle plantarflexion:  Left: 5/5  Right: 5/5    Great toe extension:  Left: 5/5  Right: 5/5     Sensation: Intact, no deficits      DTR:    Patella:  Left: +2  Right: +2    Achilles:  Left: +2  Right: +2     Special test:    Straight leg: Left: Negative  Right: Negative    Jahs: Left: Negative  Right: Negative    Piriformis: Left: Negative  Right: Negative          No results found for this or any previous visit (from the past 24 hour(s)). Assessment and Plan:     Encounter Diagnoses     ICD-10-CM ICD-9-CM   1. Acute right-sided low back pain with right-sided sciatica M54.41 724.2     724.3   2. 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 Essential hypertension I10 401.9       1. Acute right-sided low back pain with right-sided sciatica  Advised continue NSAIDs, muscle relaxants and exercises. - cyclobenzaprine (FLEXERIL) 5 mg tablet; Take 1 Tab by mouth nightly as needed for Muscle Spasm(s). Dispense: 30 Tab; Refill: 0    2. 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 Essential hypertension  Well controlled on current medications. I have discussed the diagnosis with the patient and the intended plan as seen in the above orders. she has expressed understanding. The patient has received an after-visit summary and questions were answered concerning future plans. I have discussed medication side effects and warnings with the patient as well. Electronically Signed: Darrel Willis MD     History/Allergies   Patients past medical, surgical and family histories were reviewed and updated.     Past Medical History:   Diagnosis Date    Abnormal Pap smear 2010    FU with colpo, negative per patient    Cancer [facility])     nodular lymphocytic hodgkins lymphoma    Chest pain     KAMERON I (cervical intraepithelial neoplasia I) 9/2/2011    Depression 5/12/2015    Encounter for IUD insertion 3/5/15    Mirena    Encounter for IUD removal 06/24/2016    Essential hypertension     Hypertension     Implanon removal 9/6/2013    Nexplanon insertion 02/09/2017    Pap smear for cervical cancer screening 11/11/15    Negative    Trouble in sleeping       Past Surgical History:   Procedure Laterality Date    HX COLPOSCOPY  2010    per pt WNL    HX WISDOM TEETH EXTRACTION       Family History   Problem Relation Age of Onset    HIV/AIDS Mother     Hypertension Maternal Aunt     Hypertension Maternal Grandmother      Social History     Social History    Marital status:      Spouse name: N/A    Number of children: N/A    Years of education: N/A     Occupational History    Not on file. Social History Main Topics    Smoking status: Never Smoker    Smokeless tobacco: Never Used    Alcohol use Yes      Comment: occassionally    Drug use: No    Sexual activity: Yes     Partners: Male     Birth control/ protection: Condom, Implant     Other Topics Concern    Not on file     Social History Narrative         Allergies   Allergen Reactions    Clonidine (Pf) Vertigo       Disposition     Follow-up Disposition:  Return in about 3 months (around 7/16/2018) for Routine. Future Appointments  Date Time Provider Ann Stone   5/9/2018 8:30 AM Juan Balderas MD [facility]            Current Medications after this visit     Current Outpatient Prescriptions   Medication Sig    cyclobenzaprine (FLEXERIL) 5 mg tablet Take 1 Tab by mouth nightly as needed for Muscle Spasm(s).  hydroCHLOROthiazide (MICROZIDE) 12.5 mg capsule Take 2 Caps by mouth daily.  ibuprofen (MOTRIN) 800 mg tablet as needed.     Omeprazole delayed release (PRILOSEC D/R) 20 mg tablet Take 1 Tab by mouth daily.    Logxrtqd9-Jwdogw6-Ailiu therm. 112.5 billion cell cap Take 1 Cap by mouth daily.  ALPRAZolam (XANAX) 0.5 mg tablet Take 1 Tab by mouth as needed for Anxiety. No current facility-administered medications for this visit.       Medications Discontinued During This Encounter   Medication Reason    metroNIDAZOLE (METROGEL) 0.75 % vaginal gel Therapy Completed

## 2018-04-16 NOTE — PROGRESS NOTES
1. Have you been to the ER, urgent care clinic since your last visit? Hospitalized since your last visit? No    2. Have you seen or consulted any other health care providers outside of the [facility] since your last visit? Include any pap smears or colon screening.  No  Reviewed record in preparation for visit and have necessary documentation  Pt did not bring medication to office visit for review    Goals that were addressed and/or need to be completed during or after this appointment include   Health Maintenance Due   Topic Date Due    Pneumococcal 19-64 Highest Risk (2 of 3 - PCV13) 12/15/2017

## 2018-04-16 NOTE — LETTER
NOTIFICATION RETURN TO WORK / SCHOOL 
 
4/16/2018 11:07 AM 
 
Ms. Aleksandra Mejia 62 [phone] [address] To Whom It May Concern: 
 
Aleksandra Porras is currently under the care of Marilia Quijano. She will return to work/school on: 4/17/2018 If there are questions or concerns please have the patient contact our office.  
 
 
 
Sincerely, 
 
 
Donal Villanueva MD

## 2018-05-02 ENCOUNTER — TELEPHONE (OUTPATIENT)
Dept: ONCOLOGY | Age: 33
End: 2018-05-02

## 2018-05-02 NOTE — TELEPHONE ENCOUNTER
[address] at [facility]  [phone]    05/02/18- Phone call placed to pt to remind pt to have labs drawn prior to her follow up appointment with .  left for patient.

## 2018-05-08 LAB
ALBUMIN SERPL-MCNC: 4.6 G/DL (ref 3.5–5.5)
ALBUMIN/GLOB SERPL: 1.9 {RATIO} (ref 1.2–2.2)
ALP SERPL-CCNC: 82 IU/L (ref 39–117)
ALT SERPL-CCNC: 17 IU/L (ref 0–32)
AST SERPL-CCNC: 21 IU/L (ref 0–40)
BASOPHILS # BLD AUTO: 0 X10E3/UL (ref 0–0.2)
BASOPHILS NFR BLD AUTO: 1 %
BILIRUB SERPL-MCNC: 0.2 MG/DL (ref 0–1.2)
BUN SERPL-MCNC: 12 MG/DL (ref 6–20)
BUN/CREAT SERPL: 15 (ref 9–23)
CALCIUM SERPL-MCNC: 9.7 MG/DL (ref 8.7–10.2)
CHLORIDE SERPL-SCNC: 102 MMOL/L (ref 96–106)
CO2 SERPL-SCNC: 24 MMOL/L (ref 18–29)
CREAT SERPL-MCNC: 0.82 MG/DL (ref 0.57–1)
EOSINOPHIL # BLD AUTO: 0.1 X10E3/UL (ref 0–0.4)
EOSINOPHIL NFR BLD AUTO: 1 %
ERYTHROCYTE [DISTWIDTH] IN BLOOD BY AUTOMATED COUNT: 12.9 % (ref 12.3–15.4)
ERYTHROCYTE [SEDIMENTATION RATE] IN BLOOD BY WESTERGREN METHOD: 5 MM/HR (ref 0–32)
GFR SERPLBLD CREATININE-BSD FMLA CKD-EPI: 110 ML/MIN/1.73
GFR SERPLBLD CREATININE-BSD FMLA CKD-EPI: 96 ML/MIN/1.73
GLOBULIN SER CALC-MCNC: 2.4 G/DL (ref 1.5–4.5)
GLUCOSE SERPL-MCNC: 73 MG/DL (ref 65–99)
HCT VFR BLD AUTO: 37.2 % (ref 34–46.6)
HGB BLD-MCNC: 13.2 G/DL (ref 11.1–15.9)
IMM GRANULOCYTES # BLD: 0 X10E3/UL (ref 0–0.1)
IMM GRANULOCYTES NFR BLD: 0 %
LYMPHOCYTES # BLD AUTO: 1.2 X10E3/UL (ref 0.7–3.1)
LYMPHOCYTES NFR BLD AUTO: 24 %
MCH RBC QN AUTO: 30.3 PG (ref 26.6–33)
MCHC RBC AUTO-ENTMCNC: 35.5 G/DL (ref 31.5–35.7)
MCV RBC AUTO: 85 FL (ref 79–97)
MONOCYTES # BLD AUTO: 0.4 X10E3/UL (ref 0.1–0.9)
MONOCYTES NFR BLD AUTO: 8 %
NEUTROPHILS # BLD AUTO: 3.4 X10E3/UL (ref 1.4–7)
NEUTROPHILS NFR BLD AUTO: 66 %
PLATELET # BLD AUTO: 323 X10E3/UL (ref 150–379)
POTASSIUM SERPL-SCNC: 3.4 MMOL/L (ref 3.5–5.2)
PROT SERPL-MCNC: 7 G/DL (ref 6–8.5)
RBC # BLD AUTO: 4.36 X10E6/UL (ref 3.77–5.28)
SODIUM SERPL-SCNC: 144 MMOL/L (ref 134–144)
WBC # BLD AUTO: 5.1 X10E3/UL (ref 3.4–10.8)

## 2018-05-09 ENCOUNTER — OFFICE VISIT (OUTPATIENT)
Dept: ONCOLOGY | Age: 33
End: 2018-05-09

## 2018-05-09 VITALS
DIASTOLIC BLOOD PRESSURE: 67 MMHG | RESPIRATION RATE: 20 BRPM | WEIGHT: 225 LBS | OXYGEN SATURATION: 99 % | BODY MASS INDEX: 34.1 KG/M2 | SYSTOLIC BLOOD PRESSURE: 123 MMHG | HEIGHT: 68 IN | TEMPERATURE: 97 F | HEART RATE: 67 BPM

## 2018-05-09 DIAGNOSIS — I10 ESSENTIAL HYPERTENSION: ICD-10-CM

## 2018-05-09 DIAGNOSIS — C81.01 NODULAR LYMPHOCYTE PREDOMINANT HODGKIN LYMPHOMA OF LYMPH NODES OF NECK (HCC): Primary | ICD-10-CM

## 2018-05-09 RX ORDER — METRONIDAZOLE 7.5 MG/G
GEL VAGINAL
COMMUNITY
Start: 2018-03-05 | End: 2018-08-27 | Stop reason: ALTCHOICE

## 2018-05-09 NOTE — PROGRESS NOTES
[facility] [place] [facility] [place] [address]  [address] [facility], [address] [facility] [address] [facility]  W: [phone]  F: [phone]      Reason for Visit:   Nixon Iqbal is a 34 y.o. female who is seen for follow up of lymphoma. Treatment History:   · US neck 5/26/2016: 2.5cm right posterior auricular mass  · US guided biopsy cervical node 6/30/2016: suspicious for large b-cell lymphoma  · CT Neck/C/A/P 7/25/2016: Enlarged right level 5 lymph node measuring 2 x 1 cm and slightly inferior to this there is a slightly enlarged lymph node measuring 1.2 x 0.8 cm. There is a slightly enlarged right level 2 lymph node measuring 1.3 x 0.9 cm. There is a slightly enlarged left level 2 lymph node measuring 1.7 x 1.1 cm. Lymph nodes more inferior at level 3 and level 4 are  normal in size. No supraclavicular adenopathy is identified. No adenopathy in chest, abdomen, or pelvis  · PET-CT 7/27/2016: No areas of hypermetabolic activity  · Excisional biopsy of right level 2 cervical node by Dr. 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 Jaycee Zambrano completed 10/10/2016    History of Present Illness:   She is doing well today. She did have the flu over the winter, missed some work, but bounced back to baseline. No fevers, chills, night sweats, unintentional weight loss, adenopathy. Pelvic pain has resolved. Constipation better, no longer taking anything for this. PAST HISTORY: The following sections were reviewed and updated in the EMR as appropriate: PMH, SH, FH, Medications, Allergies. Allergies   Allergen Reactions    Clonidine (Pf) Vertigo      Review of Systems: A complete review of systems was obtained, reviewed, and scanned into the EMR. Pertinent findings reviewed above.     Physical Exam:     Visit Vitals    /67 (BP 1 Location: Right arm, BP Patient Position: Sitting)  Pulse 67    Temp 97 °F (36.1 °C) (Oral)    Resp 20    Ht 5' 8\" (1.727 m)    Wt 225 lb (102.1 kg)    SpO2 99%    BMI 34.21 kg/m2     ECOG PS: 0  General: No distress  Eyes: PERRLA, anicteric sclerae  HENT: Atraumatic, OP clear  Neck: Supple  Lymphatic: No palpable cervical, supraclavicular, or inguinal adenopathy. Respiratory: CTAB, normal respiratory effort  CV: Normal rate, regular rhythm, no murmurs, no peripheral edema  GI: Soft, nontender, nondistended, no masses, no hepatomegaly, no splenomegaly  MS: Normal gait and station. Digits without clubbing or cyanosis. Skin: No rashes, ecchymoses, or petechiae. Normal temperature, turgor, and texture. Psych: Alert, oriented, appropriate affect, normal judgment/insight    Results:     Lab Results   Component Value Date/Time    WBC 5.1 05/07/2018 12:34 PM    HGB 13.2 05/07/2018 12:34 PM    HCT 37.2 05/07/2018 12:34 PM    PLATELET 788 36/52/7576 12:34 PM    MCV 85 05/07/2018 12:34 PM    ABS. NEUTROPHILS 3.4 05/07/2018 12:34 PM    Hgb, External 12.3 02/07/2014    Hct, External 35.6 02/07/2014    Platelet cnt., External 272 02/07/2014     Lab Results   Component Value Date/Time    Sodium 144 05/07/2018 12:34 PM    Potassium 3.4 (L) 05/07/2018 12:34 PM    Chloride 102 05/07/2018 12:34 PM    CO2 24 05/07/2018 12:34 PM    Glucose 73 05/07/2018 12:34 PM    BUN 12 05/07/2018 12:34 PM    Creatinine 0.82 05/07/2018 12:34 PM    GFR est  05/07/2018 12:34 PM    GFR est non-AA 96 05/07/2018 12:34 PM    Calcium 9.7 05/07/2018 12:34 PM    Glucose POC 76 11/03/2017 01:35 PM     Lab Results   Component Value Date/Time    Bilirubin, total 0.2 05/07/2018 12:34 PM    ALT (SGPT) 17 05/07/2018 12:34 PM    AST (SGOT) 21 05/07/2018 12:34 PM    Alk.  phosphatase 82 05/07/2018 12:34 PM    Protein, total 7.0 05/07/2018 12:34 PM    Albumin 4.6 05/07/2018 12:34 PM    Globulin 3.4 05/17/2017 11:56 AM     Sed rate (ESR) (mm/hr)   Date Value   05/07/2018 5   12/12/2017 8   08/28/2017 3 02/13/2017 6     Sed rate, automated (mm/hr)   Date Value   05/17/2017 12   11/11/2016 34 (H)   08/10/2016 44 (H)         CT N/C/A/P 12/4/2017: no evidence of recurrence    Assessment:   1) Nodular lymphocyte predominant Hodgkin Lymphoma  Stage IIA  She is s/p radiation with Dr. Cathleen Goldberg completed 10/2016, with resolution of cervical adenopathy on post-treatment CT scan. She is now in remission. She continues feeling well and is without evidence of disease recurrence at this time, based on history and exam. We will continue to follow with surveillance. Follow up after completion of therapy for Hodgkin Lymphoma, per NCCN:  · Year 1&2: H&P every 3-6 months  · Year 3: H&P every 6-12 months  · Year 4+: H&P every 12 months  · CBC, CMP, ESR as indicated  · TSH annually if RT to neck  · CT at 6, 12, and 24 months, or as clinically indicated  · Annual influenza vaccine    I have cautioned her to ensure she is not pregnant at the time of her surveillance CT scan. She is currently on birth control.     2) Pelvic pain, irregular menses  Resolved    3) Constipation  Resolved    Plan:     · Labs in 4 months: CBC, CMP, ESR ([facility])   · CT N/C/A/P due 12/2018  · Return to clinic in 4 months      Signed By: Jose Antonio Alvarez MD

## 2018-05-10 RX ORDER — HYDROCHLOROTHIAZIDE 12.5 MG/1
CAPSULE ORAL
Qty: 90 CAP | Refills: 0 | OUTPATIENT
Start: 2018-05-10

## 2018-05-10 RX ORDER — HYDROCHLOROTHIAZIDE 25 MG/1
25 TABLET ORAL DAILY
Qty: 90 TAB | Refills: 1 | Status: SHIPPED | OUTPATIENT
Start: 2018-05-10 | End: 2018-09-27 | Stop reason: SDUPTHER

## 2018-06-05 ENCOUNTER — OFFICE VISIT (OUTPATIENT)
Dept: FAMILY MEDICINE CLINIC | Age: 33
End: 2018-06-05

## 2018-06-05 VITALS
DIASTOLIC BLOOD PRESSURE: 95 MMHG | HEART RATE: 87 BPM | RESPIRATION RATE: 20 BRPM | SYSTOLIC BLOOD PRESSURE: 142 MMHG | TEMPERATURE: 98.6 F | HEIGHT: 68 IN | OXYGEN SATURATION: 98 % | WEIGHT: 226.2 LBS | BODY MASS INDEX: 34.28 KG/M2

## 2018-06-05 DIAGNOSIS — N94.10 DYSPAREUNIA IN FEMALE: ICD-10-CM

## 2018-06-05 DIAGNOSIS — N89.8 VAGINAL IRRITATION: Primary | ICD-10-CM

## 2018-06-05 DIAGNOSIS — B37.31 VAGINAL YEAST INFECTION: ICD-10-CM

## 2018-06-05 RX ORDER — FLUCONAZOLE 150 MG/1
150 TABLET ORAL DAILY
Qty: 1 TAB | Refills: 0 | Status: SHIPPED | OUTPATIENT
Start: 2018-06-05 | End: 2018-06-06

## 2018-06-05 NOTE — MR AVS SNAPSHOT
[address] [place] [address] 
[phone] Patient: Hari Menendez MRN: [id number] :1985 Visit Information Date & Time Provider Department Dept. Phone Encounter #  
 2018  2:10 PM Miguel Parker MD [address] [place] [address] [id number] Follow-up Instructions Return in about 4 weeks (around 7/3/2018) for Routine (blood pressure). Your Appointments 2018  9:30 AM  
ESTABLISHED PATIENT with Samantha Carrasco MD  
[facility] [address] [place] [address]) Appt Note: Marilyn, surya . [facility], [address] [facility] [address] [address]  
[phone]  
  
   
 [address] [facility], [address] [place] [address] Upcoming Health Maintenance Date Due Pneumococcal 19-64 Highest Risk (2 of 3 - PCV13) 12/15/2017 Influenza Age 5 to Adult 2018 PAP AKA CERVICAL CYTOLOGY 2018 DTaP/Tdap/Td series (2 - Td) 2024 Allergies as of 2018  Review Complete On: 2018 By: Miguel Parker MD  
  
 Severity Noted Reaction Type Reactions Clonidine (Pf)  2013   Intolerance Vertigo Current Immunizations  Never Reviewed Name Date Pneumococcal Polysaccharide (PPSV-23) 12/15/2016 Tdap 2014 12:26 PM  
  
 Not reviewed this visit You Were Diagnosed With   
  
 Codes Comments Vaginal irritation    -  Primary ICD-10-CM: A20.9 ICD-9-CM: 623.9 Dyspareunia in female     ICD-10-CM: N94.10 ICD-9-CM: 625.0 Vitals BP Pulse Temp Resp Height(growth percentile) Weight(growth percentile) (!) 142/95 87 98.6 °F (37 °C) (Oral) 20 5' 8\" (1.727 m) 226 lb 3.2 oz (102.6 kg) SpO2 BMI OB Status Smoking Status 98% 34.39 kg/m2 Implant Never Smoker Vitals History BMI and BSA Data  Body Mass Index Body Surface Area  
 34.39 kg/m 2 2.22 m 2  
  
  
 Preferred Pharmacy Pharmacy Name Phone [facility] [place] [place] [phone] Your Updated Medication List  
  
   
This list is accurate as of 6/5/18  2:40 PM.  Always use your most recent med list.  
  
  
  
  
 ALPRAZolam 0.5 mg tablet Commonly known as:  Nguyen Ji Take 1 Tab by mouth as needed for Anxiety. cyclobenzaprine 5 mg tablet Commonly known as:  FLEXERIL Take 1 Tab by mouth nightly as needed for Muscle Spasm(s). fluconazole 150 mg tablet Commonly known as:  DIFLUCAN Take 1 Tab by mouth daily for 1 day. FDA advises cautious prescribing of oral fluconazole in pregnancy. hydroCHLOROthiazide 25 mg tablet Commonly known as:  HYDRODIURIL Take 1 Tab by mouth daily. Indications: hypertension  
  
 ibuprofen 800 mg tablet Commonly known as:  MOTRIN  
as needed. Qnqbrkqs0-Cimifh2-Gdpxb therm. 112.5 billion cell Cap Take 1 Cap by mouth daily. metroNIDAZOLE 0.75 % vaginal gel Commonly known as:  Lunda Achilles Omeprazole delayed release 20 mg tablet Commonly known as:  PRILOSEC D/R Take 1 Tab by mouth daily. Prescriptions Sent to Pharmacy Refills  
 fluconazole (DIFLUCAN) 150 mg tablet 0 Sig: Take 1 Tab by mouth daily for 1 day. FDA advises cautious prescribing of oral fluconazole in pregnancy. Class: Normal  
 Pharmacy: [facility] #: [phone] Route: Oral  
  
Follow-up Instructions Return in about 4 weeks (around 7/3/2018) for Routine (blood pressure). Introducing [facility]! Dear Hallie Tapia: Thank you for requesting a [employer] account. Our records indicate that you already have an active [employer] account. You can access your account anytime at [email or web address] Did you know that you can access your hospital and ER discharge instructions at any time in [employer]? You can also review all of your test results from your hospital stay or ER visit. Additional Information If you have questions, please visit the Frequently Asked Questions section of the [employer] website at [email or web address] Remember, [employer] is NOT to be used for urgent needs. For medical emergencies, dial 911. Now available from your iPhone and Android! Please provide this summary of care documentation to your next provider. Your primary care clinician is listed as [facility]. If you have any questions after today's visit, please call [phone].

## 2018-06-05 NOTE — PROGRESS NOTES
Health Maintenance Due   Topic Date Due    Pneumococcal 19-64 Highest Risk (2 of 3 - PCV13) 12/15/2017     Body mass index is 34.39 kg/(m^2). 1. Have you been to the ER, urgent care clinic since your last visit? Hospitalized since your last visit? No    2. Have you seen or consulted any other health care providers outside of the [facility] since your last visit? Include any pap smears or colon screening.  No  Reviewed record in preparation for visit and have necessary documentation  Pt did not bring medication to office visit for review  Information was given to pt on Advanced Directives, Living Will  Information was given on Shingles Vaccine  opportunity was given for questions  Goals that were addressed and/or need to be completed during or after this appointment include       -

## 2018-06-05 NOTE — PROGRESS NOTES
Emely Juarez  34 y.o. female  1985  [address] [address]  [id number]     [facility]: Progress Note       Encounter Date: 6/5/2018    Chief Complaint   Patient presents with    Other     spot on vaginal area       History provided by patient  History of Present Illness   Emely Juarez is a 34 y.o. female who presents to clinic today for:    Spot in vaginal Area. Patient present with cc of spot in vaginal area. Patient reports that she noted the area for several months. Intially the area was painful but now it is only \"tingling. \" Denies fever, chills. Patient also reports dyspareunia once with light vaginal spotting. Denies discharge, rash, blisters. Reports that she had been placed on metronidazole gel vaginal inserts BID for 32 weeks but she reports that she has not been taking it regularly. Review of Systems   Review of Systems   Constitutional: Negative for chills and fever. Cardiovascular: Negative for chest pain and palpitations. Gastrointestinal: Negative for abdominal pain, constipation, diarrhea, nausea and vomiting. Genitourinary: Negative for dysuria, frequency and urgency. Skin: Negative for itching and rash. Neurological: Positive for tingling. Negative for dizziness and headaches. Vitals/Objective:     Vitals:    06/05/18 1412   BP: (!) 142/95   Pulse: 87   Resp: 20   Temp: 98.6 °F (37 °C)   TempSrc: Oral   SpO2: 98%   Weight: 226 lb 3.2 oz (102.6 kg)   Height: 5' 8\" (1.727 m)     Body mass index is 34.39 kg/(m^2). Wt Readings from Last 3 Encounters:   06/05/18 226 lb 3.2 oz (102.6 kg)   05/09/18 225 lb (102.1 kg)   04/16/18 230 lb (104.3 kg)       Physical Exam   Constitutional: She appears well-developed. HENT:   Head: Normocephalic and atraumatic. Cardiovascular: Normal rate and regular rhythm. Genitourinary:       There is no rash, tenderness, lesion or injury on the right labia.  There is no rash, tenderness, lesion or injury on the left labia. Cervix exhibits no motion tenderness, no discharge and no friability. Right adnexum displays no mass and no tenderness. Left adnexum displays no mass and no tenderness. No erythema, tenderness or bleeding in the vagina. No foreign body in the vagina. No signs of injury around the vagina. Vaginal discharge found. Genitourinary Comments: Chaperoned by Satish Ramirez LPN        No results found for this or any previous visit (from the past 24 hour(s)). Assessment and Plan:     Encounter Diagnoses     ICD-10-CM ICD-9-CM   1. Vaginal irritation N89.8 623.9   2. Dyspareunia in female N94.10 625.0   3. Vaginal yeast infection B37.3 112.1       1. Vaginal irritation  No obvious sign of blister rash. - CULTURE, HSV W/ TYPING  - CULTURE, VIRAL    2. Dyspareunia in female  3. Vaginal yeast infection  Advised patient to resume her flagyl inserts as prescribed. - fluconazole (DIFLUCAN) 150 mg tablet; Take 1 Tab by mouth daily for 1 day. FDA advises cautious prescribing of oral fluconazole in pregnancy. Dispense: 1 Tab; Refill: 0    I have discussed the diagnosis with the patient and the intended plan as seen in the above orders. she has expressed understanding. The patient has received an after-visit summary and questions were answered concerning future plans. I have discussed medication side effects and warnings with the patient as well. Electronically Signed: Miguel Parker MD     History/Allergies   Patients past medical, surgical and family histories were reviewed and updated.     Past Medical History:   Diagnosis Date    Abnormal Pap smear 2010    FU with colpo, negative per patient    Cancer [facility])     nodular lymphocytic hodgkins lymphoma    Chest pain     KAMERON I (cervical intraepithelial neoplasia I) 9/2/2011    Depression 5/12/2015    Encounter for IUD insertion 3/5/15    Mirena    Encounter for IUD removal 06/24/2016    Essential hypertension     Hypertension  Implanon removal 9/6/2013    Nexplanon insertion 02/09/2017    Pap smear for cervical cancer screening 11/11/15    Negative    Trouble in sleeping       Past Surgical History:   Procedure Laterality Date    HX COLPOSCOPY  2010    per pt WNL    HX WISDOM TEETH EXTRACTION       Family History   Problem Relation Age of Onset    HIV/AIDS Mother     Hypertension Maternal Aunt     Hypertension Maternal Grandmother      Social History     Social History    Marital status:      Spouse name: N/A    Number of children: N/A    Years of education: N/A     Occupational History    Not on file. Social History Main Topics    Smoking status: Never Smoker    Smokeless tobacco: Never Used    Alcohol use Yes      Comment: occassionally    Drug use: No    Sexual activity: Yes     Partners: Male     Birth control/ protection: Condom, Implant     Other Topics Concern    Not on file     Social History Narrative         Allergies   Allergen Reactions    Clonidine (Pf) Vertigo       Disposition     Follow-up Disposition:  Return in about 4 weeks (around 7/3/2018) for Routine (blood pressure). Future Appointments  Date Time Provider Ann Stone   7/3/2018 1:00 PM Ivis Conti MD [facility]   9/12/2018 9:30 AM Mike Montanez MD [facility]            Current Medications after this visit     Current Outpatient Prescriptions   Medication Sig    fluconazole (DIFLUCAN) 150 mg tablet Take 1 Tab by mouth daily for 1 day. FDA advises cautious prescribing of oral fluconazole in pregnancy.  hydroCHLOROthiazide (HYDRODIURIL) 25 mg tablet Take 1 Tab by mouth daily. Indications: hypertension    metroNIDAZOLE (METROGEL) 0.75 % vaginal gel     cyclobenzaprine (FLEXERIL) 5 mg tablet Take 1 Tab by mouth nightly as needed for Muscle Spasm(s).  ibuprofen (MOTRIN) 800 mg tablet as needed.  Omeprazole delayed release (PRILOSEC D/R) 20 mg tablet Take 1 Tab by mouth daily.     ALPRAZolam Angel Christianson) 0.5 mg tablet Take 1 Tab by mouth as needed for Anxiety.  Mepckyrp2-Uvmuqr6-Hsryq therm. 112.5 billion cell cap Take 1 Cap by mouth daily. No current facility-administered medications for this visit. There are no discontinued medications.

## 2018-06-13 LAB
HSV SPEC CULT: NORMAL
VIRUS SPEC CULT: NORMAL

## 2018-06-20 ENCOUNTER — OFFICE VISIT (OUTPATIENT)
Dept: FAMILY MEDICINE CLINIC | Age: 33
End: 2018-06-20

## 2018-06-20 VITALS
WEIGHT: 229.6 LBS | SYSTOLIC BLOOD PRESSURE: 132 MMHG | DIASTOLIC BLOOD PRESSURE: 86 MMHG | OXYGEN SATURATION: 99 % | HEIGHT: 68 IN | HEART RATE: 77 BPM | RESPIRATION RATE: 20 BRPM | BODY MASS INDEX: 34.8 KG/M2 | TEMPERATURE: 97 F

## 2018-06-20 DIAGNOSIS — N76.6 GENITAL LABIAL ULCER: Primary | ICD-10-CM

## 2018-06-20 RX ORDER — VALACYCLOVIR HYDROCHLORIDE 1 G/1
1000 TABLET, FILM COATED ORAL 2 TIMES DAILY
Qty: 14 TAB | Refills: 0 | Status: SHIPPED | OUTPATIENT
Start: 2018-06-20 | End: 2018-06-27

## 2018-06-20 NOTE — PROGRESS NOTES
Health Maintenance Due   Topic Date Due    Pneumococcal 19-64 Highest Risk (2 of 3 - PCV13) 12/15/2017     Body mass index is 34.91 kg/(m^2). 1. Have you been to the ER, urgent care clinic since your last visit? Hospitalized since your last visit? No    2. Have you seen or consulted any other health care providers outside of the [address] [place] [address] since your last visit? Include any pap smears or colon screening.  No  Reviewed record in preparation for visit and have necessary documentation  Pt did not bring medication to office visit for review  Information was given to pt on Advanced Directives, Living Will  Information was given on Shingles Vaccine  opportunity was given for questions  Goals that were addressed and/or need to be completed during or after this appointment include

## 2018-06-20 NOTE — PROGRESS NOTES
Brooke Kirk  34 y.o. female  1985  [address]  [address] [address]  [id number]     [id number]: Progress Note       Encounter Date: 6/20/2018    Chief Complaint   Patient presents with    Vaginal Pain       History provided by patient  History of Present Illness   Brooke Kirk is a 34 y.o. female who presents to clinic today for:    Labial pain  Patient present with cc of spot of irritation and pain on labia. Had been seen on 6/5/2018 for complaints of pain and tingling in the area. No prior history of STIs. Since that visit, area developed bumps and gradually an ulcer formed. Ulcer is well demarcated, painful without exudate. Review of Systems   Review of Systems   Constitutional: Negative for chills and fever. Cardiovascular: Negative for chest pain and palpitations. Gastrointestinal: Negative for abdominal pain, constipation, diarrhea, nausea and vomiting. Genitourinary: Negative for dysuria, frequency and urgency. Skin: Positive for rash (ulcer). Negative for itching. Neurological: Positive for tingling. Negative for dizziness and headaches. Vitals/Objective:     Vitals:    06/20/18 1407   BP: 132/86   Pulse: 77   Resp: 20   Temp: 97 °F (36.1 °C)   TempSrc: Oral   SpO2: 99%   Weight: 229 lb 9.6 oz (104.1 kg)   Height: 5' 8\" (1.727 m)     Body mass index is 34.91 kg/(m^2). Wt Readings from Last 3 Encounters:   06/20/18 229 lb 9.6 oz (104.1 kg)   06/05/18 226 lb 3.2 oz (102.6 kg)   05/09/18 225 lb (102.1 kg)       Physical Exam   Constitutional: She appears well-developed. HENT:   Head: Normocephalic and atraumatic. Cardiovascular: Normal rate and regular rhythm. Genitourinary:       There is no rash, tenderness, lesion or injury on the right labia. There is no rash, tenderness, lesion or injury on the left labia. Cervix exhibits no motion tenderness, no discharge and no friability. Right adnexum displays no mass and no tenderness.  Left adnexum displays no mass and no tenderness. No erythema, tenderness or bleeding in the vagina. No foreign body in the vagina. No signs of injury around the vagina. No vaginal discharge found. Genitourinary Comments: Chaperoned by Italia Guillermo LPN        No results found for this or any previous visit (from the past 24 hour(s)). Assessment and Plan:     Encounter Diagnoses     ICD-10-CM ICD-9-CM   1. Genital labial ulcer N76.6 616.50       1. Genital labial ulcer  Concerning for HSV will treat empirically. No fever, chills or lymphadenopathy   - CULTURE, HSV W/ TYPING  - valACYclovir (VALTREX) 1 gram tablet; Take 1 Tab by mouth two (2) times a day for 7 days. Dispense: 14 Tab; Refill: 0  - HEPATITIS B CORE AB W/REFLEX  - HIV 1/2 AG/AB, 4TH GENERATION,W RFLX CONFIRM  - T PALLIDUM SCREEN W/REFLEX  - CT/NG/T.VAGINALIS AMPLIFICATION    I have discussed the diagnosis with the patient and the intended plan as seen in the above orders. she has expressed understanding. The patient has received an after-visit summary and questions were answered concerning future plans. I have discussed medication side effects and warnings with the patient as well. Electronically Signed: Antonietta Jauregui MD     History/Allergies   Patients past medical, surgical and family histories were reviewed and updated.     Past Medical History:   Diagnosis Date    Abnormal Pap smear 2010    FU with colpo, negative per patient    Cancer ([facility] [address]     nodular lymphocytic hodgkins lymphoma    Chest pain     KAMERON I (cervical intraepithelial neoplasia I) 9/2/2011    Depression 5/12/2015    Encounter for IUD insertion 3/5/15    Mirena    Encounter for IUD removal 06/24/2016    Essential hypertension     Hypertension     Implanon removal 9/6/2013    Nexplanon insertion 02/09/2017    Pap smear for cervical cancer screening 11/11/15    Negative    Trouble in sleeping       Past Surgical History:   Procedure Laterality Date    HX COLPOSCOPY 2010    per pt WNL    HX WISDOM TEETH EXTRACTION       Family History   Problem Relation Age of Onset    HIV/AIDS Mother     Hypertension Maternal Aunt     Hypertension Maternal Grandmother      Social History     Social History    Marital status:      Spouse name: N/A    Number of children: N/A    Years of education: N/A     Occupational History    Not on file. Social History Main Topics    Smoking status: Never Smoker    Smokeless tobacco: Never Used    Alcohol use Yes      Comment: occassionally    Drug use: No    Sexual activity: Yes     Partners: Male     Birth control/ protection: Condom, Implant     Other Topics Concern    Not on file     Social History Narrative         Allergies   Allergen Reactions    Clonidine (Pf) Vertigo       Disposition     Follow-up Disposition: Not on File    Future Appointments  Date Time Provider Ann Bertha   7/3/2018 1:00 PM Harvey Flanagan MD [facility]   9/12/2018 9:30 AM Jazmín Zavaleta MD [facility] SCH[facility]            Current Medications after this visit     Current Outpatient Prescriptions   Medication Sig    valACYclovir (VALTREX) 1 gram tablet Take 1 Tab by mouth two (2) times a day for 7 days.  hydroCHLOROthiazide (HYDRODIURIL) 25 mg tablet Take 1 Tab by mouth daily. Indications: hypertension    metroNIDAZOLE (METROGEL) 0.75 % vaginal gel     cyclobenzaprine (FLEXERIL) 5 mg tablet Take 1 Tab by mouth nightly as needed for Muscle Spasm(s).  ALPRAZolam (XANAX) 0.5 mg tablet Take 1 Tab by mouth as needed for Anxiety.  ibuprofen (MOTRIN) 800 mg tablet as needed.  Omeprazole delayed release (PRILOSEC D/R) 20 mg tablet Take 1 Tab by mouth daily.  Hxmoengp4-Yhtdlr0-Hxizk therm. 112.5 billion cell cap Take 1 Cap by mouth daily. No current facility-administered medications for this visit. There are no discontinued medications.

## 2018-06-20 NOTE — MR AVS SNAPSHOT
[facility] 
 
 
  [address] [address] 
[phone] Patient: Blessing Amanda MRN: [id number] :1985 Visit Information Date & Time Provider Department Dept. Phone Encounter #  
 2018  2:10 PM [facility] [place] [id number] Your Appointments 7/3/2018  1:00 PM  
ROUTINE CARE with Gudelia Almazan MD  
[facility] ([facility]) Appt Note: 4 week fu  
  [address]   
[phone]  
  
   
  [address] [address]  
  
    
 2018  9:30 AM  
ESTABLISHED PATIENT with Amador Almeida MD  
[facility]) Appt Note: Marilyn, [facility]. [address] [facility], [address] [facility] [address] [address]  
[phone]  
  
   
 [address] [facility], [address] [facility] Upcoming Health Maintenance Date Due Pneumococcal 19-64 Highest Risk (2 of 3 - PCV13) 12/15/2017 Influenza Age 5 to Adult 2018 PAP AKA CERVICAL CYTOLOGY 2018 DTaP/Tdap/Td series (2 - Td) 2024 Allergies as of 2018  Review Complete On: 2018 By: Gudelia Almazan MD  
  
 Severity Noted Reaction Type Reactions Clonidine (Pf)  2013   Intolerance Vertigo Current Immunizations  Never Reviewed Name Date Pneumococcal Polysaccharide (PPSV-23) 12/15/2016 Tdap 2014 12:26 PM  
  
 Not reviewed this visit You Were Diagnosed With   
  
 Codes Comments Genital labial ulcer    -  Primary ICD-10-CM: N76.6 ICD-9-CM: 616.50 Vitals BP Pulse Temp Resp Height(growth percentile) Weight(growth percentile) 132/86 77 97 °F (36.1 °C) (Oral) 20 5' 8\" (1.727 m) 229 lb 9.6 oz (104.1 kg) SpO2 BMI OB Status Smoking Status 99% 34.91 kg/m2 Implant Never Smoker Vitals History BMI and BSA Data Body Mass Index Body Surface Area 34.91 kg/m 2 2.23 m 2 Preferred Pharmacy Pharmacy Name Phone [facility] [phone] Your Updated Medication List  
  
   
This list is accurate as of 6/20/18  2:28 PM.  Always use your most recent med list.  
  
  
  
  
 ALPRAZolam 0.5 mg tablet Commonly known as:  Fairy Ny Take 1 Tab by mouth as needed for Anxiety. cyclobenzaprine 5 mg tablet Commonly known as:  FLEXERIL Take 1 Tab by mouth nightly as needed for Muscle Spasm(s). hydroCHLOROthiazide 25 mg tablet Commonly known as:  HYDRODIURIL Take 1 Tab by mouth daily. Indications: hypertension  
  
 ibuprofen 800 mg tablet Commonly known as:  MOTRIN  
as needed. Pytcqgmb8-Iszotz5-Mihpe therm. 112.5 billion cell Cap Take 1 Cap by mouth daily. metroNIDAZOLE 0.75 % vaginal gel Commonly known as:  Ramírez Beech Omeprazole delayed release 20 mg tablet Commonly known as:  PRILOSEC D/R Take 1 Tab by mouth daily. valACYclovir 1 gram tablet Commonly known as:  VALTREX Take 1 Tab by mouth two (2) times a day for 7 days. Prescriptions Sent to Pharmacy Refills  
 valACYclovir (VALTREX) 1 gram tablet 0 Sig: Take 1 Tab by mouth two (2) times a day for 7 days. Class: Normal  
 Pharmacy: [address] [place] [address] #: [phone] Route: Oral  
  
We Performed the Following CULTURE, HSV W/ TYPING [94002 CPT(R)] Introducing [facility] SERVICES! Dear Rosa Velez: Thank you for requesting a SpareTime account. Our records indicate that you already have an active SpareTime account. You can access your account anytime at [email or web address] Did you know that you can access your hospital and ER discharge instructions at any time in SpareTime?   You can also review all of your test results from your hospital stay or ER visit. Additional Information If you have questions, please visit the Frequently Asked Questions section of the Angiodroid website at [email or web address] Remember, Angiodroid is NOT to be used for urgent needs. For medical emergencies, dial 911. Now available from your iPhone and Android! Please provide this summary of care documentation to your next provider. Your primary care clinician is listed as Miguel Parker. If you have any questions after today's visit, please call [phone].

## 2018-06-22 ENCOUNTER — TELEPHONE (OUTPATIENT)
Dept: FAMILY MEDICINE CLINIC | Age: 33
End: 2018-06-22

## 2018-06-22 NOTE — TELEPHONE ENCOUNTER
Patient wants to know when she can stop taking the Valtrex. It makes her terrazas and sleepy. Please advise at [phone].

## 2018-06-22 NOTE — TELEPHONE ENCOUNTER
She may discontinue the medication. It will only help her body clear the infection faster but it is not harmful to stop the medication.      Demetra Palma MD

## 2018-06-23 LAB — HSV SPEC CULT: NORMAL

## 2018-06-25 DIAGNOSIS — I10 ESSENTIAL HYPERTENSION: ICD-10-CM

## 2018-06-25 LAB
C TRACH RRNA SPEC QL NAA+PROBE: NORMAL
HBV CORE AB SERPL QL IA: NEGATIVE
HIV 1+2 AB+HIV1 P24 AG SERPL QL IA: NON REACTIVE
N GONORRHOEA RRNA SPEC QL NAA+PROBE: NEGATIVE
T PALLIDUM AB SER QL IA: NEGATIVE
T VAGINALIS RRNA SPEC QL NAA+PROBE: NEGATIVE

## 2018-06-25 RX ORDER — HYDROCHLOROTHIAZIDE 12.5 MG/1
CAPSULE ORAL
Qty: 90 CAP | Refills: 0 | OUTPATIENT
Start: 2018-06-25

## 2018-07-03 ENCOUNTER — OFFICE VISIT (OUTPATIENT)
Dept: FAMILY MEDICINE CLINIC | Age: 33
End: 2018-07-03

## 2018-07-03 VITALS
RESPIRATION RATE: 18 BRPM | BODY MASS INDEX: 33.92 KG/M2 | HEART RATE: 77 BPM | HEIGHT: 68 IN | WEIGHT: 223.8 LBS | OXYGEN SATURATION: 98 % | TEMPERATURE: 98.3 F | SYSTOLIC BLOOD PRESSURE: 124 MMHG | DIASTOLIC BLOOD PRESSURE: 78 MMHG

## 2018-07-03 DIAGNOSIS — Z23 ENCOUNTER FOR IMMUNIZATION: ICD-10-CM

## 2018-07-03 DIAGNOSIS — I10 ESSENTIAL HYPERTENSION: Primary | ICD-10-CM

## 2018-07-03 NOTE — PATIENT INSTRUCTIONS
Pneumococcal Conjugate Vaccine (PCV13): What You Need to Know  Why get vaccinated? Vaccination can protect both children and adults from pneumococcal disease. Pneumococcal disease is caused by bacteria that can spread from person to person through close contact. It can cause ear infections, and it can also lead to more serious infections of the:  · Lungs (pneumonia). · Blood (bacteremia). · Covering of the brain and spinal cord (meningitis). Pneumococcal pneumonia is most common among adults. Pneumococcal meningitis can cause deafness and brain damage, and it kills about 1 child in 10 who get it. Anyone can get pneumococcal disease, but children under 3years of age and adults 72 years and older, people with certain medical conditions, and cigarette smokers are at the highest risk. Before there was a vaccine, the [facility] saw the following in children under 5 each year from pneumococcal disease:  · More than 700 cases of meningitis  · About 13,000 blood infections  · About 5 million ear infections  · About 200 deaths  Since the vaccine became available, severe pneumococcal disease in these children has fallen by 88%. About 18,000 older adults die of pneumococcal disease each year in the [place]. Treatment of pneumococcal infections with penicillin and other drugs is not as effective as it used to be, because some strains of the disease have become resistant to these drugs. This makes prevention of the disease through vaccination even more important. PCV13 vaccine  Pneumococcal conjugate vaccine (called PCV13) protects against 13 types of pneumococcal bacteria. PCV13 is routinely given to children at 2, 4, 6, and 1515 months of age. It is also recommended for children and adults 3to 59years of age with certain health conditions, and for all adults 72years of age and older. Your doctor can give you details.   Some people should not get this vaccine  Anyone who has ever had a life-threatening allergic reaction to a dose of this vaccine, to an earlier pneumococcal vaccine called PCV7, or to any vaccine containing diphtheria toxoid (for example, DTaP), should not get PCV13. Anyone with a severe allergy to any component of PCV13 should not get the vaccine. Tell your doctor if the person being vaccinated has any severe allergies. If the person scheduled for vaccination is not feeling well, your healthcare provider might decide to reschedule the shot on another day. Risks of a vaccine reaction  With any medicine, including vaccines, there is a chance of reactions. These are usually mild and go away on their own, but serious reactions are also possible. Problems reported following PCV13 varied by age and dose in the series. The most common problems reported among children were:  · About half became drowsy after the shot, had a temporary loss of appetite, or had redness or tenderness where the shot was given. · About 1 out of 3 had swelling where the shot was given. · About 1 out of 3 had a mild fever, and about 1 in 20 had a fever over 102.2°F.  · Up to about 8 out of 10 became fussy or irritable. Adults have reported pain, redness, and swelling where the shot was given; also mild fever, fatigue, headache, chills, or muscle pain. Rufina Koehler children who get PCV13 along with inactivated flu vaccine at the same time may be at increased risk for seizures caused by fever. Ask your doctor for more information. Problems that could happen after any vaccine:  · People sometimes faint after a medical procedure, including vaccination. Sitting or lying down for about 15 minutes can help prevent fainting and the injuries caused by a fall. Tell your doctor if you feel dizzy or have vision changes or ringing in the ears. · Some older children and adults get severe pain in the shoulder and have difficulty moving the arm where a shot was given. This happens very rarely.   · Any medication can cause a severe allergic reaction. Such reactions from a vaccine are very rare, estimated at about 1 in a million doses, and would happen within a few minutes to a few hours after the vaccination. As with any medicine, there is a very small chance of a vaccine causing a serious injury or death. The safety of vaccines is always being monitored. For more information, visit: www.cdc.gov/vaccinesafety. What if there is a serious reaction? What should I look for? · Look for anything that concerns you, such as signs of a severe allergic reaction, very high fever, or unusual behavior. Signs of a severe allergic reaction can include hives, swelling of the face and throat, difficulty breathing, a fast heartbeat, dizziness, and weakness, usually within a few minutes to a few hours after the vaccination. What should I do? · If you think it is a severe allergic reaction or other emergency that can't wait, call 911 or get the person to the nearest hospital. Otherwise, call your doctor. · Reactions should be reported to the Vaccine Adverse Event Reporting System (VAERS). Your doctor should file this report, or you can do it yourself through the VAERS website at www.vaers. [facility].gov, or by calling 0-393.645.3983. VAERS does not give medical advice. The National Vaccine Injury Compensation Program  The National Vaccine Injury Compensation Program (VICP) is a federal program that was created to compensate people who may have been injured by certain vaccines. Persons who believe they may have been injured by a vaccine can learn about the program and about filing a claim by calling 2-801.272.8384 or visiting the [employer] website at www.[facility].gov/vaccinecompensation. There is a time limit to file a claim for compensation. How can I learn more? · Ask your healthcare provider. He or she can give you the vaccine package insert or suggest other sources of information. · Call your local or state health department.   · Contact the [facility] Disease Control and Prevention (CDC):  ¨ Call 5-426.624.3456 (1-800-CDC-INFO) or  ¨ Visit CDC's website at www.cdc.gov/vaccines  Vaccine Information Statement  PCV13 Vaccine  11/5/2015  42 JACQUELINE Chris [place] [id number]  Department of Health and Human Services  Centers for Disease Control and Prevention  Many Vaccine Information Statements are available in Sri Lankan and other languages. See www.immunize.org/vis. Muchas hojas de información sobre vacunas están disponibles en español y en otros idiomas. Visite www.immunize.org/vis. Care instructions adapted under license by [employer] (which disclaims liability or warranty for this information). If you have questions about a medical condition or this instruction, always ask your healthcare professional. [address] any warranty or liability for your use of this information.

## 2018-07-03 NOTE — MR AVS SNAPSHOT
[address] [address] 
[phone] Patient: Mayi Mann MRN: [id number] :1985 Visit Information Date & Time Provider Department Dept. Phone Encounter #  
 7/3/2018  1:00 PM Erik Gilliam  [facility] [phone] [id number] Follow-up Instructions Return in about 6 months (around 1/3/2019) for Routine. Your Appointments 2018  9:30 AM  
ESTABLISHED PATIENT with Anand Jason MD  
[facility]) Appt Note: Marilyn, surya HL. [address], [address] [place] [address]  
[phone]  
  
   
 [address] [facility], [address] [facility] Upcoming Health Maintenance Date Due Pneumococcal 19-64 Highest Risk (2 of 3 - PCV13) 12/15/2017 Influenza Age 5 to Adult 2018 PAP AKA CERVICAL CYTOLOGY 2018 DTaP/Tdap/Td series (2 - Td) 2024 Allergies as of 7/3/2018  Review Complete On: 7/3/2018 By: Erik Gilliam MD  
  
 Severity Noted Reaction Type Reactions Clonidine (Pf)  2013   Intolerance Vertigo Current Immunizations  Never Reviewed Name Date Pneumococcal Polysaccharide (PPSV-23) 12/15/2016 Tdap 2014 12:26 PM  
  
 Not reviewed this visit You Were Diagnosed With   
  
 Codes Comments Essential hypertension    -  Primary ICD-10-CM: I10 
ICD-9-CM: 401.9 Encounter for immunization     ICD-10-CM: J49 ICD-9-CM: V03.89 Vitals BP Pulse Temp Resp Height(growth percentile) Weight(growth percentile) 124/78 (BP 1 Location: Right arm, BP Patient Position: Sitting) 77 98.3 °F (36.8 °C) (Oral) 18 5' 8\" (1.727 m) 223 lb 12.8 oz (101.5 kg) SpO2 BMI OB Status Smoking Status 98% 34.03 kg/m2 Implant Never Smoker Vitals History BMI and BSA Data Body Mass Index Body Surface Area 34.03 kg/m 2 2.21 m 2 Preferred Pharmacy Pharmacy Name Phone [address] [facility] [place]  [phone] Your Updated Medication List  
  
   
This list is accurate as of 7/3/18  1:16 PM.  Always use your most recent med list.  
  
  
  
  
 ALPRAZolam 0.5 mg tablet Commonly known as:  Mosetta Harp Take 1 Tab by mouth as needed for Anxiety. cyclobenzaprine 5 mg tablet Commonly known as:  FLEXERIL Take 1 Tab by mouth nightly as needed for Muscle Spasm(s). hydroCHLOROthiazide 25 mg tablet Commonly known as:  HYDRODIURIL Take 1 Tab by mouth daily. Indications: hypertension  
  
 ibuprofen 800 mg tablet Commonly known as:  MOTRIN  
as needed. Yvwnbpld7-Zvveyj6-Ldzmi therm. 112.5 billion cell Cap Take 1 Cap by mouth daily. metroNIDAZOLE 0.75 % vaginal gel Commonly known as:  [place] Omeprazole delayed release 20 mg tablet Commonly known as:  PRILOSEC D/R Take 1 Tab by mouth daily. pneumococcal 13 paula conj dip 0.5 mL Syrg injection Commonly known as:  PREVNAR-13  
0.5 mL by IntraMUSCular route once for 1 dose. Prescriptions Printed Refills  
 pneumococcal 13 paula conj dip (PREVNAR-13) 0.5 mL syrg injection 0 Si.5 mL by IntraMUSCular route once for 1 dose. Class: Print Route: IntraMUSCular Follow-up Instructions Return in about 6 months (around 1/3/2019) for Routine. Patient Instructions Pneumococcal Conjugate Vaccine (PCV13): What You Need to Know Why get vaccinated? Vaccination can protect both children and adults from pneumococcal disease. Pneumococcal disease is caused by bacteria that can spread from person to person through close contact. It can cause ear infections, and it can also lead to more serious infections of the: 
· Lungs (pneumonia). · Blood (bacteremia). · Covering of the brain and spinal cord (meningitis). Pneumococcal pneumonia is most common among adults. Pneumococcal meningitis can cause deafness and brain damage, and it kills about 1 child in 10 who get it. Anyone can get pneumococcal disease, but children under 3years of age and adults 72 years and older, people with certain medical conditions, and cigarette smokers are at the highest risk. Before there was a vaccine, the [facility] saw the following in children under 5 each year from pneumococcal disease: · More than 700 cases of meningitis · About 13,000 blood infections · About 5 million ear infections · About 200 deaths Since the vaccine became available, severe pneumococcal disease in these children has fallen by 88%. About 18,000 older adults die of pneumococcal disease each year in the [place]. Treatment of pneumococcal infections with penicillin and other drugs is not as effective as it used to be, because some strains of the disease have become resistant to these drugs. This makes prevention of the disease through vaccination even more important. PCV13 vaccine Pneumococcal conjugate vaccine (called PCV13) protects against 13 types of pneumococcal bacteria. PCV13 is routinely given to children at 2, 4, 6, and 1515 months of age. It is also recommended for children and adults 3to 59years of age with certain health conditions, and for all adults 72years of age and older. Your doctor can give you details. Some people should not get this vaccine Anyone who has ever had a life-threatening allergic reaction to a dose of this vaccine, to an earlier pneumococcal vaccine called PCV7, or to any vaccine containing diphtheria toxoid (for example, DTaP), should not get PCV13. Anyone with a severe allergy to any component of PCV13 should not get the vaccine. Tell your doctor if the person being vaccinated has any severe allergies. If the person scheduled for vaccination is not feeling well, your healthcare provider might decide to reschedule the shot on another day. Risks of a vaccine reaction With any medicine, including vaccines, there is a chance of reactions. These are usually mild and go away on their own, but serious reactions are also possible. Problems reported following PCV13 varied by age and dose in the series. The most common problems reported among children were: · About half became drowsy after the shot, had a temporary loss of appetite, or had redness or tenderness where the shot was given. · About 1 out of 3 had swelling where the shot was given. · About 1 out of 3 had a mild fever, and about 1 in 20 had a fever over 102.2°F. 
· Up to about 8 out of 10 became fussy or irritable. Adults have reported pain, redness, and swelling where the shot was given; also mild fever, fatigue, headache, chills, or muscle pain. [employer] children who get PCV13 along with inactivated flu vaccine at the same time may be at increased risk for seizures caused by fever. Ask your doctor for more information. Problems that could happen after any vaccine: · People sometimes faint after a medical procedure, including vaccination. Sitting or lying down for about 15 minutes can help prevent fainting and the injuries caused by a fall. Tell your doctor if you feel dizzy or have vision changes or ringing in the ears. · Some older children and adults get severe pain in the shoulder and have difficulty moving the arm where a shot was given. This happens very rarely. · Any medication can cause a severe allergic reaction. Such reactions from a vaccine are very rare, estimated at about 1 in a million doses, and would happen within a few minutes to a few hours after the vaccination. As with any medicine, there is a very small chance of a vaccine causing a serious injury or death. The safety of vaccines is always being monitored.  For more information, visit: www.cdc.gov/vaccinesafety. What if there is a serious reaction? What should I look for? · Look for anything that concerns you, such as signs of a severe allergic reaction, very high fever, or unusual behavior. Signs of a severe allergic reaction can include hives, swelling of the face and throat, difficulty breathing, a fast heartbeat, dizziness, and weakness, usually within a few minutes to a few hours after the vaccination. What should I do? · If you think it is a severe allergic reaction or other emergency that can't wait, call 911 or get the person to the nearest hospital. Otherwise, call your doctor. · Reactions should be reported to the Vaccine Adverse Event Reporting System (VAERS). Your doctor should file this report, or you can do it yourself through the VAERS website at www.vaers. hhs.gov, or by calling 9-834.430.9408. VAERS does not give medical advice. The National Vaccine Injury Compensation Program 
The National Vaccine Injury Compensation Program (VICP) is a federal program that was created to compensate people who may have been injured by certain vaccines. Persons who believe they may have been injured by a vaccine can learn about the program and about filing a claim by calling 5-934.792.7815 or visiting the [address] [employer] website at www.[facility].gov/vaccinecompensation. There is a time limit to file a claim for compensation. How can I learn more? · Ask your healthcare provider. He or she can give you the vaccine package insert or suggest other sources of information. · Call your local or state health department. · Contact the Centers for Disease Control and Prevention (CDC): 
¨ Call 8-741.799.9361 (1-800-CDC-INFO) or ¨ Visit CDC's website at www.cdc.gov/vaccines Vaccine Information Statement PCV13 Vaccine 11/5/2015 
42 JACQUELINE Parks [id number] Department of Health and [employer] Centers for Disease Control and Prevention Many Vaccine Information Statements are available in Marshallese and other languages. See www.immunize.org/vis. Muchas hojas de información sobre vacunas están disponibles en español y en otros idiomas. Visite www.immunize.org/vis. Care instructions adapted under license by [employer] (which disclaims liability or warranty for this information). If you have questions about a medical condition or this instruction, always ask your healthcare professional. [address] any warranty or liability for your use of this information. Introducing \[facility] [place] [facility]\"" & HEALTH SERVICES! Dear Justin Tay: Thank you for requesting a tuul account. Our records indicate that you already have an active tuul account. You can access your account anytime at [email or web address]. [employer]/Growish Did you know that you can access your hospital and ER discharge instructions at any time in tuul? You can also review all of your test results from your hospital stay or ER visit. Additional Information If you have questions, please visit the Frequently Asked Questions section of the tuul website at [email or web address] [email or web address]¢â€[employer]Â¢ [employer]/Growish/. Remember, tuul is NOT to be used for urgent needs. For medical emergencies, dial 911. Now available from your iPhone and Android! Please provide this summary of care documentation to your next provider. Your primary care clinician is listed as Nicolas Drake. If you have any questions after today's visit, please call [phone].

## 2018-07-03 NOTE — PROGRESS NOTES
Fly Brown  34 y.o. female  1985  [address] [address]  [id number]     [id number]: Progress Note       Encounter Date: 7/3/2018    Chief Complaint   Patient presents with    Blood Pressure Check    Labs       History provided by patient  History of Present Illness   Fly Brown is a 34 y.o. female who presents to clinic today for:    Hypertension: Controlled   BP Readings from Last 3 Encounters:   07/03/18 124/78   06/20/18 132/86   06/05/18 (!) 142/95     The patient reports:  taking medications as instructed, no medication side effects noted, no TIA's, no chest pain on exertion, no dyspnea on exertion, no swelling of ankles. Sodium   Date Value Ref Range Status   05/07/2018 144 134 - 144 mmol/L Final     Potassium   Date Value Ref Range Status   05/07/2018 3.4 (L) 3.5 - 5.2 mmol/L Final     Chloride   Date Value Ref Range Status   05/07/2018 102 96 - 106 mmol/L Final     Creatinine   Date Value Ref Range Status   05/07/2018 0.82 0.57 - 1.00 mg/dL Final   12/12/2017 0.83 0.57 - 1.00 mg/dL Final   08/28/2017 0.74 0.57 - 1.00 mg/dL Final     GFR est AA   Date Value Ref Range Status   05/07/2018 110 >59 mL/min/1.73 Final   12/12/2017 109 >59 mL/min/1.73 Final   08/28/2017 126 >59 mL/min/1.73 Final     GFR est non-AA   Date Value Ref Range Status   05/07/2018 96 >59 mL/min/1.73 Final   12/12/2017 95 >59 mL/min/1.73 Final   08/28/2017 109 >59 mL/min/1.73 Final       Our goal is to normalize the blood pressure to decrease the risks of strokes and heart attacks. The patient is in agreement with the plan. Health Maintenance  Printed. Health Maintenance Due   Topic Date Due    Pneumococcal 19-64 Highest Risk (2 of 3 - PCV13) 12/15/2017     Review of Systems   Review of Systems   Constitutional: Negative for chills and fever. Cardiovascular: Negative for chest pain, palpitations and leg swelling.    Gastrointestinal: Negative for abdominal pain, constipation, diarrhea, nausea and vomiting. Skin: Negative for itching and rash. Neurological: Negative for dizziness, tingling and headaches. Vitals/Objective:     Vitals:    07/03/18 1254   BP: 124/78   Pulse: 77   Resp: 18   Temp: 98.3 °F (36.8 °C)   TempSrc: Oral   SpO2: 98%   Weight: 223 lb 12.8 oz (101.5 kg)   Height: 5' 8\" (1.727 m)     Body mass index is 34.03 kg/(m^2). Wt Readings from Last 3 Encounters:   07/03/18 223 lb 12.8 oz (101.5 kg)   06/20/18 229 lb 9.6 oz (104.1 kg)   06/05/18 226 lb 3.2 oz (102.6 kg)       Physical Exam   Constitutional: She is oriented to person, place, and time. She appears well-developed and well-nourished. HENT:   Head: Normocephalic and atraumatic. Cardiovascular: Normal rate and regular rhythm. Pulmonary/Chest: Effort normal and breath sounds normal. No respiratory distress. She has no wheezes. Musculoskeletal: She exhibits no edema or tenderness. Neurological: She is alert and oriented to person, place, and time. Skin: Skin is warm and dry. No results found for this or any previous visit (from the past 24 hour(s)). Assessment and Plan:     Encounter Diagnoses     ICD-10-CM ICD-9-CM   1. Essential hypertension I10 401.9   2. Encounter for immunization Z23 V03.89       1. Essential hypertension  Continue current medication. 2. Encounter for immunization  - pneumococcal 13 paula conj dip (PREVNAR-13) 0.5 mL syrg injection; 0.5 mL by IntraMUSCular route once for 1 dose. Dispense: 0.5 mL; Refill: 0    I have discussed the diagnosis with the patient and the intended plan as seen in the above orders. she has expressed understanding. The patient has received an after-visit summary and questions were answered concerning future plans. I have discussed medication side effects and warnings with the patient as well.     Electronically Signed: Maximilian Smallwood MD     History/Allergies   Patients past medical, surgical and family histories were reviewed and updated. Past Medical History:   Diagnosis Date    Abnormal Pap smear 2010    FU with colpo, negative per patient    Cancer ([facility] [address]     nodular lymphocytic hodgkins lymphoma    Chest pain     KAMERON I (cervical intraepithelial neoplasia I) 9/2/2011    Depression 5/12/2015    Encounter for IUD insertion 3/5/15    Mirena    Encounter for IUD removal 06/24/2016    Essential hypertension     Hypertension     Implanon removal 9/6/2013    Nexplanon insertion 02/09/2017    Pap smear for cervical cancer screening 11/11/15    Negative    Trouble in sleeping       Past Surgical History:   Procedure Laterality Date    HX COLPOSCOPY  2010    per pt WNL    HX WISDOM TEETH EXTRACTION       Family History   Problem Relation Age of Onset    HIV/AIDS Mother     Hypertension Maternal Aunt     Hypertension Maternal Grandmother      Social History     Social History    Marital status:      Spouse name: N/A    Number of children: N/A    Years of education: N/A     Occupational History    Not on file. Social History Main Topics    Smoking status: Never Smoker    Smokeless tobacco: Never Used    Alcohol use Yes      Comment: occassionally    Drug use: No    Sexual activity: Yes     Partners: Male     Birth control/ protection: Condom, Implant     Other Topics Concern    Not on file     Social History Narrative         Allergies   Allergen Reactions    Clonidine (Pf) Vertigo       Disposition     Follow-up Disposition:  Return in about 6 months (around 1/3/2019) for Routine. Future Appointments  Date Time Provider Ann Stone   9/12/2018 9:30 AM Deanna Garcia MD [facility]   1/3/2019 9:30 AM Jose Appiah MD [facility] SCH[facility]            Current Medications after this visit     Current Outpatient Prescriptions   Medication Sig    pneumococcal 13 paula conj dip (PREVNAR-13) 0.5 mL syrg injection 0.5 mL by IntraMUSCular route once for 1 dose.     hydroCHLOROthiazide (HYDRODIURIL) 25 mg tablet Take 1 Tab by mouth daily. Indications: hypertension    metroNIDAZOLE (METROGEL) 0.75 % vaginal gel     cyclobenzaprine (FLEXERIL) 5 mg tablet Take 1 Tab by mouth nightly as needed for Muscle Spasm(s).  ALPRAZolam (XANAX) 0.5 mg tablet Take 1 Tab by mouth as needed for Anxiety.  ibuprofen (MOTRIN) 800 mg tablet as needed.  Omeprazole delayed release (PRILOSEC D/R) 20 mg tablet Take 1 Tab by mouth daily.  Lqfcskqb9-Iwocos9-Vglck therm. 112.5 billion cell cap Take 1 Cap by mouth daily. No current facility-administered medications for this visit. There are no discontinued medications.

## 2018-07-03 NOTE — PROGRESS NOTES
Reviewed record in preparation for visit and have necessary documentation  Pt did not bring medication to office visit for review    Goals that were addressed and/or need to be completed during or after this appointment include   Health Maintenance Due   Topic Date Due    Pneumococcal 19-64 Highest Risk (2 of 3 - PCV13) 12/15/2017

## 2018-08-27 ENCOUNTER — OFFICE VISIT (OUTPATIENT)
Dept: FAMILY MEDICINE CLINIC | Age: 33
End: 2018-08-27

## 2018-08-27 VITALS
BODY MASS INDEX: 35.16 KG/M2 | HEART RATE: 64 BPM | DIASTOLIC BLOOD PRESSURE: 84 MMHG | WEIGHT: 232 LBS | RESPIRATION RATE: 20 BRPM | HEIGHT: 68 IN | SYSTOLIC BLOOD PRESSURE: 133 MMHG | OXYGEN SATURATION: 99 % | TEMPERATURE: 98.3 F

## 2018-08-27 DIAGNOSIS — R53.83 FATIGUE, UNSPECIFIED TYPE: Primary | ICD-10-CM

## 2018-08-27 PROBLEM — E66.01 SEVERE OBESITY (BMI 35.0-39.9): Status: ACTIVE | Noted: 2018-08-27

## 2018-08-27 NOTE — PROGRESS NOTES
1. Have you been to the ER, urgent care clinic since your last visit? Hospitalized since your last visit? No    2. Have you seen or consulted any other health care providers outside of the [address] [place] [address] since your last visit? Include any pap smears or colon screening.  No  Reviewed record in preparation for visit and have necessary documentation  Pt did not bring medication to office visit for review  Goals that were addressed and/or need to be completed during or after this appointment include     Health Maintenance Due   Topic Date Due    Pneumococcal 19-64 Highest Risk (2 of 3 - PCV13) 12/15/2017    Influenza Age 9 to Adult  08/01/2018    PAP AKA CERVICAL CYTOLOGY  11/11/2018

## 2018-08-27 NOTE — MR AVS SNAPSHOT
[facility] 
 
 
 [address] [facility] [address] [place] [id number] 
[phone] Patient: Ramsey Cassidy MRN: [id number] :1985 Visit Information Date & Time Provider Department Dept. Phone Encounter #  
 2018  2:10 PM [facility] [id number] Follow-up Instructions Return if symptoms worsen or fail to improve. Your Appointments 2018  9:30 AM  
ESTABLISHED PATIENT with Todd Ambrose MD  
[facility] [address]) Appt Note: surya Sanders . [address] [facility], [address] [facility] [address]  
[phone]  
  
   
 [address] [place] [address], [facility] [place] [address]  
  
    
 1/3/2019  9:30 AM  
ROUTINE CARE with Rosamaria Bustillos MD  
[facility] ([address]) Appt Note: routine visit [address] [facility] [address] [address]  
[place] [address] [place] [id number] Upcoming Health Maintenance Date Due Pneumococcal 19-64 Highest Risk (2 of 3 - PCV13) 12/15/2017 Influenza Age 5 to Adult 2018 PAP AKA CERVICAL CYTOLOGY 2018 DTaP/Tdap/Td series (2 - Td) 2024 Allergies as of 2018  Review Complete On: 2018 By: Rosamaria Bustillos MD  
  
 Severity Noted Reaction Type Reactions Clonidine (Pf)  2013   Intolerance Vertigo Current Immunizations  Never Reviewed Name Date Pneumococcal Polysaccharide (PPSV-23) 12/15/2016 Tdap 2014 12:26 PM  
  
 Not reviewed this visit You Were Diagnosed With   
  
 Codes Comments Fatigue, unspecified type    -  Primary ICD-10-CM: R53.83 ICD-9-CM: 780.79 Vitals BP Pulse Temp Resp Height(growth percentile) Weight(growth percentile)  133/84 (BP 1 Location: Right arm, BP Patient Position: Sitting) 64 98.3 °F (36.8 °C) (Oral) 20 5' 8\" (1.727 m) 232 lb (105.2 kg) SpO2 BMI OB Status Smoking Status 99% 35.28 kg/m2 Implant Never Smoker Vitals History BMI and BSA Data Body Mass Index Body Surface Area  
 35.28 kg/m 2 2.25 m 2 Preferred Pharmacy Pharmacy Name Phone [facility] [address] [place] [address] [facility] [phone] Your Updated Medication List  
  
   
This list is accurate as of 8/27/18  2:43 PM.  Always use your most recent med list.  
  
  
  
  
 ALPRAZolam 0.5 mg tablet Commonly known as:  [place] Take 1 Tab by mouth as needed for Anxiety. cyclobenzaprine 5 mg tablet Commonly known as:  FLEXERIL Take 1 Tab by mouth nightly as needed for Muscle Spasm(s). hydroCHLOROthiazide 25 mg tablet Commonly known as:  HYDRODIURIL Take 1 Tab by mouth daily. Indications: hypertension  
  
 ibuprofen 800 mg tablet Commonly known as:  MOTRIN  
as needed. Teijlaag6-Gsyrgu1-Wfquc therm. 112.5 billion cell Cap Take 1 Cap by mouth daily. Omeprazole delayed release 20 mg tablet Commonly known as:  PRILOSEC D/R Take 1 Tab by mouth daily. We Performed the Following CBC WITH AUTOMATED DIFF [75950 CPT(R)] IRON PROFILE [id number] CPT(R)] VITAMIN B12 [id number] CPT(R)] VITAMIN D, 25 HYDROXY [id number] CPT(R)] Follow-up Instructions Return if symptoms worsen or fail to improve. Patient Instructions Fatigue: Care Instructions Your Care Instructions Fatigue is a feeling of tiredness, exhaustion, or lack of energy. You may feel fatigue because of too much or not enough activity. It can also come from stress, lack of sleep, boredom, and poor diet. Many medical problems, such as viral infections, can cause fatigue. Emotional problems, especially depression, are often the cause of fatigue. Fatigue is most often a symptom of another problem.  Treatment for fatigue depends on the cause. For example, if you have fatigue because you have a certain health problem, treating this problem also treats your fatigue. If depression or anxiety is the cause, treatment may help. Follow-up care is a key part of your treatment and safety. Be sure to make and go to all appointments, and call your doctor if you are having problems. It's also a good idea to know your test results and keep a list of the medicines you take. How can you care for yourself at home? · Get regular exercise. But don't overdo it. Go back and forth between rest and exercise. · Get plenty of rest. 
· Eat a healthy diet. Do not skip meals, especially breakfast. 
· Reduce your use of caffeine, tobacco, and alcohol. Caffeine is most often found in coffee, tea, cola drinks, and chocolate. · Limit medicines that can cause fatigue. This includes tranquilizers and cold and allergy medicines. When should you call for help? Watch closely for changes in your health, and be sure to contact your doctor if: 
  · You have new symptoms such as fever or a rash.  
  · Your fatigue gets worse.  
  · You have been feeling down, depressed, or hopeless. Or you may have lost interest in things that you usually enjoy.  
  · You are not getting better as expected. Where can you learn more? Go to http://srini-jeny.info/. Enter U355 in the search box to learn more about \"Fatigue: Care Instructions. \" Current as of: November 20, 2017 Content Version: 11.7 © 6305-5311 MC10, IDRI ([employer]fect[employer]). Care instructions adapted under license by [employer] (which disclaims liability or warranty for this information). If you have questions about a medical condition or this instruction, always ask your healthcare professional. [address] any warranty or liability for your use of this information. Introducing [facility] & HEALTH SERVICES! Dear Jimenez Cross: Thank you for requesting a [employer] account. Our records indicate that you already have an active [employer] account. You can access your account anytime at [email or web address] Did you know that you can access your hospital and ER discharge instructions at any time in [employer]? You can also review all of your test results from your hospital stay or ER visit. Additional Information If you have questions, please visit the Frequently Asked Questions section of the [employer] website at [email or web address] Remember, [employer]ks is NOT to be used for urgent needs. For medical emergencies, dial 911. Now available from your iPhone and Android! Please provide this summary of care documentation to your next provider. Your primary care clinician is listed as Maryellen Denney. If you have any questions after today's visit, please call [phone].

## 2018-08-27 NOTE — PROGRESS NOTES
Fly Brown  34 y.o. female  1985  [address] [address]  [id number]     [id number]: Progress Note       Encounter Date: 8/27/2018    Chief Complaint   Patient presents with    Fatigue     2 weeks       History provided by patient  History of Present Illness   Fly Brown is a 34 y.o. female who presents to clinic today for:    Fatigue  Patient present with cc of fatigue x 2 weeks. Patient reports that she wakes up feeling light headed and not-rested. Denies drowsy driving. Endorses that she will take multiple naps through the day as well as falling asleep the instance she lays in bed. Sleeps on average 8 hours per night. Patient is uncertain if she snores. + moodiness and daytime headaches. Review of Systems   Review of Systems   Constitutional: Negative for chills and fever. Gastrointestinal: Negative for abdominal pain, constipation, diarrhea, nausea and vomiting. Skin: Negative for itching and rash. Neurological: Positive for dizziness and headaches. Negative for sensory change, focal weakness and seizures. Psychiatric/Behavioral: Negative for depression, hallucinations and suicidal ideas. The patient is not nervous/anxious and does not have insomnia. Vitals/Objective:     Vitals:    08/27/18 1418   BP: 133/84   Pulse: 64   Resp: 20   Temp: 98.3 °F (36.8 °C)   TempSrc: Oral   SpO2: 99%   Weight: 232 lb (105.2 kg)   Height: 5' 8\" (1.727 m)     Body mass index is 35.28 kg/(m^2). Wt Readings from Last 3 Encounters:   08/27/18 232 lb (105.2 kg)   07/03/18 223 lb 12.8 oz (101.5 kg)   06/20/18 229 lb 9.6 oz (104.1 kg)       Physical Exam   Constitutional: She is oriented to person, place, and time. She appears well-developed and well-nourished. HENT:   Head: Normocephalic and atraumatic. Cardiovascular: Normal rate and regular rhythm. No murmur heard.   Pulmonary/Chest: Effort normal and breath sounds normal.   Neurological: She is alert and oriented to person, place, and time. Skin: Skin is warm and dry. No results found for this or any previous visit (from the past 24 hour(s)). Assessment and Plan:     Encounter Diagnoses     ICD-10-CM ICD-9-CM   1. Fatigue, unspecified type R53.83 780.79       1. Fatigue, unspecified type  Will check lab values as well as sleep study for PAKO. - CBC WITH AUTOMATED DIFF  - IRON PROFILE  - VITAMIN D, 25 HYDROXY  - VITAMIN B12    I have discussed the diagnosis with the patient and the intended plan as seen in the above orders. she has expressed understanding. The patient has received an after-visit summary and questions were answered concerning future plans. I have discussed medication side effects and warnings with the patient as well. Electronically Signed: Jason Flower MD     History/Allergies   Patients past medical, surgical and family histories were reviewed and updated. Past Medical History:   Diagnosis Date    Abnormal Pap smear 2010    FU with colpo, negative per patient    Cancer ([facility] [address]     nodular lymphocytic hodgkins lymphoma    Chest pain     KAMERON I (cervical intraepithelial neoplasia I) 9/2/2011    Depression 5/12/2015    Encounter for IUD insertion 3/5/15    Mirena    Encounter for IUD removal 06/24/2016    Essential hypertension     Hypertension     Implanon removal 9/6/2013    Nexplanon insertion 02/09/2017    Pap smear for cervical cancer screening 11/11/15    Negative    Trouble in sleeping       Past Surgical History:   Procedure Laterality Date    HX COLPOSCOPY  2010    per pt WNL    HX WISDOM TEETH EXTRACTION       Family History   Problem Relation Age of Onset    HIV/AIDS Mother     Hypertension Maternal Aunt     Hypertension Maternal Grandmother      Social History     Social History    Marital status:      Spouse name: N/A    Number of children: N/A    Years of education: N/A     Occupational History    Not on file.      Social History Main Topics    Smoking status: Never Smoker    Smokeless tobacco: Never Used    Alcohol use Yes      Comment: occassionally    Drug use: No    Sexual activity: Yes     Partners: Male     Birth control/ protection: Condom, Implant     Other Topics Concern    Not on file     Social History Narrative         Allergies   Allergen Reactions    Clonidine (Pf) Vertigo       Disposition     Follow-up Disposition:  Return if symptoms worsen or fail to improve. Future Appointments  Date Time Provider Ann Stone   9/12/2018 9:30 AM Juan Pablo Valles MD [facility]   1/3/2019 9:30 AM [facility]            Current Medications after this visit     Current Outpatient Prescriptions   Medication Sig    hydroCHLOROthiazide (HYDRODIURIL) 25 mg tablet Take 1 Tab by mouth daily. Indications: hypertension    cyclobenzaprine (FLEXERIL) 5 mg tablet Take 1 Tab by mouth nightly as needed for Muscle Spasm(s).  ibuprofen (MOTRIN) 800 mg tablet as needed.  Omeprazole delayed release (PRILOSEC D/R) 20 mg tablet Take 1 Tab by mouth daily.  Rjljkqnd8-Gmtuuq5-Zzice therm. 112.5 billion cell cap Take 1 Cap by mouth daily.  ALPRAZolam (XANAX) 0.5 mg tablet Take 1 Tab by mouth as needed for Anxiety. No current facility-administered medications for this visit.       Medications Discontinued During This Encounter   Medication Reason    metroNIDAZOLE (METROGEL) 0.75 % vaginal gel Therapy Completed

## 2018-08-27 NOTE — PATIENT INSTRUCTIONS
Fatigue: Care Instructions  Your Care Instructions    Fatigue is a feeling of tiredness, exhaustion, or lack of energy. You may feel fatigue because of too much or not enough activity. It can also come from stress, lack of sleep, boredom, and poor diet. Many medical problems, such as viral infections, can cause fatigue. Emotional problems, especially depression, are often the cause of fatigue. Fatigue is most often a symptom of another problem. Treatment for fatigue depends on the cause. For example, if you have fatigue because you have a certain health problem, treating this problem also treats your fatigue. If depression or anxiety is the cause, treatment may help. Follow-up care is a key part of your treatment and safety. Be sure to make and go to all appointments, and call your doctor if you are having problems. It's also a good idea to know your test results and keep a list of the medicines you take. How can you care for yourself at home? · Get regular exercise. But don't overdo it. Go back and forth between rest and exercise. · Get plenty of rest.  · Eat a healthy diet. Do not skip meals, especially breakfast.  · Reduce your use of caffeine, tobacco, and alcohol. Caffeine is most often found in coffee, tea, cola drinks, and chocolate. · Limit medicines that can cause fatigue. This includes tranquilizers and cold and allergy medicines. When should you call for help? Watch closely for changes in your health, and be sure to contact your doctor if:    · You have new symptoms such as fever or a rash.     · Your fatigue gets worse.     · You have been feeling down, depressed, or hopeless. Or you may have lost interest in things that you usually enjoy.     · You are not getting better as expected. Where can you learn more? Go to http://srini-jeny.info/. Enter M688 in the search box to learn more about \"Fatigue: Care Instructions. \"  Current as of: November 20, 2017  Content Version: 11.7  © 5914-3535 [employer]. Care instructions adapted under license by [employer] (which disclaims liability or warranty for this information). If you have questions about a medical condition or this instruction, always ask your healthcare professional. [address] any warranty or liability for your use of this information.

## 2018-08-28 LAB
25(OH)D3+25(OH)D2 SERPL-MCNC: 27.5 NG/ML (ref 30–100)
BASOPHILS # BLD AUTO: 0 X10E3/UL (ref 0–0.2)
BASOPHILS NFR BLD AUTO: 1 %
EOSINOPHIL # BLD AUTO: 0.1 X10E3/UL (ref 0–0.4)
EOSINOPHIL NFR BLD AUTO: 1 %
ERYTHROCYTE [DISTWIDTH] IN BLOOD BY AUTOMATED COUNT: 12.9 % (ref 12.3–15.4)
HCT VFR BLD AUTO: 35.3 % (ref 34–46.6)
HGB BLD-MCNC: 12.2 G/DL (ref 11.1–15.9)
IMM GRANULOCYTES # BLD: 0 X10E3/UL (ref 0–0.1)
IMM GRANULOCYTES NFR BLD: 0 %
IRON SATN MFR SERPL: 30 % (ref 15–55)
IRON SERPL-MCNC: 85 UG/DL (ref 27–159)
LYMPHOCYTES # BLD AUTO: 1.6 X10E3/UL (ref 0.7–3.1)
LYMPHOCYTES NFR BLD AUTO: 28 %
MCH RBC QN AUTO: 30 PG (ref 26.6–33)
MCHC RBC AUTO-ENTMCNC: 34.6 G/DL (ref 31.5–35.7)
MCV RBC AUTO: 87 FL (ref 79–97)
MONOCYTES # BLD AUTO: 0.4 X10E3/UL (ref 0.1–0.9)
MONOCYTES NFR BLD AUTO: 7 %
NEUTROPHILS # BLD AUTO: 3.6 X10E3/UL (ref 1.4–7)
NEUTROPHILS NFR BLD AUTO: 63 %
PLATELET # BLD AUTO: 302 X10E3/UL (ref 150–379)
RBC # BLD AUTO: 4.07 X10E6/UL (ref 3.77–5.28)
TIBC SERPL-MCNC: 284 UG/DL (ref 250–450)
UIBC SERPL-MCNC: 199 UG/DL (ref 131–425)
VIT B12 SERPL-MCNC: 549 PG/ML (ref 232–1245)
WBC # BLD AUTO: 5.6 X10E3/UL (ref 3.4–10.8)

## 2018-08-31 RX ORDER — OMEPRAZOLE 20 MG/1
CAPSULE, DELAYED RELEASE ORAL
Qty: 30 CAP | Refills: 0 | Status: SHIPPED | OUTPATIENT
Start: 2018-08-31 | End: 2018-09-12

## 2018-09-05 ENCOUNTER — TELEPHONE (OUTPATIENT)
Dept: ONCOLOGY | Age: 33
End: 2018-09-05

## 2018-09-05 NOTE — TELEPHONE ENCOUNTER
[address] Leonel Little at [facility]  [phone]    09/05/18- Phone call placed to pt to remind pt to have labs drawn prior to her follow up appointment with .  left for patient.

## 2018-09-11 LAB
ALBUMIN SERPL-MCNC: 4.2 G/DL (ref 3.5–5.5)
ALBUMIN/GLOB SERPL: 1.7 {RATIO} (ref 1.2–2.2)
ALP SERPL-CCNC: 78 IU/L (ref 39–117)
ALT SERPL-CCNC: 16 IU/L (ref 0–32)
AST SERPL-CCNC: 25 IU/L (ref 0–40)
BASOPHILS # BLD AUTO: 0 X10E3/UL (ref 0–0.2)
BASOPHILS NFR BLD AUTO: 1 %
BILIRUB SERPL-MCNC: 0.2 MG/DL (ref 0–1.2)
BUN SERPL-MCNC: 10 MG/DL (ref 6–20)
BUN/CREAT SERPL: 12 (ref 9–23)
CALCIUM SERPL-MCNC: 9.5 MG/DL (ref 8.7–10.2)
CHLORIDE SERPL-SCNC: 104 MMOL/L (ref 96–106)
CO2 SERPL-SCNC: 23 MMOL/L (ref 20–29)
CREAT SERPL-MCNC: 0.85 MG/DL (ref 0.57–1)
EOSINOPHIL # BLD AUTO: 0.1 X10E3/UL (ref 0–0.4)
EOSINOPHIL NFR BLD AUTO: 1 %
ERYTHROCYTE [DISTWIDTH] IN BLOOD BY AUTOMATED COUNT: 13.1 % (ref 12.3–15.4)
ERYTHROCYTE [SEDIMENTATION RATE] IN BLOOD BY WESTERGREN METHOD: 6 MM/HR (ref 0–32)
GLOBULIN SER CALC-MCNC: 2.5 G/DL (ref 1.5–4.5)
GLUCOSE SERPL-MCNC: 73 MG/DL (ref 65–99)
HCT VFR BLD AUTO: 37.4 % (ref 34–46.6)
HGB BLD-MCNC: 12.8 G/DL (ref 11.1–15.9)
IMM GRANULOCYTES # BLD: 0 X10E3/UL (ref 0–0.1)
IMM GRANULOCYTES NFR BLD: 0 %
LYMPHOCYTES # BLD AUTO: 1.7 X10E3/UL (ref 0.7–3.1)
LYMPHOCYTES NFR BLD AUTO: 32 %
MCH RBC QN AUTO: 30 PG (ref 26.6–33)
MCHC RBC AUTO-ENTMCNC: 34.2 G/DL (ref 31.5–35.7)
MCV RBC AUTO: 88 FL (ref 79–97)
MONOCYTES # BLD AUTO: 0.4 X10E3/UL (ref 0.1–0.9)
MONOCYTES NFR BLD AUTO: 8 %
NEUTROPHILS # BLD AUTO: 3.2 X10E3/UL (ref 1.4–7)
NEUTROPHILS NFR BLD AUTO: 58 %
PLATELET # BLD AUTO: 319 X10E3/UL (ref 150–379)
POTASSIUM SERPL-SCNC: 3.7 MMOL/L (ref 3.5–5.2)
PROT SERPL-MCNC: 6.7 G/DL (ref 6–8.5)
RBC # BLD AUTO: 4.27 X10E6/UL (ref 3.77–5.28)
SODIUM SERPL-SCNC: 142 MMOL/L (ref 134–144)
WBC # BLD AUTO: 5.4 X10E3/UL (ref 3.4–10.8)

## 2018-09-12 ENCOUNTER — OFFICE VISIT (OUTPATIENT)
Dept: ONCOLOGY | Age: 33
End: 2018-09-12

## 2018-09-12 VITALS
TEMPERATURE: 95.8 F | RESPIRATION RATE: 14 BRPM | WEIGHT: 229.8 LBS | DIASTOLIC BLOOD PRESSURE: 70 MMHG | OXYGEN SATURATION: 98 % | HEART RATE: 64 BPM | HEIGHT: 68 IN | BODY MASS INDEX: 34.83 KG/M2 | SYSTOLIC BLOOD PRESSURE: 117 MMHG

## 2018-09-12 DIAGNOSIS — C81.01 NODULAR LYMPHOCYTE PREDOMINANT HODGKIN LYMPHOMA OF LYMPH NODES OF NECK (HCC): Primary | ICD-10-CM

## 2018-09-12 NOTE — PROGRESS NOTES
[facility] [place] at [address], [address] [facility] [[email or web address]] 100[address] [facility]  W: [phone]  F: [phone]      Reason for Visit:   Kirsten Kaplan is a 34 y.o. female who is seen for follow up of lymphoma. Treatment History:   · US neck 5/26/2016: 2.5cm right posterior auricular mass  · US guided biopsy cervical node 6/30/2016: suspicious for large b-cell lymphoma  · CT Neck/C/A/P 7/25/2016: Enlarged right level 5 lymph node measuring 2 x 1 cm and slightly inferior to this there is a slightly enlarged lymph node measuring 1.2 x 0.8 cm. There is a slightly enlarged right level 2 lymph node measuring 1.3 x 0.9 cm. There is a slightly enlarged left level 2 lymph node measuring 1.7 x 1.1 cm. Lymph nodes more inferior at level 3 and level 4 are  normal in size. No supraclavicular adenopathy is identified. No adenopathy in chest, abdomen, or pelvis  · PET-CT 7/27/2016: No areas of hypermetabolic activity  · Excisional biopsy of right level 2 cervical node by Dr. 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 Courtney Hobbs completed 10/10/2016    History of Present Illness:   Presents today for follow up. Feeling well. No new lumps or bumps. No fever or chills. No night sweats or cold chills. Working 5 days in a row, 12 hour shifts, then 5 days off as . Has children 4, 5 and 11, all in school now. 3year old in [place]. Endorses fatigue that is more than normal for her with her work and home commitments. Seen by PCP and recently diagnosed with Vit D deficiency. On daily supplements. PAST HISTORY: The following sections were reviewed and updated in the EMR as appropriate: PMH, SH, FH, Medications, Allergies.       Allergies   Allergen Reactions    Clonidine (Pf) Vertigo      Review of Systems: A complete review of systems was obtained, reviewed, and scanned into the EMR. Pertinent findings reviewed above. Physical Exam:     Visit Vitals    /70 (BP 1 Location: Right arm, BP Patient Position: Sitting)    Pulse 64    Temp 95.8 °F (35.4 °C) (Temporal)    Resp 14    Ht 5' 8\" (1.727 m)    Wt 229 lb 12.8 oz (104.2 kg)    SpO2 98%    BMI 34.94 kg/m2     ECOG PS: 0  General: No distress  Eyes: PERRLA, anicteric sclerae  HENT: Atraumatic, OP clear  Neck: Supple  Lymphatic: No palpable cervical, supraclavicular, or inguinal adenopathy. Respiratory: CTAB, normal respiratory effort  CV: Normal rate, regular rhythm, no murmurs, no peripheral edema  GI: Soft, nontender, nondistended, no masses, no hepatomegaly, no splenomegaly  MS: Normal gait and station. Digits without clubbing or cyanosis. Skin: No rashes, ecchymoses, or petechiae. Normal temperature, turgor, and texture. Psych: Alert, oriented, appropriate affect, normal judgment/insight    Results:     Lab Results   Component Value Date/Time    WBC 5.4 09/10/2018 12:33 PM    HGB 12.8 09/10/2018 12:33 PM    HCT 37.4 09/10/2018 12:33 PM    PLATELET 567 73/22/9849 12:33 PM    MCV 88 09/10/2018 12:33 PM    ABS. NEUTROPHILS 3.2 09/10/2018 12:33 PM    Hgb, External 12.3 02/07/2014    Hct, External 35.6 02/07/2014    Platelet cnt., External 272 02/07/2014     Lab Results   Component Value Date/Time    Sodium 142 09/10/2018 12:33 PM    Potassium 3.7 09/10/2018 12:33 PM    Chloride 104 09/10/2018 12:33 PM    CO2 23 09/10/2018 12:33 PM    Glucose 73 09/10/2018 12:33 PM    BUN 10 09/10/2018 12:33 PM    Creatinine 0.85 09/10/2018 12:33 PM    GFR est  09/10/2018 12:33 PM    GFR est non-AA 92 09/10/2018 12:33 PM    Calcium 9.5 09/10/2018 12:33 PM    Glucose POC 76 11/03/2017 01:35 PM     Lab Results   Component Value Date/Time    Bilirubin, total 0.2 09/10/2018 12:33 PM    ALT (SGPT) 16 09/10/2018 12:33 PM    AST (SGOT) 25 09/10/2018 12:33 PM    Alk.  phosphatase 78 09/10/2018 12:33 PM    Protein, total 6.7 09/10/2018 12:33 PM    Albumin 4.2 09/10/2018 12:33 PM    Globulin 3.4 05/17/2017 11:56 AM     Sed rate (ESR) (mm/hr)   Date Value   09/10/2018 6   05/07/2018 5   12/12/2017 8   08/28/2017 3   02/13/2017 6     Sed rate, automated (mm/hr)   Date Value   05/17/2017 12   11/11/2016 34 (H)   08/10/2016 44 (H)         CT N/C/A/P 12/4/2017: no evidence of recurrence    Assessment:   1) Nodular lymphocyte predominant Hodgkin Lymphoma  Stage IIA  She is s/p radiation with Dr. Jos Florez completed 10/2016, with resolution of cervical adenopathy on post-treatment CT scan. She is now in remission. She continues feeling well and is without evidence of disease recurrence at this time, based on history and exam. We will continue to follow with surveillance. Follow up after completion of therapy for Hodgkin Lymphoma, per NCCN:  · Year 1&2: H&P every 3-6 months  · Year 3: H&P every 6-12 months  · Year 4+: H&P every 12 months  · CBC, CMP, ESR as indicated  · TSH annually if RT to neck  · CT at 6, 12, and 24 months, or as clinically indicated  · Annual influenza vaccine    Confirms she is on birth control, Implanon. She does not desire to extend her family. 2) Pelvic pain, intermittent  Chronic and intermittent. Will evaluate on imaging. 3) Vit D deficiency  Continue supplements as prescribed by PCP    4) Fatigue  Improved with Vit D replacement. Will check thyroid levels with next labs. 5) Health maintenance  Encouraged flu vaccine in the fall. She typically does not receive. Plan:     · Labs in 3 months: CBC, CMP, ESR, TSH ([facility])   · CT N/C/A/P due 12/2018, ordered  · Return to clinic in 3 months with imaging    Patient was seen in conjunction with Anna Patel NP.     Signed By: Esau Bustamante MD

## 2018-09-12 NOTE — PROGRESS NOTES
Ovi Rodriguez is a 34 y.o. female      Chief Complaint   Patient presents with    Lymphoma       1. Have you been to the ER, urgent care clinic since your last visit? Hospitalized since your last visit? No    2. Have you seen or consulted any other health care providers outside of the [address] [place] [address] since your last visit? Include any pap smears or colon screening.  No

## 2018-09-28 RX ORDER — HYDROCHLOROTHIAZIDE 25 MG/1
TABLET ORAL
Qty: 90 TAB | Refills: 0 | Status: SHIPPED | OUTPATIENT
Start: 2018-09-28 | End: 2019-01-03 | Stop reason: SDUPTHER

## 2018-09-28 RX ORDER — OMEPRAZOLE 20 MG/1
CAPSULE, DELAYED RELEASE ORAL
Qty: 30 CAP | Refills: 2 | Status: SHIPPED | OUTPATIENT
Start: 2018-09-28 | End: 2019-09-06 | Stop reason: SDUPTHER

## 2018-12-05 ENCOUNTER — OFFICE VISIT (OUTPATIENT)
Dept: FAMILY MEDICINE CLINIC | Age: 33
End: 2018-12-05

## 2018-12-05 VITALS
SYSTOLIC BLOOD PRESSURE: 126 MMHG | RESPIRATION RATE: 18 BRPM | HEART RATE: 68 BPM | BODY MASS INDEX: 34.25 KG/M2 | TEMPERATURE: 98.1 F | HEIGHT: 68 IN | DIASTOLIC BLOOD PRESSURE: 79 MMHG | WEIGHT: 226 LBS | OXYGEN SATURATION: 100 %

## 2018-12-05 DIAGNOSIS — L73.9 FOLLICULITIS: ICD-10-CM

## 2018-12-05 DIAGNOSIS — H92.01 OTALGIA OF RIGHT EAR: Primary | ICD-10-CM

## 2018-12-05 NOTE — PROGRESS NOTES
1. Have you been to the ER, urgent care clinic, or been hospitalized since your last visit? No     2. Have you seen or consulted any other health care providers outside of the [address] [place] [address] since your last visit?  No     Reviewed record in preparation for visit and have necessary documentation  Opportunity was given for questions  Goals that were addressed and/or need to be completed during or after this appointment include     Health Maintenance Due   Topic Date Due    Pneumococcal 19-64 Highest Risk (2 of 3 - PCV13) 12/15/2017    Influenza Age 9 to Adult  08/01/2018    PAP AKA CERVICAL CYTOLOGY  11/11/2018

## 2018-12-05 NOTE — PATIENT INSTRUCTIONS
Earache: Care Instructions  Your Care Instructions    Even though infection is a common cause of ear pain, not all ear pain means an infection. If you have ear pain and don't have an infection, it could be because of a jaw problem, such as temporomandibular joint (TMJ) pain. Or it could be because of a neck problem. When ear discomfort or pain is mild or comes and goes without other symptoms, home treatment may be all you need. Follow-up care is a key part of your treatment and safety. Be sure to make and go to all appointments, and call your doctor if you are having problems. It's also a good idea to know your test results and keep a list of the medicines you take. How can you care for yourself at home? · Apply heat on the ear to ease pain. To apply heat, put a warm water bottle, a heating pad set on low, or a warm cloth on your ear. Do not go to sleep with a heating pad on your skin. · Take an over-the-counter pain medicine, such as acetaminophen (Tylenol), ibuprofen (Advil, Motrin), or naproxen (Aleve). Be safe with medicines. Read and follow all instructions on the label. · Do not take two or more pain medicines at the same time unless the doctor told you to. Many pain medicines have acetaminophen, which is Tylenol. Too much acetaminophen (Tylenol) can be harmful. · Never insert anything, such as a cotton swab or a sincere pin, into the ear. When should you call for help? Call your doctor now or seek immediate medical care if:    · You have new or worse symptoms of infection, such as:  ? Increased pain, swelling, warmth, or redness. ? Red streaks leading from the area. ? Pus draining from the area. ? A fever.    Watch closely for changes in your health, and be sure to contact your doctor if:    · You have new or worse discharge coming from the ear.     · You do not get better as expected. Where can you learn more? Go to http://srini-jeny.info/.   Enter B323 in the search box to learn more about \"Earache: Care Instructions. \"  Current as of: March 28, 2018  Content Version: 11.8  © 6586-4480 [employer]. Care instructions adapted under license by [employer] (which disclaims liability or warranty for this information). If you have questions about a medical condition or this instruction, always ask your healthcare professional. [address] any warranty or liability for your use of this information. Folliculitis: Care Instructions  Your Care Instructions    Folliculitis (say \"ctx-OZA-ent-LY-tus\") is an infection of the pouches (follicles) in the skin where hair grows. It can occur on any part of the body, but it is most common on the scalp, face, armpits, and groin. Bacteria, such as those found in a hot tub, can cause folliculitis. Folliculitis begins as a red, tender area near a strand of hair. The skin can itch or burn and may drain pus or blood. Sometimes folliculitis can lead to more serious skin infections. For stubborn cases of folliculitis, laser treatment may be an option. Laser treatment uses strong beams of light to destroy the hair follicle. But hair will no longer grow in the treated area. Follow-up care is a key part of your treatment and safety. Be sure to make and go to all appointments, and call your doctor if you are having problems. It's also a good idea to know your test results and keep a list of the medicines you take. How can you care for yourself at home? · Take your medicine exactly as prescribed. If your doctor prescribed antibiotics, take them as directed. Do not stop taking them just because you feel better. You need to take the full course of antibiotics. · Use a soap that kills bacteria to wash the infected area. If your scalp or beard is infected, use a shampoo with selenium or propylene glycol. Be careful. Do not scrub too long or too hard. · Mix 1 1/3 cup warm water and 1 tablespoon vinegar. Soak a cloth in the mixture, and place it over the infected skin until it cools off (usually 5 to 10 minutes). You can do this 3 to 6 times a day. · Do not share your razor, towel, or washcloth. That can spread folliculitis. · Use a new blade in your razor each time you shave to keep from re-infecting your skin. · If you tend to get folliculitis, avoid using hot tubs. They can contain bacteria that cause folliculitis. When should you call for help? Call your doctor now or seek immediate medical care if:    · You have symptoms of infection, such as:  ? Increased pain, swelling, warmth, or redness. ? Red streaks leading from the area. ? Pus draining from the area. ? A fever.    Watch closely for changes in your health, and be sure to contact your doctor if:    · You do not get better as expected. Where can you learn more? Go to http://srini-jeny.info/. Enter M257 in the search box to learn more about \"Folliculitis: Care Instructions. \"  Current as of: April 18, 2018  Content Version: 11.8  © 6615-7397 [employer]. Care instructions adapted under license by [employer] (which disclaims liability or warranty for this information). If you have questions about a medical condition or this instruction, always ask your healthcare professional. [address] any warranty or liability for your use of this information.

## 2018-12-05 NOTE — PROGRESS NOTES
Lois Skinner  34 y.o. female  1985  [address]  [address] [address]  [phone]     [id number]: Progress Note       Encounter Date: 12/5/2018    Chief Complaint   Patient presents with    Ear Pain     Right ear    Skin Problem     bump on right inner thigh and vaginal odor       History provided by patient  History of Present Illness   Lois Skinner is a 34 y.o. female who presents to clinic today for:    Right ear  Patient present with cc of right ear pain x 3 days. Pain described as throbbing and radiating down behind ear to neck. Patient reports that she had difficulty hearing from her left ear while wearing ear buds. Denies fever, chills, drainage. Skin Problem  Patient noted bump on right inner thigh several days ago. It has gradually increased in size. Patient notes pains only when friction irritates the area. Denies redness, drainage from the area. Health Maintenance  Health Maintenance Due   Topic Date Due    Pneumococcal 19-64 Highest Risk (2 of 3 - PCV13) 12/15/2017    PAP AKA CERVICAL CYTOLOGY  11/11/2018     Review of Systems   Review of Systems   Constitutional: Negative for fever. HENT: Positive for ear pain. Negative for ear discharge and sinus pain. Respiratory: Negative for cough, hemoptysis, sputum production, shortness of breath and stridor. Cardiovascular: Negative for chest pain and palpitations. Gastrointestinal: Negative for abdominal pain, constipation, diarrhea, nausea and vomiting. Skin: Negative for itching and rash. Neurological: Negative for dizziness and headaches. Vitals/Objective:     Vitals:    12/05/18 1318   BP: 126/79   Pulse: 68   Resp: 18   Temp: 98.1 °F (36.7 °C)   TempSrc: Oral   SpO2: 100%   Weight: 226 lb (102.5 kg)   Height: 5' 8\" (1.727 m)     Body mass index is 34.36 kg/m².     Wt Readings from Last 3 Encounters:   12/05/18 226 lb (102.5 kg)   09/12/18 229 lb 12.8 oz (104.2 kg)   08/27/18 232 lb (105.2 kg)       Physical Exam   Constitutional: She is oriented to person, place, and time. She appears well-developed and well-nourished. HENT:   Head: Normocephalic and atraumatic. Right Ear: Tympanic membrane, external ear and ear canal normal.   Left Ear: Tympanic membrane, external ear and ear canal normal.   Cardiovascular: Normal rate and regular rhythm. Pulmonary/Chest: Effort normal and breath sounds normal.   Neurological: She is alert and oriented to person, place, and time. No results found for this or any previous visit (from the past 24 hour(s)). Assessment and Plan:     Encounter Diagnoses     ICD-10-CM ICD-9-CM   1. Otalgia of right ear H92.01 388.70   2. Folliculitis K76.3 481.7       1. Otalgia of right ear    2. Folliculitis  Advised warm compresses and to call if area increases in size for does not resolve. I have discussed the diagnosis with the patient and the intended plan as seen in the above orders. she has expressed understanding. The patient has received an after-visit summary and questions were answered concerning future plans. I have discussed medication side effects and warnings with the patient as well. Electronically Signed: Michelle Maya MD     History/Allergies   Patients past medical, surgical and family histories were reviewed and updated.     Past Medical History:   Diagnosis Date    Abnormal Pap smear 2010    FU with colpo, negative per patient    Cancer ([facility] [address]     nodular lymphocytic hodgkins lymphoma    Chest pain     KAMERON I (cervical intraepithelial neoplasia I) 9/2/2011    Depression 5/12/2015    Encounter for IUD insertion 3/5/15    Mirena    Encounter for IUD removal 06/24/2016    Essential hypertension     Hypertension     Implanon removal 9/6/2013    Nexplanon insertion 02/09/2017    Pap smear for cervical cancer screening 11/11/15    Negative    Trouble in sleeping       Past Surgical History:   Procedure Laterality Date    HX COLPOSCOPY  2010    per pt WNL    HX WISDOM TEETH EXTRACTION       Family History   Problem Relation Age of Onset    HIV/AIDS Mother     Hypertension Maternal Aunt     Hypertension Maternal Grandmother      Social History     Socioeconomic History    Marital status:      Spouse name: Not on file    Number of children: Not on file    Years of education: Not on file    Highest education level: Not on file   Social Needs    Financial resource strain: Not on file    Food insecurity - worry: Not on file    Food insecurity - inability: Not on file   [employer] needs - medical: Not on file   [employer] needs - non-medical: Not on file   Occupational History    Not on file   Tobacco Use    Smoking status: Never Smoker    Smokeless tobacco: Never Used   Substance and Sexual Activity    Alcohol use: Yes     Comment: occassionally    Drug use: No    Sexual activity: Yes     Partners: Male     Birth control/protection: Condom, Implant   Other Topics Concern    Not on file   Social History Narrative    Not on file         Allergies   Allergen Reactions    Clonidine (Pf) Vertigo       Disposition     Follow-up Disposition:  Return if symptoms worsen or fail to improve. Future Appointments   Date Time Provider Ann Stone   12/10/2018  9:30 AM [facility]   12/10/2018 10:00 AM [facility] CT 1 [facility]   1/3/2019  9:30 AM Lorna Darling MD [facility]   1/8/2019 10:30 AM Mary Sanders MD [facility] [facility]            Current Medications after this visit     Current Outpatient Medications   Medication Sig    hydroCHLOROthiazide (HYDRODIURIL) 25 mg tablet TAKE 1 TABLET BY MOUTH DAILY    omeprazole (PRILOSEC) 20 mg capsule TAKE ONE CAPSULE BY MOUTH EVERY DAY    ergocalciferol, vitamin D2, (VITAMIN D2 PO) Take  by mouth daily.  cyclobenzaprine (FLEXERIL) 5 mg tablet Take 1 Tab by mouth nightly as needed for Muscle Spasm(s).     ALPRAZolam Yvan Fees) 0.5 mg tablet Take 1 Tab by mouth as needed for Anxiety. No current facility-administered medications for this visit. There are no discontinued medications.

## 2018-12-10 DIAGNOSIS — C81.01 NODULAR LYMPHOCYTE PREDOMINANT HODGKIN LYMPHOMA OF LYMPH NODES OF NECK (HCC): ICD-10-CM

## 2018-12-17 ENCOUNTER — HOSPITAL ENCOUNTER (OUTPATIENT)
Dept: CT IMAGING | Age: 33
Discharge: HOME | End: 2018-12-17
Attending: NURSE PRACTITIONER
Payer: COMMERCIAL

## 2018-12-17 DIAGNOSIS — C81.01 NODULAR LYMPHOCYTE PREDOMINANT HODGKIN LYMPHOMA OF LYMPH NODES OF NECK (HCC): ICD-10-CM

## 2018-12-17 PROCEDURE — 74177 CT ABD & PELVIS W/CONTRAST: CPT

## 2018-12-17 PROCEDURE — 70491 CT SOFT TISSUE NECK W/DYE: CPT

## 2018-12-17 PROCEDURE — 74011636320 HC RX REV CODE- 636/320: Performed by: RADIOLOGY

## 2018-12-17 RX ADMIN — IOPAMIDOL 100 ML: 755 INJECTION, SOLUTION INTRAVENOUS at 12:52

## 2019-01-03 ENCOUNTER — OFFICE VISIT (OUTPATIENT)
Dept: FAMILY MEDICINE CLINIC | Age: 34
End: 2019-01-03

## 2019-01-03 VITALS
RESPIRATION RATE: 16 BRPM | HEART RATE: 64 BPM | HEIGHT: 68 IN | DIASTOLIC BLOOD PRESSURE: 71 MMHG | OXYGEN SATURATION: 100 % | TEMPERATURE: 98.3 F | WEIGHT: 227 LBS | BODY MASS INDEX: 34.4 KG/M2 | SYSTOLIC BLOOD PRESSURE: 122 MMHG

## 2019-01-03 DIAGNOSIS — I10 ESSENTIAL HYPERTENSION: Primary | ICD-10-CM

## 2019-01-03 DIAGNOSIS — H92.01 OTALGIA OF RIGHT EAR: ICD-10-CM

## 2019-01-03 RX ORDER — HYDROCHLOROTHIAZIDE 25 MG/1
25 TABLET ORAL DAILY
Qty: 90 TAB | Refills: 1 | Status: SHIPPED | OUTPATIENT
Start: 2019-01-03 | End: 2019-06-25 | Stop reason: SDUPTHER

## 2019-01-03 NOTE — PROGRESS NOTES
Paula Powell 
34 y.o. female 1985 
[address] [address] 
[id number] [facility]: Progress Note Encounter Date: 1/3/2019 Chief Complaint Patient presents with  Hypertension History provided by patient History of Present Illness Paula Powell is a 34 y.o. female who presents to clinic today for: Hypertension: Stable BP Readings from Last 3 Encounters:  
01/03/19 122/71  
12/05/18 126/79  
09/12/18 117/70 The patient reports:  taking medications as instructed, no medication side effects noted, no TIA's, no chest pain on exertion, no dyspnea on exertion, no swelling of ankles. Home monitoring:No 
Sodium Date Value Ref Range Status 09/10/2018 142 134 - 144 mmol/L Final  
 
Potassium Date Value Ref Range Status 09/10/2018 3.7 3.5 - 5.2 mmol/L Final  
 
Creatinine Date Value Ref Range Status 09/10/2018 0.85 0.57 - 1.00 mg/dL Final  
05/07/2018 0.82 0.57 - 1.00 mg/dL Final  
 
 
Our goal is to normalize the blood pressure to decrease the risks of strokes and heart attacks. The patient is in agreement with the plan. Right ear Patient present with cc of right ear pain x 1 months. No change in pain since she was last seen in December. Pain described as throbbing and radiating down behind ear to neck. Denies fever, chills, drainage. Health Maintenance Has appointment with OB/GYN Health Maintenance Due Topic Date Due  
 PAP AKA CERVICAL CYTOLOGY  11/11/2018 Review of Systems Review of Systems Constitutional: Negative for chills and fever. HENT: Positive for ear pain. Negative for congestion, ear discharge and sinus pain. Respiratory: Negative for cough, shortness of breath and wheezing. Cardiovascular: Negative for chest pain, palpitations, orthopnea and leg swelling. Gastrointestinal: Negative for abdominal pain, constipation, diarrhea, nausea and vomiting. Skin: Negative for itching and rash. Neurological: Negative for dizziness, tingling and headaches. Vitals/Objective:  
 
Vitals:  
 01/03/19 4426 BP: 122/71 Pulse: 64 Resp: 16 Temp: 98.3 °F (36.8 °C) TempSrc: Oral  
SpO2: 100% Weight: 227 lb (103 kg) Height: 5' 8\" (1.727 m) Body mass index is 34.52 kg/m². Wt Readings from Last 3 Encounters:  
01/03/19 227 lb (103 kg) 12/05/18 226 lb (102.5 kg) 09/12/18 229 lb 12.8 oz (104.2 kg) Physical Exam  
Constitutional: She appears well-developed and well-nourished. HENT:  
Head: Normocephalic and atraumatic. Right Ear: Tympanic membrane, external ear and ear canal normal.  
Left Ear: Tympanic membrane, external ear and ear canal normal.  
Mouth/Throat: Oropharynx is clear and moist.  
Eyes: Conjunctivae are normal. Right eye exhibits no discharge. Left eye exhibits no discharge. Cardiovascular: Normal rate and regular rhythm. Pulmonary/Chest: Effort normal and breath sounds normal.  
  
 
No results found for this or any previous visit (from the past 24 hour(s)). Assessment and Plan:  
 
Encounter Diagnoses ICD-10-CM ICD-9-CM 1. Essential hypertension I10 401.9 2. Otalgia of right ear H92.01 388.70 1. Essential hypertension Well controlled. Blood work pending for heme/Onc.  
- LIPID PANEL 
- hydroCHLOROthiazide (HYDRODIURIL) 25 mg tablet; Take 1 Tab by mouth daily. Dispense: 90 Tab; Refill: 1 
 
2. Otalgia of right ear Advised trial of sinus rinses. I have discussed the diagnosis with the patient and the intended plan as seen in the above orders. she has expressed understanding. The patient has received an after-visit summary and questions were answered concerning future plans. I have discussed medication side effects and warnings with the patient as well. Electronically Signed: Darrel Willis MD 
  
History/Allergies Patients past medical, surgical and family histories were reviewed and updated. Past Medical History:  
Diagnosis Date  Abnormal Pap smear 2010 FU with colpo, negative per patient  Cancer ([facility] [address]   
 nodular lymphocytic hodgkins lymphoma  Chest pain  KAMERON I (cervical intraepithelial neoplasia I) 9/2/2011  Depression 5/12/2015  Encounter for IUD insertion 3/5/15 Mirena  Encounter for IUD removal 06/24/2016  Essential hypertension  Hypertension  Implanon removal 9/6/2013  Nexplanon insertion 02/09/2017  Pap smear for cervical cancer screening 11/11/15 Negative  Trouble in sleeping Past Surgical History:  
Procedure Laterality Date  HX COLPOSCOPY  2010  
 per pt WNL  HX WISDOM TEETH EXTRACTION Family History Problem Relation Age of Onset  HIV/AIDS Mother  Hypertension Maternal Aunt  Hypertension Maternal Grandmother Social History Socioeconomic History  Marital status:  Spouse name: Not on file  Number of children: Not on file  Years of education: Not on file  Highest education level: Not on file Social Needs  Financial resource strain: Not on file  Food insecurity - worry: Not on file  Food insecurity - inability: Not on file  Transportation needs - medical: Not on file  Transportation needs - non-medical: Not on file Occupational History  Not on file Tobacco Use  Smoking status: Never Smoker  Smokeless tobacco: Never Used Substance and Sexual Activity  Alcohol use: Yes Comment: occassionally  Drug use: No  
 Sexual activity: Yes  
  Partners: Male Birth control/protection: Condom, Implant Other Topics Concern  Not on file Social History Narrative  Not on file Allergies Allergen Reactions  Clonidine (Pf) Vertigo Disposition Follow-up Disposition: 
Return in about 6 months (around 7/3/2019) for Routine with blood work. Future Appointments Date Time Provider Ann Stone 1/8/2019 10:30 AM Mary Sanders MD [facility] SCHED  
1/16/2019 10:40 AM Ben Dubose MD [address] [place] [address] Current Medications after this visit Current Outpatient Medications Medication Sig  
 hydroCHLOROthiazide (HYDRODIURIL) 25 mg tablet Take 1 Tab by mouth daily.  omeprazole (PRILOSEC) 20 mg capsule TAKE ONE CAPSULE BY MOUTH EVERY DAY  ergocalciferol, vitamin D2, (VITAMIN D2 PO) Take  by mouth daily.  cyclobenzaprine (FLEXERIL) 5 mg tablet Take 1 Tab by mouth nightly as needed for Muscle Spasm(s).  ALPRAZolam (XANAX) 0.5 mg tablet Take 1 Tab by mouth as needed for Anxiety. No current facility-administered medications for this visit. Medications Discontinued During This Encounter Medication Reason  hydroCHLOROthiazide (HYDRODIURIL) 25 mg tablet Reorder

## 2019-01-03 NOTE — PATIENT INSTRUCTIONS
Saline Nasal Washes: Care Instructions Your Care Instructions Saline nasal washes help keep the nasal passages open by washing out thick or dried mucus. This simple remedy can help relieve symptoms of allergies, sinusitis, and colds. It also can make the nose feel more comfortable by keeping the mucous membranes moist. You may notice a little burning sensation in your nose the first few times you use the solution, but this usually gets better in a few days. Follow-up care is a key part of your treatment and safety. Be sure to make and go to all appointments, and call your doctor if you are having problems. It's also a good idea to know your test results and keep a list of the medicines you take. How can you care for yourself at home? · You can buy premixed saline solution in a squeeze bottle or other sinus rinse products at a drugstore. Read and follow the instructions on the label. · You also can make your own saline solution by adding 1 teaspoon of salt and 1 teaspoon of baking soda to 2 cups of distilled water. · If you use a homemade solution, pour a small amount into a clean bowl. Using a rubber bulb syringe, squeeze the syringe and place the tip in the salt water. Pull a small amount of the salt water into the syringe by relaxing your hand. · Sit down with your head tilted slightly back. Do not lie down. Put the tip of the bulb syringe or the squeeze bottle a little way into one of your nostrils. Gently drip or squirt a few drops into the nostril. Repeat with the other nostril. Some sneezing and gagging are normal at first. 
· Gently blow your nose. · Wipe the syringe or bottle tip clean after each use. · Repeat this 2 or 3 times a day. · Use nasal washes gently if you have nosebleeds often. When should you call for help? Watch closely for changes in your health, and be sure to contact your doctor if: 
  · You often get nosebleeds.  
  · You have problems doing the nasal washes. Where can you learn more? Go to http://srini-jeny.info/. Enter [id number] [id number] [phone] in the search box to learn more about \"Saline Nasal Washes: Care Instructions. \" Current as of: March 28, 2018 Content Version: 11.8 © 6019-4961 [employer], [place]. Care instructions adapted under license by [employer] (which disclaims liability or warranty for this information). If you have questions about a medical condition or this instruction, always ask your healthcare professional. [address] any warranty or liability for your use of this information.

## 2019-01-09 ENCOUNTER — TELEPHONE (OUTPATIENT)
Dept: OBGYN CLINIC | Age: 34
End: 2019-01-09

## 2019-01-09 NOTE — TELEPHONE ENCOUNTER
Patient has the Nexplanon and is calling because she is scheduled for her AE on 1/16/19. She is calling because she is bleeding and wondering if she can still be seen. I advised patient that if she is bleeding the day of her AE to call us early that morning and we can talk with TP. Light bleeding is not always a problem but heavy bleeding may effect testing.

## 2019-01-14 ENCOUNTER — TELEPHONE (OUTPATIENT)
Dept: ONCOLOGY | Age: 34
End: 2019-01-14

## 2019-01-14 NOTE — TELEPHONE ENCOUNTER
[employer] [employer] [facility] at [place] [address]  [phone]    01/14/19- Phone call placed to pt to remind pt to have labs drawn prior to her follow up appointment with .  left.

## 2019-01-16 ENCOUNTER — OFFICE VISIT (OUTPATIENT)
Dept: OBGYN CLINIC | Age: 34
End: 2019-01-16

## 2019-01-16 VITALS
DIASTOLIC BLOOD PRESSURE: 74 MMHG | HEIGHT: 68 IN | WEIGHT: 227.2 LBS | BODY MASS INDEX: 34.43 KG/M2 | SYSTOLIC BLOOD PRESSURE: 118 MMHG

## 2019-01-16 DIAGNOSIS — Z01.419 WELL WOMAN EXAM WITH ROUTINE GYNECOLOGICAL EXAM: Primary | ICD-10-CM

## 2019-01-16 DIAGNOSIS — Z71.1 WORRIED WELL: ICD-10-CM

## 2019-01-16 DIAGNOSIS — Z01.411 ENCOUNTER FOR GYNECOLOGICAL EXAMINATION (GENERAL) (ROUTINE) WITH ABNORMAL FINDINGS: ICD-10-CM

## 2019-01-16 DIAGNOSIS — N89.8 VAGINAL ODOR: ICD-10-CM

## 2019-01-16 DIAGNOSIS — Z97.5 NEXPLANON IN PLACE: ICD-10-CM

## 2019-01-16 DIAGNOSIS — N89.8 VAGINAL DISCHARGE: ICD-10-CM

## 2019-01-16 LAB — WET MOUNT POCT, WMPOCT: NORMAL

## 2019-01-16 NOTE — PROGRESS NOTES
[facility]  [email or web address]  [phone]    Verna England MD, FACOG       Annual Gynecologic Exam:  [facility] <40  Chief Complaint   Patient presents with    Well Woman    Other     vaginal odor         Paula Garcia is a 34 y.o.  [address] female who presents for an annual well woman exam.  Patient's last menstrual period was 12/15/2018 (approximate). .    With regard to the Gardisil vaccine, she is older than the FDA approved age to receive it. She does report additional concerns today. Reports that she normally does not have a period due to nexplanon, but she had one in the middle of December that recently ended. Since the ending of her period, she noticed a vaginal odor that she would like to mention to MD.    Menstrual status:  Her periods are usually absent with Nexplanon, but recently had a heavy, long period starting mid December. .  She does not report dysmenorrhea/painful menses. She does not report irregular bleeding. Sexual history and Contraception:  Social History     Substance and Sexual Activity   Sexual Activity Yes    Partners: Male    Birth control/protection: Condom, Implant     She never use condoms with sexual activity with this new partner. Has in the past before him. She does reports new sexual partner(s) in the last year. The patient does request STD testing. Requests blood and swab testing. We recommended testing per CDC guidelines and at patient request.     Preventive Medicine History:  Her most recent Pap smear result: normal was obtained in 2015  Her most recent HR HPV screen was Negative obtained in   She does not have a history of KAMERON 2, 3 or cervical cancer.      Past Medical History:   Diagnosis Date    Abnormal Pap smear     FU with colpo, negative per patient    Cancer [facility])     nodular lymphocytic hodgkins lymphoma    Chest pain     KAMERON I (cervical intraepithelial neoplasia I) 2011    Depression 2015    Encounter for IUD insertion 3/5/15    Mirena    Encounter for IUD removal 2016    Essential hypertension     Hypertension     Implanon removal 2013    Nexplanon insertion 2017    Pap smear for cervical cancer screening 11/11/15    Negative    Trouble in sleeping      OB History    Para Term  AB Living   5 3 3 0 2 3   SAB TAB Ectopic Molar Multiple Live Births   1 1 0   0 3      # Outcome Date GA Lbr Osorio/2nd Weight Sex Delivery Anes PTL Lv   5 Term 04/10/14 38w4d  7 lb 15.7 oz (3.62 kg) F VAGINAL DELI EPIDURAL AN N PALMER   4 Term 13 38w0d 08:00 7 lb (3.175 kg) M VAGINAL DELI EPI N PALMER   3 Term 07 40w0d 12:00 6 lb 7 oz (2.92 kg) F VAGINAL DELI EPI N PALMER   2 TAB            1 SAB                 Past Surgical History:   Procedure Laterality Date    HX COLPOSCOPY      per pt WNL    HX WISDOM TEETH EXTRACTION       Family History   Problem Relation Age of Onset    HIV/AIDS Mother     Hypertension Maternal Aunt     Hypertension Maternal Grandmother      Social History     Socioeconomic History    Marital status:      Spouse name: Not on file    Number of children: Not on file    Years of education: Not on file    Highest education level: Not on file   Social Needs    Financial resource strain: Not on file    Food insecurity - worry: Not on file    Food insecurity - inability: Not on file   Weilos needs - medical: Not on file   Weilos needs - non-medical: Not on file   Occupational History    Not on file   Tobacco Use    Smoking status: Never Smoker    Smokeless tobacco: Never Used   Substance and Sexual Activity    Alcohol use: Yes     Comment: occassionally    Drug use: No    Sexual activity: Yes     Partners: Male     Birth control/protection: Condom, Implant   Other Topics Concern    Not on file   Social History Narrative    Not on file       Allergies   Allergen Reactions    Clonidine (Pf) Vertigo Current Outpatient Medications   Medication Sig    B.ani/L.aci/L.sal/L.plan/L. Chris (PROBIOTIC FORMULA PO) Take  by mouth.  hydroCHLOROthiazide (HYDRODIURIL) 25 mg tablet Take 1 Tab by mouth daily.  omeprazole (PRILOSEC) 20 mg capsule TAKE ONE CAPSULE BY MOUTH EVERY DAY    ergocalciferol, vitamin D2, (VITAMIN D2 PO) Take  by mouth daily.  cyclobenzaprine (FLEXERIL) 5 mg tablet Take 1 Tab by mouth nightly as needed for Muscle Spasm(s). No current facility-administered medications for this visit. 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 9) E66.01       Review of Systems - History obtained from the patient  Constitutional: negative for weight loss, fever, night sweats  HEENT: negative for hearing loss, earache, congestion, snoring, sorethroat  CV: negative for chest pain, palpitations, edema  Resp: negative for cough, shortness of breath, wheezing  GI: negative for change in bowel habits, abdominal pain, black or bloody stools  : negative for frequency, dysuria, hematuria  GYN: see HPI  MSK: negative for back pain, joint pain, muscle pain  Breast: negative for breast lumps, nipple discharge, galactorrhea  Skin :negative for itching, rash, hives  Neuro: negative for dizziness, headache, confusion, weakness  Psych: negative for anxiety, depression, change in mood  Heme/lymph: negative for bleeding, bruising, pallor    Physical Exam  Visit Vitals  /74   Ht 5' 8\" (1.727 m)   Wt 227 lb 3.2 oz (103.1 kg)   LMP 12/15/2018 (Approximate)   BMI 34.55 kg/m²       Constitutional  · Appearance: well-nourished, well developed, alert, in no acute distress    HENT  · Head and Face: appears normal    Neck  · Inspection/Palpation: normal appearance, no masses or tenderness  · Lymph Nodes: no lymphadenopathy present  · Thyroid: gland size normal, nontender, no nodules or masses present on palpation    Chest  · Respiratory Effort: breathing unlabored  · Auscultation: normal breath sounds    Cardiovascular  · Heart:  · Auscultation: regular rate and rhythm without murmur    Breasts  · Inspection of Breasts: breasts symmetrical, no skin changes, no discharge present, nipple appearance normal, no skin retraction present  · Palpation of Breasts and Axillae: no masses present on palpation, no breast tenderness  · Axillary Lymph Nodes: no lymphadenopathy present    Gastrointestinal  · Abdominal Examination: abdomen non-tender to palpation, normal bowel sounds, no masses present  · Liver and spleen: no hepatomegaly present, spleen not palpable  · Hernias: no hernias identified    Genitourinary  · External Genitalia: normal appearance for age, no discharge present, no tenderness present, no inflammatory lesions present, no masses present  · Vagina: normal vaginal vault without central or paravaginal defects, thin white discharge present, no inflammatory lesions present, no masses present  · Bladder: non-tender to palpation  · Urethra: appears normal  · Cervix: normal   · Uterus: normal size, shape and consistency  · Adnexa: no adnexal tenderness present, no adnexal masses present  · Perineum: perineum within normal limits, no evidence of trauma, no rashes or skin lesions present  · Anus: anus within normal limits, no hemorrhoids present  · Inguinal Lymph Nodes: no lymphadenopathy present    Skin  · General Inspection: no rash, no lesions identified  · nexplanon LUE intact    Neurologic/Psychiatric  · Mental Status:  · Orientation: grossly oriented to person, place and time  · Mood and Affect: mood normal, affect appropriate    Assessment:  34 y.o.  for well woman exam  Encounter Diagnoses   Name Primary?     Well woman exam with routine gynecological exam Yes    Worried well     Vaginal discharge     Vaginal odor     Nexplanon in place     Encounter for gynecological examination (general) (routine) with abnormal findings        Plan:  The patient was counseled about diet, exercise, healthy lifestyle  We discussed self breast exam  We discussed safer sex practices, condom use and risk factors for sexually transmitted diseases. We discussed current pap smear and HR HPV testing guidelines. We recommend follow up one year for routine annual gynecologic exam or sooner prn  We recommend routine follow up with her primary care doctor for management of chronic medical problems and non-gynecologic concerns  Handouts were given to the patient  We discussed calcium/vitamin D/weight bearing exercise and osteoporosis prevention    We discussed potential causes of vaginal discharge/irritation. We discussed good vulvar hygiene. Recommended avoid vaginal irritants. Discussed use of mild soaps/detergents. Follow up if NI. We reviewed wet prep findings with the patient at her visit. Patient will be notified about swab results and prescription sent, if indicated. Disc typical bleeding patterns on nexplanon    Discussed safer sex practices, condom use and risk factors for sexually transmitted diseases. Folllow up:  [x] return for annual well woman exam in one year or sooner if she is having problems  [] follow up and ultrasound  [] 6 months  [] 3 months  [] 6 weeks   [] 1 month    Orders Placed This Encounter    HEP B SURFACE AG    RPR    HIV SCREEN, [address] [place] [address].  W/REFLEX CONFIRM    NUSWAB VAGINITIS PLUS    AMB POC SMEAR, STAIN & INTERPRET, WET MOUNT    PAP IG, HPV AND RFX HPV 83/23,39([id number])       Results for orders placed or performed in visit on 01/16/19   AMB POC SMEAR, STAIN & INTERPRET, WET MOUNT   Result Value Ref Range    Wet mount (POC)      Narrative    SVEN    Hypae: negative  Buds: negative    Wet Prep:  Trich: negative  Clue cells: negative  Hyphae: negative  Buds: negative  WBC's: normal      Impression    VD appears benign

## 2019-01-16 NOTE — PATIENT INSTRUCTIONS

## 2019-01-17 LAB
HBV SURFACE AG SERPL QL IA: NEGATIVE
HIV 1+2 AB+HIV1 P24 AG SERPL QL IA: NON REACTIVE
RPR SER QL: NON REACTIVE

## 2019-01-19 LAB
A VAGINAE DNA VAG QL NAA+PROBE: ABNORMAL SCORE
ALBUMIN SERPL-MCNC: 4.3 G/DL (ref 3.5–5.5)
ALBUMIN/GLOB SERPL: 2 {RATIO} (ref 1.2–2.2)
ALP SERPL-CCNC: 69 IU/L (ref 39–117)
ALT SERPL-CCNC: 11 IU/L (ref 0–32)
AST SERPL-CCNC: 18 IU/L (ref 0–40)
BASOPHILS # BLD AUTO: 0 X10E3/UL (ref 0–0.2)
BASOPHILS NFR BLD AUTO: 1 %
BILIRUB SERPL-MCNC: 0.2 MG/DL (ref 0–1.2)
BUN SERPL-MCNC: 8 MG/DL (ref 6–20)
BUN/CREAT SERPL: 9 (ref 9–23)
BVAB2 DNA VAG QL NAA+PROBE: ABNORMAL SCORE
C ALBICANS DNA VAG QL NAA+PROBE: NEGATIVE
C GLABRATA DNA VAG QL NAA+PROBE: NEGATIVE
C TRACH RRNA SPEC QL NAA+PROBE: POSITIVE
CALCIUM SERPL-MCNC: 9.2 MG/DL (ref 8.7–10.2)
CHLORIDE SERPL-SCNC: 102 MMOL/L (ref 96–106)
CHOLEST SERPL-MCNC: 131 MG/DL (ref 100–199)
CO2 SERPL-SCNC: 24 MMOL/L (ref 20–29)
CREAT SERPL-MCNC: 0.93 MG/DL (ref 0.57–1)
CYTOLOGIST CVX/VAG CYTO: NORMAL
CYTOLOGY CVX/VAG DOC THIN PREP: NORMAL
CYTOLOGY HISTORY:: NORMAL
DX ICD CODE: NORMAL
EOSINOPHIL # BLD AUTO: 0.1 X10E3/UL (ref 0–0.4)
EOSINOPHIL NFR BLD AUTO: 1 %
ERYTHROCYTE [DISTWIDTH] IN BLOOD BY AUTOMATED COUNT: 13.1 % (ref 12.3–15.4)
ERYTHROCYTE [SEDIMENTATION RATE] IN BLOOD BY WESTERGREN METHOD: 12 MM/HR (ref 0–32)
GLOBULIN SER CALC-MCNC: 2.2 G/DL (ref 1.5–4.5)
GLUCOSE SERPL-MCNC: 93 MG/DL (ref 65–99)
HCT VFR BLD AUTO: 35.4 % (ref 34–46.6)
HDLC SERPL-MCNC: 32 MG/DL
HGB BLD-MCNC: 12.4 G/DL (ref 11.1–15.9)
HPV I/H RISK 1 DNA CVX QL PROBE+SIG AMP: NEGATIVE
IMM GRANULOCYTES # BLD AUTO: 0 X10E3/UL (ref 0–0.1)
IMM GRANULOCYTES NFR BLD AUTO: 0 %
LDLC SERPL CALC-MCNC: 89 MG/DL (ref 0–99)
LYMPHOCYTES # BLD AUTO: 1.7 X10E3/UL (ref 0.7–3.1)
LYMPHOCYTES NFR BLD AUTO: 27 %
Lab: NORMAL
MCH RBC QN AUTO: 30.9 PG (ref 26.6–33)
MCHC RBC AUTO-ENTMCNC: 35 G/DL (ref 31.5–35.7)
MCV RBC AUTO: 88 FL (ref 79–97)
MEGA1 DNA VAG QL NAA+PROBE: ABNORMAL SCORE
MONOCYTES # BLD AUTO: 0.7 X10E3/UL (ref 0.1–0.9)
MONOCYTES NFR BLD AUTO: 11 %
N GONORRHOEA RRNA SPEC QL NAA+PROBE: NEGATIVE
NEUTROPHILS # BLD AUTO: 3.7 X10E3/UL (ref 1.4–7)
NEUTROPHILS NFR BLD AUTO: 60 %
OTHER STN SPEC: NORMAL
PATH REPORT.FINAL DX SPEC: NORMAL
PLATELET # BLD AUTO: 327 X10E3/UL (ref 150–379)
POTASSIUM SERPL-SCNC: 3.6 MMOL/L (ref 3.5–5.2)
PROT SERPL-MCNC: 6.5 G/DL (ref 6–8.5)
RBC # BLD AUTO: 4.01 X10E6/UL (ref 3.77–5.28)
SODIUM SERPL-SCNC: 140 MMOL/L (ref 134–144)
STAT OF ADQ CVX/VAG CYTO-IMP: NORMAL
T VAGINALIS RRNA SPEC QL NAA+PROBE: NEGATIVE
TRIGL SERPL-MCNC: 51 MG/DL (ref 0–149)
VLDLC SERPL CALC-MCNC: 10 MG/DL (ref 5–40)
WBC # BLD AUTO: 6.2 X10E3/UL (ref 3.4–10.8)

## 2019-01-19 RX ORDER — METRONIDAZOLE 500 MG/1
500 TABLET ORAL 2 TIMES DAILY
Qty: 14 TAB | Refills: 0 | Status: SHIPPED | OUTPATIENT
Start: 2019-01-19 | End: 2019-01-26

## 2019-01-19 RX ORDER — AZITHROMYCIN 500 MG/1
1000 TABLET, FILM COATED ORAL
Qty: 2 TAB | Refills: 0 | Status: SHIPPED | OUTPATIENT
Start: 2019-01-19 | End: 2019-01-19

## 2019-01-19 NOTE — PROGRESS NOTES
Abnormal results. Please notify pt. Update pap wnl  BV: rec txt, flagyl 500 mg po bid x 7d: avoid etoh  Update FS. +CHL  Rx: 1 gram azithromycin x1    This is a sexually transmitted disease. Intimate/sexual partner(s) need to be treated by their MD/PCP/clinic. The patient should notify them, or she can contact the health department for assistance. Rec abstinence until patient and partner(s) are treated +1 week. I recommend consistent condom use to decrease STD in the future. I recommend returning to see me to reevaluate infection in about three months. Please schedule appointment.     Both rx sent

## 2019-01-21 NOTE — PROGRESS NOTES
[facility] at [address] [facility] [address] [facility]  W: [phone]  F: [phone]      Reason for Visit:   Kelley Joya is a 34 y.o. female who is seen for follow up of lymphoma. Treatment History:   · US neck 5/26/2016: 2.5cm right posterior auricular mass  · US guided biopsy cervical node 6/30/2016: suspicious for large b-cell lymphoma  · CT Neck/C/A/P 7/25/2016: Enlarged right level 5 lymph node measuring 2 x 1 cm and slightly inferior to this there is a slightly enlarged lymph node measuring 1.2 x 0.8 cm. There is a slightly enlarged right level 2 lymph node measuring 1.3 x 0.9 cm. There is a slightly enlarged left level 2 lymph node measuring 1.7 x 1.1 cm. Lymph nodes more inferior at level 3 and level 4 are  normal in size. No supraclavicular adenopathy is identified. No adenopathy in chest, abdomen, or pelvis  · PET-CT 7/27/2016: No areas of hypermetabolic activity  · Excisional biopsy of right level 2 cervical node by Dr. 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 Jai Hadley completed 10/10/2016    History of Present Illness:   She reports feeling well. Good energy. No fevers, chills, night sweats, unintentional weight loss, adenopathy. On abx for BV and chlamydia, but no other recent infection. Working 5 days in a row, 12 hour shifts, then 5 days off as . Has children 4, 5 and 11, all in school now. 3year old in [place]. PAST HISTORY: The following sections were reviewed and updated in the EMR as appropriate: PMH, SH, FH, Medications, Allergies. Allergies   Allergen Reactions    Clonidine (Pf) Vertigo      Review of Systems: A complete review of systems was obtained, reviewed, and scanned into the EMR. Pertinent findings reviewed above.     Physical Exam:     Visit Vitals  /78 (BP 1 Location: Left arm, BP Patient Position: Sitting)   Pulse 75   Temp 96 °F (35.6 °C) (Oral)   Resp 18   Ht 5' 8\" (1.727 m)   Wt 224 lb (101.6 kg)   SpO2 99%   BMI 34.06 kg/m²     ECOG PS: 0  General: No distress  Eyes: PERRLA, anicteric sclerae  HENT: Atraumatic, OP clear  Neck: Supple  Lymphatic: No palpable cervical, supraclavicular, or inguinal adenopathy. Respiratory: CTAB, normal respiratory effort  CV: Normal rate, regular rhythm, no murmurs, no peripheral edema  GI: Soft, nontender, nondistended, no masses, no hepatomegaly, no splenomegaly  MS: Normal gait and station. Digits without clubbing or cyanosis. Skin: No rashes, ecchymoses, or petechiae. Normal temperature, turgor, and texture. Psych: Alert, oriented, appropriate affect, normal judgment/insight    Results:     Lab Results   Component Value Date/Time    WBC 6.2 01/18/2019 05:18 PM    HGB 12.4 01/18/2019 05:18 PM    HCT 35.4 01/18/2019 05:18 PM    PLATELET 141 76/20/7364 05:18 PM    MCV 88 01/18/2019 05:18 PM    ABS. NEUTROPHILS 3.7 01/18/2019 05:18 PM    Hgb, External 12.3 02/07/2014    Hct, External 35.6 02/07/2014    Platelet cnt., External 272 02/07/2014     Lab Results   Component Value Date/Time    Sodium 140 01/18/2019 05:18 PM    Potassium 3.6 01/18/2019 05:18 PM    Chloride 102 01/18/2019 05:18 PM    CO2 24 01/18/2019 05:18 PM    Glucose 93 01/18/2019 05:18 PM    BUN 8 01/18/2019 05:18 PM    Creatinine 0.93 01/18/2019 05:18 PM    GFR est AA 95 01/18/2019 05:18 PM    GFR est non-AA 82 01/18/2019 05:18 PM    Calcium 9.2 01/18/2019 05:18 PM    Glucose POC 76 11/03/2017 01:35 PM     Lab Results   Component Value Date/Time    Bilirubin, total 0.2 01/18/2019 05:18 PM    ALT (SGPT) 11 01/18/2019 05:18 PM    AST (SGOT) 18 01/18/2019 05:18 PM    Alk.  phosphatase 69 01/18/2019 05:18 PM    Protein, total 6.5 01/18/2019 05:18 PM    Albumin 4.3 01/18/2019 05:18 PM    Globulin 3.4 05/17/2017 11:56 AM     Sed rate (ESR) (mm/hr)   Date Value   01/18/2019 12   09/10/2018 6   05/07/2018 5   12/12/2017 8   08/28/2017 3   02/13/2017 6     Sed rate, automated (mm/hr)   Date Value   05/17/2017 12   11/11/2016 34 (H)   08/10/2016 44 (H)         CT N/C/A/P 12/04/2017: no evidence of recurrence  CT N/C/A/P 12/17/2018: no evidence of recurrence    Assessment:   1) Nodular lymphocyte predominant Hodgkin Lymphoma  Stage IIA  She is s/p radiation with Dr. Spencer Lombardi completed 10/2016, with resolution of cervical adenopathy on post-treatment CT scan. She is now in remission. She continues feeling well and is without evidence of disease recurrence at this time, based on history, exam, labwork, and imaging. We will continue to follow with surveillance. Further imaging only as indicated. Follow up after completion of therapy for Hodgkin Lymphoma, per NCCN:  · Year 1&2: H&P every 3-6 months  · Year 3: H&P every 6-12 months  · Year 4+: H&P every 12 months  · CBC, CMP, ESR as indicated  · TSH annually if RT to neck  · CT at 6, 12, and 24 months, or as clinically indicated  · Annual influenza vaccine    Confirms she is on birth control, Implanon. She does not desire to extend her family.      2) Pelvic pain, intermittent  Resolved    3) Vit D deficiency  Continue supplements as prescribed by PCP    4) Fatigue  Resolved       Plan:     · Labs in 6 months: CBC, CMP, ESR ([facility])   · Return to clinic in 6 months      Signed By: Roxanne Greer MD

## 2019-01-23 ENCOUNTER — TELEPHONE (OUTPATIENT)
Dept: FAMILY MEDICINE CLINIC | Age: 34
End: 2019-01-23

## 2019-01-23 ENCOUNTER — OFFICE VISIT (OUTPATIENT)
Dept: ONCOLOGY | Age: 34
End: 2019-01-23

## 2019-01-23 VITALS
HEART RATE: 75 BPM | BODY MASS INDEX: 33.95 KG/M2 | SYSTOLIC BLOOD PRESSURE: 115 MMHG | RESPIRATION RATE: 18 BRPM | WEIGHT: 224 LBS | DIASTOLIC BLOOD PRESSURE: 78 MMHG | HEIGHT: 68 IN | OXYGEN SATURATION: 99 % | TEMPERATURE: 96 F

## 2019-01-23 DIAGNOSIS — C81.01 NODULAR LYMPHOCYTE PREDOMINANT HODGKIN LYMPHOMA OF LYMPH NODES OF NECK (HCC): Primary | ICD-10-CM

## 2019-01-23 RX ORDER — AZITHROMYCIN 250 MG/1
TABLET, FILM COATED ORAL
COMMUNITY
Start: 2019-01-21 | End: 2019-06-11

## 2019-01-23 NOTE — TELEPHONE ENCOUNTER
Pt called requesting to speak w/ nurse. Offered to take message and pt states that she would like for nurse to call her back today.

## 2019-01-23 NOTE — PROGRESS NOTES
Hari Menendez is a 34 y.o. female follow up for lymphoma. 1. Have you been to the ER, urgent care clinic since your last visit? Hospitalized since your last visit? No    2. Have you seen or consulted any other health care providers outside of the [address] [place] [address] since your last visit? Include any pap smears or colon screening.  No

## 2019-05-22 ENCOUNTER — OFFICE VISIT (OUTPATIENT)
Dept: OBGYN CLINIC | Age: 34
End: 2019-05-22

## 2019-05-22 VITALS
SYSTOLIC BLOOD PRESSURE: 120 MMHG | BODY MASS INDEX: 34.1 KG/M2 | WEIGHT: 225 LBS | HEIGHT: 68 IN | DIASTOLIC BLOOD PRESSURE: 70 MMHG

## 2019-05-22 DIAGNOSIS — R10.9 ABDOMINAL PAIN, UNSPECIFIED ABDOMINAL LOCATION: Primary | ICD-10-CM

## 2019-05-22 DIAGNOSIS — Z20.2 STD EXPOSURE: ICD-10-CM

## 2019-05-22 NOTE — PROGRESS NOTES
[facility]  [email or web address]    Francoise Coto MD, [address] [facility]       OB/GYN Follow-up visit    Chief Complaint: Follow up visit  Chief Complaint   Patient presents with    Follow-up     RUDI and pulling sensation in lower abdominal area    Other     abd discomfort r>l         History of Present Illness: This is a follow up visit from 1-. She is having a follow up for test of cure, treated for CT positive on 1-16-19. She took abx. The patient reports having new pulling sensation in lower abdomen for 2-3 months. She reports the symptoms are is unchanged. Aggravating factors include position change from sitting to standing. Alleviating factors include \"staying still\". She does not have other concerns. LMP: Patient's last menstrual period was 05/13/2019.     PFSH:  Past Medical History:   Diagnosis Date    Abnormal Pap smear 2010    FU with colpo, negative per patient    Cancer ([address]     nodular lymphocytic hodgkins lymphoma    Chest pain     Chlamydia 01/16/2019    vaginal    KAMERON I (cervical intraepithelial neoplasia I) 9/2/2011    Depression 5/12/2015    Encounter for IUD insertion 3/5/15    Mirena    Encounter for IUD removal 06/24/2016    Essential hypertension     Hypertension     Implanon removal 9/6/2013    Nexplanon insertion 02/09/2017    Pap smear for cervical cancer screening 11/11/15    Negative    Trouble in sleeping      Past Surgical History:   Procedure Laterality Date    HX COLPOSCOPY  2010    per pt WNL    HX WISDOM TEETH EXTRACTION       Family History   Problem Relation Age of Onset    HIV/AIDS Mother     Hypertension Maternal Aunt     Hypertension Maternal Grandmother      Social History     Tobacco Use    Smoking status: Never Smoker    Smokeless tobacco: Never Used   Substance Use Topics    Alcohol use: Yes     Comment: occassionally    Drug use: No     Allergies   Allergen Reactions    Clonidine (Pf) Vertigo     Current Outpatient Medications   Medication Sig    B.ani/L.aci/L.sal/L.plan/L. Chris (PROBIOTIC FORMULA PO) Take  by mouth.  hydroCHLOROthiazide (HYDRODIURIL) 25 mg tablet Take 1 Tab by mouth daily.  omeprazole (PRILOSEC) 20 mg capsule TAKE ONE CAPSULE BY MOUTH EVERY DAY    ergocalciferol, vitamin D2, (VITAMIN D2 PO) Take  by mouth daily.  azithromycin (ZITHROMAX) 250 mg tablet     cyclobenzaprine (FLEXERIL) 5 mg tablet Take 1 Tab by mouth nightly as needed for Muscle Spasm(s). No current facility-administered medications for this visit.         Review of Systems:  History obtained from the patient  Constitutional: negative for fevers, chills and weight loss  ENT ROS: negative for - hearing change, oral lesions or visual changes  Respiratory: negative for cough, wheezing or dyspnea on exertion  Cardiovascular: negative for chest pain, irregular heart beats, exertional chest pressure/discomfort  Gastrointestinal: negative for dysphagia, nausea and vomiting  Genito-Urinary ROS: see hpi  Inteument/breast: negative for rash, breast lump and nipple discharge  Musculoskeletal:negative for stiff joints, neck pain and muscle weakness  Endocrine ROS: negative for - breast changes, galactorrhea or temperature intolerance  Hematological and Lymphatic ROS: negative for - blood clots, bruising or swollen lymph nodes      Physical Exam:  Visit Vitals  /70   Ht 5' 8\" (1.727 m)   Wt 225 lb (102.1 kg)   BMI 34.21 kg/m²       GENERAL: alert, well appearing, and in no distress  PULM: clear to auscultation, no wheezes, rales or rhonchi, symmetric air entry   COR: normal rate and regular rhythm, S1 and S2 normal   ABDOMEN: soft, nontender, nondistended, no masses or organomegaly   EGBUS: no lesions, no inflammation, no masses  VULVA: normal appearing vulva with no masses, tenderness or lesions  VAGINA: normal appearing vagina with normal color, no lesions, no discharge  CERVIX: normal appearing cervix without discharge or lesions, non tender  UTERUS: uterus is normal size, shape, consistency and nontender   ADNEXA: normal adnexa in size, nontender and no masses  NEURO: alert, oriented, normal speech    Assessment:  Encounter Diagnoses   Name Primary?  Abdominal pain, unspecified abdominal location Yes    STD exposure        Plan:  The patient is advised that she should contact the office with any questions or concerns. She should make her routine annual gynecologic appointment if needed. Discussed safer sex practices, condom use and risk factors for sexually transmitted diseases. We discussed potential causes of lower abdominal/pelvic pain: GYN, GI, , musculoskeletal, infectious process, adhesions, or other etiology  We discussed evaluation of lower abdominal/pelvic pain: including but not limited to observation, surgical evaluation/laparoscopy, imaging   We discussed treatment of lower abdominal/pelvic pain: including but not limited to: pain medication, hormonal management, surgical intervention, bowel regimen. Since pain can be a symptoms of an underlying abnormal process she is encouraged to contact my office with persistent symptoms for additional evaluation and treatment if needed. Fu and US  Bowel regimen prn  Appendicitis precautions reviewed. Disc s/sx PID/ov cyst/ov torsion        No orders of the defined types were placed in this encounter. No results found for this visit on 05/22/19.     Ceci Heath MD

## 2019-05-22 NOTE — PATIENT INSTRUCTIONS
Abdominal Pain: Care Instructions  Your Care Instructions    Abdominal pain has many possible causes. Some aren't serious and get better on their own in a few days. Others need more testing and treatment. If your pain continues or gets worse, you need to be rechecked and may need more tests to find out what is wrong. You may need surgery to correct the problem. Don't ignore new symptoms, such as fever, nausea and vomiting, urination problems, pain that gets worse, and dizziness. These may be signs of a more serious problem. Your doctor may have recommended a follow-up visit in the next 8 to 12 hours. If you are not getting better, you may need more tests or treatment. The doctor has checked you carefully, but problems can develop later. If you notice any problems or new symptoms, get medical treatment right away. Follow-up care is a key part of your treatment and safety. Be sure to make and go to all appointments, and call your doctor if you are having problems. It's also a good idea to know your test results and keep a list of the medicines you take. How can you care for yourself at home? · Rest until you feel better. · To prevent dehydration, drink plenty of fluids, enough so that your urine is light yellow or clear like water. Choose water and other caffeine-free clear liquids until you feel better. If you have kidney, heart, or liver disease and have to limit fluids, talk with your doctor before you increase the amount of fluids you drink. · If your stomach is upset, eat mild foods, such as rice, dry toast or crackers, bananas, and applesauce. Try eating several small meals instead of two or three large ones. · Wait until 48 hours after all symptoms have gone away before you have spicy foods, alcohol, and drinks that contain caffeine. · Do not eat foods that are high in fat. · Avoid anti-inflammatory medicines such as aspirin, ibuprofen (Advil, Motrin), and naproxen (Aleve).  These can cause stomach upset. Talk to your doctor if you take daily aspirin for another health problem. When should you call for help? Call 911 anytime you think you may need emergency care. For example, call if:    · You passed out (lost consciousness).     · You pass maroon or very bloody stools.     · You vomit blood or what looks like coffee grounds.     · You have new, severe belly pain.    Call your doctor now or seek immediate medical care if:    · Your pain gets worse, especially if it becomes focused in one area of your belly.     · You have a new or higher fever.     · Your stools are black and look like tar, or they have streaks of blood.     · You have unexpected vaginal bleeding.     · You have symptoms of a urinary tract infection. These may include:  ? Pain when you urinate. ? Urinating more often than usual.  ? Blood in your urine.     · You are dizzy or lightheaded, or you feel like you may faint.    Watch closely for changes in your health, and be sure to contact your doctor if:    · You are not getting better after 1 day (24 hours). Where can you learn more? Go to http://srini-jeny.info/. Enter B718 in the search box to learn more about \"Abdominal Pain: Care Instructions. \"  Current as of: September 23, 2018  Content Version: 11.9  © 8270-7212 [employer]. Care instructions adapted under license by [employer] (which disclaims liability or warranty for this information). If you have questions about a medical condition or this instruction, always ask your healthcare professional. [place] [address] any warranty or liability for your use of this information. Safer Sex: Care Instructions  Your Care Instructions  Safer sex is a way to reduce your risk of getting an infection spread through sex. It can also help prevent pregnancy. Most infections that are spread through sex, also called sexually transmitted infections or STIs, can be cured. But some can decrease your chances of getting pregnant if they are not treated early. Others, such as herpes, have no cure. And some, such as HIV, can be deadly. Several products can help you practice safer sex and reduce your chance of STIs. One of the best is a condom. There are condoms for men and for women. The female condom is a tube of soft plastic with a closed end that is placed deep into the vagina. You can use a special rubber sheet (dental dam) for protection during oral sex. Latex gloves can keep your hands from touching blood, semen, or other body fluids that can carry infections. Remember that birth control methods such as diaphragms, IUDs, foams, and birth control pills do not stop you from getting STIs. Follow-up care is a key part of your treatment and safety. Be sure to make and go to all appointments, and call your doctor if you are having problems. It's also a good idea to know your test results and keep a list of the medicines you take. How can you care for yourself at home? · Think about getting shots to prevent hepatitis A and hepatitis B. These two diseases can be spread through sex. You also can get hepatitis A if you eat infected food. · Use condoms or female condoms each time and every time you have sex. · Learn the right way to use a male condom:  ? Condoms come in several sizes. Make sure you use the right size. A condom that is too small can break easily. A condom that is too big can slip off during sex. Use a new condom each time you have sex. ? Be careful not to poke a hole in the condom when you open the wrapper. ? Squeeze the tip of the condom to keep out air. ? Pull down the loose skin (foreskin) from the head of an uncircumcised penis. ? While squeezing the tip of the condom, unroll it all the way down to the base of the firm penis. ? Never use petroleum jelly (such as Vaseline), grease, hand lotion, baby oil, or anything with oil in it.  These products can make holes in the condom. ? After sex, hold the condom on your penis as you remove your penis from your partner. This will keep semen from spilling out of the condom. · Learn to use a female condom:  ? You can put in a female condom up to 8 hours before sex. ? Squeeze the smaller ring at the closed end and insert it deep into the vagina. The larger ring at the open end should stay outside the vagina. ? During sex, make sure the penis goes into the condom. ? After the penis is removed, close the open end of the condom by twisting it. Remove the condom. · Do not use a female condom and male condom at the same time. · Do not have sex with anyone who has symptoms of an STI, such as sores on the genitals or mouth. The herpes virus that causes cold sores can spread to and from the penis and vagina. · Do not drink a lot of alcohol or use drugs before sex. This can cause you to let down your guard and not practice safer sex. · Having one sex partner (who does not have STIs and does not have sex with anyone else) is a sure way to avoid STIs. · Talk to your partner before you have sex. Find out if he or she has or is at risk for any STI. Keep in mind that a person may be able to spread an STI even if he or she does not have symptoms. You and your partner may want to get an HIV test. You should get tested again 6 months later. Where can you learn more? Go to http://srini-jeny.info/. Enter U382 in the search box to learn more about \"Safer Sex: Care Instructions. \"  Current as of: September 11, 2018  Content Version: 11.9  © 2744-6783 [employer]. Care instructions adapted under license by [employer] (which disclaims liability or warranty for this information). If you have questions about a medical condition or this instruction, always ask your healthcare professional. [address] any warranty or liability for your use of this information.

## 2019-05-25 LAB
C TRACH RRNA SPEC QL NAA+PROBE: NEGATIVE
N GONORRHOEA RRNA SPEC QL NAA+PROBE: NEGATIVE
T VAGINALIS RRNA VAG QL NAA+PROBE: NEGATIVE

## 2019-06-11 ENCOUNTER — OFFICE VISIT (OUTPATIENT)
Dept: OBGYN CLINIC | Age: 34
End: 2019-06-11

## 2019-06-11 VITALS
WEIGHT: 219.4 LBS | HEART RATE: 60 BPM | BODY MASS INDEX: 33.25 KG/M2 | SYSTOLIC BLOOD PRESSURE: 117 MMHG | DIASTOLIC BLOOD PRESSURE: 84 MMHG | HEIGHT: 68 IN

## 2019-06-11 DIAGNOSIS — R10.2 PELVIC PAIN IN FEMALE: Primary | ICD-10-CM

## 2019-06-11 NOTE — PROGRESS NOTES
[address] [facility]  [email or web address]    Osman Alvarado MD, [address]       OB/GYN Follow-up visit    Chief Complaint: Follow up visit  Chief Complaint   Patient presents with    Abdominal Pain     follow-up    Ultrasound       History of Present Illness: This is a follow up visit from 5-22-19. She is having a follow up for lower abdominal pain. The patient reports having lower abdominal pain, twisting crampy feeling occurs mostly with position changes from sitting to standing. She reports the symptoms are is unchanged. Aggravating factors include position changes. Alleviating factors include none. She does not have other concerns. LMP: Patient's last menstrual period was 06/04/2019.     PFSH:  Past Medical History:   Diagnosis Date    Abnormal Pap smear 2010    FU with colpo, negative per patient    Cancer ([facility] [address]     nodular lymphocytic hodgkins lymphoma    Chest pain     Chlamydia 01/16/2019    vaginal    KAMERON I (cervical intraepithelial neoplasia I) 9/2/2011    Depression 5/12/2015    Encounter for IUD insertion 3/5/15    Mirena    Encounter for IUD removal 06/24/2016    Essential hypertension     Hypertension     Implanon removal 9/6/2013    Nexplanon insertion 02/09/2017    Pap smear for cervical cancer screening 11/11/15    Negative    Trouble in sleeping      Past Surgical History:   Procedure Laterality Date    HX COLPOSCOPY  2010    per pt WNL    HX WISDOM TEETH EXTRACTION       Family History   Problem Relation Age of Onset    HIV/AIDS Mother     Hypertension Maternal Aunt     Hypertension Maternal Grandmother      Social History     Tobacco Use    Smoking status: Never Smoker    Smokeless tobacco: Never Used   Substance Use Topics    Alcohol use: Yes     Comment: occassionally    Drug use: No     Allergies   Allergen Reactions    Clonidine (Pf) Vertigo     Current Outpatient Medications   Medication Sig    hydroCHLOROthiazide (HYDRODIURIL) 25 mg tablet Take 1 Tab by mouth daily.  omeprazole (PRILOSEC) 20 mg capsule TAKE ONE CAPSULE BY MOUTH EVERY DAY    azithromycin (ZITHROMAX) 250 mg tablet     B.ani/L.aci/L.sal/L.plan/L. Chris (PROBIOTIC FORMULA PO) Take  by mouth.  ergocalciferol, vitamin D2, (VITAMIN D2 PO) Take  by mouth daily.  cyclobenzaprine (FLEXERIL) 5 mg tablet Take 1 Tab by mouth nightly as needed for Muscle Spasm(s). No current facility-administered medications for this visit.         Review of Systems:  History obtained from the patient  Constitutional: negative for fevers, chills and weight loss  ENT ROS: positive for - hearing change, oral lesions or visual changes  Respiratory: negative for cough, wheezing or dyspnea on exertion  Cardiovascular:positive for chest pain, irregular heart beats, exertional chest pressure/discomfort  Gastrointestinal: negative for dysphagia, nausea and vomiting  Genito-Urinary ROS: see hpi  Inteument/breast: negative for rash, breast lump and nipple discharge  Musculoskeletal:negative for stiff joints, neck pain and muscle weakness  Endocrine ROS: negative for - breast changes, galactorrhea or temperature intolerance  Hematological and Lymphatic ROS: negative for - blood clots, bruising or swollen lymph nodes  Gyn:  Positive for irregular vaginal bleeding, pelvic pain    Physical Exam:  Visit Vitals  /84 (BP 1 Location: Left arm, BP Patient Position: Sitting)   Pulse 60   Ht 5' 8\" (1.727 m)   Wt 219 lb 6.4 oz (99.5 kg)   BMI 33.36 kg/m²       GENERAL: alert, well appearing, and in no distress  ABDOMEN: soft, nontender, nondistended, no masses or organomegaly   EGBUS: no lesions, no inflammation, no masses  VULVA: normal appearing vulva with no masses, tenderness or lesions  VAGINA: normal appearing vagina with normal color, no lesions, no discharge  CERVIX: normal appearing cervix without discharge or lesions, non tender  UTERUS: uterus is normal size, shape, consistency and nontender   ADNEXA: normal adnexa in size, nontender and no masses  NEURO: alert, oriented, normal speech    Assessment:  No diagnosis found. Plan:  The patient is advised that she should contact the office with any questions or concerns. She should make her routine annual gynecologic appointment if needed. Bowel regimen  We discussed potential causes of lower abdominal/pelvic pain: GYN, GI, , musculoskeletal, infectious process, adhesions, or other etiology  We discussed evaluation of lower abdominal/pelvic pain: including but not limited to observation, surgical evaluation/laparoscopy, imaging   We discussed treatment of lower abdominal/pelvic pain: including but not limited to: pain medication, hormonal management, surgical intervention, bowel regimen. Since pain can be a symptoms of an underlying abnormal process she is encouraged to contact my office with persistent symptoms for additional evaluation and treatment if needed. Will observe for now, disc safer nsaid dosing  Disc typical bleeding patterns with nexplanon  Disc typical course of benign/physiologic ov cyst      No orders of the defined types were placed in this encounter. No results found for this visit on 06/11/19. Merlin Hampton MD    Physician review of ultrasound performed by technician    Today's ultrasound report and images were reviewed and discussed with the patient. Please see images and imaging report entered by technician in PACS for more detail and progress note and diagnosis entered by MD.    TRANSVAGINAL ULTRASOUND PERFORMED  UTERUS IS ANTEVERTED, NORMAL IN SIZE AND HETEROGENOUS IN ECHOGENICITY. THE POSTERIOR  UTERINE WALL HAS INCREASED ECHOGENICITY AND BLOOD FLOW. ENDOMETRIUM MEASURES 2MM IN THICKNESS. THE POSTERIOR WALL OF THE ENDOMETRIUM IS POORLY  DEFINED. RIGHT OVARY APPEARS WITHIN NORMAL LIMITS. LEFT OVARY APPEARS WITHIN NORMAL LIMITS. A FOLLICULAR CYST IS SEEN. NO FREE FLUID SEEN IN THE CDS.     Aba Noonan MD

## 2019-06-11 NOTE — PATIENT INSTRUCTIONS

## 2019-06-25 DIAGNOSIS — I10 ESSENTIAL HYPERTENSION: ICD-10-CM

## 2019-06-25 RX ORDER — HYDROCHLOROTHIAZIDE 25 MG/1
TABLET ORAL
Qty: 90 TAB | Refills: 0 | Status: SHIPPED | OUTPATIENT
Start: 2019-06-25 | End: 2019-12-24

## 2019-07-11 RX ORDER — FLUCONAZOLE 150 MG/1
TABLET ORAL
Qty: 2 TAB | Refills: 0 | Status: SHIPPED | OUTPATIENT
Start: 2019-07-11 | End: 2019-08-02

## 2019-07-17 ENCOUNTER — TELEPHONE (OUTPATIENT)
Dept: ONCOLOGY | Age: 34
End: 2019-07-17

## 2019-07-17 NOTE — TELEPHONE ENCOUNTER
[address] at [facility]  [phone]    07/17/19- Phone call placed to pt to remind pt to have labs drawn prior to her follow up appointment with .  left for patient.

## 2019-07-21 NOTE — PROGRESS NOTES
Cancer [place] at [place] [address]  [address] [facility], [address] [facility] [address] [facility]  Jasmine Fetch: [phone]  F: [phone]      Reason for Visit:   Moira Bee is a 30 y.o. female who is seen for follow up of lymphoma. Treatment History:   · US neck 5/26/2016: 2.5cm right posterior auricular mass  · US guided biopsy cervical node 6/30/2016: suspicious for large b-cell lymphoma  · CT Neck/C/A/P 7/25/2016: Enlarged right level 5 lymph node measuring 2 x 1 cm and slightly inferior to this there is a slightly enlarged lymph node measuring 1.2 x 0.8 cm. There is a slightly enlarged right level 2 lymph node measuring 1.3 x 0.9 cm. There is a slightly enlarged left level 2 lymph node measuring 1.7 x 1.1 cm. Lymph nodes more inferior at level 3 and level 4 are  normal in size. No supraclavicular adenopathy is identified. No adenopathy in chest, abdomen, or pelvis  · PET-CT 7/27/2016: No areas of hypermetabolic activity  · Excisional biopsy of right level 2 cervical node by Dr. 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 Eloy Robison completed 10/10/2016    History of Present Illness:   She reports some increasing fatigue. Feels tired all the time. Gets more than 8 hours of sleep most nights, sometimes 10-11 hours. No recent infections, other than a vaginal infection recently. No fevers, chills, night sweats, unintentional weight loss, adenopathy. Working full time as . Has three young children, 5-11. PAST HISTORY: The following sections were reviewed and updated in the EMR as appropriate: PMH, SH, FH, Medications, Allergies. Allergies   Allergen Reactions    Clonidine (Pf) Vertigo      Review of Systems: A complete review of systems was obtained, reviewed, and scanned into the EMR. Pertinent findings reviewed above.     Physical Exam:     Visit Vitals  /83 (BP 1 Location: Left arm, BP Patient Position: Sitting)   Pulse 67   Temp 97.5 °F (36.4 °C) (Temporal)   Resp 16   Ht 5' 8\" (1.727 m)   Wt 224 lb (101.6 kg)   SpO2 100%   BMI 34.06 kg/m²     ECOG PS: 0  General: No distress  Eyes: PERRLA, anicteric sclerae  HENT: Atraumatic, OP clear  Neck: Supple  Lymphatic: No palpable cervical, supraclavicular, or inguinal adenopathy. Respiratory: CTAB, normal respiratory effort  CV: Normal rate, regular rhythm, no murmurs, no peripheral edema  GI: Soft, nontender, nondistended, no masses, no hepatomegaly, no splenomegaly  MS: Normal gait and station. Digits without clubbing or cyanosis. Skin: No rashes, ecchymoses, or petechiae. Normal temperature, turgor, and texture. Psych: Alert, oriented, appropriate affect, normal judgment/insight    Results:     Lab Results   Component Value Date/Time    WBC 6.2 01/18/2019 05:18 PM    HGB 12.4 01/18/2019 05:18 PM    HCT 35.4 01/18/2019 05:18 PM    PLATELET 178 85/32/3709 05:18 PM    MCV 88 01/18/2019 05:18 PM    ABS. NEUTROPHILS 3.7 01/18/2019 05:18 PM    Hgb, External 12.3 02/07/2014    Hct, External 35.6 02/07/2014    Platelet cnt., External 272 02/07/2014     Lab Results   Component Value Date/Time    Sodium 140 01/18/2019 05:18 PM    Potassium 3.6 01/18/2019 05:18 PM    Chloride 102 01/18/2019 05:18 PM    CO2 24 01/18/2019 05:18 PM    Glucose 93 01/18/2019 05:18 PM    BUN 8 01/18/2019 05:18 PM    Creatinine 0.93 01/18/2019 05:18 PM    GFR est AA 95 01/18/2019 05:18 PM    GFR est non-AA 82 01/18/2019 05:18 PM    Calcium 9.2 01/18/2019 05:18 PM    Glucose POC 76 11/03/2017 01:35 PM     Lab Results   Component Value Date/Time    Bilirubin, total 0.2 01/18/2019 05:18 PM    ALT (SGPT) 11 01/18/2019 05:18 PM    AST (SGOT) 18 01/18/2019 05:18 PM    Alk.  phosphatase 69 01/18/2019 05:18 PM    Protein, total 6.5 01/18/2019 05:18 PM    Albumin 4.3 01/18/2019 05:18 PM    Globulin 3.4 05/17/2017 11:56 AM     Sed rate (ESR) (mm/hr)   Date Value   01/18/2019 12   09/10/2018 6   05/07/2018 5   12/12/2017 8   08/28/2017 3   02/13/2017 6     Sed rate, automated (mm/hr)   Date Value   05/17/2017 12   11/11/2016 34 (H)   08/10/2016 44 (H)         CT N/C/A/P 12/04/2017: no evidence of recurrence  CT N/C/A/P 12/17/2018: no evidence of recurrence    Assessment:   1) Nodular lymphocyte predominant Hodgkin Lymphoma  Stage IIA  She is s/p radiation with Dr. Laurie Ring completed 10/2016, with resolution of cervical adenopathy on post-treatment CT scan. She is now in remission. She remains without definite evidence of disease recurrence at this time, based on history, exam, labwork, and imaging. We will continue to follow with surveillance. Further imaging only as indicated. Follow up after completion of therapy for Hodgkin Lymphoma, per NCCN:  · Year 1&2: H&P every 3-6 months  · Year 3: H&P every 6-12 months  · Year 4+: H&P every 12 months  · CBC, CMP, ESR as indicated  · TSH annually if RT to neck  · CT at 6, 12, and 24 months, or as clinically indicated  · Annual influenza vaccine    Confirms she is on birth control, Implanon. She does not desire to extend her family. 2) Pelvic pain, intermittent  Persists. Saw GYN, told she has an ovarian cyst and this is being monitored. 3) Vit D deficiency  Continue supplements as prescribed by PCP    4) Fatigue  Uncertain etiology. Previous TSH ok  Due for some labs now. If this continues to worsen, we could consider repeating a CT to ensure no recurrence of her lymphoma.     Plan:     · Labs today and in 6 months: CBC, CMP, ESR ([facility])   · Return to clinic in 6 months      Signed By: Azucena Gilliam MD

## 2019-07-23 ENCOUNTER — TELEPHONE (OUTPATIENT)
Dept: ONCOLOGY | Age: 34
End: 2019-07-23

## 2019-07-23 ENCOUNTER — OFFICE VISIT (OUTPATIENT)
Dept: ONCOLOGY | Age: 34
End: 2019-07-23

## 2019-07-23 VITALS
HEIGHT: 68 IN | BODY MASS INDEX: 33.95 KG/M2 | WEIGHT: 224 LBS | OXYGEN SATURATION: 100 % | HEART RATE: 67 BPM | DIASTOLIC BLOOD PRESSURE: 83 MMHG | RESPIRATION RATE: 16 BRPM | SYSTOLIC BLOOD PRESSURE: 118 MMHG | TEMPERATURE: 97.5 F

## 2019-07-23 DIAGNOSIS — C81.01 NODULAR LYMPHOCYTE PREDOMINANT HODGKIN LYMPHOMA OF LYMPH NODES OF NECK (HCC): Primary | ICD-10-CM

## 2019-07-23 NOTE — TELEPHONE ENCOUNTER
Patient called and stated that she forgot to get a letter for her work. Patient would like to know if it can be sent through my chart so she can print it and give to her employer.

## 2019-07-23 NOTE — TELEPHONE ENCOUNTER
[address] at [place]  [phone]    07/23/19- Letter written and signed by Dr. Saritha Swan. Emailed using [employer] [email or web address] to [email or web address] per patient request.  No further questions or concerns.

## 2019-07-23 NOTE — PROGRESS NOTES
Collin Avery is a 30 y.o. female follow up for lymphoma. 1. Have you been to the ER, urgent care clinic since your last visit? Hospitalized since your last visit? No     2. Have you seen or consulted any other health care providers outside of the [address] [place] [address] since your last visit? Include any pap smears or colon screening.  No

## 2019-07-23 NOTE — LETTER
7/23/2019 11:43 AM 
 
Ms. Mee Coley [phone] [address] To Whom It May Concern: 
 
Mee Coley is currently under the care of Dr. Sravan Chávez at [facility]. She was in our office today for an appointment. If there are questions or concerns please have the patient contact our office. Sincerely, Ariana Cunha MD

## 2019-07-24 LAB
ALBUMIN SERPL-MCNC: 4.1 G/DL (ref 3.5–5.5)
ALBUMIN/GLOB SERPL: 1.7 {RATIO} (ref 1.2–2.2)
ALP SERPL-CCNC: 70 IU/L (ref 39–117)
ALT SERPL-CCNC: 12 IU/L (ref 0–32)
AST SERPL-CCNC: 17 IU/L (ref 0–40)
BASOPHILS # BLD AUTO: 0.1 X10E3/UL (ref 0–0.2)
BASOPHILS NFR BLD AUTO: 1 %
BILIRUB SERPL-MCNC: 0.3 MG/DL (ref 0–1.2)
BUN SERPL-MCNC: 7 MG/DL (ref 6–20)
BUN/CREAT SERPL: 8 (ref 9–23)
CALCIUM SERPL-MCNC: 9.4 MG/DL (ref 8.7–10.2)
CHLORIDE SERPL-SCNC: 102 MMOL/L (ref 96–106)
CO2 SERPL-SCNC: 25 MMOL/L (ref 20–29)
CREAT SERPL-MCNC: 0.88 MG/DL (ref 0.57–1)
EOSINOPHIL # BLD AUTO: 0.1 X10E3/UL (ref 0–0.4)
EOSINOPHIL NFR BLD AUTO: 1 %
ERYTHROCYTE [DISTWIDTH] IN BLOOD BY AUTOMATED COUNT: 12.4 % (ref 12.3–15.4)
ERYTHROCYTE [SEDIMENTATION RATE] IN BLOOD BY WESTERGREN METHOD: 4 MM/HR (ref 0–32)
GLOBULIN SER CALC-MCNC: 2.4 G/DL (ref 1.5–4.5)
GLUCOSE SERPL-MCNC: 86 MG/DL (ref 65–99)
HCT VFR BLD AUTO: 37.4 % (ref 34–46.6)
HGB BLD-MCNC: 13 G/DL (ref 11.1–15.9)
IMM GRANULOCYTES # BLD AUTO: 0 X10E3/UL (ref 0–0.1)
IMM GRANULOCYTES NFR BLD AUTO: 0 %
LYMPHOCYTES # BLD AUTO: 1.5 X10E3/UL (ref 0.7–3.1)
LYMPHOCYTES NFR BLD AUTO: 27 %
MCH RBC QN AUTO: 29.9 PG (ref 26.6–33)
MCHC RBC AUTO-ENTMCNC: 34.8 G/DL (ref 31.5–35.7)
MCV RBC AUTO: 86 FL (ref 79–97)
MONOCYTES # BLD AUTO: 0.5 X10E3/UL (ref 0.1–0.9)
MONOCYTES NFR BLD AUTO: 10 %
NEUTROPHILS # BLD AUTO: 3.5 X10E3/UL (ref 1.4–7)
NEUTROPHILS NFR BLD AUTO: 61 %
PLATELET # BLD AUTO: 359 X10E3/UL (ref 150–450)
POTASSIUM SERPL-SCNC: 3.7 MMOL/L (ref 3.5–5.2)
PROT SERPL-MCNC: 6.5 G/DL (ref 6–8.5)
RBC # BLD AUTO: 4.35 X10E6/UL (ref 3.77–5.28)
SODIUM SERPL-SCNC: 142 MMOL/L (ref 134–144)
WBC # BLD AUTO: 5.7 X10E3/UL (ref 3.4–10.8)

## 2019-07-30 ENCOUNTER — OFFICE VISIT (OUTPATIENT)
Dept: FAMILY MEDICINE CLINIC | Age: 34
End: 2019-07-30

## 2019-07-30 VITALS
DIASTOLIC BLOOD PRESSURE: 79 MMHG | HEIGHT: 68 IN | BODY MASS INDEX: 33.34 KG/M2 | HEART RATE: 67 BPM | SYSTOLIC BLOOD PRESSURE: 123 MMHG | RESPIRATION RATE: 16 BRPM | TEMPERATURE: 96.6 F | WEIGHT: 220 LBS | OXYGEN SATURATION: 100 %

## 2019-07-30 DIAGNOSIS — N89.8 VAGINAL ODOR: Primary | ICD-10-CM

## 2019-07-30 RX ORDER — METRONIDAZOLE 500 MG/1
500 TABLET ORAL 2 TIMES DAILY
Qty: 14 TAB | Refills: 0 | Status: SHIPPED | OUTPATIENT
Start: 2019-07-30 | End: 2019-08-06

## 2019-07-30 NOTE — PATIENT INSTRUCTIONS
A Healthy Lifestyle: Care Instructions  Your Care Instructions    A healthy lifestyle can help you feel good, stay at a healthy weight, and have plenty of energy for both work and play. A healthy lifestyle is something you can share with your whole family. A healthy lifestyle also can lower your risk for serious health problems, such as high blood pressure, heart disease, and diabetes. You can follow a few steps listed below to improve your health and the health of your family. Follow-up care is a key part of your treatment and safety. Be sure to make and go to all appointments, and call your doctor if you are having problems. It's also a good idea to know your test results and keep a list of the medicines you take. How can you care for yourself at home? · Do not eat too much sugar, fat, or fast foods. You can still have dessert and treats now and then. The goal is moderation. · Start small to improve your eating habits. Pay attention to portion sizes, drink less juice and soda pop, and eat more fruits and vegetables. ? Eat a healthy amount of food. A 3-ounce serving of meat, for example, is about the size of a deck of cards. Fill the rest of your plate with vegetables and whole grains. ? Limit the amount of soda and sports drinks you have every day. Drink more water when you are thirsty. ? Eat at least 5 servings of fruits and vegetables every day. It may seem like a lot, but it is not hard to reach this goal. A serving or helping is 1 piece of fruit, 1 cup of vegetables, or 2 cups of leafy, raw vegetables. Have an apple or some carrot sticks as an afternoon snack instead of a candy bar. Try to have fruits and/or vegetables at every meal.  · Make exercise part of your daily routine. You may want to start with simple activities, such as walking, bicycling, or slow swimming. Try to be active 30 to 60 minutes every day. You do not need to do all 30 to 60 minutes all at once.  For example, you can exercise 3 times a day for 10 or 20 minutes. Moderate exercise is safe for most people, but it is always a good idea to talk to your doctor before starting an exercise program.  · Keep moving. Magui Paraday the lawn, work in the garden, or coComment. Take the stairs instead of the elevator at work. · If you smoke, quit. People who smoke have an increased risk for heart attack, stroke, cancer, and other lung illnesses. Quitting is hard, but there are ways to boost your chance of quitting tobacco for good. ? Use nicotine gum, patches, or lozenges. ? Ask your doctor about stop-smoking programs and medicines. ? Keep trying. In addition to reducing your risk of diseases in the future, you will notice some benefits soon after you stop using tobacco. If you have shortness of breath or asthma symptoms, they will likely get better within a few weeks after you quit. · Limit how much alcohol you drink. Moderate amounts of alcohol (up to 2 drinks a day for men, 1 drink a day for women) are okay. But drinking too much can lead to liver problems, high blood pressure, and other health problems. Family health  If you have a family, there are many things you can do together to improve your health. · Eat meals together as a family as often as possible. · Eat healthy foods. This includes fruits, vegetables, lean meats and dairy, and whole grains. · Include your family in your fitness plan. Most people think of activities such as jogging or tennis as the way to fitness, but there are many ways you and your family can be more active. Anything that makes you breathe hard and gets your heart pumping is exercise. Here are some tips:  ? Walk to do errands or to take your child to school or the bus.  ? Go for a family bike ride after dinner instead of watching TV. Where can you learn more? Go to http://srini-jeny.info/. Enter A344 in the search box to learn more about \"A Healthy Lifestyle: Care Instructions. \"  Current as of: September 11, 2018  Content Version: 12.1  © 5360-6462 [employer]. Care instructions adapted under license by [employer] (which disclaims liability or warranty for this information). If you have questions about a medical condition or this instruction, always ask your healthcare professional. [address] any warranty or liability for your use of this information. Bacterial Vaginosis: Care Instructions  Your Care Instructions    Bacterial vaginosis is a type of vaginal infection. It is caused by excess growth of certain bacteria that are normally found in the vagina. Symptoms can include itching, swelling, pain when you urinate or have sex, and a gray or yellow discharge with a \"fishy\" odor. It is not considered an infection that is spread through sexual contact. Although symptoms can be annoying and uncomfortable, bacterial vaginosis does not usually cause other health problems. However, if you have it while you are pregnant, it can cause complications. While the infection may go away on its own, most doctors use antibiotics to treat it. You may have been prescribed pills or vaginal cream. With treatment, bacterial vaginosis usually clears up in 5 to 7 days. Follow-up care is a key part of your treatment and safety. Be sure to make and go to all appointments, and call your doctor if you are having problems. It's also a good idea to know your test results and keep a list of the medicines you take. How can you care for yourself at home? · Take your antibiotics as directed. Do not stop taking them just because you feel better. You need to take the full course of antibiotics. · Do not eat or drink anything that contains alcohol if you are taking metronidazole (Flagyl). · Keep using your medicine if you start your period. Use pads instead of tampons while using a vaginal cream or suppository. Tampons can absorb the medicine.   · Wear loose cotton clothing. Do not wear nylon and other materials that hold body heat and moisture close to the skin. · Do not scratch. Relieve itching with a cold pack or a cool bath. · Do not wash your vaginal area more than once a day. Use plain water or a mild, unscented soap. Do not douche. When should you call for help? Watch closely for changes in your health, and be sure to contact your doctor if:    · You have unexpected vaginal bleeding.     · You have a fever.     · You have new or increased pain in your vagina or pelvis.     · You are not getting better after 1 week.     · Your symptoms return after you finish the course of your medicine. Where can you learn more? Go to http://srini-jeny.info/. Surinder Tarango in the search box to learn more about \"Bacterial Vaginosis: Care Instructions. \"  Current as of: February 19, 2019  Content Version: 12.1  © 4359-6627 [employer]. Care instructions adapted under license by [employer] (which disclaims liability or warranty for this information). If you have questions about a medical condition or this instruction, always ask your healthcare professional. [address] any warranty or liability for your use of this information.

## 2019-07-30 NOTE — PROGRESS NOTES
[facility]    Subjective:   Moira Bee is a 30 y.o. female with history of HTN, depression, nodular lymphocyte predominant hodgkin lymphoma  CC: vaginal odor  History provided by patient     HPI:  Two weeks ago, she called about vaginal odor, and she was given 2 pills. She took one and then took another three days later. She has had yeast infections frequently. Her last episode was back in 6/02/18. She feels that she a fishy smell after intercourse. Two days ago she started having a milky discharge. She denies dyspareunia, but feels like she has been a little dry. She does not want further STI testing other than what is taken on the NuSwab (GC/chlam). She is currently using implanon for birth control. She is not using condoms currently, but does not have a new partner or any further concerns. PFSH:     Current Outpatient Medications on File Prior to Visit   Medication Sig Dispense Refill    hydroCHLOROthiazide (HYDRODIURIL) 25 mg tablet TAKE 1 TABLET BY MOUTH DAILY 90 Tab 0    omeprazole (PRILOSEC) 20 mg capsule TAKE ONE CAPSULE BY MOUTH EVERY DAY 30 Cap 2    fluconazole (DIFLUCAN) 150 mg tablet TAKE ONE TABLET BY MOUTH NOW, MAY REPEAT IN 3 DAYS IF NEEDED 2 Tab 0     No current facility-administered medications on file prior to visit. 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 9) E66.01       Social History     Socioeconomic History    Marital status:      Spouse name: Not on file    Number of children: Not on file    Years of education: Not on file    Highest education level: Not on file   Occupational History    Not on file   Social Needs    Financial resource strain: Not on file    Food insecurity:     Worry: Not on file     Inability: Not on file    Transportation needs: Medical: Not on file     Non-medical: Not on file   Tobacco Use    Smoking status: Never Smoker    Smokeless tobacco: Never Used   Substance and Sexual Activity    Alcohol use: Yes     Comment: occassionally    Drug use: No    Sexual activity: Yes     Partners: Male     Birth control/protection: Condom, Implant   Lifestyle    Physical activity:     Days per week: Not on file     Minutes per session: Not on file    Stress: Not on file   Relationships    Social connections:     Talks on phone: Not on file     Gets together: Not on file     Attends [employer] service: Not on file     Active member of club or organization: Not on file     Attends meetings of clubs or organizations: Not on file     Relationship status: Not on file    Intimate partner violence:     Fear of current or ex partner: Not on file     Emotionally abused: Not on file     Physically abused: Not on file     Forced sexual activity: Not on file   Other Topics Concern    Not on file   Social History Narrative    Not on file       Review of Systems   Constitutional: Negative for chills, fever and malaise/fatigue. Gastrointestinal: Negative for abdominal pain, constipation, diarrhea, nausea and vomiting. Genitourinary: Negative for dysuria, frequency, hematuria and urgency. Positive for vaginal discharge, dryness and odor. Skin: Negative for itching and rash. Objective:     Visit Vitals  /79 (BP 1 Location: Right arm, BP Patient Position: Sitting)   Pulse 67   Temp 96.6 °F (35.9 °C) (Oral)   Resp 16   Ht 5' 8\" (1.727 m)   Wt 220 lb (99.8 kg)   SpO2 100%   BMI 33.45 kg/m²          Physical Exam   Constitutional: She appears well-developed and well-nourished. No distress. Cardiovascular: Normal rate, regular rhythm, normal heart sounds and intact distal pulses. Pulmonary/Chest: Effort normal.   Genitourinary: Vaginal discharge found. Genitourinary Comments: Vagina without erythema or rash.  Vaginal discharge malodorous, thin, and white. Musculoskeletal: Normal range of motion. Skin: She is not diaphoretic. Pertinent Labs/Studies: Nuswab performed      Assessment and orders:       ICD-10-CM ICD-9-CM    1. Vaginal odor N89.8 625.8 NUSWAB VAGINITIS PLUS     Encounter Diagnoses   Name Primary?  Vaginal odor Yes     Diagnoses and all orders for this visit:    1. Vaginal odor - Vaginal discharge and odor did not resolve with diflucan that was sent over a telephone encounter. Symptoms and exam consistent with BV. Will get Nuswab and cover for BV while awaiting results. -     NUSWAB VAGINITIS PLUS  -     metroNIDAZOLE (FLAGYL) 500 mg tablet; Take 1 Tab by mouth two (2) times a day for 7 days.  -     Educated pt about flagyl side effects and told pt to avoid alcohol during this course  -     Pt to call if symptoms continue or worsen    Follow-up and Dispositions    · Return if symptoms worsen or fail to improve. I have discussed the diagnosis with the patient and the intended plan as seen in the above orders. Social history, medical history, and labs were reviewed. The patient has received an after-visit summary and questions were answered concerning future plans. I have discussed medication side effects and warnings with the patient as well.     Lisa Vincent MD  Resident [facility]  07/30/19

## 2019-07-30 NOTE — PROGRESS NOTES
1. Have you been to the ER, urgent care clinic since your last visit? Hospitalized since your last visit? no    2. Have you seen or consulted any other health care providers outside of the [address] [place] [address] since your last visit? Include any pap smears or colon screening. yes  Reviewed record in preparation for visit and have obtained necessary documentation. Patient did not bring medications to visit for review. Information provided on Advanced Directive, Living Will. Body mass index is 33.45 kg/m². There are no preventive care reminders to display for this patient.

## 2019-08-09 NOTE — PROGRESS NOTES
1. Have you been to the ER, urgent care clinic, or been hospitalized since your last visit? No  
 
2. Have you seen or consulted any other health care providers outside of the [address] [place] [address] since your last visit? No  
 
 
Reviewed record in preparation for visit and have necessary documentation Opportunity was given for questions Goals that were addressed and/or need to be completed during or after this appointment include Health Maintenance Due Topic Date Due  
 PAP AKA CERVICAL CYTOLOGY  11/11/2018 Has appointment for PAP on 1/16/2019 Order iron studies and repeat CBC.  Start iron supplementation 325 mg daily.  Call patient to see if she is taking this already.  She has had persistent anemia in the past.  With GI bleeding.  Make sure that patient is aware that iron causes constipation and that she needs to be on stool softener when she is taking iron supplementation.    Follow-up with me in 4 weeks.  We are still waiting on cholesterol and hepatitis C screening.    Patient's results were released through my chart and was notified.  Please follow up to make sure that they received the results.  Released results through my chart.    I have reviewed your recent blood work.    Your complete blood count is abnormal showing persistent anemia.  Patient needs follow-up CBC.  Also needs iron studies.  Start iron supplementation for now.  Your metabolic panel which shows a glucose kidney function electrolytes and liver function is stable within normal limits.  Albumin is slightly decreased.  But improved from previous.   Thyroid studies are stable within normal limits continue current thyroid dose..   Your cholesterol is pending.    Your vitamin-D is  low in you need to start on supplementation.  We will send supplementation to pharmacy.  Repeat in 3 to 6 months.  This is a once weekly medication.   .  Your hemoglobin A1c is normal.  Therefore you do not have diabetes  We will discuss these results in detail at your next office visit.  Please let me know if you have any questions concerning these laboratory results.

## 2019-09-06 ENCOUNTER — OFFICE VISIT (OUTPATIENT)
Dept: FAMILY MEDICINE CLINIC | Age: 34
End: 2019-09-06

## 2019-09-06 VITALS
TEMPERATURE: 98.8 F | DIASTOLIC BLOOD PRESSURE: 85 MMHG | WEIGHT: 229 LBS | HEART RATE: 72 BPM | SYSTOLIC BLOOD PRESSURE: 121 MMHG | OXYGEN SATURATION: 97 % | RESPIRATION RATE: 16 BRPM | HEIGHT: 68 IN | BODY MASS INDEX: 34.71 KG/M2

## 2019-09-06 DIAGNOSIS — J06.9 VIRAL URI: Primary | ICD-10-CM

## 2019-09-06 DIAGNOSIS — K21.9 GASTROESOPHAGEAL REFLUX DISEASE, ESOPHAGITIS PRESENCE NOT SPECIFIED: ICD-10-CM

## 2019-09-06 RX ORDER — OMEPRAZOLE 20 MG/1
CAPSULE, DELAYED RELEASE ORAL
Qty: 30 CAP | Refills: 2 | Status: SHIPPED | OUTPATIENT
Start: 2019-09-06 | End: 2022-01-21 | Stop reason: SDUPTHER

## 2019-09-06 RX ORDER — HYDROCHLOROTHIAZIDE 25 MG/1
TABLET ORAL
Qty: 90 TAB | Refills: 0 | Status: CANCELLED | OUTPATIENT
Start: 2019-09-06

## 2019-09-06 RX ORDER — FLUTICASONE PROPIONATE 50 MCG
SPRAY, SUSPENSION (ML) NASAL
Qty: 1 BOTTLE | Refills: 0 | Status: SHIPPED | OUTPATIENT
Start: 2019-09-06

## 2019-09-06 NOTE — PATIENT INSTRUCTIONS
Try a sinus rinse kit (for example swati pot) to help move secretions from your sinus.     Start using Flonase 1 spray in each nostril once a day for 2 week. Use Afrin spray one spray in each nostril daily. Do NOT exceed 3 days of use. Use first, followed by one spray of nasal saline followed by one spay of the Azelastine-Flonase. Stay well hydrated as symptoms can last up to 1.5 weeks      Use tylenol/motrin as needed for generalized muscle pain and fever     Sudafed for congestion or Afrin spray one spray in each nostril daily. Do NOT exceed 3 days of use.     Try Tea with honey for cough or throat irritation.      Salt water rinses or Cepacol drops for throat irritation     Wash hand frequently and cough/sneeze into your sleeve to help prevent infection of others          Saline Nasal Washes: Care Instructions  Your Care Instructions  Saline nasal washes help keep the nasal passages open by washing out thick or dried mucus. This simple remedy can help relieve symptoms of allergies, sinusitis, and colds. It also can make the nose feel more comfortable by keeping the mucous membranes moist. You may notice a little burning sensation in your nose the first few times you use the solution, but this usually gets better in a few days. Follow-up care is a key part of your treatment and safety. Be sure to make and go to all appointments, and call your doctor if you are having problems. It's also a good idea to know your test results and keep a list of the medicines you take. How can you care for yourself at home? · You can buy premixed saline solution in a squeeze bottle or other sinus rinse products at a drugstore. Read and follow the instructions on the label. · You also can make your own saline solution by adding 1 teaspoon of salt and 1 teaspoon of baking soda to 2 cups of distilled water. · If you use a homemade solution, pour a small amount into a clean bowl.  Using a rubber bulb syringe, squeeze the syringe and place the tip in the salt water. Pull a small amount of the salt water into the syringe by relaxing your hand. · Sit down with your head tilted slightly back. Do not lie down. Put the tip of the bulb syringe or the squeeze bottle a little way into one of your nostrils. Gently drip or squirt a few drops into the nostril. Repeat with the other nostril. Some sneezing and gagging are normal at first.  · Gently blow your nose. · Wipe the syringe or bottle tip clean after each use. · Repeat this 2 or 3 times a day. · Use nasal washes gently if you have nosebleeds often. When should you call for help? Watch closely for changes in your health, and be sure to contact your doctor if:    · You often get nosebleeds.     · You have problems doing the nasal washes. Where can you learn more? Go to http://srini-jeny.info/. Enter [phone] in the search box to learn more about \"Saline Nasal Washes: Care Instructions. \"  Current as of: March 28, 2018  Content Version: 11.8  © 6335-2471 [employer]. Care instructions adapted under license by [employer] (which disclaims liability or warranty for this information). If you have questions about a medical condition or this instruction, always ask your healthcare professional. [place] [address] any warranty or liability for your use of this information.

## 2019-09-06 NOTE — PROGRESS NOTES
Subjective  CC: Maya Herrera is an 30 y.o. female presents for sore throat    Reports that she has been sore throat for the past 3 days. On day 2 she noted some nasal congestion and nasal pressure. Mild cough that started yesterday. No fevers. Mild today but noted to be worsened when waking in the AM. Has tried OTC agents which helps. Requesting refill on omeprazole. Allergies - reviewed: Allergies   Allergen Reactions    Clonidine (Pf) Vertigo         Medications - reviewed:   Current Outpatient Medications   Medication Sig    hydroCHLOROthiazide (HYDRODIURIL) 25 mg tablet TAKE 1 TABLET BY MOUTH DAILY    omeprazole (PRILOSEC) 20 mg capsule TAKE ONE CAPSULE BY MOUTH EVERY DAY     No current facility-administered medications for this visit. Past Medical History - reviewed:  Past Medical History:   Diagnosis Date    Abnormal Pap smear 2010    FU with colpo, negative per patient    Cancer ([facility]     nodular lymphocytic hodgkins lymphoma    Chest pain     Chlamydia 01/16/2019    vaginal    KAMERON I (cervical intraepithelial neoplasia I) 9/2/2011    Depression 5/12/2015    Encounter for IUD insertion 3/5/15    Mirena    Encounter for IUD removal 06/24/2016    Essential hypertension     Hypertension     Implanon removal 9/6/2013    Nexplanon insertion 02/09/2017    Pap smear for cervical cancer screening 11/11/15    Negative    Trouble in sleeping          Immunizations - reviewed:   Immunization History   Administered Date(s) Administered    Pneumococcal Conjugate (PCV-13) 12/06/2018    Pneumococcal Polysaccharide (PPSV-23) 12/15/2016    Tdap 04/12/2014         ROS  Review of Systems : A review of systems was performed. All pertinent negatives and positives are mentioned in the HPI.     Physical Exam  Visit Vitals  /85 (BP 1 Location: Left arm, BP Patient Position: Sitting)   Pulse 72   Temp 98.8 °F (37.1 °C) (Oral)   Resp 16   Ht 5' 8\" (1.727 m)   Wt 229 lb (103.9 kg)   SpO2 97%   BMI 34.82 kg/m²       General appearance - Alert, NAD. Head: Atraumatic. Normocephalic. No lymphadenopathy  Eyes: EOMI. Sclera white. Ears: Hearing grossly normal. TM non erythematous with no effusion   Nose: Nares patent, no polyps. Edematous turbinates  Throat: pharynx clear, no exudates. Respiratory - LCTAB. No wheeze/rale/rhonchi  Heart - Normal rate, regular rhythm. No m/r/r  Abdomen - Soft, non tender. Non distended. Neurological - No focal deficits. Speech normal.    Extremities - No LE edema. Distal pulses intact  Skin - normal coloration and normal turgor. No cyanosis, no rash. Assessment/Plan  1. Viral URI - Improving. symptomatic treatment. - fluticasone propionate (FLONASE) 50 mcg/actuation nasal spray; Use one spray in each nostril daily. Continue for at least 2 weeks. Dispense: 1 Bottle; Refill: 0    2. Gastroesophageal reflux disease, esophagitis presence not specified  - omeprazole (PRILOSEC) 20 mg capsule; TAKE ONE CAPSULE BY MOUTH EVERY DAY  Dispense: 30 Cap; Refill: 2        I have discussed the aforementioned diagnoses and plan with the patient in detail. I have provided information in person and/or in AVS. All questions answered prior to discharge.     Ciera Howard MD  Family Medicine Resident

## 2019-09-06 NOTE — PROGRESS NOTES
1. Have you been to the ER, urgent care clinic since your last visit? Hospitalized since your last visit? No    2. Have you seen or consulted any other health care providers outside of the [address] [place] [address] since your last visit? Include any pap smears or colon screening.  No  Reviewed record in preparation for visit and have necessary documentation  Pt did not bring medication to office visit for review    Goals that were addressed and/or need to be completed during or after this appointment include   Health Maintenance Due   Topic Date Due    Influenza Age 5 to Adult  08/01/2019

## 2020-01-15 ENCOUNTER — OFFICE VISIT (OUTPATIENT)
Dept: FAMILY MEDICINE CLINIC | Age: 35
End: 2020-01-15

## 2020-01-15 VITALS
OXYGEN SATURATION: 99 % | DIASTOLIC BLOOD PRESSURE: 80 MMHG | WEIGHT: 240 LBS | HEART RATE: 94 BPM | SYSTOLIC BLOOD PRESSURE: 121 MMHG | TEMPERATURE: 101.4 F | RESPIRATION RATE: 16 BRPM | BODY MASS INDEX: 36.37 KG/M2 | HEIGHT: 68 IN

## 2020-01-15 DIAGNOSIS — R50.9 FEVER, UNSPECIFIED FEVER CAUSE: ICD-10-CM

## 2020-01-15 DIAGNOSIS — R68.89 FLU-LIKE SYMPTOMS: Primary | ICD-10-CM

## 2020-01-15 LAB
QUICKVUE INFLUENZA TEST: NEGATIVE
S PYO AG THROAT QL: NEGATIVE
VALID INTERNAL CONTROL?: YES
VALID INTERNAL CONTROL?: YES

## 2020-01-15 RX ORDER — OSELTAMIVIR PHOSPHATE 75 MG/1
75 CAPSULE ORAL 2 TIMES DAILY
Qty: 10 CAP | Refills: 0 | Status: SHIPPED | OUTPATIENT
Start: 2020-01-15 | End: 2020-01-20

## 2020-01-15 NOTE — PROGRESS NOTES
1. Have you been to the ER, urgent care clinic since your last visit? Hospitalized since your last visit? No    2. Have you seen or consulted any other health care providers outside of the [address] [place] [address] since your last visit? Include any pap smears or colon screening.  No  Reviewed record in preparation for visit and have necessary documentation  Pt did not bring medication to office visit for review    Goals that were addressed and/or need to be completed during or after this appointment include     Health Maintenance Due   Topic Date Due    Influenza Age 5 to Adult  08/01/2019

## 2020-01-15 NOTE — LETTER
NOTIFICATION RETURN TO WORK  
 
1/15/2020 2:51 PM 
 
Ms. Mine Kelley [facility] [address] [address] To Whom It May Concern: 
 
Mine Kelley is currently under the care of Marilia Quijano. She will return to work on 1/19/2020. If there are questions or concerns please have the patient contact our office. Sincerely, Darvin Bustillos MD

## 2020-01-20 ENCOUNTER — TELEPHONE (OUTPATIENT)
Dept: FAMILY MEDICINE CLINIC | Age: 35
End: 2020-01-20

## 2020-01-20 NOTE — PROGRESS NOTES
Patient: Fly Brown MRN: [id number]  SSN: [id number]    YOB: 1985  Age: 30 y.o. Sex: female      Chief Complaint   Patient presents with    Flu Like Symptoms     chills, body aches, cough, sore throat, ear fullness; started yesterday     Fly Brown is a 30 y.o. female presents with complaints of congestion, sore throat, myalgias and chills for 1 days. There has been no nausea and no vomiting . she has not had  swollen glands and headache. Symptoms are moderate. Patient is drinking plenty of fluids. There is not a hx of asthma. There is a hx of allergic rhinitis. There is not a hx of tobacco use. There have not been contacts with similar infections. Medications:     Current Outpatient Medications   Medication Sig    oseltamivir (TAMIFLU) 75 mg capsule Take 1 Cap by mouth two (2) times a day for 5 days.  hydroCHLOROthiazide (HYDRODIURIL) 25 mg tablet TAKE 1 TABLET BY MOUTH DAILY    omeprazole (PRILOSEC) 20 mg capsule TAKE ONE CAPSULE BY MOUTH EVERY DAY    fluticasone propionate (FLONASE) 50 mcg/actuation nasal spray Use one spray in each nostril daily. Continue for at least 2 weeks. No current facility-administered medications for this visit.         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 ([address] 08/10/2016    Depression 05/12/2015    HTN (hypertension) 08/27/2012       Medical History:     Past Medical History:   Diagnosis Date    Abnormal Pap smear 2010    FU with colpo, negative per patient    Cancer ([address]     nodular lymphocytic hodgkins lymphoma    Chest pain     Chlamydia 01/16/2019    vaginal    KAMERON I (cervical intraepithelial neoplasia I) 9/2/2011    Depression 5/12/2015    Encounter for IUD insertion 3/5/15    Mirena    Encounter for IUD removal 06/24/2016    Essential hypertension     Hypertension     Implanon removal 9/6/2013    Nexplanon insertion 02/09/2017    Pap smear for cervical cancer screening 11/11/15    Negative    Trouble in sleeping        Allergies:      Allergies   Allergen Reactions    Clonidine (Pf) Vertigo       Surgical History:     Past Surgical History:   Procedure Laterality Date    HX COLPOSCOPY  2010    per pt WNL    HX WISDOM TEETH EXTRACTION         Social History:     Social History     Socioeconomic History    Marital status:      Spouse name: Not on file    Number of children: Not on file    Years of education: Not on file    Highest education level: Not on file   Tobacco Use    Smoking status: Never Smoker    Smokeless tobacco: Never Used   Substance and Sexual Activity    Alcohol use: Yes     Comment: occassionally    Drug use: No    Sexual activity: Yes     Partners: Male     Birth control/protection: Condom, Implant       Review of Symptoms:  Constitutional: c/o malaise, denies fever or chills  Skin: Negative for rash or lesion  Head: Negative for facial swelling or tenderness  Eyes: Negative for redness or discharge  Ears: Negative for otalgia or decreased hearing  Nose: c/o nasal congestion, denies sinus pressure  Neck: c/o sore throat, denies lymphadenopathy   Cardiovascular: Negative for chest pain or palpitations  Respiratory: c/o non-productive cough, denies wheezing or SOB  Gastrointestinal: Negative for nausea or abdominal pain  Neurologic: Negative for headache or dizziness      Visit Vitals  /80 (BP 1 Location: Left arm, BP Patient Position: Sitting)   Pulse 94   Temp (!) 101.4 °F (38.6 °C) (Oral)   Resp 16   Ht 5' 8\" (1.727 m)   Wt 240 lb (108.9 kg)   SpO2 99%   BMI 36.49 kg/m²       Physical Examination:  General: Well developed, well nourished, in no acute distress  Skin: Warm and dry sans rash or lesion  Head: Normocephalic, atraumatic  Eyes: Sclera clear, EOMI, PERRL  Ears: tympanic membranes normal in appearance  Nose: mucosal edema with rhinorrhea  Oropharynx: posterior erythema, no exudate   Neck: Normal range of motion, no lymphadenopathy  Cardiovascular: normal S1, S2, regular rate and rhythm  Respiratory: Clear to auscultation bilaterally with symmetrical, unlabored effort  Abdomen: Soft, Normal BS  Extremities: Full range of motion  Neurologic: Active, alert and oriented      Diagnoses and all orders for this visit:    1. Flu-like symptoms  -     oseltamivir (TAMIFLU) 75 mg capsule; Take 1 Cap by mouth two (2) times a day for 5 days. 2. Fever, unspecified fever cause  -     AMB POC RAPID INFLUENZA TEST  -     AMB POC RAPID STREP A        Symptomatic therapy suggested: rest, increase fluids and call prn if symptoms persist or worsen. I have discussed the diagnosis with the patient and the intended plan as seen in the above orders. The patient expresses understanding and agreement with our plan of care. All of the patient's questions were answered to apparent satisfaction. The patient has received an after-visit summary. The patient knows to call our office if there are any questions or concerns regarding diagnosis and treatment plans. I have discussed medication side effects and warnings with the patient as well.

## 2020-01-20 NOTE — PATIENT INSTRUCTIONS
Learning About Fever What is a fever? A fever is a high body temperature. It's one way your body fights being sick. A fever shows that the body is responding to infection or other illnesses, both minor and severe. A fever is a symptom, not an illness by itself. A fever can be a sign that you are ill, but most fevers are not caused by a serious problem. You may have a fever with a minor illness, such as a cold. But sometimes a very serious infection may cause little or no fever. It is important to look at other symptoms, other conditions you have, and how you feel in general. In children, notice how they act and see what symptoms they complain of. What is a normal body temperature? A normal body temperature is about 98. 6ºF. Some people have a normal temperature that is a little higher or a little lower than this. Your temperature may be a little lower in the morning than it is later in the day. It may go up during hot weather or when you exercise, wear heavy clothes, or take a hot bath. Your temperature may also be different depending on how you take it. A temperature taken in the mouth (oral) or under the arm may be a little lower than your core temperature (rectal). What is a fever temperature? A core temperature of 100.4°F or above is considered a fever. What can cause a fever? A fever may be caused by: · Infections. This is the most common cause of a fever. Examples of infections that can cause a fever include the flu, a kidney infection, or pneumonia. · Some medicines. · Severe trauma or injury, such as a heart attack, stroke, heatstroke, or burns. · Other medical conditions, such as arthritis and some cancers. How can you treat a fever at home? · Ask your doctor if you can take an over-the-counter pain medicine, such as acetaminophen (Tylenol), ibuprofen (Advil, Motrin), or naproxen (Aleve). Be safe with medicines. Read and follow all instructions on the label. · To prevent dehydration, drink plenty of fluids. Choose water and other caffeine-free clear liquids until you feel better. If you have kidney, heart, or liver disease and have to limit fluids, talk with your doctor before you increase the amount of fluids you drink. Follow-up care is a key part of your treatment and safety. Be sure to make and go to all appointments, and call your doctor if you are having problems. It's also a good idea to know your test results and keep a list of the medicines you take. Where can you learn more? Go to http://srini-jeny.info/. Enter P339 in the search box to learn more about \"Learning About Fever. \" Current as of: June 26, 2019 Content Version: 12.2 © 1184-1375 [employer]. Care instructions adapted under license by [employer] (which disclaims liability or warranty for this information). If you have questions about a medical condition or this instruction, always ask your healthcare professional. [place] [address] any warranty or liability for your use of this information.

## 2020-01-20 NOTE — TELEPHONE ENCOUNTER
----- Message from Palma Ritter sent at 1/20/2020  8:34 AM EST -----  Regarding: Dr. Yesenia Caputo Message/Vendor Calls    Caller's first and last name: Norma Urena      Reason for call: extension of letter to excuse absence from work      Callback required yes/no and why: yes      Best contact number(s): [phone]      Details to clarify the request: Pt was seen by Dr. Kay Maradiaga on 1/15/20. He gave a letter to excuse her from work and told her that if she wasn't feeling better to call back and he would extend her letter. Pt is not feeling better and needs to letter extended.        Palma Ritter

## 2020-01-21 NOTE — TELEPHONE ENCOUNTER
Pt called and says she could not go back to work on Sunday nor Monday. She is doing as instructed. It just hanging on. Can you do the letter so she can return hopefully on Saturday. She will get someone to come by and pick it up. Please call Pt as to when it is ready. Any questions, please call her.  Thanks

## 2020-01-22 ENCOUNTER — HOSPITAL ENCOUNTER (OUTPATIENT)
Dept: LAB | Age: 35
Discharge: HOME | End: 2020-01-22

## 2020-01-22 DIAGNOSIS — C81.01 NODLR LYMPHOCY PREDOM HDGKN LYMPH, NODES OF HEAD, FACE, & NK (HCC): ICD-10-CM

## 2020-01-22 LAB
ALBUMIN SERPL-MCNC: 4 G/DL (ref 3.5–5)
ALBUMIN/GLOB SERPL: 1.2 {RATIO} (ref 1.1–2.2)
ALP SERPL-CCNC: 70 U/L (ref 45–117)
ALT SERPL-CCNC: 39 U/L (ref 12–78)
ANION GAP SERPL CALC-SCNC: 6 MMOL/L (ref 5–15)
AST SERPL-CCNC: 34 U/L (ref 15–37)
BASOPHILS # BLD: 0 K/UL (ref 0–0.1)
BASOPHILS NFR BLD: 0 % (ref 0–1)
BILIRUB SERPL-MCNC: 0.4 MG/DL (ref 0.2–1)
BUN SERPL-MCNC: 9 MG/DL (ref 6–20)
BUN/CREAT SERPL: 11 (ref 12–20)
CALCIUM SERPL-MCNC: 9.1 MG/DL (ref 8.5–10.1)
CHLORIDE SERPL-SCNC: 103 MMOL/L (ref 97–108)
CO2 SERPL-SCNC: 29 MMOL/L (ref 21–32)
CREAT SERPL-MCNC: 0.83 MG/DL (ref 0.55–1.02)
DIFFERENTIAL METHOD BLD: NORMAL
EOSINOPHIL # BLD: 0.1 K/UL (ref 0–0.4)
EOSINOPHIL NFR BLD: 1 % (ref 0–7)
ERYTHROCYTE [DISTWIDTH] IN BLOOD BY AUTOMATED COUNT: 11.9 % (ref 11.5–14.5)
ERYTHROCYTE [SEDIMENTATION RATE] IN BLOOD: 25 MM/HR (ref 0–20)
GLOBULIN SER CALC-MCNC: 3.4 G/DL (ref 2–4)
GLUCOSE SERPL-MCNC: 99 MG/DL (ref 65–100)
HCT VFR BLD AUTO: 39.1 % (ref 35–47)
HGB BLD-MCNC: 13.4 G/DL (ref 11.5–16)
IMM GRANULOCYTES # BLD AUTO: 0 K/UL (ref 0–0.04)
IMM GRANULOCYTES NFR BLD AUTO: 0 % (ref 0–0.5)
LYMPHOCYTES # BLD: 1.8 K/UL (ref 0.8–3.5)
LYMPHOCYTES NFR BLD: 36 % (ref 12–49)
MCH RBC QN AUTO: 30.3 PG (ref 26–34)
MCHC RBC AUTO-ENTMCNC: 34.3 G/DL (ref 30–36.5)
MCV RBC AUTO: 88.5 FL (ref 80–99)
MONOCYTES # BLD: 0.3 K/UL (ref 0–1)
MONOCYTES NFR BLD: 6 % (ref 5–13)
NEUTS SEG # BLD: 2.9 K/UL (ref 1.8–8)
NEUTS SEG NFR BLD: 57 % (ref 32–75)
NRBC # BLD: 0 K/UL (ref 0–0.01)
NRBC BLD-RTO: 0 PER 100 WBC
PLATELET # BLD AUTO: 312 K/UL (ref 150–400)
PMV BLD AUTO: 8.9 FL (ref 8.9–12.9)
POTASSIUM SERPL-SCNC: 3.3 MMOL/L (ref 3.5–5.1)
PROT SERPL-MCNC: 7.4 G/DL (ref 6.4–8.2)
RBC # BLD AUTO: 4.42 M/UL (ref 3.8–5.2)
SODIUM SERPL-SCNC: 138 MMOL/L (ref 136–145)
WBC # BLD AUTO: 5.2 K/UL (ref 3.6–11)

## 2020-01-22 NOTE — TELEPHONE ENCOUNTER
Spoke with patient and advised needs follow up for letter for work. Patient expressed understanding and appt scheduled.

## 2020-01-22 NOTE — TELEPHONE ENCOUNTER
Cannot write note for >1 week without patient being seen and still with fever or CXR showing acute pulmonary process. Cannot complete supporting [facility] paperwork otherwise.

## 2020-01-23 ENCOUNTER — OFFICE VISIT (OUTPATIENT)
Dept: FAMILY MEDICINE CLINIC | Age: 35
End: 2020-01-23

## 2020-01-23 VITALS
WEIGHT: 234 LBS | DIASTOLIC BLOOD PRESSURE: 72 MMHG | RESPIRATION RATE: 18 BRPM | HEART RATE: 66 BPM | SYSTOLIC BLOOD PRESSURE: 120 MMHG | BODY MASS INDEX: 35.46 KG/M2 | HEIGHT: 68 IN | OXYGEN SATURATION: 100 % | TEMPERATURE: 98.9 F

## 2020-01-23 DIAGNOSIS — R68.89 FLU-LIKE SYMPTOMS: Primary | ICD-10-CM

## 2020-01-23 DIAGNOSIS — I10 ESSENTIAL HYPERTENSION: ICD-10-CM

## 2020-01-23 RX ORDER — HYDROCHLOROTHIAZIDE 25 MG/1
TABLET ORAL
Qty: 30 TAB | Refills: 0 | Status: SHIPPED | OUTPATIENT
Start: 2020-01-23 | End: 2020-02-25

## 2020-01-23 NOTE — PROGRESS NOTES
[facility]    Subjective:   Pedro Mello is a 30 y.o. female with history of HTN, hodgkin lymphoma, depression  CC: flu follow up  History provided by patient     HPI:  Pt states that she is feeling better. Her nausea/vomiting, fever, and myalgias have resolved. She states that she felt a little light-headed upon standing as she has been recovering, but this symptom has improved since she started drinking more water. She has been drinking ginger ale and gatorade as well. She would like a work note today as she missed more days than she intended to. PFSH:     Current Outpatient Medications on File Prior to Visit   Medication Sig Dispense Refill    omeprazole (PRILOSEC) 20 mg capsule TAKE ONE CAPSULE BY MOUTH EVERY DAY 30 Cap 2    fluticasone propionate (FLONASE) 50 mcg/actuation nasal spray Use one spray in each nostril daily. Continue for at least 2 weeks. 1 Bottle 0     No current facility-administered medications on file prior to visit. 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 9) E66.01       Social History     Socioeconomic History    Marital status:      Spouse name: Not on file    Number of children: Not on file    Years of education: Not on file    Highest education level: Not on file   Occupational History    Not on file   Social Needs    Financial resource strain: Not on file    Food insecurity:     Worry: Not on file     Inability: Not on file    Transportation needs:     Medical: Not on file     Non-medical: Not on file   Tobacco Use    Smoking status: Never Smoker    Smokeless tobacco: Never Used   Substance and Sexual Activity    Alcohol use: Yes     Comment: occassionally    Drug use: No    Sexual activity: Yes     Partners: Male     Birth control/protection: Condom, Implant Lifestyle    Physical activity:     Days per week: Not on file     Minutes per session: Not on file    Stress: Not on file   Relationships    Social connections:     Talks on phone: Not on file     Gets together: Not on file     Attends [employer] service: Not on file     Active member of club or organization: Not on file     Attends meetings of clubs or organizations: Not on file     Relationship status: Not on file    Intimate partner violence:     Fear of current or ex partner: Not on file     Emotionally abused: Not on file     Physically abused: Not on file     Forced sexual activity: Not on file   Other Topics Concern    Not on file   Social History Narrative    Not on file       Review of Systems   Constitutional: Negative for chills, fever and malaise/fatigue. HENT: Negative for congestion, ear pain and sore throat. Eyes: Negative for blurred vision and double vision. Respiratory: Negative for sputum production, shortness of breath and wheezing. Positive for some residual dry cough. Cardiovascular: Negative for chest pain and palpitations. Gastrointestinal: Negative for nausea and vomiting. Musculoskeletal: Negative for joint pain and myalgias. Neurological: Negative for headaches. Positive for light-headedness this past week. Objective:     Visit Vitals  /72 (BP 1 Location: Left arm, BP Patient Position: Sitting)   Pulse 66   Temp 98.9 °F (37.2 °C) (Oral)   Resp 18   Ht 5' 8\" (1.727 m)   Wt 234 lb (106.1 kg)   SpO2 100%   BMI 35.58 kg/m²          Physical Exam  Constitutional:       Appearance: Normal appearance. HENT:      Head: Normocephalic and atraumatic. Right Ear: Tympanic membrane, ear canal and external ear normal. There is no impacted cerumen. Left Ear: Tympanic membrane, ear canal and external ear normal. There is no impacted cerumen. Nose: Nose normal. No congestion.       Mouth/Throat:      Mouth: Mucous membranes are moist. Pharynx: Oropharynx is clear. No oropharyngeal exudate. Eyes:      General:         Right eye: No discharge. Left eye: No discharge. Conjunctiva/sclera: Conjunctivae normal.      Pupils: Pupils are equal, round, and reactive to light. Neck:      Musculoskeletal: Normal range of motion. Cardiovascular:      Rate and Rhythm: Normal rate and regular rhythm. Pulses: Normal pulses. Heart sounds: No murmur. Pulmonary:      Effort: Pulmonary effort is normal. No respiratory distress. Breath sounds: Normal breath sounds. No wheezing, rhonchi or rales. Abdominal:      General: Bowel sounds are normal.      Palpations: Abdomen is soft. Lymphadenopathy:      Cervical: No cervical adenopathy. Skin:     General: Skin is warm. Neurological:      General: No focal deficit present. Mental Status: She is alert and oriented to person, place, and time. Cranial Nerves: No cranial nerve deficit. Sensory: No sensory deficit. Pertinent Labs/Studies: none      Assessment and orders:       ICD-10-CM ICD-9-CM    1. Flu-like symptoms R68.89 780.99    2. Essential hypertension I10 401.9 hydroCHLOROthiazide (HYDRODIURIL) 25 mg tablet     Encounter Diagnoses   Name Primary?  Flu-like symptoms Yes    Essential hypertension      Diagnoses and all orders for this visit:    1. Flu-like symptoms - pt had tested negative on rapid flu, but had all the characteristic symptoms of flu when last in clinic. She is doing much better now, but needed a work note. 2. Essential hypertension - pt needs refill. BP well-controlled today. -     hydroCHLOROthiazide (HYDRODIURIL) 25 mg tablet; TAKE 1 TABLET BY MOUTH DAILY      Follow-up and Dispositions    · Return if symptoms worsen or fail to improve. I have discussed the diagnosis with the patient and the intended plan as seen in the above orders. Social history, medical history, and labs were reviewed.   The patient has received an after-visit summary and questions were answered concerning future plans. I have discussed medication side effects and warnings with the patient as well.     Yesica Nguyễn MD  Resident [facility]  01/26/20

## 2020-01-23 NOTE — PROGRESS NOTES
[facility] [place] at [address], [address] [facility] [address] [phone]  F: [phone]      Reason for Visit:   Corine Devlin is a 30 y.o. female who is seen for follow up of lymphoma. Treatment History:   · US neck 2016: 2.5cm right posterior auricular mass  · US guided biopsy cervical node 2016: suspicious for large b-cell lymphoma  · CT Neck/C/A/P 2016: Enlarged right level 5 lymph node measuring 2 x 1 cm and slightly inferior to this there is a slightly enlarged lymph node measuring 1.2 x 0.8 cm. There is a slightly enlarged right level 2 lymph node measuring 1.3 x 0.9 cm. There is a slightly enlarged left level 2 lymph node measuring 1.7 x 1.1 cm. Lymph nodes more inferior at level 3 and level 4 are  normal in size. No supraclavicular adenopathy is identified. No adenopathy in chest, abdomen, or pelvis  · PET-CT 2016: No areas of hypermetabolic activity  · Excisional biopsy of right level 2 cervical node by Dr. 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 Fox Medeiros completed 10/10/2016    History of Present Illness:   She was diagnosed with the flu last week. She is recovered now. Prior to this she was doing well. No fevers, chills, night sweats, unintentional weight loss, adenopathy. No other recent infections. Working full time as . Has three young children, 5-11. PAST HISTORY: The following sections were reviewed and updated in the EMR as appropriate: PMH, SH, FH, Medications, Allergies. Allergies   Allergen Reactions    Clonidine (Pf) Vertigo      Review of Systems: A complete review of systems was obtained, reviewed, and scanned into the EMR. Pertinent findings reviewed above.     Physical Exam:     Visit Vitals  /76 (BP 1 Location: Right arm, BP Patient Position: Sitting) Comment: . Pulse 61   Temp 97.7 °F (36.5 °C) (Temporal)   Resp 16   Ht 5' 8\" (1.727 m)   Wt 230 lb (104.3 kg)   SpO2 100%   BMI 34.97 kg/m²     ECOG PS: 0  General: No distress  Eyes: PERRLA, anicteric sclerae  HENT: Atraumatic, OP clear  Neck: Supple  Lymphatic: No palpable cervical, supraclavicular, or inguinal adenopathy. Respiratory: CTAB, normal respiratory effort  CV: Normal rate, regular rhythm, no murmurs, no peripheral edema  GI: Soft, nontender, nondistended, no masses, no hepatomegaly, no splenomegaly  MS: Normal gait and station. Digits without clubbing or cyanosis. Skin: No rashes, ecchymoses, or petechiae. Normal temperature, turgor, and texture. Psych: Alert, oriented, appropriate affect, normal judgment/insight    Results:     Lab Results   Component Value Date/Time    WBC 5.2 01/22/2020 02:56 PM    HGB 13.4 01/22/2020 02:56 PM    HCT 39.1 01/22/2020 02:56 PM    PLATELET 659 65/02/2098 02:56 PM    MCV 88.5 01/22/2020 02:56 PM    ABS. NEUTROPHILS 2.9 01/22/2020 02:56 PM    Hgb, External 12.3 02/07/2014    Hct, External 35.6 02/07/2014    Platelet cnt., External 272 02/07/2014     Lab Results   Component Value Date/Time    Sodium 138 01/22/2020 02:56 PM    Potassium 3.3 (L) 01/22/2020 02:56 PM    Chloride 103 01/22/2020 02:56 PM    CO2 29 01/22/2020 02:56 PM    Glucose 99 01/22/2020 02:56 PM    BUN 9 01/22/2020 02:56 PM    Creatinine 0.83 01/22/2020 02:56 PM    GFR est AA >60 01/22/2020 02:56 PM    GFR est non-AA >60 01/22/2020 02:56 PM    Calcium 9.1 01/22/2020 02:56 PM    Glucose POC 76 11/03/2017 01:35 PM     Lab Results   Component Value Date/Time    Bilirubin, total 0.4 01/22/2020 02:56 PM    ALT (SGPT) 39 01/22/2020 02:56 PM    AST (SGOT) 34 01/22/2020 02:56 PM    Alk.  phosphatase 70 01/22/2020 02:56 PM    Protein, total 7.4 01/22/2020 02:56 PM    Albumin 4.0 01/22/2020 02:56 PM    Globulin 3.4 01/22/2020 02:56 PM     Sed rate (ESR) (mm/hr)   Date Value   07/23/2019 4   01/18/2019 12   09/10/2018 6   05/07/2018 5   12/12/2017 8   08/28/2017 3   02/13/2017 6     Sed rate, automated (mm/hr)   Date Value   01/22/2020 25 (H)   05/17/2017 12   11/11/2016 34 (H)   08/10/2016 44 (H)         CT N/C/A/P 12/04/2017: no evidence of recurrence  CT N/C/A/P 12/17/2018: no evidence of recurrence    Assessment:   1) Nodular lymphocyte predominant Hodgkin Lymphoma  Stage IIA  She is s/p radiation with Dr. Martina Jacinto completed 10/2016, with resolution of cervical adenopathy on post-treatment CT scan. She is now in remission. She remains without definite evidence of disease recurrence at this time, though her ESR is now slightly elevated. This is likely high due to her recent flu diagnosis. We will continue to follow with surveillance, repeat ESR in a month. Follow up after completion of therapy for Hodgkin Lymphoma, per NCCN:  · Year 1&2: H&P every 3-6 months  · Year 3: H&P every 6-12 months  · Year 4+: H&P every 12 months  · CBC, CMP, ESR as indicated  · TSH annually if RT to neck  · CT at 6, 12, and 24 months, or as clinically indicated  · Annual influenza vaccine    Confirms she is on birth control, Implanon. She does not desire to extend her family. 2) Pelvic pain, intermittent  Persists. Saw GYN, told she has an ovarian cyst and this is being monitored. Plans to follow up with GYN soon. 3) Vit D deficiency  She reports she has not been compliant with supplements prescribed by PCP. 4) Fatigue  Improved since last visit.     Plan:     · Repeat ESR in a month ([facility])  · Labs every 6 months: CBC, CMP, ESR (she prefers [facility])   · Return to clinic in 6 months      Signed By: Sharad Arvizu MD

## 2020-01-23 NOTE — PROGRESS NOTES
I discussed the findings, assessment and plan in detail with the resident and agree with the resident's findings and plan as documented in the resident's note. Falguni Oden M.D.

## 2020-01-23 NOTE — LETTER
NOTIFICATION RETURN TO WORK / SCHOOL 
 
1/23/2020 4:45 PM 
 
Ms. Lali Goyal [facility] [address] [id number] To Whom It May Concern: 
 
Lali Goyal is currently under the care of Marilia Quijano. Please excuse her from missing work from January 15-20th as she had the flu. If there are questions or concerns please have the patient contact our office. Sincerely, Atul Calvert MD

## 2020-01-23 NOTE — PROGRESS NOTES
1. Have you been to the ER, urgent care clinic since your last visit? Hospitalized since your last visit? no    2. Have you seen or consulted any other health care providers outside of the [address] [place] [address] since your last visit? Including any pap smears or colon screening. no  Reviewed record in preparation for visit and have necessary documentation    Pt did not bring medication to office visit for review  Opportunity was given for questions    Goals that were addressed and/or need to be completed during or after this appointment include     Health Maintenance Due   Topic Date Due    Influenza Age 5 to Adult  08/01/2019     Body mass index is 35.58 kg/m².

## 2020-01-24 ENCOUNTER — OFFICE VISIT (OUTPATIENT)
Dept: ONCOLOGY | Age: 35
End: 2020-01-24

## 2020-01-24 VITALS
SYSTOLIC BLOOD PRESSURE: 123 MMHG | BODY MASS INDEX: 34.86 KG/M2 | RESPIRATION RATE: 16 BRPM | DIASTOLIC BLOOD PRESSURE: 76 MMHG | TEMPERATURE: 97.7 F | WEIGHT: 230 LBS | OXYGEN SATURATION: 100 % | HEART RATE: 61 BPM | HEIGHT: 68 IN

## 2020-01-24 DIAGNOSIS — C81.01 NODULAR LYMPHOCYTE PREDOMINANT HODGKIN LYMPHOMA OF LYMPH NODES OF NECK (HCC): Primary | ICD-10-CM

## 2020-01-24 NOTE — PROGRESS NOTES
Treasa Boxer is a 30 y.o. female follow up for lymphoma. 1. Have you been to the ER, urgent care clinic since your last visit? Hospitalized since your last visit?no     2. Have you seen or consulted any other health care providers outside of the [address] [place] [address] since your last visit? Include any pap smears or colon screening.  no

## 2020-02-17 DIAGNOSIS — C81.01 NODULAR LYMPHOCYTE PREDOMINANT HODGKIN LYMPHOMA OF LYMPH NODES OF NECK (HCC): ICD-10-CM

## 2020-02-20 ENCOUNTER — OFFICE VISIT (OUTPATIENT)
Dept: OBGYN CLINIC | Age: 35
End: 2020-02-20

## 2020-02-20 VITALS
SYSTOLIC BLOOD PRESSURE: 128 MMHG | BODY MASS INDEX: 35.92 KG/M2 | HEIGHT: 68 IN | WEIGHT: 237 LBS | DIASTOLIC BLOOD PRESSURE: 74 MMHG

## 2020-02-20 DIAGNOSIS — Z97.5 NEXPLANON IN PLACE: ICD-10-CM

## 2020-02-20 DIAGNOSIS — Z01.419 ENCOUNTER FOR GYNECOLOGICAL EXAMINATION (GENERAL) (ROUTINE) WITHOUT ABNORMAL FINDINGS: Primary | ICD-10-CM

## 2020-02-20 DIAGNOSIS — Z20.2 POSSIBLE EXPOSURE TO STD: ICD-10-CM

## 2020-02-20 NOTE — PATIENT INSTRUCTIONS
Well Visit, Ages 25 to 48: Care Instructions  Your Care Instructions    Physical exams can help you stay healthy. Your doctor has checked your overall health and may have suggested ways to take good care of yourself. He or she also may have recommended tests. At home, you can help prevent illness with healthy eating, regular exercise, and other steps. Follow-up care is a key part of your treatment and safety. Be sure to make and go to all appointments, and call your doctor if you are having problems. It's also a good idea to know your test results and keep a list of the medicines you take. How can you care for yourself at home? · Reach and stay at a healthy weight. This will lower your risk for many problems, such as obesity, diabetes, heart disease, and high blood pressure. · Get at least 30 minutes of physical activity on most days of the week. Walking is a good choice. You also may want to do other activities, such as running, swimming, cycling, or playing tennis or team sports. Discuss any changes in your exercise program with your doctor. · Do not smoke or allow others to smoke around you. If you need help quitting, talk to your doctor about stop-smoking programs and medicines. These can increase your chances of quitting for good. · Talk to your doctor about whether you have any risk factors for sexually transmitted infections (STIs). Having one sex partner (who does not have STIs and does not have sex with anyone else) is a good way to avoid these infections. · Use birth control if you do not want to have children at this time. Talk with your doctor about the choices available and what might be best for you. · Protect your skin from too much sun. When you're outdoors from 10 a.m. to 4 p.m., stay in the shade or cover up with clothing and a hat with a wide brim. Wear sunglasses that block UV rays. Even when it's cloudy, put broad-spectrum sunscreen (SPF 30 or higher) on any exposed skin.   · See a dentist one or two times a year for checkups and to have your teeth cleaned. · Wear a seat belt in the car. Follow your doctor's advice about when to have certain tests. These tests can spot problems early. For everyone  · Cholesterol. Have the fat (cholesterol) in your blood tested after age 21. Your doctor will tell you how often to have this done based on your age, family history, or other things that can increase your risk for heart disease. · Blood pressure. Have your blood pressure checked during a routine doctor visit. Your doctor will tell you how often to check your blood pressure based on your age, your blood pressure results, and other factors. · Vision. Talk with your doctor about how often to have a glaucoma test.  · Diabetes. Ask your doctor whether you should have tests for diabetes. · Colon cancer. Your risk for colorectal cancer gets higher as you get older. Some experts say that adults should start regular screening at age 48 and stop at age 76. Others say to start before age 48 or continue after age 76. Talk with your doctor about your risk and when to start and stop screening. For women  · Breast exam and mammogram. Talk to your doctor about when you should have a clinical breast exam and a mammogram. Medical experts differ on whether and how often women under 50 should have these tests. Your doctor can help you decide what is right for you. · Cervical cancer screening test and pelvic exam. Begin with a Pap test at age 24. The test often is part of a pelvic exam. Starting at age 27, you may choose to have a Pap test, an HPV test, or both tests at the same time (called co-testing). Talk with your doctor about how often to have testing. · Tests for sexually transmitted infections (STIs). Ask whether you should have tests for STIs. You may be at risk if you have sex with more than one person, especially if your partners do not wear condoms.   For men  · Tests for sexually transmitted infections (STIs). Ask whether you should have tests for STIs. You may be at risk if you have sex with more than one person, especially if you do not wear a condom. · Testicular cancer exam. Ask your doctor whether you should check your testicles regularly. · Prostate exam. Talk to your doctor about whether you should have a blood test (called a PSA test) for prostate cancer. Experts differ on whether and when men should have this test. Some experts suggest it if you are older than 39 and are -American or have a father or brother who got prostate cancer when he was younger than 72. When should you call for help? Watch closely for changes in your health, and be sure to contact your doctor if you have any problems or symptoms that concern you. Where can you learn more? Go to http://srini-jeny.info/. Enter P072 in the search box to learn more about \"Well Visit, Ages 25 to 48: Care Instructions. \"  Current as of: December 13, 2018  Content Version: 12.2  © 6416-7516 [employer]. Care instructions adapted under license by [employer] (which disclaims liability or warranty for this information). If you have questions about a medical condition or this instruction, always ask your healthcare professional. [place] [address] any warranty or liability for your use of this information.

## 2020-02-20 NOTE — PROGRESS NOTES
[facility]  [email or web address]  [phone]    Sidney Lin MD, FACOG       Annual Gynecologic Exam:  [facility] <40  Chief Complaint   Patient presents with    Well Woman         Mike uYen is a 37 y.o.  BLACK OR  female who presents for an annual well woman exam.  No LMP recorded. Patient has had an implant. .    With regard to the Gardisil vaccine, she is older than the FDA approved age to receive it. She does not report additional concerns today. Occ has pain with intercourse. Menstrual status:  Her periods are none d/t Nexplanon     Sexual history and Contraception:  Social History     Substance and Sexual Activity   Sexual Activity Yes    Partners: Male    Birth control/protection: Condom, Implant     She never use condoms with sexual activity  She does reports new sexual partner(s) in the last year. The patient does request STD testing. We recommended testing per CDC guidelines and at patient request.     Preventive Medicine History:  Her most recent Pap smear result: normal was obtained in 2019  There was chlamydia was present on pap. Her most recent HR HPV screen was Negative obtained in   She does not have a history of KAMERON 2, 3 or cervical cancer.      Past Medical History:   Diagnosis Date    Abnormal Pap smear     FU with colpo, negative per patient    Cancer ([facility] [address]     nodular lymphocytic hodgkins lymphoma    Chest pain     Chlamydia 2019    vaginal    KAMERON I (cervical intraepithelial neoplasia I) 2011    Depression 2015    Encounter for IUD insertion 3/5/15    Mirena    Encounter for IUD removal 2016    Essential hypertension     Hypertension     Implanon removal 2013    Nexplanon insertion 2017    Pap smear for cervical cancer screening 11/11/15    Negative    Trouble in sleeping      OB History    Para Term  AB Living   5 3 3 0 2 3   SAB TAB Ectopic Molar Multiple Live Births   1 1 0   0 3      # Outcome Date GA Lbr Osorio/2nd Weight Sex Delivery Anes PTL Lv   5 Term 04/10/14 38w4d  7 lb 15.7 oz (3.62 kg) F VAGINAL DELI EPIDURAL AN N PALMER   4 Term 04/23/13 38w0d 08:00 7 lb (3.175 kg) M VAGINAL DELI EPI N PALMER   3 Term 09/06/07 40w0d 12:00 6 lb 7 oz (2.92 kg) F VAGINAL DELI EPI N PALMER   2 TAB            1 SAB              Past Surgical History:   Procedure Laterality Date    HX COLPOSCOPY  2010    per pt WNL    HX WISDOM TEETH EXTRACTION       Family History   Problem Relation Age of Onset    HIV/AIDS Mother     Hypertension Maternal Aunt     Hypertension Maternal Grandmother      Social History     Socioeconomic History    Marital status:      Spouse name: Not on file    Number of children: Not on file    Years of education: Not on file    Highest education level: Not on file   Occupational History    Not on file   Social Needs    Financial resource strain: Not on file    Food insecurity:     Worry: Not on file     Inability: Not on file    Transportation needs:     Medical: Not on file     Non-medical: Not on file   Tobacco Use    Smoking status: Never Smoker    Smokeless tobacco: Never Used   Substance and Sexual Activity    Alcohol use: Yes     Comment: occassionally    Drug use: No    Sexual activity: Yes     Partners: Male     Birth control/protection: Condom, Implant   Lifestyle    Physical activity:     Days per week: Not on file     Minutes per session: Not on file    Stress: Not on file   Relationships    Social connections:     Talks on phone: Not on file     Gets together: Not on file     Attends Religion service: Not on file     Active member of club or organization: Not on file     Attends meetings of clubs or organizations: Not on file     Relationship status: Not on file    Intimate partner violence:     Fear of current or ex partner: Not on file     Emotionally abused: Not on file     Physically abused: Not on file     Forced sexual activity: Not on file   Other Topics Concern    Not on file   Social History Narrative    Not on file       Allergies   Allergen Reactions    Clonidine (Pf) Vertigo       Current Outpatient Medications   Medication Sig    BIOTIN PO Take  by mouth.  hydroCHLOROthiazide (HYDRODIURIL) 25 mg tablet TAKE 1 TABLET BY MOUTH DAILY    omeprazole (PRILOSEC) 20 mg capsule TAKE ONE CAPSULE BY MOUTH EVERY DAY    fluticasone propionate (FLONASE) 50 mcg/actuation nasal spray Use one spray in each nostril daily. Continue for at least 2 weeks. No current facility-administered medications for this visit. 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 9) E66.01       Review of Systems - History obtained from the patient  Constitutional: negative for weight loss, fever, night sweats  HEENT: negative for hearing loss, earache, congestion, snoring, sorethroat  CV: negative for chest pain, palpitations, edema  Resp: negative for cough, shortness of breath, wheezing  GI: negative for change in bowel habits, abdominal pain, black or bloody stools  : negative for frequency, dysuria, hematuria  GYN: see HPI  MSK: negative for back pain, joint pain, muscle pain  Breast: negative for breast lumps, nipple discharge, galactorrhea  Skin :negative for itching, rash, hives  Neuro: negative for dizziness, headache, confusion, weakness  Psych: negative for anxiety, depression, change in mood  Heme/lymph: negative for bleeding, bruising, pallor      (WWE continued)     Physical Exam  Visit Vitals  /74   Ht 5' 8\" (1.727 m)   Wt 237 lb (107.5 kg)   BMI 36.04 kg/m²       Constitutional  · Appearance: well-nourished, well developed, alert, in no acute distress    HENT  · Head and Face: appears normal    Neck  · Inspection/Palpation: normal appearance, no masses or tenderness  · Lymph Nodes: no lymphadenopathy present  · Thyroid: gland size normal, nontender, no nodules or masses present on palpation    Chest  · Respiratory Effort: breathing unlabored  · Auscultation: normal breath sounds    Cardiovascular  · Heart:  · Auscultation: regular rate and rhythm without murmur    Breasts  · Inspection of Breasts: breasts symmetrical, no skin changes, no discharge present, nipple appearance normal, no skin retraction present  · Palpation of Breasts and Axillae: no masses present on palpation, no breast tenderness  · Axillary Lymph Nodes: no lymphadenopathy present    Gastrointestinal  · Abdominal Examination: abdomen non-tender to palpation, normal bowel sounds, no masses present  · Liver and spleen: no hepatomegaly present, spleen not palpable  · Hernias: no hernias identified    Genitourinary  · External Genitalia: normal appearance for age, no discharge present, no tenderness present, no inflammatory lesions present, no masses present  · Vagina: normal vaginal vault without central or paravaginal defects, minimal white discharge present, no inflammatory lesions present, no masses present  · Bladder: non-tender to palpation  · Urethra: appears normal  · Cervix: normal   · Uterus: normal size, shape and consistency  · Adnexa: no adnexal tenderness present, no adnexal masses present  · Perineum: perineum within normal limits, no evidence of trauma, no rashes or skin lesions present  · Anus: anus within normal limits, no hemorrhoids present  · Inguinal Lymph Nodes: no lymphadenopathy present    Skin  · General Inspection: no rash, no lesions identified  · nexplanon in place LUE    Neurologic/Psychiatric  · Mental Status:  · Orientation: grossly oriented to person, place and time  · Mood and Affect: mood normal, affect appropriate    Assessment:  37 y.o.  for well woman exam  Encounter Diagnoses   Name Primary?     Possible exposure to STD     Encounter for gynecological examination (general) (routine) without abnormal findings Yes    Nexplanon in place        Plan:  The patient was counseled about diet, exercise, healthy lifestyle  We discussed self breast exam  We discussed safer sex practices, condom use and risk factors for sexually transmitted diseases. We discussed current pap smear and HR HPV testing guidelines. We recommend follow up one year for routine annual gynecologic exam or sooner prn  We recommend routine follow up with her primary care doctor for management of chronic medical problems and non-gynecologic concerns  Handouts were given to the patient  We discussed calcium/vitamin D/weight bearing exercise and osteoporosis prevention  It is preferred to place at the beginning of cycle. Confirm AE up to date, pap up to date, no unprotected intercourse x 2 weeks prior to visit, and that she has checked with her insurance about coverage. nexplanon exchange 30min, no unprotected int x 2 wks: pt notified  STD labs per pt request  Reviewed prior us and ct, disc causes of pain w int, can repeat US or consider PT, pt will see if sx improve w new nexplanon    Folllow up:  [x] return for annual well woman exam in one year or sooner if she is having problems  [] follow up and ultrasound  [] 6 months  [] 3 months  [] 6 weeks   [] 1 month    Orders Placed This Encounter    CT/NG/T.VAGINALIS AMPLIFICATION    HEP B SURFACE AG    T PALLIDUM SCREEN W/REFLEX    HIV SCREEN, [address] [place] [address]. W/REFLEX CONFIRM       No results found for any visits on 02/20/20.

## 2020-02-22 LAB
HBV SURFACE AG SERPL QL IA: NEGATIVE
HIV 1+2 AB+HIV1 P24 AG SERPL QL IA: NON REACTIVE
TREPONEMA PALLIDUM IGG+IGM AB [PRESENCE] IN SERUM OR PLASMA BY IMMUNOASSAY: NON REACTIVE

## 2020-02-24 LAB
C TRACH RRNA SPEC QL NAA+PROBE: NEGATIVE
N GONORRHOEA RRNA SPEC QL NAA+PROBE: NEGATIVE
T VAGINALIS DNA SPEC QL NAA+PROBE: NEGATIVE

## 2020-02-28 ENCOUNTER — OFFICE VISIT (OUTPATIENT)
Dept: OBGYN CLINIC | Age: 35
End: 2020-02-28

## 2020-02-28 VITALS — HEIGHT: 68 IN | WEIGHT: 239 LBS | BODY MASS INDEX: 36.22 KG/M2

## 2020-02-28 DIAGNOSIS — Z76.89 ENCOUNTER FOR MENSTRUAL REGULATION: ICD-10-CM

## 2020-02-28 DIAGNOSIS — Z30.017 NEXPLANON INSERTION: Primary | ICD-10-CM

## 2020-02-28 DIAGNOSIS — Z30.46 NEXPLANON REMOVAL: ICD-10-CM

## 2020-02-28 LAB
HCG URINE, QL. (POC): NEGATIVE
VALID INTERNAL CONTROL?: YES

## 2020-02-28 NOTE — PROGRESS NOTES
Procedure: Nexplanon Removal    The patient presents for office removal of a NEXPLANON sub-dermal contraceptive implant. Patient elects removal because expiration. Chart reviewed for the following:   Brandie Baer LPN, have reviewed the History, Physical and updated the Allergic reactions for Tamme 63 performed immediately prior to start of procedure:   IAdilene LPN, have performed the following reviews on [address] [place] [address],[address] Floor prior to the start of the procedure:            * Patient was identified by name and date of birth   * Agreement on procedure being performed was verified  * Risks and Benefits explained to the patient  * Procedure site verified and marked as necessary  * Patient was positioned for comfort  * Consent was signed and verified     Time: 10:42am    Date of procedure: 2/28/2020    Procedure performed by: Isiah Duran MD    Provider assisted by:   Fabien Nguyen LPN    Patient assisted by: self    How tolerated by patient: tolerated the procedure well with no complications    Post Procedural Pain Scale: 0 - No Hurt    Comments: none    Procedure:  She was positioned so the site of her implant was visable and easily accessible. The implant was located by palpation. The end of the implant nearest the elbow was marked with a sterile marker. The operative site was cleansed with Betadine. Using a 27 gauge needle on a 5 cc syringe and 1% lidocaine, 3 cc were infiltrated as a intradermal wheal and underneath the end of the implant closest to the elbow. Downward pressure was applied on the end of the implant nearest the axilla and a 2-3mm incision was made in the longitudinal direction of the arm at the tip of the implant closest to the elbow. The implant was then pushed gently toward the incision until the tip was visible. The fibrous capsule was opened with a combination of blunt and sharp dissection.  The implant was grasped with mosquito forceps and removed intact. It measured a full 4 cm in length. The skin was cleansed and dried. A steristrip was applied then topped with a folded 4x4 gauze and a Curlex pressure dressing. There were no complication or problems. She demonstrated full active range of movement of her elbow, wrist, all five digits and denied numbness and tingling. Post-procedure:  She was told to remove the pressure dressing in 12-24 hours, to keep the incision area dry for 24 hours and to remove the Steristrip in several days. She was instructed to contact the office with any questions or concerns and bleeding and infection precautions were reviewed with her. We discussed typical bleeding patterns after removal of the Nexplanon. Recommend follow up for well woman exam or sooner prn. [address] [place] [address]  [email or web address]  [phone]    Ventura Pruitt MD, [facility]  OFFICE PROCEDURE PROGRESS NOTE    Chart reviewed for the following:   Kris Hernandez LPN, have reviewed the History, Physical and updated the Allergic reactions for Tamme 63 performed immediately prior to start of procedure:   INola LPN, have performed the following reviews on [address] [place] [address],Third Floor prior to the start of the procedure:            * Patient was identified by name and date of birth   * Agreement on procedure being performed was verified  * Risks and Benefits explained to the patient  * Procedure site verified and marked as necessary  * Patient was positioned for comfort  * Consent was signed and verified     Time: 10:43am    Date of procedure: 2/28/2020    Procedure performed by:   Milady Scott MD    Provider assisted by:   Navdeep Carias LPN    Patient assisted by: self    How tolerated by patient: tolerated the procedure well with no complications    Post Procedural Pain Scale: 0 - No Hurt    Comments: none      Procedure note: Nexplanon insertion    Altaf Martinez is a ,  37 y.o. female [address] whose No LMP recorded. Patient has had an implant. was on  presents for office insertion of an [address] sub-dermal contraceptive implant. She has had an opportunity to read the [address] \"Patient Labeling and Consent Form\". We counseled the patient about the insertion and removal procedures as well as potential side-effects, benefits and risks. She state she had no further questions and signed the consent form. She has been using nexplanon to the present time. She confirmed that she has no allergies to Betadine or Xylocaine. She reclined on the examination table in the supine position with her left arm flexed at the elbow and externally rotated. The insertion site was previously cleansed with Betadine antiseptic. The nexplanon was inserted in the removal incision. The NEXPLANON sterile applicator was carefully removed from its blister pack and kept sterile. I removed the needle cap. I visually verified the presence of NEXPLANON inside the needle tip. The skin at the insertion site was then stretched by my thumb and index finger. I then inserted the needle tip through the skin at the appropriate angle to the skin surface, just until the skin has been penetrated. The needle was gently inserted to its full length. The slider was then pushed all the way and then released. I then removed the needle and palpated the implant in the appropriate location. The patient also palpated the implant in place. Both the patient and I were able to confirm the presence of the [address] in its subdermal location by palpation. A small adhesive bandage over the insertion site then wrapped a pressure bandage with sterile gauze. The patient User Card and Patient Chart Label were filled in. She was given the User Card for her records after explaining it to her in detail.  I stressed to her that she must have the [address] removed before three years from today's date. She was then given the Personal Calendar (Bleeding Diary) with instructions in it's use. The patient received Nexplanon lot number [id number]. The Nexplanon did not come from a speciality pharmacy. We discussed typical bleeding patterns and side effects with the Nexplanon  We recommend bleeding/symptom calendar and follow three months to evaluate or at [facility], if preferred, no problems. We recommended back up contraception x 2 weeks after insertion.

## 2020-02-28 NOTE — PATIENT INSTRUCTIONS
Implant for Birth Control: Care Instructions  Your Care Instructions    The implant is used to prevent pregnancy. It's a thin kasi about the size of a matchstick that is inserted under the skin (subdermal) on the inside of your arm. The implant prevents pregnancy for 3 years. After it is put in, you don't have to do anything else to prevent pregnancy. Follow-up care is a key part of your treatment and safety. Be sure to make and go to all appointments, and call your doctor if you are having problems. It's also a good idea to know your test results and keep a list of the medicines you take. How can you care for yourself at home? How do you use the subdermal implant? · The implant is put in and taken out by your doctor or another trained health professional. This is done in your doctor's office. It only takes a few minutes. · Ask your doctor if you need to use backup birth control, such as a condom. And ask if (and how long) you should avoid intercourse after you get the implant. You may need to do this, depending on where you are in your cycle. What else do you need to know? · The implant has side effects. ? You may have changes in your period. Your period may stop. You may also have spotting or bleeding between periods. ? You may have mood changes, less interest in sex, or weight gain. · The implant can stay in place for up to 3 years to prevent pregnancy. But your doctor may talk to you about leaving it in for longer. ? If you don't replace the implant and don't use another form of birth control, you could get pregnant. ? If you have the implant removed, you'll have to find another method of birth control. If you don't, you may get pregnant. ? Even if you are planning to get pregnant, you have to have the implant removed. · Check with your doctor before you use any other medicines. This includes over-the-counter medicines, vitamins, herbal products, and supplements.  Birth control hormones may not work as well to prevent pregnancy when combined with other medicines. · The implant doesn't protect against sexually transmitted infections (STIs), such as herpes or HIV/AIDS. If you're not sure if your sex partner might have an STI, use a condom to protect against infection. When should you call for help? Watch closely for changes in your health, and be sure to contact your doctor if you have any problems. Where can you learn more? Go to http://[employer].info/. Enter J109 in the search box to learn more about \"Implant for Birth Control: Care Instructions. \"  Current as of: May 29, 2019  Content Version: 12.2  © 5480-3536 [employer]. Care instructions adapted under license by [employer] (which disclaims liability or warranty for this information). If you have questions about a medical condition or this instruction, always ask your healthcare professional. [address] any warranty or liability for your use of this information.

## 2020-03-30 ENCOUNTER — OFFICE VISIT (OUTPATIENT)
Dept: FAMILY MEDICINE CLINIC | Age: 35
End: 2020-03-30

## 2020-03-30 ENCOUNTER — TELEPHONE (OUTPATIENT)
Dept: FAMILY MEDICINE CLINIC | Age: 35
End: 2020-03-30

## 2020-03-30 VITALS
SYSTOLIC BLOOD PRESSURE: 131 MMHG | OXYGEN SATURATION: 100 % | HEART RATE: 73 BPM | DIASTOLIC BLOOD PRESSURE: 87 MMHG | WEIGHT: 242 LBS | BODY MASS INDEX: 36.68 KG/M2 | TEMPERATURE: 98.1 F | RESPIRATION RATE: 16 BRPM | HEIGHT: 68 IN

## 2020-03-30 DIAGNOSIS — C81.01 NODULAR LYMPHOCYTE PREDOMINANT HODGKIN LYMPHOMA OF LYMPH NODES OF NECK (HCC): ICD-10-CM

## 2020-03-30 DIAGNOSIS — I10 ESSENTIAL HYPERTENSION: ICD-10-CM

## 2020-03-30 DIAGNOSIS — N83.202 CYST OF LEFT OVARY: ICD-10-CM

## 2020-03-30 DIAGNOSIS — N90.89 VULVAR LESION: Primary | ICD-10-CM

## 2020-03-30 DIAGNOSIS — R10.32 LEFT LOWER QUADRANT PAIN: ICD-10-CM

## 2020-03-30 RX ORDER — MUPIROCIN 20 MG/G
OINTMENT TOPICAL DAILY
Qty: 22 G | Refills: 0 | Status: SHIPPED | OUTPATIENT
Start: 2020-03-30 | End: 2020-05-08 | Stop reason: ALTCHOICE

## 2020-03-30 RX ORDER — LIDOCAINE HYDROCHLORIDE 30 MG/G
CREAM TOPICAL 2 TIMES DAILY
Qty: 85 G | Refills: 0 | Status: SHIPPED | OUTPATIENT
Start: 2020-03-30 | End: 2020-05-08 | Stop reason: ALTCHOICE

## 2020-03-30 NOTE — PATIENT INSTRUCTIONS
A Healthy Lifestyle: Care Instructions Your Care Instructions A healthy lifestyle can help you feel good, stay at a healthy weight, and have plenty of energy for both work and play. A healthy lifestyle is something you can share with your whole family. A healthy lifestyle also can lower your risk for serious health problems, such as high blood pressure, heart disease, and diabetes. You can follow a few steps listed below to improve your health and the health of your family. Follow-up care is a key part of your treatment and safety. Be sure to make and go to all appointments, and call your doctor if you are having problems. It's also a good idea to know your test results and keep a list of the medicines you take. How can you care for yourself at home? · Do not eat too much sugar, fat, or fast foods. You can still have dessert and treats now and then. The goal is moderation. · Start small to improve your eating habits. Pay attention to portion sizes, drink less juice and soda pop, and eat more fruits and vegetables. ? Eat a healthy amount of food. A 3-ounce serving of meat, for example, is about the size of a deck of cards. Fill the rest of your plate with vegetables and whole grains. ? Limit the amount of soda and sports drinks you have every day. Drink more water when you are thirsty. ? Eat at least 5 servings of fruits and vegetables every day. It may seem like a lot, but it is not hard to reach this goal. A serving or helping is 1 piece of fruit, 1 cup of vegetables, or 2 cups of leafy, raw vegetables. Have an apple or some carrot sticks as an afternoon snack instead of a candy bar. Try to have fruits and/or vegetables at every meal. 
· Make exercise part of your daily routine. You may want to start with simple activities, such as walking, bicycling, or slow swimming. Try to be active 30 to 60 minutes every day.  You do not need to do all 30 to 60 minutes all at once. For example, you can exercise 3 times a day for 10 or 20 minutes. Moderate exercise is safe for most people, but it is always a good idea to talk to your doctor before starting an exercise program. 
· Keep moving. Anirudh Mckeon the lawn, work in the garden, or [place]. Take the stairs instead of the elevator at work. · If you smoke, quit. People who smoke have an increased risk for heart attack, stroke, cancer, and other lung illnesses. Quitting is hard, but there are ways to boost your chance of quitting tobacco for good. ? Use nicotine gum, patches, or lozenges. ? Ask your doctor about stop-smoking programs and medicines. ? Keep trying. In addition to reducing your risk of diseases in the future, you will notice some benefits soon after you stop using tobacco. If you have shortness of breath or asthma symptoms, they will likely get better within a few weeks after you quit. · Limit how much alcohol you drink. Moderate amounts of alcohol (up to 2 drinks a day for men, 1 drink a day for women) are okay. But drinking too much can lead to liver problems, high blood pressure, and other health problems. Family health If you have a family, there are many things you can do together to improve your health. · Eat meals together as a family as often as possible. · Eat healthy foods. This includes fruits, vegetables, lean meats and dairy, and whole grains. · Include your family in your fitness plan. Most people think of activities such as jogging or tennis as the way to fitness, but there are many ways you and your family can be more active. Anything that makes you breathe hard and gets your heart pumping is exercise. Here are some tips: 
? Walk to do errands or to take your child to school or the bus. 
? Go for a family bike ride after dinner instead of watching TV. Where can you learn more? Go to http://srini-jeny.info/ Enter E001 in the search box to learn more about \"A Healthy Lifestyle: Care Instructions. \" Current as of: May 28, 2019Content Version: 12.4 © 7071-9276 [employer]. Care instructions adapted under license by [employer] (which disclaims liability or warranty for this information). If you have questions about a medical condition or this instruction, always ask your healthcare professional. [place] [address] any warranty or liability for your use of this information.

## 2020-03-30 NOTE — TELEPHONE ENCOUNTER
Ins will not cover the Lidocaine. Pharmacy wants like to know if provider would like to switch to something else.

## 2020-03-30 NOTE — PROGRESS NOTES
CC: Vulvar lesion    HPI: Pt is a 37 y.o. female who presents for vulvar lesion. Lesion popped up about 4 days ago on her left labia and is very painful. Has been getting worse. She has not tried anything on the area. Pt states she has had similar symptoms in the past. Chart review shows she was treated empirically with valtrex in 2017 but pt stated she only took it for a few days and it did not help. In 2018 she was seen for similar concerns and had a swab of the lesion sent with viral culture that was negative for HSV. Both times the lesions healed on their own. She had been shaving her pubic area but stopped about a week ago. She saw GYN for her annual exam last month and had a negative HIV, T pal Ab, GC and CT. She has also been having pain in her left pelvic region. Last night she noticed tenderness there when she pushed. She did not feel any bumps. Today the pain is improved. She has a h/o cyst on the left ovary diagnosed by Dr. Carolynn Escobar, GYN. Notes personally reviewed. Transvaginal US in 6/2019 showed a normal left ovary with a follicular cyst. She was advised that the cyst was benign and to use NSAIDs as needed. 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 Annel Snyder, for this. She was treated with radiation in 2016 and her last follow-up with Dr. Annel Snyder was in 1/2020. Notes personally reviewed. At that time she had not had evidence for recurrence but her ESR was slightly elevated, which was thought to be related to her getting over the flu. Labs were ordered for her to have drawn in about a month but she has not had time to get them drawn yet. She denies fevers, night sweats and unintentional weight loss.        Past Medical History:   Diagnosis Date    Abnormal Pap smear 2010    FU with colpo, negative per patient    Cancer [facility])     nodular lymphocytic hodgkins lymphoma    Chest pain     Chlamydia 01/16/2019    vaginal    KAMERON I (cervical intraepithelial neoplasia I) 9/2/2011    Depression 5/12/2015    Encounter for IUD insertion 3/5/15    Mirena    Encounter for IUD removal 06/24/2016    Essential hypertension     Hypertension     Implanon removal 9/6/2013    Nexplanon insertion 02/09/2017    Pap smear for cervical cancer screening 11/11/15    Negative    Trouble in sleeping        Family History   Problem Relation Age of Onset    HIV/AIDS Mother     Hypertension Maternal Aunt     Hypertension Maternal Grandmother        Social History     Tobacco Use    Smoking status: Never Smoker    Smokeless tobacco: Never Used   Substance Use Topics    Alcohol use: Yes     Comment: occassionally    Drug use: No       ROS:  Positive only when bolded  Constitutional: F/C, changes in weight  Eyes: Changes in vision  Ears, nose, mouth, throat, and face: Rhinorrhea, congestion, sore throat  Respiratory: SOB, cough  Gastrointestinal: Abd pain (LLQ/pelvic)  Genitourinary: Dysuria, hematuria, abnormal vaginal discharge, abnormal vaginal bleeding  Integument/breast: Changes in skin  Hematologic/lymphatic: Enlarged lymph nodes  Musculoskeletal: Myalgias, arthralgias  Neurological: Changes in gait    PE:  Visit Vitals  /87   Pulse 73   Temp 98.1 °F (36.7 °C) (Oral)   Resp 16   Ht 5' 8\" (1.727 m)   Wt 242 lb (109.8 kg)   SpO2 100%   BMI 36.80 kg/m²     Gen: Pt sitting in chair, in NAD  Head: Normocephalic, atraumatic  Eyes: Sclera anicteric, EOM grossly intact, PERRL  Throat: MMM, normal lips, tongue, teeth and gums. No lesions. Neck: Supple, no LAD  CVS: Normal S1, S2, no m/r/g  Resp: CTAB, no wheezes or rales  Abd: Soft, non-tender, non-distended, +BS. No masses palpated in LLQ/pelvic area  : Normal external female genitalia. 4mm healing lesion on left labia. Difficult to fully examine 2/2 presence of pubic hair, but does not appear to be an ulcer. No drainage. Mild surrounding erythema.    Extrem: Atraumatic, no cyanosis or edema  Pulses: 2+ Skin: Warm, dry  Neuro: Alert, oriented, appropriate      A/P:   Encounter Diagnoses     ICD-10-CM ICD-9-CM   1. Vulvar lesion N90.89 624.8   2. Left lower quadrant pain R10.32 789.04   3. Cyst of left ovary N83.202 620.2   4. 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 Vulvar lesion: Discussed with pt. This appears most c/w an ingrown hair. She has been tested for HSV in the past and it was negative. She was also tested for STDs including syphilis and RPR, last month and these were negative. Discussed that this will likely heal on its own in the next few days, but given current pandemic and desire to reduce clinic visits, will send in antibiotic cream to prevent infection while healing. Can use lidocaine cream for relief of pain in the meantime.   - lidocaine HCL (XYLOCAINE) 3 % topical cream; Apply  to affected area two (2) times a day. Apply to vulva  Dispense: 85 g; Refill: 0  - mupirocin (BACTROBAN) 2 % ointment; Apply  to affected area daily. Apply to vulva  Dispense: 22 g; Refill: 0    2. Left lower quadrant pain: Pt with h/o cyst in 6/2019, however appeared to be follicular on US. Would not expect this to still be present. No masses palpated on exam. Greatest concern would be for LAD related to a recurrence of her lymphoma. Will have her repeat the labwork ordered by Dr. Thea Landin today, and go from there based on results. 3. Cyst of left ovary    4. 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 HTN: Bp initially elevated. Pt states she takes it at home and has been normal. Improved on recheck. - Continue to check BP at home and call if it is >140/90       RTC prn pending results of labwork and symptoms    Discussed diagnoses in detail with patient. Medication risks/benefits/side effects discussed with patient. All of the patient's questions were addressed.  The patient understands and agrees with our plan of care.  The patient knows to call back if they are unsure of or forget any changes we discussed today or if the symptoms change. The patient received an After-Visit Summary which contains VS, orders, medication list and allergy list. This can be used as a \"mini-medical record\" should they have to seek medical care while out of town. Current Outpatient Medications on File Prior to Visit   Medication Sig Dispense Refill    hydroCHLOROthiazide (HYDRODIURIL) 25 mg tablet TAKE ONE TABLET BY MOUTH EVERY DAY 30 Tab 5    BIOTIN PO Take  by mouth.  omeprazole (PRILOSEC) 20 mg capsule TAKE ONE CAPSULE BY MOUTH EVERY DAY 30 Cap 2    fluticasone propionate (FLONASE) 50 mcg/actuation nasal spray Use one spray in each nostril daily. Continue for at least 2 weeks. 1 Bottle 0     No current facility-administered medications on file prior to visit.

## 2020-03-30 NOTE — LETTER
NOTIFICATION RETURN TO WORK 
 
3/30/2020 9:18 AM 
 
Ms. Altaf Martinez [facility] [address] [address] To Whom It May Concern: 
 
Altaf Martinez is currently under the care of Marilia Quijano. She was out of work on 3/30/20. If there are questions or concerns please have the patient contact our office.  
 
 
 
Sincerely, 
 
 
Dwayne Blum MD

## 2020-05-04 ENCOUNTER — TELEPHONE (OUTPATIENT)
Dept: ONCOLOGY | Age: 35
End: 2020-05-04

## 2020-05-04 ENCOUNTER — HOSPITAL ENCOUNTER (OUTPATIENT)
Dept: LAB | Age: 35
Discharge: HOME | End: 2020-05-04

## 2020-05-04 DIAGNOSIS — R59.0 CERVICAL ADENOPATHY: ICD-10-CM

## 2020-05-04 DIAGNOSIS — C81.01 NODLR LYMPHOCY PREDOM HDGKN LYMPH, NODES OF HEAD, FACE, & NK (HCC): ICD-10-CM

## 2020-05-04 DIAGNOSIS — C81.01 NODULAR LYMPHOCYTE PREDOMINANT HODGKIN LYMPHOMA OF LYMPH NODES OF NECK (HCC): Primary | ICD-10-CM

## 2020-05-04 LAB
ALBUMIN SERPL-MCNC: 3.9 G/DL (ref 3.5–5)
ALBUMIN/GLOB SERPL: 1.3 {RATIO} (ref 1.1–2.2)
ALP SERPL-CCNC: 76 U/L (ref 45–117)
ALT SERPL-CCNC: 28 U/L (ref 12–78)
ANION GAP SERPL CALC-SCNC: 8 MMOL/L (ref 5–15)
AST SERPL-CCNC: 24 U/L (ref 15–37)
BASOPHILS # BLD: 0.1 K/UL (ref 0–0.1)
BASOPHILS NFR BLD: 1 % (ref 0–1)
BILIRUB SERPL-MCNC: 0.2 MG/DL (ref 0.2–1)
BUN SERPL-MCNC: 8 MG/DL (ref 6–20)
BUN/CREAT SERPL: 9 (ref 12–20)
CALCIUM SERPL-MCNC: 9.2 MG/DL (ref 8.5–10.1)
CHLORIDE SERPL-SCNC: 105 MMOL/L (ref 97–108)
CO2 SERPL-SCNC: 28 MMOL/L (ref 21–32)
CREAT SERPL-MCNC: 0.85 MG/DL (ref 0.55–1.02)
DIFFERENTIAL METHOD BLD: NORMAL
EOSINOPHIL # BLD: 0.1 K/UL (ref 0–0.4)
EOSINOPHIL NFR BLD: 1 % (ref 0–7)
ERYTHROCYTE [DISTWIDTH] IN BLOOD BY AUTOMATED COUNT: 12 % (ref 11.5–14.5)
GLOBULIN SER CALC-MCNC: 3 G/DL (ref 2–4)
GLUCOSE SERPL-MCNC: 89 MG/DL (ref 65–100)
HCT VFR BLD AUTO: 39.2 % (ref 35–47)
HGB BLD-MCNC: 13.1 G/DL (ref 11.5–16)
IMM GRANULOCYTES # BLD AUTO: 0 K/UL (ref 0–0.04)
IMM GRANULOCYTES NFR BLD AUTO: 0 % (ref 0–0.5)
LYMPHOCYTES # BLD: 2.1 K/UL (ref 0.8–3.5)
LYMPHOCYTES NFR BLD: 35 % (ref 12–49)
MCH RBC QN AUTO: 30.2 PG (ref 26–34)
MCHC RBC AUTO-ENTMCNC: 33.4 G/DL (ref 30–36.5)
MCV RBC AUTO: 90.3 FL (ref 80–99)
MONOCYTES # BLD: 0.5 K/UL (ref 0–1)
MONOCYTES NFR BLD: 8 % (ref 5–13)
NEUTS SEG # BLD: 3.4 K/UL (ref 1.8–8)
NEUTS SEG NFR BLD: 55 % (ref 32–75)
NRBC # BLD: 0 K/UL (ref 0–0.01)
NRBC BLD-RTO: 0 PER 100 WBC
PLATELET # BLD AUTO: 340 K/UL (ref 150–400)
PMV BLD AUTO: 8.9 FL (ref 8.9–12.9)
POTASSIUM SERPL-SCNC: 3.5 MMOL/L (ref 3.5–5.1)
PROT SERPL-MCNC: 6.9 G/DL (ref 6.4–8.2)
RBC # BLD AUTO: 4.34 M/UL (ref 3.8–5.2)
SODIUM SERPL-SCNC: 141 MMOL/L (ref 136–145)
WBC # BLD AUTO: 6.1 K/UL (ref 3.6–11)

## 2020-05-04 NOTE — TELEPHONE ENCOUNTER
Formerly [facility], [facility] [employer] at [address]  [phone]    05/04/20- Patient reports a swollen lymph node on the right side of her neck, not visible, but able to feel it. Stated it's hard, but she's able to press on it, sore to the touch. Patient noticed it last Tuesday because she woke up with neck pain. No other swelling, denies recent illness. Having some cold chills, but otherwise asymptomatic. Will discuss with Neymar Peters and call patient back. 11:01 AM- Informed patient we would like to get an ultrasound of her neck and labs (CBC, CMP, ESR) done to further evaluate swollen lymph node. Will have  contact her to schedule. Patient verbalized understanding, no further questions or concerns.

## 2020-05-04 NOTE — TELEPHONE ENCOUNTER
Patient called and stated that she thinks one of her lymphomas is swollen. She would like to come in. Let me know and I can call her back and make her appointment.    CB#[phone]

## 2020-05-05 ENCOUNTER — TELEPHONE (OUTPATIENT)
Dept: INFUSION THERAPY | Age: 35
End: 2020-05-05

## 2020-05-05 DIAGNOSIS — C81.01 NODULAR LYMPHOCYTE PREDOMINANT HODGKIN LYMPHOMA OF LYMPH NODES OF NECK (HCC): Primary | ICD-10-CM

## 2020-05-05 DIAGNOSIS — C81.01 NODULAR LYMPHOCYTE PREDOMINANT HODGKIN LYMPHOMA OF LYMPH NODES OF NECK (HCC): ICD-10-CM

## 2020-05-05 LAB — ERYTHROCYTE [SEDIMENTATION RATE] IN BLOOD: 11 MM/HR (ref 0–20)

## 2020-05-05 NOTE — TELEPHONE ENCOUNTER
[facility] ultrasound department called to speak with Christi Rojo regarding Marta Arceo. They stated that she had put in an order for an ultrasound of the neck. Dr. Dante Ludwig would like a call as he believes she needs a CT instead of an ultrasound. Dr. Dante Ludwig said Christi Rojo would have his phone number.

## 2020-05-05 NOTE — TELEPHONE ENCOUNTER
Planned US neck as patient is almost 4 years out from diagnosis and this node has not been evaluated on physical exam. Planned US of neck, followed by full body CT if US positive. However, if CT neck is recommended by radiologist will update order.

## 2020-05-05 NOTE — PROGRESS NOTES
05/05/20- Informed patient that labs look good. Will need to set her up for a virtual visit with Dr. Mariella Leon after the US is done to review results and next steps. Patient verbalized understanding.

## 2020-05-06 NOTE — PROGRESS NOTES
[facility] [place] at [address], [address]  Deuce [phone]  F: [phone]      Reason for Visit:   David Packer is a 37 y.o. female who is seen by synchronous (real-time) audio-video technology for follow up of lymphoma. Treatment History:   · US neck 2016: 2.5cm right posterior auricular mass  · US guided biopsy cervical node 2016: suspicious for large b-cell lymphoma  · CT Neck/C/A/P 2016: Enlarged right level 5 lymph node measuring 2 x 1 cm and slightly inferior to this there is a slightly enlarged lymph node measuring 1.2 x 0.8 cm. There is a slightly enlarged right level 2 lymph node measuring 1.3 x 0.9 cm. There is a slightly enlarged left level 2 lymph node measuring 1.7 x 1.1 cm. Lymph nodes more inferior at level 3 and level 4 are  normal in size. No supraclavicular adenopathy is identified. No adenopathy in chest, abdomen, or pelvis  · PET-CT 2016: No areas of hypermetabolic activity  · Excisional biopsy of right level 2 cervical node by Dr. 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 Laura Herndon completed 10/10/2016    History of Present Illness:   She noticed a lymph node at the base of her neck on the right side. First felt this the last week of April when she woke from sleep. It was painful initially, but pain has since resolved. No other lumps elsewhere. No recent infections or URI symptoms. No fevers, chills, night sweats, unintentional weight loss. She called about this node and we set her up for a CT and labs which she is here to discuss. Working full time as . Currently no COVID cases at the [employer] she is working at.         PAST HISTORY: The following sections were reviewed and updated in the EMR as appropriate: PMH, SH, FH, Medications, Allergies. Allergies   Allergen Reactions    Clonidine (Pf) Vertigo      Review of Systems: A complete review of systems was obtained, reviewed, and scanned into the EMR. Pertinent findings reviewed above. Physical Exam:     There were no vitals taken for this visit. ECOG PS: 0  General: alert, cooperative, no distress   Mental  status: normal mood, behavior, speech, dress, motor activity, and thought processes, able to follow commands   HENT: NCAT   Neck: no visualized mass   Resp: no respiratory distress   Neuro: no gross deficits   Skin: no discoloration or lesions of concern on visible areas   Psychiatric: normal affect, consistent with stated mood, no evidence of hallucinations       Due to this being a TeleHealth evaluation (During QCIIP-00 public health emergency), many elements of the physical examination are unable to be assessed. Evaluation of the following organ systems was limited: Vitals/Constitutional/EENT/Resp/CV/GI//MS/Neuro/Skin/Heme-Lymph-Imm. Results:     Lab Results   Component Value Date/Time    WBC 6.1 05/04/2020 01:59 PM    HGB 13.1 05/04/2020 01:59 PM    HCT 39.2 05/04/2020 01:59 PM    PLATELET 459 57/14/6827 01:59 PM    MCV 90.3 05/04/2020 01:59 PM    ABS.  NEUTROPHILS 3.4 05/04/2020 01:59 PM    Hgb, External 12.3 02/07/2014    Hct, External 35.6 02/07/2014    Platelet cnt., External 272 02/07/2014     Lab Results   Component Value Date/Time    Sodium 141 05/04/2020 01:59 PM    Potassium 3.5 05/04/2020 01:59 PM    Chloride 105 05/04/2020 01:59 PM    CO2 28 05/04/2020 01:59 PM    Glucose 89 05/04/2020 01:59 PM    BUN 8 05/04/2020 01:59 PM    Creatinine 0.85 05/04/2020 01:59 PM    GFR est AA >60 05/04/2020 01:59 PM    GFR est non-AA >60 05/04/2020 01:59 PM    Calcium 9.2 05/04/2020 01:59 PM    Glucose POC 76 11/03/2017 01:35 PM     Lab Results   Component Value Date/Time    Bilirubin, total 0.2 05/04/2020 01:59 PM    ALT (SGPT) 28 05/04/2020 01:59 PM    AST (SGOT) 24 05/04/2020 01:59 PM    Alk. phosphatase 76 05/04/2020 01:59 PM    Protein, total 6.9 05/04/2020 01:59 PM    Albumin 3.9 05/04/2020 01:59 PM    Globulin 3.0 05/04/2020 01:59 PM     Sed rate (ESR) (mm/hr)   Date Value   07/23/2019 4   01/18/2019 12   09/10/2018 6   05/07/2018 5   12/12/2017 8   08/28/2017 3   02/13/2017 6     Sed rate, automated (mm/hr)   Date Value   05/04/2020 11   01/22/2020 25 (H)   05/17/2017 12   11/11/2016 34 (H)   08/10/2016 44 (H)         CT N/C/A/P 12/04/2017: no evidence of recurrence  CT N/C/A/P 12/17/2018: no evidence of recurrence  CT Neck 5/7/2020: no evidence of recurrence, no adenopathy or mass in neck    Assessment:   1) Nodular lymphocyte predominant Hodgkin Lymphoma  Stage IIA  She is s/p radiation with Dr. Saintclair Nome completed 10/2016, with resolution of cervical adenopathy on post-treatment CT scan. She is being followed with surveillance. She remains without definite evidence of disease recurrence at this time. ESR previously had risen, but has since normalized on repeat labs. She noted a cervical mass, but CT of her neck is negative for any masses or adenopathy. We will continue with surveillance. Follow up after completion of therapy for Hodgkin Lymphoma, per NCCN:  · Year 1&2: H&P every 3-6 months  · Year 3: H&P every 6-12 months  · Year 4+: H&P every 12 months  · CBC, CMP, ESR as indicated  · TSH annually if RT to neck  · CT at 6, 12, and 24 months, or as clinically indicated  · Annual influenza vaccine    Confirms she is on birth control, Implanon. She does not desire to extend her family. 2) Pelvic pain, intermittent  Following with GYN for ovarian cyst    3) Cervical mass  CT is negative. Uncertain etiology. Perhaps muscle spasms/knot? Perhaps subcutaneous issue? I advised warm compresses and will monitor.         Plan:     · Labs in 6 months: CBC, CMP, ESR (she prefers [facility])   · Return to see me in 6 months      Signed By: Shaquille Chambers MD

## 2020-05-06 NOTE — TELEPHONE ENCOUNTER
[address] at [place]  [phone]    05/06/20-Informed patient on the following below per Ulysses Box. NP- she verbalized understanding and confirmed she is not pregnant. Please notify patient CT neck recommended by radiologist. [facility] if this will be approved by insurance without ultrasound done first, but we will try to get this approved and scheduled for 5/7, same date/time that ultrasound is currently scheduled. Please inquire about pregnancy status as she can not have CT if concern for pregnancy.

## 2020-05-07 ENCOUNTER — HOSPITAL ENCOUNTER (OUTPATIENT)
Dept: CT IMAGING | Age: 35
Discharge: HOME | End: 2020-05-07
Attending: NURSE PRACTITIONER
Payer: COMMERCIAL

## 2020-05-07 PROCEDURE — 70491 CT SOFT TISSUE NECK W/DYE: CPT

## 2020-05-07 PROCEDURE — 74011636320 HC RX REV CODE- 636/320: Performed by: RADIOLOGY

## 2020-05-07 RX ADMIN — IOPAMIDOL 100 ML: 612 INJECTION, SOLUTION INTRAVENOUS at 11:01

## 2020-05-08 ENCOUNTER — VIRTUAL VISIT (OUTPATIENT)
Dept: ONCOLOGY | Age: 35
End: 2020-05-08

## 2020-05-08 DIAGNOSIS — C81.01 NODULAR LYMPHOCYTE PREDOMINANT HODGKIN LYMPHOMA OF LYMPH NODES OF NECK (HCC): Primary | ICD-10-CM

## 2020-05-08 NOTE — PATIENT INSTRUCTIONS
Thank you for participating in the virtual visit with Dr. Marian Webb. Someone will call you to schedule a follow up appointment with us in 6 months. If you do not hear from us, please call us at [phone] to schedule your appointment. Please use the enclosed lab slip to have your labs done before your next appointment.

## 2020-05-15 DIAGNOSIS — J06.9 VIRAL URI: ICD-10-CM

## 2020-05-18 RX ORDER — FLUTICASONE PROPIONATE 50 MCG
SPRAY, SUSPENSION (ML) NASAL
Qty: 16 G | Refills: 0 | OUTPATIENT
Start: 2020-05-18

## 2020-07-20 DIAGNOSIS — C81.01 NODULAR LYMPHOCYTE PREDOMINANT HODGKIN LYMPHOMA OF LYMPH NODES OF NECK (HCC): ICD-10-CM

## 2020-08-10 PROCEDURE — 99282 EMERGENCY DEPT VISIT SF MDM: CPT

## 2020-08-11 ENCOUNTER — TELEPHONE (OUTPATIENT)
Dept: FAMILY MEDICINE CLINIC | Age: 35
End: 2020-08-11

## 2020-08-11 ENCOUNTER — APPOINTMENT (OUTPATIENT)
Dept: GENERAL RADIOLOGY | Age: 35
End: 2020-08-11
Attending: EMERGENCY MEDICINE
Payer: COMMERCIAL

## 2020-08-11 ENCOUNTER — HOSPITAL ENCOUNTER (EMERGENCY)
Age: 35
Discharge: HOME | End: 2020-08-11
Attending: EMERGENCY MEDICINE | Admitting: EMERGENCY MEDICINE
Payer: COMMERCIAL

## 2020-08-11 VITALS
HEART RATE: 74 BPM | BODY MASS INDEX: 35.61 KG/M2 | HEIGHT: 68 IN | SYSTOLIC BLOOD PRESSURE: 146 MMHG | RESPIRATION RATE: 20 BRPM | WEIGHT: 235 LBS | TEMPERATURE: 98.4 F | DIASTOLIC BLOOD PRESSURE: 93 MMHG | OXYGEN SATURATION: 100 %

## 2020-08-11 DIAGNOSIS — J06.9 VIRAL URI WITH COUGH: Primary | ICD-10-CM

## 2020-08-11 DIAGNOSIS — Z20.822 SUSPECTED COVID-19 VIRUS INFECTION: ICD-10-CM

## 2020-08-11 LAB
COVID-19, XGCOVT: NOT DETECTED
HEALTH STATUS, XMCV2T: NORMAL
SOURCE, COVRS: NORMAL
SPECIMEN SOURCE, FCOV2M: NORMAL
SPECIMEN TYPE, XMCV1T: NORMAL

## 2020-08-11 PROCEDURE — 71046 X-RAY EXAM CHEST 2 VIEWS: CPT

## 2020-08-11 PROCEDURE — 87635 SARS-COV-2 COVID-19 AMP PRB: CPT

## 2020-08-11 NOTE — ED PROVIDER NOTES
The patient is a 79-year-old female with a past medical history significant for hypertension, morbid obesity, chronic chest pain who presents to the ED with a complaint of dry cough, runny nose, congestion, chest tightness with coughing and body aches for approximately 2 to 3 days. Patient denies any fever, headache, nausea, vomiting, abdominal pain, diarrhea, constipation, dysuria, dizziness, extremity weakness or numbness. The patient stated her son has symptoms of a URI last week but recovered from them.            Past Medical History:   Diagnosis Date    Abnormal Pap smear 2010    FU with colpo, negative per patient    Cancer ([facility] [address]     nodular lymphocytic hodgkins lymphoma    Chest pain     Chlamydia 01/16/2019    vaginal    KAMERON I (cervical intraepithelial neoplasia I) 9/2/2011    Depression 5/12/2015    Encounter for IUD insertion 3/5/15    Mirena    Encounter for IUD removal 06/24/2016    Essential hypertension     Hypertension     Implanon removal 9/6/2013    Nexplanon insertion 02/09/2017    Pap smear for cervical cancer screening 11/11/15    Negative    Trouble in sleeping        Past Surgical History:   Procedure Laterality Date    HX COLPOSCOPY  2010    per pt WNL    HX WISDOM TEETH EXTRACTION           Family History:   Problem Relation Age of Onset    HIV/AIDS Mother     Hypertension Maternal Aunt     Hypertension Maternal Grandmother        Social History     Socioeconomic History    Marital status:      Spouse name: Not on file    Number of children: Not on file    Years of education: Not on file    Highest education level: Not on file   Occupational History    Not on file   Social Needs    Financial resource strain: Not on file    Food insecurity     Worry: Not on file     Inability: Not on file    Transportation needs     Medical: Not on file     Non-medical: Not on file   Tobacco Use    Smoking status: Never Smoker    Smokeless tobacco: Never Used   Substance and Sexual Activity    Alcohol use: Yes     Comment: occassionally    Drug use: No    Sexual activity: Yes     Partners: Male     Birth control/protection: Condom, Implant   Lifestyle    Physical activity     Days per week: Not on file     Minutes per session: Not on file    Stress: Not on file   Relationships    Social connections     Talks on phone: Not on file     Gets together: Not on file     Attends Zoroastrianism service: Not on file     Active member of club or organization: Not on file     Attends meetings of clubs or organizations: Not on file     Relationship status: Not on file    Intimate partner violence     Fear of current or ex partner: Not on file     Emotionally abused: Not on file     Physically abused: Not on file     Forced sexual activity: Not on file   Other Topics Concern    Not on file   Social History Narrative    Not on file         ALLERGIES: Clonidine (pf)    Review of Systems   All other systems reviewed and are negative. Vitals:    08/11/20 0005   BP: (!) 146/93   Pulse: 74   Resp: 20   Temp: 98.4 °F (36.9 °C)   SpO2: 100%   Weight: 106.6 kg (235 lb)   Height: 5' 8\" (1.727 m)            Physical Exam     CONSTITUTIONAL: Well-appearing; well-nourished; in no apparent distress  HEAD: Normocephalic; atraumatic  EYES: PERRL; EOM intact; conjunctiva and sclera are clear bilaterally. ENT: No rhinorrhea; normal pharynx with no tonsillar hypertrophy; mucous membranes pink/moist, no erythema, no exudate. NECK: Supple; non-tender; no cervical lymphadenopathy  CARD: Normal S1, S2; no murmurs, rubs, or gallops. Regular rate and rhythm. RESP: Normal respiratory effort; breath sounds clear and equal bilaterally; no wheezes, rhonchi, or rales. ABD: Normal bowel sounds; non-distended; non-tender; no palpable organomegaly, no masses, no bruits. Back Exam: Normal inspection; no vertebral point tenderness, no CVA tenderness. Normal range of motion.   EXT: Normal ROM in all four extremities; non-tender to palpation; no swelling or deformity; distal pulses are normal, no edema. SKIN: Warm; dry; no rash. NEURO:Alert and oriented x 3, coherent, OLY-XII grossly intact, sensory and motor are non-focal.        MDM  Number of Diagnoses or Management Options  Diagnosis management comments: Assessment: 59-year-old female who presents to the ED with nonfocal exam suspect viral URI with cough rule out pneumonia and COVID-19. Plan: Education, reassurance, symptomatic treatment/chest x-ray/COVID-19 testing/serial exam/ Monitor and Reevaluate. Amount and/or Complexity of Data Reviewed  Clinical lab tests: ordered and reviewed  Tests in the radiology section of CPT®: ordered and reviewed  Tests in the medicine section of CPT®: reviewed and ordered  Discussion of test results with the performing providers: yes  Decide to obtain previous medical records or to obtain history from someone other than the patient: yes  Obtain history from someone other than the patient: yes  Review and summarize past medical records: yes  Discuss the patient with other providers: yes  Independent visualization of images, tracings, or specimens: yes    Risk of Complications, Morbidity, and/or Mortality  Presenting problems: moderate  Diagnostic procedures: moderate  Management options: moderate    Patient Progress  Patient progress: stable         Procedures    XRAY INTERPRETATION (ED MD)  Chest Xray  No acute process seen. Normal heart size. No bony abnormalities. No infiltrate. Tanner Rausch MD 12:41 AM      Progress Note:   Pt has been reexamined by Riley Major MD. Pt is feeling much better. Symptoms have improved. All available results have been reviewed with pt and any available family. Pt understands sx, dx, and tx in ED. Care plan has been outlined and questions have been answered. Pt is ready to go home. Will send home on viral URI with cough and COVID-19 instruction. Isolation education. Salena Bragg outpatient referral with PCP as needed. Written by Rena Duane, MD,12:42 AM    .   .

## 2020-08-11 NOTE — ED TRIAGE NOTES
Triage: Pt advises that she has had chills and cough since Saturday. Pt advises of chest pain with cough tonight.

## 2020-08-11 NOTE — DISCHARGE INSTRUCTIONS
Patient Education        Coronavirus ([facility]): Care Instructions  Overview  The coronavirus disease (COVID-19) is caused by a virus. Symptoms may include a fever, a cough, and shortness of breath. It mainly spreads person-to-person through droplets from coughing and sneezing. The virus also can spread when people are in close contact with someone who is infected. Most people have mild symptoms and can take care of themselves at home. If their symptoms get worse, they may need care in a hospital. There is no medicine to fight the virus. It's important to not spread the virus to others. If you have COVID-19, wear a face cover anytime you are around other people. You need to isolate yourself while you are sick. Your doctor or local public health official will tell you when you no longer need to be isolated. Leave your home only if you need to get medical care. Follow-up care is a key part of your treatment and safety. Be sure to make and go to all appointments, and call your doctor if you are having problems. It's also a good idea to know your test results and keep a list of the medicines you take. How can you care for yourself at home? · Get extra rest. It can help you feel better. · Drink plenty of fluids. This helps replace fluids lost from fever. Fluids also help ease a scratchy throat. Water, soup, fruit juice, and hot tea with lemon are good choices. · Take acetaminophen (such as Tylenol) to reduce a fever. It may also help with muscle aches. Read and follow all instructions on the label. · Sponge your body with lukewarm water to help with fever. Don't use cold water or ice. · Use petroleum jelly on sore skin. This can help if the skin around your nose and lips becomes sore from rubbing a lot with tissues. Tips for isolation  · Wear a cloth face cover when you are around other people. It can help stop the spread of the virus when you cough or sneeze. · Limit contact with people in your home.  If possible, stay in a separate bedroom and use a separate bathroom. · If you have to leave home, avoid crowds and try to stay at least 6 feet away from other people. · Avoid contact with pets and other animals. · Cover your mouth and nose with a tissue when you cough or sneeze. Then throw it in the trash right away. · Wash your hands often, especially after you cough or sneeze. Use soap and water, and scrub for at least 20 seconds. If soap and water aren't available, use an alcohol-based hand . · Don't share personal household items. These include bedding, towels, cups and glasses, and eating utensils. · [address] in the warmest water allowed for the fabric type, and dry it completely. It's okay to wash other people's laundry with yours. · Clean and disinfect your home every day. Use household  and disinfectant wipes or sprays. Take special care to clean things that you grab with your hands. These include doorknobs, remote controls, phones, and handles on your refrigerator and microwave. And don't forget countertops, tabletops, bathrooms, and computer keyboards. When should you call for help? [id number] anytime you think you may need emergency care. For example, call if you have life-threatening symptoms, such as:  · You have severe trouble breathing. (You can't talk at all.)  · You have constant chest pain or pressure. · You are severely dizzy or lightheaded. · You are confused or can't think clearly. · Your face and lips have a blue color. · You pass out (lose consciousness) or are very hard to wake up. Call your doctor now or seek immediate medical care if:  · You have moderate trouble breathing. (You can't speak a full sentence.)  · You are coughing up blood (more than about 1 teaspoon). · You have signs of low blood pressure. These include feeling lightheaded; being too weak to stand; and having cold, pale, clammy skin.   Watch closely for changes in your health, and be sure to contact your doctor if:  · Your symptoms get worse. · You are not getting better as expected. Call before you go to the doctor's office. Follow their instructions. And wear a cloth face cover. Current as of: May 8, 2020               Content Version: 12.5  © 2006-2020 [employer]. Care instructions adapted under license by [employer] (which disclaims liability or warranty for this information). If you have questions about a medical condition or this instruction, always ask your healthcare professional. [place] [address] any warranty or liability for your use of this information. Patient Education        Upper Respiratory Infection (Cold): Care Instructions  Your Care Instructions        An upper respiratory infection, or URI, is an infection of the nose, sinuses, or throat. URIs are spread by coughs, sneezes, and direct contact. The common cold is the most frequent kind of URI. The flu and sinus infections are other kinds of URIs. Almost all URIs are caused by viruses. Antibiotics won't cure them. But you can treat most infections with home care. This may include drinking lots of fluids and taking over-the-counter pain medicine. You will probably feel better in 4 to 10 days. The doctor has checked you carefully, but problems can develop later. If you notice any problems or new symptoms, get medical treatment right away. Follow-up care is a key part of your treatment and safety. Be sure to make and go to all appointments, and call your doctor if you are having problems. It's also a good idea to know your test results and keep a list of the medicines you take. How can you care for yourself at home? · To prevent dehydration, drink plenty of fluids, enough so that your urine is light yellow or clear like water. Choose water and other caffeine-free clear liquids until you feel better.  If you have kidney, heart, or liver disease and have to limit fluids, talk with your doctor before you increase the amount of fluids you drink. · Take an over-the-counter pain medicine, such as acetaminophen (Tylenol), ibuprofen (Advil, Motrin), or naproxen (Aleve). Read and follow all instructions on the label. · Before you use cough and cold medicines, check the label. These medicines may not be safe for young children or for people with certain health problems. · Be careful when taking over-the-counter cold or flu medicines and Tylenol at the same time. Many of these medicines have acetaminophen, which is Tylenol. Read the labels to make sure that you are not taking more than the recommended dose. Too much acetaminophen (Tylenol) can be harmful. · Get plenty of rest.  · Do not smoke or allow others to smoke around you. If you need help quitting, talk to your doctor about stop-smoking programs and medicines. These can increase your chances of quitting for good. When should you call for help? [id number] anytime you think you may need emergency care. For example, call if:  · You have severe trouble breathing. Call your doctor now or seek immediate medical care if:  · You seem to be getting much sicker. · You have new or worse trouble breathing. · You have a new or higher fever. · You have a new rash. Watch closely for changes in your health, and be sure to contact your doctor if:  · You have a new symptom, such as a sore throat, an earache, or sinus pain. · You cough more deeply or more often, especially if you notice more mucus or a change in the color of your mucus. · You do not get better as expected. Where can you learn more? Go to http://srini-jeny.info/  Enter K520 in the search box to learn more about \"Upper Respiratory Infection (Cold): Care Instructions. \"  Current as of: February 24, 2020               Content Version: 12.5  © 2896-0170 [employer].    Care instructions adapted under license by [employer] (which disclaims liability or warranty for this information). If you have questions about a medical condition or this instruction, always ask your healthcare professional. [place] [address] any warranty or liability for your use of this information.

## 2020-08-12 ENCOUNTER — PATIENT OUTREACH (OUTPATIENT)
Dept: CASE MANAGEMENT | Age: 35
End: 2020-08-12

## 2020-08-12 NOTE — ACP (ADVANCE CARE PLANNING)
Pt stated her aunt, Edmundo Lantigua, is her primary healthcare SDM. She will have a conversation with her regarding her healthcare instructions should she not be able to speak for herself.

## 2020-08-12 NOTE — PROGRESS NOTES
Patient contacted regarding COVID-19 suspected. [facility] ED 20 dx-viral URI w/cough, suspected covid-19 virus infection    Discussed COVID-19 related testing which was available at this time. Test results were negative. Patient informed of results, if available? yes     Pt stated she is still congested, but cough has lessened. She will call her PCP for an appt. Care Transition Nurse/ Ambulatory Care Manager/ LPN Care Coordinator contacted the patient by telephone to perform post discharge assessment. Verified name and  with patient as identifiers. Provided introduction to self, and explanation of the CTN/ACM/LPN role, and reason for call due to risk factors for infection and/or exposure to COVID-19. Symptoms reviewed with patient who verbalized the following symptoms: fatigue, cough, shortness of breath, chest pain, dizziness/lightheadedness, no new symptoms, no worsening symptoms and CP when coughing, dizziness. Due to no new or worsening symptoms encounter was not routed to provider for escalation. Discussed follow-up appointments. If no appointment was previously scheduled, appointment scheduling offered: [facility] follow up appointment(s):   Future Appointments   Date Time Provider Ann Stone   2020 10:30 AM Travis Sanders MD [facility]     Non-[facility] follow up appointment(s): pt will set up      Advance Care Planning:   Does patient have an Advance Directive: not on file; education provided     Patient has following risk factors of: HTN, severe obesity, h/o lymphoma. CTN/ACM/LPN reviewed discharge instructions, medical action plan and red flags such as increased shortness of breath, increasing fever and signs of decompensation with patient who verbalized understanding. Discussed exposure protocols and quarantine with CDC Guidelines What to do if you are sick with coronavirus disease .  Patient was given an opportunity for questions and concerns.  The patient agrees to contact the [employer] exposure line [phone], local health department n/a and PCP office for questions related to their healthcare. CTN/ACM provided contact information for future needs. Reviewed and educated patient on any new and changed medications related to discharge diagnosis. Patient/family/caregiver given information for [employer] and agrees to enroll yes  Patient's preferred e-mail:  [email or web address]  Patient's preferred phone number: [phone]  Based on Loop alert triggers, patient will be contacted by nurse care manager for worsening symptoms. Pt will be further monitored by COVID Loop Team based on severity of symptoms and risk factors.

## 2020-08-13 ENCOUNTER — VIRTUAL VISIT (OUTPATIENT)
Dept: FAMILY MEDICINE CLINIC | Age: 35
End: 2020-08-13
Payer: COMMERCIAL

## 2020-08-13 DIAGNOSIS — R05.9 COUGH: ICD-10-CM

## 2020-08-13 DIAGNOSIS — R09.81 NASAL CONGESTION: ICD-10-CM

## 2020-08-13 DIAGNOSIS — I10 ESSENTIAL HYPERTENSION: Primary | ICD-10-CM

## 2020-08-13 PROCEDURE — 99213 OFFICE O/P EST LOW 20 MIN: CPT | Performed by: STUDENT IN AN ORGANIZED HEALTH CARE EDUCATION/TRAINING PROGRAM

## 2020-08-13 NOTE — PROGRESS NOTES
Maddie Martin  37 y.o. female  1985  [address] [address]  [id number]   [facility]:    Telemedicine Progress Note  Filipe Quigley MD       Encounter Date and Time: August 13, 2020 at 10:41 AM    Consent:  She and/or the health care decision maker is aware that that she may receive a bill for this telephone service, depending on her insurance coverage, and has provided verbal consent to proceed: Yes    Chief Complaint   Patient presents with    Hypertension     History of Present Illness   Maddie Martin is a 37 y.o. female was evaluated by synchronous (real-time) audio-video technology from home. Pt reports went to ER 8/10/20 for cough and high blood pressure. Bottom BP number 105. Reports was tested for COVID which was negative, and had nl CXR. Cough and fatigue started 8/8/20, overall improved since then, denies fever, taking dayquil/nyquil, and mucinex. Reports home BP now 140s-150s/80s. 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 Patient Screening for COVID-19:     1) Patient denies complaints of shortness of breath or difficulty breathing, sore throat,  chills, fatigue, muscle aches, loss of taste or smell, or GI symptoms(nausea, vomiting or diarrhea): No, has had fatigue, but reports negative COVID test    2) Patient denies complaints of cough or fever over 100F: No, has had cough, but reports negative COVID test    3) Patient denies leaving the country in the past 14 days or any other recent travel: Yes    4) Patient denies being exposed to anyone with COVID-19 and has not been around any one that has been sick with COVID-19 like symptoms as listed above: Yes     5) Patient informed to wear mask when arriving for appointment: Yes      Review of Systems   Review of Systems   Constitutional: Negative for chills and fever. HENT: Positive for congestion. Respiratory: Positive for cough.  Negative for shortness of breath and wheezing. Cardiovascular: Negative for chest pain and palpitations. Gastrointestinal: Negative for abdominal pain, nausea and vomiting. Neurological: Negative for dizziness. Vitals/Objective:     General: alert, cooperative, no distress   Mental  status: mental status: alert, oriented to person, place, and time, normal mood, behavior, speech, dress, motor activity, and thought processes   Resp: resp: normal effort and no respiratory distress   Neuro: neuro: no gross deficits   Skin: skin: no discoloration or lesions of concern on visible areas   Due to this being a TeleHealth evaluation, many elements of the physical examination are unable to be assessed. Assessment and Plan:   Time-based coding, delete if not needed: I spent at least 15 minutes with this established patient, and >50% of the time was spent counseling and/or coordinating care regarding cough and HTN    Myrna Ambriz is a 37 y.o. female with recent ER visit for HTN and cough, symptoms improving and pt reports home BP readings better and COVID test negative    1. Essential hypertension - BP high at ER, possibly 2/2 to acute illness, home BP readings now better  -Continue to monitor BP at home  -Continue HCTZ 25mg daily  -F/u in 1 week for BP check and medication management    2. Cough and Nasal congestion - likely 2/2 to URI vs allergies, pt reports negative COVID test  -Continue otc cold medications  -START Flonase to see if helps with congestion  -Encouraging good PO hydration  -Return to clinic if symptoms worsen or do not improve        Time spent in direct conversation with the patient to include medical condition(s) discussed, assessment and treatment plan:       We discussed the expected course, resolution and complications of the diagnosis(es) in detail. Medication risks, benefits, costs, interactions, and alternatives were discussed as indicated.   I advised her to contact the office if her condition worsens, changes or fails to improve as anticipated. She expressed understanding with the diagnosis(es) and plan. Patient understands that this encounter was a temporary measure, and the importance of further follow up and examination was emphasized. Patient verbalized understanding. Patient informed to follow up: Follow-up and Dispositions  ·   Return in about 1 week (around 8/20/2020), or or sooner if symptoms worsen or fail to improve, for Blood Pressure. Electronically Signed: Vanesa Paige MD    Providers location when delivering service (clinic, hospital, home): Home      ICD-10-CM ICD-9-CM    1. Essential hypertension  I10 401.9    2. Cough  R05 786.2    3. Nasal congestion  R09.81 478.19          Pursuant to the emergency declaration under the [address] [place] [address], [facility] waiver authority and the [employer] Act, this Virtual  Visit was conducted, with patient's consent, to reduce the patient's risk of exposure to COVID-19 and provide continuity of care for an established patient. Services were provided through a video synchronous discussion virtually to substitute for in-person clinic visit. History   Patients past medical, surgical and family histories were reviewed.     Past Medical History:   Diagnosis Date    Abnormal Pap smear 2010    FU with colpo, negative per patient    Cancer ([facility] [address]     nodular lymphocytic hodgkins lymphoma    Chest pain     Chlamydia 01/16/2019    vaginal    KAMERON I (cervical intraepithelial neoplasia I) 9/2/2011    Depression 5/12/2015    Encounter for IUD insertion 3/5/15    Mirena    Encounter for IUD removal 06/24/2016    Essential hypertension     Hypertension     Implanon removal 9/6/2013    Nexplanon insertion 02/09/2017    Pap smear for cervical cancer screening 11/11/15    Negative    Trouble in sleeping      Past Surgical History:   Procedure Laterality Date    HX COLPOSCOPY 2010    per pt WNL    HX WISDOM TEETH EXTRACTION       Family History   Problem Relation Age of Onset    HIV/AIDS Mother     Hypertension Maternal Aunt     Hypertension Maternal Grandmother      Social History     Socioeconomic History    Marital status:      Spouse name: Not on file    Number of children: Not on file    Years of education: Not on file    Highest education level: Not on file   Occupational History    Not on file   Social Needs    Financial resource strain: Not on file    Food insecurity     Worry: Not on file     Inability: Not on file    Transportation needs     Medical: Not on file     Non-medical: Not on file   Tobacco Use    Smoking status: Never Smoker    Smokeless tobacco: Never Used   Substance and Sexual Activity    Alcohol use: Yes     Comment: occassionally    Drug use: No    Sexual activity: Yes     Partners: Male     Birth control/protection: Condom, Implant   Lifestyle    Physical activity     Days per week: Not on file     Minutes per session: Not on file    Stress: Not on file   Relationships    Social connections     Talks on phone: Not on file     Gets together: Not on file     Attends Orthodoxy service: Not on file     Active member of club or organization: Not on file     Attends meetings of clubs or organizations: Not on file     Relationship status: Not on file    Intimate partner violence     Fear of current or ex partner: Not on file     Emotionally abused: Not on file     Physically abused: Not on file     Forced sexual activity: Not on file   Other Topics Concern    Not on file   Social History Narrative    Not on file     Patient Active Problem List   Diagnosis Code    HTN (hypertension) I10    Depression F32.9    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 9) E66.01          Current Medications/Allergies   Medications and Allergies reviewed:    Current Outpatient Medications   Medication Sig Dispense Refill    cholecalciferol, vitamin D3, (VITAMIN D3 PO) Take  by mouth.  hydroCHLOROthiazide (HYDRODIURIL) 25 mg tablet TAKE ONE TABLET BY MOUTH EVERY DAY 30 Tab 5    omeprazole (PRILOSEC) 20 mg capsule TAKE ONE CAPSULE BY MOUTH EVERY DAY 30 Cap 2    fluticasone propionate (FLONASE) 50 mcg/actuation nasal spray Use one spray in each nostril daily. Continue for at least 2 weeks.  1 Bottle 0     Allergies   Allergen Reactions    Clonidine (Pf) Vertigo

## 2020-08-13 NOTE — LETTER
NOTIFICATION RETURN TO WORK 
 
8/13/2020 10:56 AM 
 
Ms. Johana Cassidy [facility] [address] [address] To Whom It May Concern: 
 
Johana Cassidy is currently under the care of Marilia Quijano. She will need to be excused for 8/10/20 through 8/14/20 and will return to work on 8/17/20 so long as without symptoms at that time without any fever reducing medications and continues proper social distancing and mask wearing while at work along with any other work policies in place. If there are questions or concerns please have the patient contact our office. Sincerely, Ciara Mondragon MD

## 2020-08-15 NOTE — PROGRESS NOTES
[address] [place] [address] Residency Attending Addendum:  Dr. Vanesa Paige MD,  the patient and I were not physically present during this encounter. The resident and I are concurrently monitoring the patient care through appropriate telecommunication technology. I discussed the findings, assessment and plan with the resident and agree with the resident's findings and plan as documented in the resident's note.       Ti Cantu MD

## 2020-08-16 ENCOUNTER — PATIENT OUTREACH (OUTPATIENT)
Dept: CASE MANAGEMENT | Age: 35
End: 2020-08-16

## 2020-08-16 NOTE — PROGRESS NOTES
Yellow alert noted in remote symptom monitoring program. Messaged patient to notify Penny Redding if symptoms have worsened since yesterday or if they would like to have a nurse reach out.

## 2020-08-26 DIAGNOSIS — I10 ESSENTIAL HYPERTENSION: ICD-10-CM

## 2020-08-26 RX ORDER — HYDROCHLOROTHIAZIDE 25 MG/1
TABLET ORAL
Qty: 30 TAB | Refills: 0 | Status: SHIPPED | OUTPATIENT
Start: 2020-08-26 | End: 2020-09-28

## 2020-09-28 DIAGNOSIS — I10 ESSENTIAL HYPERTENSION: ICD-10-CM

## 2020-09-28 RX ORDER — HYDROCHLOROTHIAZIDE 25 MG/1
TABLET ORAL
Qty: 30 TAB | Refills: 0 | Status: SHIPPED | OUTPATIENT
Start: 2020-09-28 | End: 2020-10-28

## 2020-10-26 DIAGNOSIS — I10 ESSENTIAL HYPERTENSION: ICD-10-CM

## 2020-10-26 NOTE — LETTER
11/3/2020 9:57 AM 
 
Ms. Kathe Chavez [address] [address] Dear Ms. Tresea RadonOvidio Holstein missed you! Please call our office at [phone] and schedule a follow up appointment for your continued care. Sincerely, MICHAEL PAYNE & DAVID MOLINA [facility]

## 2020-10-28 ENCOUNTER — TELEPHONE (OUTPATIENT)
Dept: ONCOLOGY | Age: 35
End: 2020-10-28

## 2020-10-28 RX ORDER — HYDROCHLOROTHIAZIDE 25 MG/1
TABLET ORAL
Qty: 90 TAB | Refills: 0 | Status: SHIPPED | OUTPATIENT
Start: 2020-10-28 | End: 2020-11-27 | Stop reason: SDUPTHER

## 2020-10-28 NOTE — TELEPHONE ENCOUNTER
[address] at [facility]  [phone]    10/28/20- Phone call placed to pt to remind pt to have labs drawn prior to her follow up appointment with .  left for patient.

## 2020-11-02 ENCOUNTER — HOSPITAL ENCOUNTER (OUTPATIENT)
Dept: LAB | Age: 35
Discharge: HOME | End: 2020-11-02

## 2020-11-02 NOTE — PROGRESS NOTES
[facility] [place] [facility] [place] [address] [facility]  Jef Curl: [phone]  F: [phone]      Reason for Visit:   Constantino Iglesias is a 37 y.o. female who is seen for follow up of lymphoma. Treatment History:   · US neck 5/26/2016: 2.5cm right posterior auricular mass  · US guided biopsy cervical node 6/30/2016: suspicious for large b-cell lymphoma  · CT Neck/C/A/P 7/25/2016: Enlarged right level 5 lymph node measuring 2 x 1 cm and slightly inferior to this there is a slightly enlarged lymph node measuring 1.2 x 0.8 cm. There is a slightly enlarged right level 2 lymph node measuring 1.3 x 0.9 cm. There is a slightly enlarged left level 2 lymph node measuring 1.7 x 1.1 cm. Lymph nodes more inferior at level 3 and level 4 are  normal in size. No supraclavicular adenopathy is identified. No adenopathy in chest, abdomen, or pelvis  · PET-CT 7/27/2016: No areas of hypermetabolic activity  · Excisional biopsy of right level 2 cervical node by Dr. 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 Verito Gibbons completed 10/10/2016    History of Present Illness:   She was in the August for URI symptoms, workup was ok. No other recent infections. She has otherwise been feeling. Energy has been fairly good. No fevers, chills, night sweats, unintentional weight loss, adenopathy. Working full time as . PAST HISTORY: The following sections were reviewed and updated in the EMR as appropriate: PMH, SH, FH, Medications, Allergies. Allergies   Allergen Reactions    Clonidine (Pf) Vertigo      Review of Systems: A complete review of systems was obtained, reviewed, and scanned into the EMR. Pertinent findings reviewed above.     Physical Exam:     Visit Vitals  /88 (BP 1 Location: Left arm, BP Patient Position: Sitting)   Pulse 67 Temp 98 °F (36.7 °C) (Temporal)   Wt 235 lb 12.8 oz (107 kg)   SpO2 98%   BMI 35.85 kg/m²     ECOG PS: 0  General: No distress  Eyes: PERRLA, anicteric sclerae  HENT: Atraumatic, OP clear  Neck: Supple  Lymphatic: No cervical, supraclavicular, or inguinal adenopathy  Respiratory: CTAB, normal respiratory effort  CV: regular rhythm, no murmurs, no peripheral edema  GI: Soft, nontender, nondistended, no masses, no hepatomegaly, no splenomegaly  MS: Normal gait and station. Digits without clubbing or cyanosis. Skin: No rashes, ecchymoses, or petechiae. Normal temperature, turgor, and texture. Psych: Alert, oriented, appropriate affect, normal judgment/insight      Results:     Lab Results   Component Value Date/Time    WBC 6.1 05/04/2020 01:59 PM    HGB 13.1 05/04/2020 01:59 PM    HCT 39.2 05/04/2020 01:59 PM    PLATELET 173 39/32/4509 01:59 PM    MCV 90.3 05/04/2020 01:59 PM    ABS. NEUTROPHILS 3.4 05/04/2020 01:59 PM    Hgb, External 12.3 02/07/2014    Hct, External 35.6 02/07/2014    Platelet cnt., External 272 02/07/2014     Lab Results   Component Value Date/Time    Sodium 141 05/04/2020 01:59 PM    Potassium 3.5 05/04/2020 01:59 PM    Chloride 105 05/04/2020 01:59 PM    CO2 28 05/04/2020 01:59 PM    Glucose 89 05/04/2020 01:59 PM    BUN 8 05/04/2020 01:59 PM    Creatinine 0.85 05/04/2020 01:59 PM    GFR est AA >60 05/04/2020 01:59 PM    GFR est non-AA >60 05/04/2020 01:59 PM    Calcium 9.2 05/04/2020 01:59 PM    Glucose POC 76 11/03/2017 01:35 PM     Lab Results   Component Value Date/Time    Bilirubin, total 0.2 05/04/2020 01:59 PM    ALT (SGPT) 28 05/04/2020 01:59 PM    Alk.  phosphatase 76 05/04/2020 01:59 PM    Protein, total 6.9 05/04/2020 01:59 PM    Albumin 3.9 05/04/2020 01:59 PM    Globulin 3.0 05/04/2020 01:59 PM     Sed rate (ESR) (mm/hr)   Date Value   07/23/2019 4   01/18/2019 12   09/10/2018 6   05/07/2018 5   12/12/2017 8   08/28/2017 3   02/13/2017 6     Sed rate, automated (mm/hr)   Date Value 05/04/2020 11   01/22/2020 25 (H)   05/17/2017 12   11/11/2016 34 (H)   08/10/2016 44 (H)         CT N/C/A/P 12/04/2017: no evidence of recurrence  CT N/C/A/P 12/17/2018: no evidence of recurrence  CT Neck 5/7/2020: no evidence of recurrence, no adenopathy or mass in neck    Assessment:   1) Nodular lymphocyte predominant Hodgkin Lymphoma  Stage IIA  She is s/p radiation with Dr. Sally Barnett completed 10/2016, with resolution of cervical adenopathy on post-treatment CT scan. She is being followed with surveillance. She remains without evidence of disease recurrence at this time. She had labs drawn Monday, but they have not resulted yet. We will continue with surveillance. Follow up after completion of therapy for Hodgkin Lymphoma, per NCCN:  · Year 1&2: H&P every 3-6 months  · Year 3: H&P every 6-12 months  · Year 4+: H&P every 12 months  · CBC, CMP, ESR as indicated  · TSH annually if RT to neck  · CT at 6, 12, and 24 months, or as clinically indicated  · Annual influenza vaccine      2) Pelvic pain, ovarian cyst  Improved recently. Has seen GYN about this in the past    3) Cervical mass  Resolved since last here. CT was negative. Monitor.         Plan:     · Labs in 12 months: CBC, CMP, ESR (she likes to get these with her PCP office)   · Return to see me in 12 months      Signed By: Maria De Jesus Nj MD

## 2020-11-05 NOTE — TELEPHONE ENCOUNTER
Attempted to call. unsuccessful.  message left  Pt will need a virtual appointment for blood pressure

## 2020-11-06 ENCOUNTER — OFFICE VISIT (OUTPATIENT)
Dept: ONCOLOGY | Age: 35
End: 2020-11-06
Payer: COMMERCIAL

## 2020-11-06 VITALS
WEIGHT: 235.8 LBS | BODY MASS INDEX: 35.85 KG/M2 | HEART RATE: 67 BPM | SYSTOLIC BLOOD PRESSURE: 127 MMHG | DIASTOLIC BLOOD PRESSURE: 88 MMHG | OXYGEN SATURATION: 98 % | TEMPERATURE: 98 F

## 2020-11-06 DIAGNOSIS — C81.01 NODULAR LYMPHOCYTE PREDOMINANT HODGKIN LYMPHOMA OF LYMPH NODES OF NECK (HCC): Primary | ICD-10-CM

## 2020-11-06 LAB
ALBUMIN SERPL-MCNC: 3.9 G/DL (ref 3.5–5)
ALBUMIN/GLOB SERPL: 1.3 {RATIO} (ref 1.1–2.2)
ALP SERPL-CCNC: 82 U/L (ref 45–117)
ALT SERPL-CCNC: 23 U/L (ref 12–78)
ANION GAP SERPL CALC-SCNC: 7 MMOL/L (ref 5–15)
AST SERPL-CCNC: 19 U/L (ref 15–37)
BASOPHILS # BLD: 0.1 K/UL (ref 0–0.1)
BASOPHILS NFR BLD: 1 % (ref 0–1)
BILIRUB SERPL-MCNC: 0.2 MG/DL (ref 0.2–1)
BUN SERPL-MCNC: 12 MG/DL (ref 6–20)
BUN/CREAT SERPL: 13 (ref 12–20)
CALCIUM SERPL-MCNC: 9 MG/DL (ref 8.5–10.1)
CHLORIDE SERPL-SCNC: 105 MMOL/L (ref 97–108)
CO2 SERPL-SCNC: 28 MMOL/L (ref 21–32)
CREAT SERPL-MCNC: 0.9 MG/DL (ref 0.55–1.02)
DIFFERENTIAL METHOD BLD: NORMAL
EOSINOPHIL # BLD: 0.1 K/UL (ref 0–0.4)
EOSINOPHIL NFR BLD: 1 % (ref 0–7)
ERYTHROCYTE [DISTWIDTH] IN BLOOD BY AUTOMATED COUNT: 12 % (ref 11.5–14.5)
ERYTHROCYTE [SEDIMENTATION RATE] IN BLOOD: 10 MM/HR (ref 0–20)
GLOBULIN SER CALC-MCNC: 3.1 G/DL (ref 2–4)
GLUCOSE SERPL-MCNC: 82 MG/DL (ref 65–100)
HCT VFR BLD AUTO: 39.6 % (ref 35–47)
HGB BLD-MCNC: 13.4 G/DL (ref 11.5–16)
IMM GRANULOCYTES # BLD AUTO: 0 K/UL (ref 0–0.04)
IMM GRANULOCYTES NFR BLD AUTO: 0 % (ref 0–0.5)
LYMPHOCYTES # BLD: 2.1 K/UL (ref 0.8–3.5)
LYMPHOCYTES NFR BLD: 33 % (ref 12–49)
MCH RBC QN AUTO: 30.3 PG (ref 26–34)
MCHC RBC AUTO-ENTMCNC: 33.8 G/DL (ref 30–36.5)
MCV RBC AUTO: 89.6 FL (ref 80–99)
MONOCYTES # BLD: 0.5 K/UL (ref 0–1)
MONOCYTES NFR BLD: 8 % (ref 5–13)
NEUTS SEG # BLD: 3.6 K/UL (ref 1.8–8)
NEUTS SEG NFR BLD: 57 % (ref 32–75)
NRBC # BLD: 0 K/UL (ref 0–0.01)
NRBC BLD-RTO: 0 PER 100 WBC
PLATELET # BLD AUTO: 368 K/UL (ref 150–400)
PMV BLD AUTO: 9.2 FL (ref 8.9–12.9)
POTASSIUM SERPL-SCNC: 3.3 MMOL/L (ref 3.5–5.1)
PROT SERPL-MCNC: 7 G/DL (ref 6.4–8.2)
RBC # BLD AUTO: 4.42 M/UL (ref 3.8–5.2)
SODIUM SERPL-SCNC: 140 MMOL/L (ref 136–145)
WBC # BLD AUTO: 6.3 K/UL (ref 3.6–11)

## 2020-11-06 PROCEDURE — 99214 OFFICE O/P EST MOD 30 MIN: CPT | Performed by: INTERNAL MEDICINE

## 2020-11-06 NOTE — PROGRESS NOTES
Greer Bone is a 37 y.o. female follow up for lymphoma. 1. Have you been to the ER, urgent care clinic since your last visit? Hospitalized since your last visit?no    2. Have you seen or consulted any other health care providers outside of the [address] [place] [address] since your last visit? Include any pap smears or colon screening.  no

## 2020-11-27 DIAGNOSIS — I10 ESSENTIAL HYPERTENSION: ICD-10-CM

## 2020-11-27 RX ORDER — HYDROCHLOROTHIAZIDE 25 MG/1
25 TABLET ORAL DAILY
Qty: 90 TAB | Refills: 1 | Status: SHIPPED | OUTPATIENT
Start: 2020-11-27 | End: 2021-06-01

## 2020-12-03 ENCOUNTER — VIRTUAL VISIT (OUTPATIENT)
Dept: FAMILY MEDICINE CLINIC | Age: 35
End: 2020-12-03
Payer: COMMERCIAL

## 2020-12-03 DIAGNOSIS — B37.31 VAGINAL YEAST INFECTION: Primary | ICD-10-CM

## 2020-12-03 PROCEDURE — 99442 PR PHYS/QHP TELEPHONE EVALUATION 11-20 MIN: CPT | Performed by: STUDENT IN AN ORGANIZED HEALTH CARE EDUCATION/TRAINING PROGRAM

## 2020-12-03 RX ORDER — FLUCONAZOLE 150 MG/1
150 TABLET ORAL DAILY
Qty: 1 TAB | Refills: 0 | Status: SHIPPED | OUTPATIENT
Start: 2020-12-03 | End: 2020-12-04

## 2020-12-03 NOTE — PROGRESS NOTES
[address] Medicine Residency Attending Addendum:  Dr. Georgina Romano MD,  the patient and I were not physically present during this encounter. The resident and I are concurrently monitoring the patient care through appropriate telecommunication technology. I discussed the findings, assessment and plan with the resident and agree with the resident's findings and plan as documented in the resident's note.       Singh Marti MD

## 2020-12-03 NOTE — PROGRESS NOTES
Skye Newberry  37 y.o. female  1985  [id number] [place] [address],[address]  [place]  [phone]    [phone] (home) [phone] (work)     [address]:    Telephone Encounter  Maverick Ferguson MD       Encounter Date: 12/3/2020 at 10:11 AM    Consent: Skye Newberry, who was seen by synchronous (real-time) audio only technology, and/or her healthcare decision maker, is aware that this patient-initiated, Telehealth encounter on 12/3/2020 is a billable service, with coverage as determined by her insurance carrier. She is aware that she may receive a bill and has provided verbal consent to proceed: Yes. Chief Complaint   Patient presents with    Yeast Infection       History of Present Illness   Skye Newberry is a 37 y.o. female was evaluated by telephone. I communicated with the patient and/or health care decision maker about yeast infection. Pt reports that she has been having vaginal itching for the past 5 days. This is similar to previous yeast infections that she has had. She has also had some external irritation with wiping and discomfort with sexual intercourse. She denies vaginal discharge and odor. She has been sexually active with one long term partner. They do not use condoms. She is currently using nexplanon for birth control and denies any symptoms of pregnancy. No fevers, abdominal pain, N/V,dysuria, frequency, or urgency. Review of Systems   Review of Systems   Constitutional: Negative for chills and fever. Gastrointestinal: Negative for abdominal pain, nausea and vomiting. Genitourinary: Negative for dysuria, flank pain, frequency, hematuria and urgency. Positive for vaginal itching and irritation. Negative for discharge or odor. Vitals/Objective:   General: Patient speaking in complete sentences without effort. Normal speech and cooperative.        Due to this being a Virtual Check-in/Telephone evaluation, many elements of the physical examination are unable to be assessed. Assessment and Plan:   Time-based coding, delete if not needed: I spent at least 10 minutes with this established patient, and >50% of the time was spent counseling and/or coordinating care regarding yeast infection  Total Time: minutes: 11-20 minutes    1. Vaginal yeast infection - pt has symptoms characteristic of mild yeast infection, likely involving more of distal canal and exterior labia as she is not having noticeable increase in discharge. The irritation and itching are typical of this. She is currently using nexplanon for birth control.  - Diflucan, 1 tablet  - Discussed if pt's symptoms persist, she should contact office for further evaluation. We discussed the expected course, resolution and complications of the diagnosis(es) in detail. Medication risks, benefits, costs, interactions, and alternatives were discussed as indicated. I advised her to contact the office if her condition worsens, changes or fails to improve as anticipated. She expressed understanding with the diagnosis(es) and plan. Patient understands that this encounter was a temporary measure, and the importance of further follow up and examination was emphasized. Patient verbalized understanding. Patient informed to follow up: PRN    I affirm this is a Patient Initiated Episode with an Established Patient who has not had a related appointment within my department in the past 7 days or scheduled within the next 24 hours. Note: not billable if this call serves to triage the patient into an appointment for the relevant concern      Electronically Signed: Maryan Carpenter MD  Providers location when delivering service: home        ICD-10-CM ICD-9-CM    1.  Vaginal yeast infection  B37.3 112.1        Pursuant to the emergency declaration under the [facility]5 waiver authority and the [employer] Appropriations Act, this Virtual  Visit was conducted, with patient's consent, to reduce the patient's risk of exposure to COVID-19 and provide continuity of care for an established patient. History   Patients past medical, surgical and family histories were personally reviewed and updated.       Past Medical History:   Diagnosis Date    Abnormal Pap smear 2010    FU with colpo, negative per patient    Cancer ([facility] [address]     nodular lymphocytic hodgkins lymphoma    Chest pain     Chlamydia 01/16/2019    vaginal    KAMERON I (cervical intraepithelial neoplasia I) 9/2/2011    Depression 5/12/2015    Encounter for IUD insertion 3/5/15    Mirena    Encounter for IUD removal 06/24/2016    Essential hypertension     Hypertension     Implanon removal 9/6/2013    Nexplanon insertion 02/09/2017    Pap smear for cervical cancer screening 11/11/15    Negative    Trouble in sleeping      Past Surgical History:   Procedure Laterality Date    HX COLPOSCOPY  2010    per pt WNL    HX WISDOM TEETH EXTRACTION       Family History   Problem Relation Age of Onset    HIV/AIDS Mother     Hypertension Maternal Aunt     Hypertension Maternal Grandmother      Social History     Socioeconomic History    Marital status:      Spouse name: Not on file    Number of children: Not on file    Years of education: Not on file    Highest education level: Not on file   Occupational History    Not on file   Social Needs    Financial resource strain: Not on file    Food insecurity     Worry: Not on file     Inability: Not on file    Transportation needs     Medical: Not on file     Non-medical: Not on file   Tobacco Use    Smoking status: Never Smoker    Smokeless tobacco: Never Used   Substance and Sexual Activity    Alcohol use: Yes     Comment: occassionally    Drug use: No    Sexual activity: Yes     Partners: Male     Birth control/protection: Condom, Implant   Lifestyle    Physical activity     Days per week: Not on file     Minutes per session: Not on file    Stress: Not on file   Relationships    Social connections     Talks on phone: Not on file     Gets together: Not on file     Attends Jainism service: Not on file     Active member of club or organization: Not on file     Attends meetings of clubs or organizations: Not on file     Relationship status: Not on file    Intimate partner violence     Fear of current or ex partner: Not on file     Emotionally abused: Not on file     Physically abused: Not on file     Forced sexual activity: Not on file   Other Topics Concern    Not on file   Social History Narrative    Not on file            Current Medications/Allergies   Medications and Allergies reviewed:    Current Outpatient Medications   Medication Sig Dispense Refill    fluconazole (DIFLUCAN) 150 mg tablet Take 1 Tab by mouth daily for 1 day. FDA advises cautious prescribing of oral fluconazole in pregnancy. 1 Tab 0    hydroCHLOROthiazide (HYDRODIURIL) 25 mg tablet Take 1 Tab by mouth daily. 90 Tab 1    cholecalciferol, vitamin D3, (VITAMIN D3 PO) Take  by mouth.  omeprazole (PRILOSEC) 20 mg capsule TAKE ONE CAPSULE BY MOUTH EVERY DAY 30 Cap 2    fluticasone propionate (FLONASE) 50 mcg/actuation nasal spray Use one spray in each nostril daily. Continue for at least 2 weeks.  1 Bottle 0     Allergies   Allergen Reactions    Clonidine (Pf) Vertigo

## 2021-03-03 ENCOUNTER — TELEPHONE (OUTPATIENT)
Dept: FAMILY MEDICINE CLINIC | Age: 36
End: 2021-03-03

## 2021-04-15 ENCOUNTER — OFFICE VISIT (OUTPATIENT)
Dept: CARDIOLOGY CLINIC | Age: 36
End: 2021-04-15
Payer: COMMERCIAL

## 2021-04-15 VITALS
HEART RATE: 68 BPM | OXYGEN SATURATION: 98 % | WEIGHT: 238 LBS | HEIGHT: 68 IN | SYSTOLIC BLOOD PRESSURE: 126 MMHG | BODY MASS INDEX: 36.07 KG/M2 | DIASTOLIC BLOOD PRESSURE: 78 MMHG

## 2021-04-15 DIAGNOSIS — R07.9 CHEST PAIN, UNSPECIFIED TYPE: Primary | ICD-10-CM

## 2021-04-15 PROBLEM — I10 ESSENTIAL HYPERTENSION: Status: ACTIVE | Noted: 2021-04-15

## 2021-04-15 PROCEDURE — 99204 OFFICE O/P NEW MOD 45 MIN: CPT | Performed by: SPECIALIST

## 2021-04-15 PROCEDURE — 93000 ELECTROCARDIOGRAM COMPLETE: CPT | Performed by: SPECIALIST

## 2021-04-15 NOTE — PROGRESS NOTES
CARDIOLOGY OFFICE NOTE    Grant Loya MD, [address], [place], [address] [facility]  Phone [phone]; Fax [phone]  Mobile [phone]   Voice Mail [phone]    LAST OFFICE VISIT : Visit date not found  Bhupinder Ludwig MD       ATTENTION:   This medical record was transcribed using an electronic medical records/speech recognition system. Although proofread, it may and can contain electronic, spelling and other errors. Corrections may be executed at a later time. Please feel free to contact us for any clarifications as needed. ICD-10-CM ICD-9-CM   1. Chest pain, unspecified type  R07.9 786.50            Nu Wesley is a 31 y.o. female with  referred for   chest discomfort and hypertension . The patient denies chest pain/ shortness of breath, orthopnea, PND, LE edema, palpitations, syncope, presyncope or fatigue. Cardiac risk factors: obesity  I have personally obtained the history from the patient. HISTORY OF PRESENTING ILLNESS      Very pleasant lady who saw Dr. Nando Barnett in the past and had a cardiac work-up at that time including echo that was normal as well as a monitor that did not reveal any arrhythmias. Now she has been experiencing episodic chest discomfort and had 2 episodes since one episode in February. She another episode last week the duration is about 30 minutes and can be made worse by deep breathing. She had a work-up in the emergency room that was negative for pulmonary embolism in [place]. It does not wake her up from sleep at night. She denies any PND and orthopnea.   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 ([facility] [address] 08/10/2016    Depression 05/12/2015 PAST MEDICAL HISTORY     Past Medical History:   Diagnosis Date    Abnormal Pap smear 2010    FU with colpo, negative per patient    Cancer ([facility] [address]     nodular lymphocytic hodgkins lymphoma    Chest pain     Chlamydia 01/16/2019    vaginal    KAMERON I (cervical intraepithelial neoplasia I) 9/2/2011    Depression 5/12/2015    Encounter for IUD insertion 3/5/15    Mirena    Encounter for IUD removal 06/24/2016    Essential hypertension     Hypertension     Implanon removal 9/6/2013    Nexplanon insertion 02/09/2017    Pap smear for cervical cancer screening 11/11/15    Negative    Trouble in sleeping            PAST SURGICAL HISTORY     Past Surgical History:   Procedure Laterality Date    HX COLPOSCOPY  2010    per pt WNL    HX WISDOM TEETH EXTRACTION            ALLERGIES     Allergies   Allergen Reactions    Clonidine (Pf) Vertigo          FAMILY HISTORY     Family History   Problem Relation Age of Onset    HIV/AIDS Mother     Hypertension Maternal Aunt     Hypertension Maternal Grandmother     negative for cardiac disease       SOCIAL HISTORY     Social History     Socioeconomic History    Marital status:      Spouse name: Not on file    Number of children: Not on file    Years of education: Not on file    Highest education level: Not on file   Tobacco Use    Smoking status: Never Smoker    Smokeless tobacco: Never Used   Substance and Sexual Activity    Alcohol use: Yes     Comment: occassionally    Drug use: No    Sexual activity: Yes     Partners: Male     Birth control/protection: Condom, Implant         MEDICATIONS     Current Outpatient Medications   Medication Sig    hydroCHLOROthiazide (HYDRODIURIL) 25 mg tablet Take 1 Tab by mouth daily.  cholecalciferol, vitamin D3, (VITAMIN D3 PO) Take  by mouth.     omeprazole (PRILOSEC) 20 mg capsule TAKE ONE CAPSULE BY MOUTH EVERY DAY    fluticasone propionate (FLONASE) 50 mcg/actuation nasal spray Use one spray in each nostril daily. Continue for at least 2 weeks. No current facility-administered medications for this visit. I have reviewed the nurses notes, vitals, problem list, allergy list, medical history, family, social history and medications. REVIEW OF SYMPTOMS   Pertinent positives per HPI  General: Pt denies excessive weight gain or loss. Pt is able to conduct ADL's  HEENT: Denies blurred vision, headaches, hearing loss, epistaxis and difficulty swallowing. Respiratory: Denies cough, congestion, shortness of breath, LEE, wheezing or stridor. Cardiovascular: Denies precordial pain, palpitations, edema or PND  Gastrointestinal: Denies poor appetite, indigestion, abdominal pain or blood in stool  Genitourinary: Denies hematuria, dysuria, increased urinary frequency  Musculoskeletal: Denies joint pain or swelling from muscles or joints  Neurologic: Denies tremor, paresthesias, headache, or sensory motor disturbance  Psychiatric: Denies confusion, insomnia, depression  Integumentray: Denies rash, itching or ulcers. Hematologic: Denies easy bruising, bleeding     PHYSICAL EXAMINATION      Vitals:    04/15/21 1456   BP: 126/78   Pulse: 68   SpO2: 98%   Weight: 238 lb (108 kg)   Height: 5' 8\" (1.727 m)     General: Well developed, in no acute distress. HEENT: No jaundice, oral mucosa moist, no oral ulcers  Neck: Supple, no stiffness, no lymphadenopathy, supple  Heart:  Normal S1/S2 negative S3 or S4. Regular, no murmur, gallop or rub, no jugular venous distention  Respiratory: Clear bilaterally x 4, no wheezing or rales  Abdomen:   Soft, non-tender, bowel sounds are active. Extremities:  No edema, normal cap refill, no cyanosis. Musculoskeletal: No clubbing, no deformities  Neuro: A&Ox3, speech clear, gait stable, cooperative, no focal neurologic deficits  Skin: Skin color is normal. No rashes or lesions.  Non diaphoretic, moist.  Vascular: 2+ pulses symmetric in all extremities        EKG: Normal sinus rhythm     DIAGNOSTIC DATA     1. Lipids  1/18/19- , HDL 32, LDL 89, TG 51         LABORATORY DATA            Lab Results   Component Value Date/Time    WBC 6.3 11/02/2020 03:12 PM    HGB 13.4 11/02/2020 03:12 PM    HCT 39.6 11/02/2020 03:12 PM    PLATELET 255 76/57/2720 03:12 PM    MCV 89.6 11/02/2020 03:12 PM    Hgb, External 12.3 02/07/2014    Hct, External 35.6 02/07/2014    Platelet cnt., External 272 02/07/2014      Lab Results   Component Value Date/Time    Sodium 140 11/02/2020 03:12 PM    Potassium 3.3 (L) 11/02/2020 03:12 PM    Chloride 105 11/02/2020 03:12 PM    CO2 28 11/02/2020 03:12 PM    Anion gap 7 11/02/2020 03:12 PM    Glucose 82 11/02/2020 03:12 PM    BUN 12 11/02/2020 03:12 PM    Creatinine 0.90 11/02/2020 03:12 PM    BUN/Creatinine ratio 13 11/02/2020 03:12 PM    GFR est AA >60 11/02/2020 03:12 PM    GFR est non-AA >60 11/02/2020 03:12 PM    Calcium 9.0 11/02/2020 03:12 PM    Bilirubin, total 0.2 11/02/2020 03:12 PM    Alk. phosphatase 82 11/02/2020 03:12 PM    Protein, total 7.0 11/02/2020 03:12 PM    Albumin 3.9 11/02/2020 03:12 PM    Globulin 3.1 11/02/2020 03:12 PM    A-G Ratio 1.3 11/02/2020 03:12 PM    ALT (SGPT) 23 11/02/2020 03:12 PM           ASSESSMENT/RECOMMENDATIONS:.      1.  Chest pain  -Her pretest probability of having heart disease is low but she is obese so I think does warrant going forward with exercise echo. Otherwise I do believe that if we do not clarify that she will be to the emergency room multiple times. Other considerations include GI symptoms such as reflux  Possibly esophageal spasm  Also check echocardiogram  2. Shortness of breath  -We will check echocardiogram to look at LV function although in the past has been normal  -Encourage her to take her blood pressure when she is short of breath  3.   Hypertension  -Today her blood pressure is under good control on HCTZ    Follow-up with me in 6 to 8 weeks weeks    Orders Placed This Encounter    AMB POC EKG ROUTINE W/ 12 LEADS, INTER & REP     Order Specific Question:   Reason for Exam:     Answer:   CP       We discussed the expected course, resolution and complications of the diagnosis(es) in detail. Medication risks, benefits, costs, interactions, and alternatives were discussed as indicated. I advised him to contact the office if his condition worsens, changes or fails to improve as anticipated. He expressed understanding with the diagnosis(es) and plan              I have discussed the diagnosis with  Lissy Burnette and the intended plan as seen in the above orders. Questions were answered concerning future plans. I have discussed medication side effects and warnings with the patient as well. Thank you,  Alida Rosas MD for involving me in the care of  Lissy Burnette. Please do not hesitate to contact me for further questions/concerns. Grant Dill MD, [facility] [address]  [address] [place] [address], [address] [place] [address] Drive      [phone] / [phone] Fax

## 2021-04-15 NOTE — PROGRESS NOTES
Room 2    Visit Vitals  /78 (BP 1 Location: Left upper arm, BP Patient Position: Sitting, BP Cuff Size: Large adult)   Pulse 68   Ht 5' 8\" (1.727 m)   Wt 238 lb (108 kg)   SpO2 98%   BMI 36.19 kg/m²       Chest pain episodes along with SOB and h/a    Seen cardiology before: Saw [facility], last 2018    Chest pain: no  Shortness of breath: no  Edema: no  Palpitations: no  Dizziness: no    ER/Urgent care/Hospitalizations for your symptoms: [facility], Feb 2021

## 2021-04-15 NOTE — PATIENT INSTRUCTIONS
1) echocardiogram 
 
2) exercise echocardiogram 
 
3) buy pulse ox [employer] or pharmacy 4) return in 6-8 weeks 5) FASTING CHOLESTEROL AT LAB [facility]

## 2021-04-23 ENCOUNTER — LAB ONLY (OUTPATIENT)
Dept: FAMILY MEDICINE CLINIC | Age: 36
End: 2021-04-23

## 2021-04-23 ENCOUNTER — TELEPHONE (OUTPATIENT)
Dept: FAMILY MEDICINE CLINIC | Age: 36
End: 2021-04-23

## 2021-04-23 DIAGNOSIS — R07.9 CHEST PAIN, UNSPECIFIED TYPE: ICD-10-CM

## 2021-04-23 NOTE — LETTER
NOTIFICATION RETURN TO WORK / SCHOOL 
 
4/26/2021 10:29 AM 
 
Ms. Jamey Lindsey [facility] [address] [id number] To Whom It May Concern: 
 
Jamey Lindsey is currently under the care of Marilia Quijano. She had an appointment this morning. She will return to work/school on: 4/26/21 If there are questions or concerns please have the patient contact our office. Sincerely, Robby Hebert MD

## 2021-04-23 NOTE — TELEPHONE ENCOUNTER
Pt came in at 8:50 for her labs. She states she needs a work note stating she had an appt this morning. She is at work now.  Please fax to [phone]

## 2021-04-24 LAB
ALBUMIN SERPL-MCNC: 3.7 G/DL (ref 3.5–5)
ALBUMIN/GLOB SERPL: 1.2 {RATIO} (ref 1.1–2.2)
ALP SERPL-CCNC: 87 U/L (ref 45–117)
ALT SERPL-CCNC: 22 U/L (ref 12–78)
AST SERPL-CCNC: 18 U/L (ref 15–37)
BILIRUB DIRECT SERPL-MCNC: 0.1 MG/DL (ref 0–0.2)
BILIRUB SERPL-MCNC: 0.4 MG/DL (ref 0.2–1)
CHOLEST SERPL-MCNC: 135 MG/DL
GLOBULIN SER CALC-MCNC: 3 G/DL (ref 2–4)
HDLC SERPL-MCNC: 32 MG/DL
HDLC SERPL: 4.2 {RATIO} (ref 0–5)
LDLC SERPL CALC-MCNC: 95.2 MG/DL (ref 0–100)
LIPID PROFILE,FLP: NORMAL
PROT SERPL-MCNC: 6.7 G/DL (ref 6.4–8.2)
TRIGL SERPL-MCNC: 39 MG/DL (ref ?–150)
VLDLC SERPL CALC-MCNC: 7.8 MG/DL

## 2021-04-27 ENCOUNTER — ANCILLARY PROCEDURE (OUTPATIENT)
Dept: CARDIOLOGY CLINIC | Age: 36
End: 2021-04-27
Payer: COMMERCIAL

## 2021-04-27 ENCOUNTER — DOCUMENTATION ONLY (OUTPATIENT)
Dept: CARDIOLOGY CLINIC | Age: 36
End: 2021-04-27

## 2021-04-27 ENCOUNTER — APPOINTMENT (OUTPATIENT)
Dept: CARDIOLOGY CLINIC | Age: 36
End: 2021-04-27

## 2021-04-27 VITALS — BODY MASS INDEX: 36.07 KG/M2 | HEIGHT: 68 IN | WEIGHT: 238 LBS

## 2021-04-27 DIAGNOSIS — R07.9 CHEST PAIN, UNSPECIFIED TYPE: ICD-10-CM

## 2021-04-27 LAB
ECHO AO ROOT DIAM: 3.12 CM
STRESS BASELINE DIAS BP: 80 MMHG
STRESS BASELINE HR: 73 BPM
STRESS BASELINE SYS BP: 128 MMHG
STRESS ESTIMATED WORKLOAD: 10.1 METS
STRESS EXERCISE DUR MIN: NORMAL
STRESS O2 SAT PEAK: 98 %
STRESS O2 SAT REST: 98 %
STRESS PEAK DIAS BP: 86 MMHG
STRESS PEAK SYS BP: 160 MMHG
STRESS PERCENT HR ACHIEVED: 93 %
STRESS POST PEAK HR: 173 BPM
STRESS RATE PRESSURE PRODUCT: NORMAL BPM*MMHG
STRESS ST DEPRESSION: 0 MM
STRESS ST ELEVATION: 0 MM
STRESS TARGET HR: 186 BPM

## 2021-04-27 PROCEDURE — 93351 STRESS TTE COMPLETE: CPT | Performed by: SPECIALIST

## 2021-05-11 ENCOUNTER — VIRTUAL VISIT (OUTPATIENT)
Dept: FAMILY MEDICINE CLINIC | Age: 36
End: 2021-05-11
Payer: COMMERCIAL

## 2021-05-11 DIAGNOSIS — J06.9 UPPER RESPIRATORY TRACT INFECTION, UNSPECIFIED TYPE: Primary | ICD-10-CM

## 2021-05-11 PROCEDURE — 99213 OFFICE O/P EST LOW 20 MIN: CPT | Performed by: STUDENT IN AN ORGANIZED HEALTH CARE EDUCATION/TRAINING PROGRAM

## 2021-05-11 NOTE — PROGRESS NOTES
Camron Fernández  31 y.o. female  1985  [address]  [id number]    [phone] (home) [phone] (work)     [address]:    Telephone Encounter  Celia Rodriguez [place]       Encounter Date: 5/11/2021    Consent:  She and/or the health care decision maker is aware that that she may receive a bill for this telephone service, depending on her insurance coverage, and has provided verbal consent to proceed: Yes    Chief Complaint   Patient presents with    Headache    Abdominal Pain    Nasal Congestion    Fatigue       History of Present Illness   Camron Fernández is a 31 y.o. female was evaluated by telephone. 30 y/o patient complaining of cough. No hx asthma, COPD, or smoking. Had negative rapid COVID test yesterday . Started with coughing and sneezing 6 days ago, worsened Friday where she had body aches. Now has abdominal cramping when she eats or drinks anything, followed by diarrhea. Diarrhea 2-3 x a day, loose non bloody non watery. No recent antibiotic use. Drinking ginger ale. Has congestion and fatigue as well. Taking briseida seltzer and Nyquil. Her son and fiance both have colds as well, they have not been tested for COVID. Review of Systems   See HPI    Vitals/Objective:     General: alert, cooperative, no distress   Mental  status: mental status: alert, oriented to person, place, and time, normal mood, behavior, motor activity, and thought processes   Resp: resp: normal effort and no respiratory distress           Due to this being a TeleHealth evaluation, many elements of the physical examination are unable to be assessed. Assessment and Plan:     1.  Upper respiratory tract infection, unspecified type  - Advised on the need to stay well hydrated and that symptoms can last up to 1.5 weeks- Can use tylenol/motrin as needed for generalized muscle pain and fever   -Can drink sugar free gatorade and ginger ale for nausea/diarrhea  - Can use Sudafed for nasal congestion or nasal saline rinses 2-3 times daily for a few days  - Can continue with Nyquil for cough, also try Tea with honey  - Wash hand frequently and cough/sneeze into your sleeve to help prevent   infection of others   - Educated on the lack of benefit of antibiotics in a viral illness but advised to call if symptoms worsening past 7 days      I affirm this is a Patient Initiated Episode with an Established Patient who has not had a related appointment within my department in the past 7 days or scheduled within the next 24 hours. Note: not billable if this call serves to triage the patient into an appointment for the relevant concern     We discussed the expected course, resolution and complications of the diagnosis(es) in detail. Medication risks, benefits, costs, interactions, and alternatives were discussed as indicated. I advised her to contact the office if her condition worsens, changes or fails to improve as anticipated. She expressed understanding with the diagnosis(es) and plan. Patient understands that this encounter was a temporary measure, and the importance of further follow up and examination was emphasized. Patient verbalized understanding. Electronically Signed: Celia Rodriguez DO    Providers location when delivering service: home    CPT:  38432 (5-10 minutes)  27421 (11-20 minutes)  21  (21-30 minutes)      ICD-10-CM ICD-9-CM    1. Upper respiratory tract infection, unspecified type  J06.9 465.9        Pursuant to the emergency declaration under the [facility], Count includes the [facility] waiver authority and the [employer] Act, this Virtual  Visit was conducted, with patient's consent, to reduce the patient's risk of exposure to COVID-19 and provide continuity of care for an established patient.      Services were provided through a video synchronous discussion virtually to substitute for in-person clinic visit. History   Patients past medical, surgical and family histories were personally reviewed and updated.       Past Medical History:   Diagnosis Date    Abnormal Pap smear 2010    FU with colpo, negative per patient    Cancer ([facility] [address]     nodular lymphocytic hodgkins lymphoma    Chest pain     Chlamydia 01/16/2019    vaginal    KAMERON I (cervical intraepithelial neoplasia I) 9/2/2011    Depression 5/12/2015    Encounter for IUD insertion 3/5/15    Mirena    Encounter for IUD removal 06/24/2016    Essential hypertension     Hypertension     Implanon removal 9/6/2013    Nexplanon insertion 02/09/2017    Pap smear for cervical cancer screening 11/11/15    Negative    Trouble in sleeping      Past Surgical History:   Procedure Laterality Date    HX COLPOSCOPY  2010    per pt WNL    HX WISDOM TEETH EXTRACTION       Family History   Problem Relation Age of Onset    HIV/AIDS Mother     Hypertension Maternal Aunt     Hypertension Maternal Grandmother      Social History     Socioeconomic History    Marital status:      Spouse name: Not on file    Number of children: Not on file    Years of education: Not on file    Highest education level: Not on file   Occupational History    Not on file   Social Needs    Financial resource strain: Not on file    Food insecurity     Worry: Not on file     Inability: Not on file    Transportation needs     Medical: Not on file     Non-medical: Not on file   Tobacco Use    Smoking status: Never Smoker    Smokeless tobacco: Never Used   Substance and Sexual Activity    Alcohol use: Yes     Comment: occassionally    Drug use: No    Sexual activity: Yes     Partners: Male     Birth control/protection: Condom, Implant   Lifestyle    Physical activity     Days per week: Not on file     Minutes per session: Not on file    Stress: Not on file   Relationships    Social connections     Talks on phone: Not on file     Gets together: Not on file     Attends L[employer]-day service: Not on file     Active member of club or organization: Not on file     Attends meetings of clubs or organizations: Not on file     Relationship status: Not on file    Intimate partner violence     Fear of current or ex partner: Not on file     Emotionally abused: Not on file     Physically abused: Not on file     Forced sexual activity: Not on file   Other Topics Concern    Not on file   Social History Narrative    Not on file            Current Medications/Allergies   Medications and Allergies reviewed:    Current Outpatient Medications   Medication Sig Dispense Refill    hydroCHLOROthiazide (HYDRODIURIL) 25 mg tablet Take 1 Tab by mouth daily. 90 Tab 1    cholecalciferol, vitamin D3, (VITAMIN D3 PO) Take  by mouth.  omeprazole (PRILOSEC) 20 mg capsule TAKE ONE CAPSULE BY MOUTH EVERY DAY 30 Cap 2    fluticasone propionate (FLONASE) 50 mcg/actuation nasal spray Use one spray in each nostril daily. Continue for at least 2 weeks.  1 Bottle 0     Allergies   Allergen Reactions    Clonidine (Pf) Vertigo

## 2021-05-11 NOTE — LETTER
NOTIFICATION RETURN TO WORK / SCHOOL 
 
5/11/2021 2:44 PM 
 
[facility]ink[facility]endo[facility] [address] [address] To Whom It May Concern: 
 
Rinku Mendoza is currently under the care of Marilia Quijano. She is excused from work 5/10-5/14. If there are questions or concerns please have the patient contact our office. Sincerely, Prisca Dorsey, DO

## 2021-05-11 NOTE — Clinical Note
Christa Home, can you email this work note to [phone] [email or web address] ? Thanks!

## 2021-06-11 ENCOUNTER — TELEPHONE (OUTPATIENT)
Dept: FAMILY MEDICINE CLINIC | Age: 36
End: 2021-06-11

## 2021-06-11 NOTE — TELEPHONE ENCOUNTER
what can pt take thinks she my have vaginal bacterial infection states she gets this a lot.  Pt would like to know what she can take OTC

## 2021-06-14 NOTE — TELEPHONE ENCOUNTER
Attempted to call. No answer. Message left.   Per rafa Jaeger to schedule for 6/15/21 for complaints of BV

## 2021-06-15 ENCOUNTER — ANCILLARY PROCEDURE (OUTPATIENT)
Dept: CARDIOLOGY CLINIC | Age: 36
End: 2021-06-15

## 2021-06-15 ENCOUNTER — OFFICE VISIT (OUTPATIENT)
Dept: OBGYN CLINIC | Age: 36
End: 2021-06-15
Payer: COMMERCIAL

## 2021-06-15 VITALS
SYSTOLIC BLOOD PRESSURE: 119 MMHG | HEIGHT: 68 IN | WEIGHT: 237 LBS | BODY MASS INDEX: 35.92 KG/M2 | DIASTOLIC BLOOD PRESSURE: 85 MMHG

## 2021-06-15 VITALS
DIASTOLIC BLOOD PRESSURE: 85 MMHG | HEIGHT: 68 IN | SYSTOLIC BLOOD PRESSURE: 119 MMHG | WEIGHT: 237.2 LBS | HEART RATE: 65 BPM | BODY MASS INDEX: 35.95 KG/M2

## 2021-06-15 DIAGNOSIS — N89.8 VAGINAL DISCHARGE: ICD-10-CM

## 2021-06-15 DIAGNOSIS — R07.9 CHEST PAIN, UNSPECIFIED TYPE: ICD-10-CM

## 2021-06-15 DIAGNOSIS — N94.10 DYSPAREUNIA IN FEMALE: ICD-10-CM

## 2021-06-15 DIAGNOSIS — N89.8 VAGINAL ODOR: Primary | ICD-10-CM

## 2021-06-15 DIAGNOSIS — N93.9 VAGINAL SPOTTING: ICD-10-CM

## 2021-06-15 PROCEDURE — 99213 OFFICE O/P EST LOW 20 MIN: CPT | Performed by: OBSTETRICS & GYNECOLOGY

## 2021-06-15 PROCEDURE — 93306 TTE W/DOPPLER COMPLETE: CPT | Performed by: SPECIALIST

## 2021-06-15 RX ORDER — METRONIDAZOLE 500 MG/1
500 TABLET ORAL 2 TIMES DAILY
Qty: 14 TABLET | Refills: 0 | Status: SHIPPED | OUTPATIENT
Start: 2021-06-15 | End: 2021-06-22

## 2021-06-15 NOTE — PROGRESS NOTES
[facility]  [email or web address]  [phone]    Jaylin Sanchez MD, [facility]       OB/GYN Problem visit    Chief Complaint:   Chief Complaint   Patient presents with   Lachelle Pheasant Vaginal Discharge     odor       Last or next [employer] is: 2020    History of Present Illness: This is a new problem being evaluated by this provider. The patient is a 31 y.o.  female who reports having a vaginal odor. It started off as fishy, now it smells like \"something \" for 1 weeks. She also has pains on both sides of her lover abdomen in her pelvic area for the past month. When she is having intercourse she feels intermittent pain. Hx ovarian cysts. Pt has nexplanon (inserted 2020) & has just started spotting the past few weeks. No new sexual partners. She reports the symptoms are is unchanged. Aggravating factors include intercourse. Alleviating factors include Azo, probiotic, yogurt. She does not have other concerns. LMP: No LMP recorded. Patient has had an implant.     PFSH:  Past Medical History:   Diagnosis Date    Abnormal Pap smear     FU with colpo, negative per patient    Cancer ([facility] [address]     nodular lymphocytic hodgkins lymphoma    Chest pain     Chlamydia 2019    vaginal    KAMERON I (cervical intraepithelial neoplasia I) 2011    Depression 2015    Encounter for IUD insertion 3/5/15    Mirena    Encounter for IUD removal 2016    Essential hypertension     Hypertension     Implanon removal 2013    Nexplanon insertion 2017    Pap smear for cervical cancer screening 11/11/15    Negative    Trouble in sleeping      Past Surgical History:   Procedure Laterality Date    HX COLPOSCOPY      per pt WNL    HX WISDOM TEETH EXTRACTION       Family History   Problem Relation Age of Onset    HIV/AIDS Mother     Hypertension Maternal Aunt     Hypertension Maternal Grandmother      Social History     Tobacco Use    Smoking status: Never Smoker    Smokeless tobacco: Never Used   Substance Use Topics    Alcohol use: Yes     Comment: occassionally    Drug use: No     Allergies   Allergen Reactions    Clonidine (Pf) Vertigo     Current Outpatient Medications   Medication Sig    metroNIDAZOLE (FlagyL) 500 mg tablet Take 1 Tablet by mouth two (2) times a day for 7 days.  hydroCHLOROthiazide (HYDRODIURIL) 25 mg tablet TAKE ONE TABLET BY MOUTH EVERY DAY    cholecalciferol, vitamin D3, (VITAMIN D3 PO) Take  by mouth.  omeprazole (PRILOSEC) 20 mg capsule TAKE ONE CAPSULE BY MOUTH EVERY DAY    fluticasone propionate (FLONASE) 50 mcg/actuation nasal spray Use one spray in each nostril daily. Continue for at least 2 weeks. No current facility-administered medications for this visit. Review of Systems:  History obtained from the patient  Constitutional: negative for fevers, chills and weight loss  ENT ROS: negative for - hearing change, oral lesions or visual changes  Respiratory: negative for cough, wheezing or dyspnea on exertion  Cardiovascular: negative for chest pain, irregular heart beats, exertional chest pressure/discomfort  Gastrointestinal: negative for dysphagia, nausea and vomiting  Genito-Urinary ROS:  see HPI  Inteument/breast: negative for rash, breast lump and nipple discharge  Musculoskeletal:negative for stiff joints, neck pain and muscle weakness  Endocrine ROS: negative for - breast changes, galactorrhea or temperature intolerance  Hematological and Lymphatic ROS: negative for - blood clots, bruising or swollen lymph nodes    Physical Exam:  Visit Vitals  /85   Pulse 65   Ht 5' 8\" (1.727 m)   Wt 237 lb 3.2 oz (107.6 kg)   BMI 36.07 kg/m²       GENERAL: alert, well appearing, and in no distress  HEAD: normocephalic, atraumatic.    PULM: clear to auscultation, no wheezes, rales or rhonchi, symmetric air entry   COR: normal rate and regular rhythm, S1 and S2 normal   ABDOMEN: soft, nontender, nondistended, no masses or organomegaly   EGBUS: no lesions, no inflammation, no masses  VULVA: normal appearing vulva with no masses, tenderness or lesions  VAGINA: normal appearing vagina with normal color, no lesions, white and thin discharge  CERVIX: normal appearing cervix without discharge or lesions, non tender  UTERUS: uterus is normal size, shape, consistency and nontender   ADNEXA: normal adnexa in size, nontender and no masses  NEURO: alert, oriented, normal speech    Assessment:  Encounter Diagnoses   Name Primary?  Vaginal odor Yes    Vaginal discharge     Vaginal spotting     Dyspareunia in female        Plan:  The patient is advised that she should contact the office if she does not note improvement or if symptoms recur  Recommend follow up with PCP for non-gynecologic complaints and chronic medical problems. She should contact our office with any questions or concerns  She could keep her routine annual exam appointment. We discussed potential causes of vaginal discharge/irritation. We discussed good vulvar hygiene. Recommended avoid vaginal irritants. Discussed use of mild soaps/detergents. Follow up if NI. Patient will be notified about swab results and prescription sent, if indicated. rec avoid etoh while take flagyl  Discussed avoiding painful intercourse. FU and US, can do with wwe if due    On this date, 6/15/2021,  I have spent 20 minutes reviewing previous notes, test results and face to face with the patient discussing the diagnosis and importance of compliance with the treatment plan as well as documenting on the day of the visit. Orders Placed This Encounter    NUSWAB VAGINITIS PLUS ([facility])    metroNIDAZOLE (FlagyL) 500 mg tablet       No results found for this visit on 06/15/21.

## 2021-06-16 LAB
ECHO AO ASC DIAM: 2.85 CM
ECHO AO ROOT DIAM: 2.89 CM
ECHO AV AREA PEAK VELOCITY: 2.02 CM2
ECHO AV AREA VTI: 2.11 CM2
ECHO AV AREA/BSA PEAK VELOCITY: 0.9 CM2/M2
ECHO AV AREA/BSA VTI: 1 CM2/M2
ECHO AV MEAN GRADIENT: 5.99 MMHG
ECHO AV PEAK GRADIENT: 10.26 MMHG
ECHO AV PEAK VELOCITY: 160.17 CM/S
ECHO AV VTI: 34.67 CM
ECHO EST RA PRESSURE: 3 MMHG
ECHO LA AREA 4C: 23.77 CM2
ECHO LA MAJOR AXIS: 4.17 CM
ECHO LA MINOR AXIS: 1.9 CM
ECHO LA VOL 2C: 74.02 ML (ref 22–52)
ECHO LA VOL 4C: 71.87 ML (ref 22–52)
ECHO LA VOL BP: 82.09 ML (ref 22–52)
ECHO LA VOL/BSA BIPLANE: 37.31 ML/M2 (ref 16–28)
ECHO LA VOLUME INDEX A2C: 33.65 ML/M2 (ref 16–28)
ECHO LA VOLUME INDEX A4C: 32.67 ML/M2 (ref 16–28)
ECHO LV E' LATERAL VELOCITY: 11.43 CM/S
ECHO LV E' SEPTAL VELOCITY: 8.81 CM/S
ECHO LV EDV A2C: 111.78 ML
ECHO LV EDV A4C: 171.37 ML
ECHO LV EDV BP: 141.65 ML (ref 56–104)
ECHO LV EDV INDEX A4C: 77.9 ML/M2
ECHO LV EDV INDEX BP: 64.4 ML/M2
ECHO LV EDV NDEX A2C: 50.8 ML/M2
ECHO LV EJECTION FRACTION A2C: 60 PERCENT
ECHO LV EJECTION FRACTION A4C: 63 PERCENT
ECHO LV EJECTION FRACTION BIPLANE: 61.9 PERCENT (ref 55–100)
ECHO LV ESV A2C: 44.77 ML
ECHO LV ESV A4C: 63.13 ML
ECHO LV ESV BP: 53.98 ML (ref 19–49)
ECHO LV ESV INDEX A2C: 20.4 ML/M2
ECHO LV ESV INDEX A4C: 28.7 ML/M2
ECHO LV ESV INDEX BP: 24.5 ML/M2
ECHO LV INTERNAL DIMENSION DIASTOLIC: 5.16 CM (ref 3.9–5.3)
ECHO LV INTERNAL DIMENSION SYSTOLIC: 3.49 CM
ECHO LV IVSD: 0.91 CM (ref 0.6–0.9)
ECHO LV MASS 2D: 163.2 G (ref 67–162)
ECHO LV MASS INDEX 2D: 74.2 G/M2 (ref 43–95)
ECHO LV POSTERIOR WALL DIASTOLIC: 0.86 CM (ref 0.6–0.9)
ECHO LVOT DIAM: 2.09 CM
ECHO LVOT PEAK GRADIENT: 3.59 MMHG
ECHO LVOT PEAK VELOCITY: 94.71 CM/S
ECHO LVOT SV: 73.3 ML
ECHO LVOT VTI: 21.39 CM
ECHO MV A VELOCITY: 63.63 CM/S
ECHO MV E DECELERATION TIME (DT): 226.01 MS
ECHO MV E VELOCITY: 80.83 CM/S
ECHO MV E/A RATIO: 1.27
ECHO MV E/E' LATERAL: 7.07
ECHO MV E/E' RATIO (AVERAGED): 8.12
ECHO MV E/E' SEPTAL: 9.17
ECHO MV PRESSURE HALF TIME (PHT): 65.54 MS
ECHO RA AREA 4C: 14.4 CM2
ECHO RIGHT VENTRICULAR SYSTOLIC PRESSURE (RVSP): 21.4 MMHG
ECHO RV INTERNAL DIMENSION: 3.28 CM
ECHO RV TAPSE: 2.59 CM (ref 1.5–2)
ECHO TV REGURGITANT MAX VELOCITY: 214.47 CM/S
ECHO TV REGURGITANT PEAK GRADIENT: 18.4 MMHG
LA VOL DISK BP: 75.53 ML (ref 22–52)

## 2021-06-18 LAB
A VAGINAE DNA VAG QL NAA+PROBE: ABNORMAL SCORE
BVAB2 DNA VAG QL NAA+PROBE: ABNORMAL SCORE
C ALBICANS DNA VAG QL NAA+PROBE: NEGATIVE
C GLABRATA DNA VAG QL NAA+PROBE: NEGATIVE
C TRACH DNA VAG QL NAA+PROBE: NEGATIVE
MEGA1 DNA VAG QL NAA+PROBE: ABNORMAL SCORE
N GONORRHOEA DNA VAG QL NAA+PROBE: NEGATIVE
T VAGINALIS DNA VAG QL NAA+PROBE: NEGATIVE

## 2021-07-03 NOTE — PROGRESS NOTES
Abnormal,Consistent with BV   Treated at visit  Notify pt if PodPoster message not read or not active.   Rx:   flagyl 500mg bid   x 7 days    May cause nausea, take with food  Avoid etoh during treatment and 1 day following  Notify MD if NI after 1 week    (If patient prefers vaginal tx't  0.75 %t metronidazole vaginal gel   5 grams qhs per vagina  x5 days)

## 2021-07-07 ENCOUNTER — OFFICE VISIT (OUTPATIENT)
Dept: OBGYN CLINIC | Age: 36
End: 2021-07-07

## 2021-07-07 VITALS
BODY MASS INDEX: 35.83 KG/M2 | DIASTOLIC BLOOD PRESSURE: 93 MMHG | WEIGHT: 236.4 LBS | HEIGHT: 68 IN | HEART RATE: 68 BPM | SYSTOLIC BLOOD PRESSURE: 131 MMHG

## 2021-07-07 DIAGNOSIS — N94.10 DYSPAREUNIA IN FEMALE: ICD-10-CM

## 2021-07-07 DIAGNOSIS — Z01.419 ENCOUNTER FOR WELL WOMAN EXAM WITH ROUTINE GYNECOLOGICAL EXAM: ICD-10-CM

## 2021-07-07 DIAGNOSIS — Z11.51 ENCOUNTER FOR SCREENING FOR HUMAN PAPILLOMAVIRUS (HPV): ICD-10-CM

## 2021-07-07 DIAGNOSIS — D21.9 MYOMA: ICD-10-CM

## 2021-07-07 DIAGNOSIS — N93.9 ABNORMAL UTERINE BLEEDING: ICD-10-CM

## 2021-07-07 DIAGNOSIS — Z12.4 ENCOUNTER FOR PAPANICOLAOU SMEAR FOR CERVICAL CANCER SCREENING: Primary | ICD-10-CM

## 2021-07-07 PROCEDURE — 99395 PREV VISIT EST AGE 18-39: CPT | Performed by: OBSTETRICS & GYNECOLOGY

## 2021-07-07 NOTE — PATIENT INSTRUCTIONS
Well Visit, Ages 25 to 48: Care Instructions  Overview     Well visits can help you stay healthy. Your doctor has checked your overall health and may have suggested ways to take good care of yourself. Your doctor also may have recommended tests. At home, you can help prevent illness with healthy eating, regular exercise, and other steps. Follow-up care is a key part of your treatment and safety. Be sure to make and go to all appointments, and call your doctor if you are having problems. It's also a good idea to know your test results and keep a list of the medicines you take. How can you care for yourself at home? · Get screening tests that you and your doctor decide on. Screening helps find diseases before any symptoms appear. · Eat healthy foods. Choose fruits, vegetables, whole grains, protein, and low-fat dairy foods. Limit fat, especially saturated fat. Reduce salt in your diet. · Limit alcohol. If you are a man, have no more than 2 drinks a day or 14 drinks a week. If you are a woman, have no more than 1 drink a day or 7 drinks a week. · Get at least 30 minutes of physical activity on most days of the week. Walking is a good choice. You also may want to do other activities, such as running, swimming, cycling, or playing tennis or team sports. Discuss any changes in your exercise program with your doctor. · Reach and stay at a healthy weight. This will lower your risk for many problems, such as obesity, diabetes, heart disease, and high blood pressure. · Do not smoke or allow others to smoke around you. If you need help quitting, talk to your doctor about stop-smoking programs and medicines. These can increase your chances of quitting for good. · Care for your mental health. It is easy to get weighed down by worry and stress. Learn strategies to manage stress, like deep breathing and mindfulness, and stay connected with your family and community.  If you find you often feel sad or hopeless, talk with your doctor. Treatment can help. · Talk to your doctor about whether you have any risk factors for sexually transmitted infections (STIs). You can help prevent STIs if you wait to have sex with a new partner (or partners) until you've each been tested for STIs. It also helps if you use condoms (male or female condoms) and if you limit your sex partners to one person who only has sex with you. Vaccines are available for some STIs, such as HPV. · Use birth control if it's important to you to prevent pregnancy. Talk with your doctor about the choices available and what might be best for you. · If you think you may have a problem with alcohol or drug use, talk to your doctor. This includes prescription medicines (such as amphetamines and opioids) and illegal drugs (such as cocaine and methamphetamine). Your doctor can help you figure out what type of treatment is best for you. · Protect your skin from too much sun. When you're outdoors from 10 a.m. to 4 p.m., stay in the shade or cover up with clothing and a hat with a wide brim. Wear sunglasses that block UV rays. Even when it's cloudy, put broad-spectrum sunscreen (SPF 30 or higher) on any exposed skin. · See a dentist one or two times a year for checkups and to have your teeth cleaned. · Wear a seat belt in the car. When should you call for help? Watch closely for changes in your health, and be sure to contact your doctor if you have any problems or symptoms that concern you. Where can you learn more? Go to http://www.[employer].com/  Enter P072 in the search box to learn more about \"Well Visit, Ages 25 to 48: Care Instructions. \"  Current as of: May 27, 2020               Content Version: 12.8  © 7431-2935 [employer]. Care instructions adapted under license by [employer] (which disclaims liability or warranty for this information).  If you have questions about a medical condition or this instruction, always ask your healthcare professional. [place] [address] any warranty or liability for your use of this information.

## 2021-07-07 NOTE — PROGRESS NOTES
[facility]  [email or web address]  [phone]    Hira Crain MD, FACOG       Annual Gynecologic Exam:  [facility] <40  Chief Complaint   Patient presents with    Follow-up    Ultrasound    Well Woman         Axel Valverde is a 31 y.o.  BLACK/ female who presents for an annual well woman exam.  No LMP recorded. Patient has had an implant. .    She reports the following additional concerns: This is a follow up visit from 06/15/2021. She is having a follow up for pelvic pain, painful intercourse, spotting, & BV. Since last visit she has not had any odor or spotting. When she is having intercourse she feels there is a specific spot that is very painful. C/o pelvic pains, not recently; described as when she stands up a pulling pain that makes her to hunch over. Vag sx improved after txt for BV. Menstrual status:  She does not report dysmenorrhea/painful menses. She does not report heavy menses. She does report irregular bleeding. Ultrasound:   TRANSVAGINAL ULTRASOUND PERFORMED  UTERUS IS ANTEVERTED, NORMAL IN SIZE AND HETEROGENOUS IN ECHOGENICITY. A POSTERIOR FIBROID IS SEEN AND MEASURED.  ENDOMETRIUM MEASURES 2-3MM IN THICKNESS. NO EVIDENCE OF MASSES OR ABNORMALITIES ARE SEEN. RIGHT OVARY APPEARS WITHIN NORMAL LIMITS. A FOLLICULAR CYST IS SEEN. LEFT OVARY APPEARS WITHIN NORMAL LIMITS. FREE FLUID SEEN IN THE CDS. Sexual history and Contraception:  Social History     Substance and Sexual Activity   Sexual Activity Yes    Partners: Male    Birth control/protection: Implant       She does not reports new sexual partner(s) in the last year. Preventive Medicine History:  Her most recent Pap smear result: normal was obtained in 2019  Her most recent HR HPV screen was Negative obtained in 2019    She does not have a history of KAMERON 2, 3 or cervical cancer.      Past Medical History:   Diagnosis Date    Abnormal Pap smear  FU with colpo, negative per patient    Cancer [facility])     nodular lymphocytic hodgkins lymphoma    Chest pain     Chlamydia 2019    vaginal    KAMERON I (cervical intraepithelial neoplasia I) 2011    Depression 2015    Encounter for IUD insertion 3/5/15    Mirena    Encounter for IUD removal 2016    Essential hypertension     Hypertension     Implanon removal 2013    Nexplanon insertion 2017    Pap smear for cervical cancer screening 11/11/15    Negative    Trouble in sleeping      OB History    Para Term  AB Living   5 3 3 0 2 3   SAB TAB Ectopic Molar Multiple Live Births   1 1 0   0 3      # Outcome Date GA Lbr Osorio/2nd Weight Sex Delivery Anes PTL Lv   5 Term 04/10/14 38w4d  7 lb 15.7 oz (3.62 kg) F VAGINAL DELI EPIDURAL AN N PALMER   4 Term 13 38w0d 08:00 7 lb (3.175 kg) M VAGINAL DELI EPI N PALMER   3 Term 07 40w0d 12:00 6 lb 7 oz (2.92 kg) F VAGINAL DELI EPI N PALMER   2 TAB            1 SAB              Past Surgical History:   Procedure Laterality Date    HX COLPOSCOPY      per pt WNL    HX WISDOM TEETH EXTRACTION       Family History   Problem Relation Age of Onset    HIV/AIDS Mother     Hypertension Maternal Aunt     Hypertension Maternal Grandmother      Social History     Socioeconomic History    Marital status:      Spouse name: Not on file    Number of children: Not on file    Years of education: Not on file    Highest education level: Not on file   Occupational History    Not on file   Tobacco Use    Smoking status: Never Smoker    Smokeless tobacco: Never Used   Vaping Use    Vaping Use: Never used   Substance and Sexual Activity    Alcohol use: Yes     Comment: occassionally    Drug use: No    Sexual activity: Yes     Partners: Male     Birth control/protection: Implant   Other Topics Concern    Not on file   Social History Narrative    Not on file     Social Determinants of Health     Financial Resource Strain:    Saira Quigley Difficulty of Paying Living Expenses:    Food Insecurity:     Worried About Running Out of Food in the Last Year:     [address] in the Last Year:    Transportation Needs:     Lack of Transportation (Medical):  Lack of Transportation (Non-Medical):    Physical Activity:     Days of Exercise per Week:     Minutes of Exercise per Session:    Stress:     Feeling of Stress :    Social Connections:     Frequency of Communication with Friends and Family:     Frequency of Social Gatherings with Friends and Family:     Attends J[employer] Services:     Active Member of Clubs or Organizations:     Attends Club or Organization Meetings:     Marital Status:    Intimate Partner Violence:     Fear of Current or Ex-Partner:     Emotionally Abused:     Physically Abused:     Sexually Abused: Allergies   Allergen Reactions    Clonidine (Pf) Vertigo       Current Outpatient Medications   Medication Sig    hydroCHLOROthiazide (HYDRODIURIL) 25 mg tablet TAKE ONE TABLET BY MOUTH EVERY DAY    cholecalciferol, vitamin D3, (VITAMIN D3 PO) Take  by mouth.  omeprazole (PRILOSEC) 20 mg capsule TAKE ONE CAPSULE BY MOUTH EVERY DAY    fluticasone propionate (FLONASE) 50 mcg/actuation nasal spray Use one spray in each nostril daily. Continue for at least 2 weeks. No current facility-administered medications for this visit. 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 9) E66.01    Essential hypertension I10    Myoma D21.9         Review of Systems - History obtained from the patient and patient filled out questionnaire   Constitutional/general, HEENT, CV, Resp, GI, MSK, Neuro, Psych, Heme/lymph, Skin, Breast ROS: no significant complaints except as noted on HPI    Physical Exam  Visit Vitals  BP (!) 131/93 (BP 1 Location: Left upper arm, BP Patient Position: Sitting, BP Cuff Size: Adult)   Pulse 68   Ht 5' 8\" (1.727 m)   Wt 236 lb 6.4 oz (107.2 kg)   BMI 35.94 kg/m²       Constitutional  · Appearance: well-nourished, well developed, alert, in no acute distress    HENT  · Head and Face: appears normal    Neck  · Inspection/Palpation: normal appearance, no masses or tenderness  · Lymph Nodes: no lymphadenopathy present  · Thyroid: gland size normal, nontender, no nodules or masses present on palpation    Chest  · Respiratory Effort: breathing unlabored  · Auscultation: normal breath sounds    Cardiovascular  · Heart:  · Auscultation: regular rate and rhythm without murmur    Breasts  · Inspection of Breasts: breasts symmetrical, no skin changes, no discharge present, nipple appearance normal, no skin retraction present  · Palpation of Breasts and Axillae: no masses present on palpation, no breast tenderness  · Axillary Lymph Nodes: no lymphadenopathy present    Gastrointestinal  · Abdominal Examination: abdomen non-tender to palpation, normal bowel sounds, no masses present  · Liver and spleen: no hepatomegaly present, spleen not palpable  · Hernias: no hernias identified    Genitourinary  · External Genitalia: normal appearance for age, no discharge present, no tenderness present, no inflammatory lesions present, no masses present  · Vagina: normal vaginal vault without central or paravaginal defects, minimal white discharge present, no inflammatory lesions present, no masses present  · Bladder: non-tender to palpation  · Urethra: appears normal  · Cervix: normal   · Uterus: normal size, shape and consistency  · Adnexa: no adnexal tenderness present, no adnexal masses present  · Perineum: perineum within normal limits, no evidence of trauma, no rashes or skin lesions present  · Anus: anus within normal limits, no hemorrhoids present  · Inguinal Lymph Nodes: no lymphadenopathy present    Skin  · General Inspection: no rash, no lesions identified    Neurologic/Psychiatric  · Mental Status:  · Orientation: grossly oriented to person, place and time  · Mood and Affect: mood normal, affect appropriate    Assessment:  31 y.o.  for well woman exam  Encounter Diagnoses   Name Primary?  Encounter for Papanicolaou smear for cervical cancer screening Yes    Encounter for well woman exam with routine gynecological exam     Encounter for screening for human papillomavirus (HPV)     Myoma     Dyspareunia in female     Abnormal uterine bleeding        Plan:  The patient was counseled about diet, exercise, healthy lifestyle  We discussed current pap smear and HR HPV testing guidelines. I recommended follow up one year for routine annual gynecologic exam or sooner prn  Handouts were given to the patient  I recommended follow up with a primary care physician for chronic medical problems and evaluation of non-gynecologic concerns and to please contact our office with any GYN questions or concerns. I recommended testing per CDC guidelines and at patient request.   Discussed avoiding painful intercourse, generous lubrication and vaginal moisturiser for at lest three months  FU three months if NI  Disc typical course of myomas, plan observation  We discussed potential causes of lower abdominal/pelvic pain: GYN, GI, , musculoskeletal, infectious process, adhesions, or other etiology  We discussed evaluation of lower abdominal/pelvic pain: including but not limited to observation, surgical evaluation/laparoscopy, imaging   We discussed treatment of lower abdominal/pelvic pain: including but not limited to: pain medication, hormonal management, surgical intervention, bowel regimen. Since pain can be a symptoms of an underlying abnormal process she is encouraged to contact my office with persistent symptoms for additional evaluation and treatment if needed. Notify MD if vaginal sx recur.       Folllow up:  [x] return for annual well woman exam in one year or sooner if she is having problems  [] follow up and ultrasound  [] 6 months  [] 3 months  [] 6 weeks   [] 1 month    Orders Placed This Encounter    PAP IG, APTIMA HPV AND RFX 11/58,07 ([id number])       Results for orders placed or performed in visit on 07/07/21   US TRANSVAGINAL    Narrative    See impression. Impression    Impression: The final result for this exam can be located in this patient's chart with  results from [facility]. Physician review of ultrasound performed by technician    Today's ultrasound report and images were reviewed and discussed with the patient.   Please see images and imaging report entered by technician in PACS for more detail and progress note and diagnosis entered by MD.    Randee Salazar MD

## 2021-07-10 LAB
CYTOLOGIST CVX/VAG CYTO: NORMAL
CYTOLOGY CVX/VAG DOC CYTO: NORMAL
CYTOLOGY CVX/VAG DOC THIN PREP: NORMAL
CYTOLOGY HISTORY:: NORMAL
DX ICD CODE: NORMAL
HPV I/H RISK 4 DNA CVX QL PROBE+SIG AMP: NEGATIVE
Lab: NORMAL
Lab: NORMAL
OTHER STN SPEC: NORMAL
STAT OF ADQ CVX/VAG CYTO-IMP: NORMAL

## 2021-07-11 NOTE — PROGRESS NOTES
Normal pap smear, message sent if [address] active. Update PMH/HM: include: Date of pap, Cytology: wnl. For HR HPV results: list NEG or POS, when done.

## 2021-11-12 LAB
ALBUMIN SERPL-MCNC: 4.3 G/DL (ref 3.8–4.8)
ALBUMIN/GLOB SERPL: 2 {RATIO} (ref 1.2–2.2)
ALP SERPL-CCNC: 84 IU/L (ref 44–121)
ALT SERPL-CCNC: 11 IU/L (ref 0–32)
AST SERPL-CCNC: 16 IU/L (ref 0–40)
BASOPHILS # BLD AUTO: 0.1 X10E3/UL (ref 0–0.2)
BASOPHILS NFR BLD AUTO: 1 %
BILIRUB SERPL-MCNC: <0.2 MG/DL (ref 0–1.2)
BUN SERPL-MCNC: 8 MG/DL (ref 6–20)
BUN/CREAT SERPL: 11 (ref 9–23)
CALCIUM SERPL-MCNC: 9.2 MG/DL (ref 8.7–10.2)
CHLORIDE SERPL-SCNC: 106 MMOL/L (ref 96–106)
CO2 SERPL-SCNC: 25 MMOL/L (ref 20–29)
CREAT SERPL-MCNC: 0.76 MG/DL (ref 0.57–1)
EOSINOPHIL # BLD AUTO: 0.1 X10E3/UL (ref 0–0.4)
EOSINOPHIL NFR BLD AUTO: 1 %
ERYTHROCYTE [DISTWIDTH] IN BLOOD BY AUTOMATED COUNT: 12.5 % (ref 11.7–15.4)
ERYTHROCYTE [SEDIMENTATION RATE] IN BLOOD BY WESTERGREN METHOD: 11 MM/HR (ref 0–32)
GLOBULIN SER CALC-MCNC: 2.1 G/DL (ref 1.5–4.5)
GLUCOSE SERPL-MCNC: 70 MG/DL (ref 65–99)
HCT VFR BLD AUTO: 37.9 % (ref 34–46.6)
HGB BLD-MCNC: 13.1 G/DL (ref 11.1–15.9)
IMM GRANULOCYTES # BLD AUTO: 0 X10E3/UL (ref 0–0.1)
IMM GRANULOCYTES NFR BLD AUTO: 0 %
LYMPHOCYTES # BLD AUTO: 1.8 X10E3/UL (ref 0.7–3.1)
LYMPHOCYTES NFR BLD AUTO: 32 %
MCH RBC QN AUTO: 31 PG (ref 26.6–33)
MCHC RBC AUTO-ENTMCNC: 34.6 G/DL (ref 31.5–35.7)
MCV RBC AUTO: 90 FL (ref 79–97)
MONOCYTES # BLD AUTO: 0.6 X10E3/UL (ref 0.1–0.9)
MONOCYTES NFR BLD AUTO: 10 %
NEUTROPHILS # BLD AUTO: 3.1 X10E3/UL (ref 1.4–7)
NEUTROPHILS NFR BLD AUTO: 56 %
PLATELET # BLD AUTO: 370 X10E3/UL (ref 150–450)
POTASSIUM SERPL-SCNC: 3.7 MMOL/L (ref 3.5–5.2)
PROT SERPL-MCNC: 6.4 G/DL (ref 6–8.5)
RBC # BLD AUTO: 4.23 X10E6/UL (ref 3.77–5.28)
SODIUM SERPL-SCNC: 143 MMOL/L (ref 134–144)
WBC # BLD AUTO: 5.6 X10E3/UL (ref 3.4–10.8)

## 2021-11-30 DIAGNOSIS — I10 ESSENTIAL HYPERTENSION: ICD-10-CM

## 2021-12-02 RX ORDER — HYDROCHLOROTHIAZIDE 25 MG/1
TABLET ORAL
Qty: 90 TABLET | Refills: 0 | Status: SHIPPED | OUTPATIENT
Start: 2021-12-02 | End: 2022-03-12

## 2022-01-07 ENCOUNTER — TELEPHONE (OUTPATIENT)
Dept: FAMILY MEDICINE CLINIC | Age: 37
End: 2022-01-07

## 2022-01-07 NOTE — PATIENT INSTRUCTIONS
Influenza (Flu): Care Instructions Your Care Instructions Influenza (flu) is an infection in the lungs and breathing passages. It is caused by the influenza virus. There are different strains, or types, of the flu virus from year to year. Unlike the common cold, the flu comes on suddenly and the symptoms, such as a cough, congestion, fever, chills, fatigue, aches, and pains, are more severe. These symptoms may last up to 10 days. Although the flu can make you feel very sick, it usually doesn't cause serious health problems. Home treatment is usually all you need for flu symptoms. But your doctor may prescribe antiviral medicine to prevent other health problems, such as pneumonia, from developing. Older people and those who have a long-term health condition, such as lung disease, are most at risk for having pneumonia or other health problems. Follow-up care is a key part of your treatment and safety. Be sure to make and go to all appointments, and call your doctor if you are having problems. It's also a good idea to know your test results and keep a list of the medicines you take. How can you care for yourself at home? · Get plenty of rest. 
· Drink plenty of fluids, enough so that your urine is light yellow or clear like water. If you have kidney, heart, or liver disease and have to limit fluids, talk with your doctor before you increase the amount of fluids you drink. · Take an over-the-counter pain medicine if needed, such as acetaminophen (Tylenol), ibuprofen (Advil, Motrin), or naproxen (Aleve), to relieve fever, headache, and muscle aches. Read and follow all instructions on the label. No one younger than 20 should take aspirin. It has been linked to Reye syndrome, a serious illness. · Do not smoke. Smoking can make the flu worse. If you need help quitting, talk to your doctor about stop-smoking programs and medicines. These can increase your chances of quitting for good. Mikael is a 35 year old who is being evaluated via a billable video visit.      Video Start Time: 4:28 PM    Assessment & Plan     Pharyngitis, unspecified etiology  - predniSONE (DELTASONE) 20 MG tablet; Take 2 tablets (40 mg) by mouth daily for 5 days  - azithromycin (ZITHROMAX) 250 MG tablet; Two tablets first day, then one tablet daily for four days.    Otalgia of both ears  - predniSONE (DELTASONE) 20 MG tablet; Take 2 tablets (40 mg) by mouth daily for 5 days  - azithromycin (ZITHROMAX) 250 MG tablet; Two tablets first day, then one tablet daily for four days.    Infection due to 2019 novel coronavirus  - predniSONE (DELTASONE) 20 MG tablet; Take 2 tablets (40 mg) by mouth daily for 5 days    I will treat for likely secondary infection and for inflammation and have them follow up as needed.     Katerine Gutiérrez PA-C  Virtual Urgent Care  [facility] VIRTUAL URGENT CARE    Subjective   Mikael is a 35 year old who presents for the following health issues: covid concern    HPI - Mikael tested positive for Covid on 1/5. He has today developed severe sore throat making it very difficult to talk and swallow. He has had a mild sore throat since Tuesday and it has only gotten worse. He developed ear pain both sides today. He has not had drainage from the ears.   He was seen in the ER on Wednesday due to very high fever and a strep test was mentioned but wasn't done because he had seizures and it was forgotten. They are worried he may also have strep due to severe throat symptoms.     Review of Systems   Constitutional, HEENT, cardiovascular, pulmonary, gi and gu systems are negative, except as otherwise noted.      Objective           Vitals:  No vitals were obtained today due to virtual visit.    Physical Exam   GENERAL: alert and fatigued  HENT: normal cephalic/atraumatic, nose and mouth without ulcers or lesions, oropharynx clear, oral mucous membranes moist and hoarse voice  RESP: No audible wheeze, cough, or  visible cyanosis.  No visible retractions or increased work of breathing.    SKIN: Visible skin clear. No significant rash, abnormal pigmentation or lesions.  NEURO: Cranial nerves grossly intact.  Mentation and speech appropriate for age.  PSYCH: Mentation appears normal, affect normal/bright, judgement and insight intact, normal speech and appearance well-groomed.           Video-Visit Details    Type of service:  Video Visit    Video End Time:4:39 PM    Originating Location (pt. Location): Home    Distant Location (provider location):  [facility] URGENT [facility]ARE     Platform used for Video Visit: [employer]     · Breathe moist air from a hot shower or from a sink filled with hot water to help clear a stuffy nose. · Before you use cough and cold medicines, check the label. These medicines may not be safe for young children or for people with certain health problems. · If the skin around your nose and lips becomes sore, put some petroleum jelly on the area. · To ease coughing: ? Drink fluids to soothe a scratchy throat. ? Suck on cough drops or plain hard candy. ? Take an over-the-counter cough medicine that contains dextromethorphan to help you get some sleep. Read and follow all instructions on the label. ? Raise your head at night with an extra pillow. This may help you rest if coughing keeps you awake. · Take any prescribed medicine exactly as directed. Call your doctor if you think you are having a problem with your medicine. To avoid spreading the flu · Wash your hands regularly, and keep your hands away from your face. · Stay home from school, work, and other public places until you are feeling better and your fever has been gone for at least 24 hours. The fever needs to have gone away on its own without the help of medicine. · Ask people living with you to talk to their doctors about preventing the flu. They may get antiviral medicine to keep from getting the flu from you. · To prevent the flu in the future, get a flu vaccine every fall. Encourage people living with you to get the vaccine. · Cover your mouth when you cough or sneeze. When should you call for help? Call 911 anytime you think you may need emergency care.  For example, call if: 
  · You have severe trouble breathing.  
 Call your doctor now or seek immediate medical care if: 
  · You have new or worse trouble breathing.  
  · You seem to be getting much sicker.  
  · You feel very sleepy or confused.  
  · You have a new or higher fever.  
  · You get a new rash.  
 Watch closely for changes in your health, and be sure to contact your doctor if: 
  · You begin to get better and then get worse.  
  · You are not getting better after 1 week. Where can you learn more? Go to http://srini-jeny.info/. Enter X759 in the search box to learn more about \"Influenza (Flu): Care Instructions. \" Current as of: June 9, 2019 Content Version: 12.2 © 9887-6855 [employer]. Care instructions adapted under license by [employer] (which disclaims liability or warranty for this information). If you have questions about a medical condition or this instruction, always ask your healthcare professional. [place] [address] any warranty or liability for your use of this information.

## 2022-02-08 NOTE — PROGRESS NOTES
[facility] [place] [facility] [place] [address] [facility]  Jhon Gibbs: [phone]  F: [phone]      Reason for Visit:   Jenelle Sahni is a 31 y.o. female who is seen for follow up of lymphoma. Treatment History:   · US neck 5/26/2016: 2.5cm right posterior auricular mass  · US guided biopsy cervical node 6/30/2016: suspicious for large b-cell lymphoma  · CT Neck/C/A/P 7/25/2016: Enlarged right level 5 lymph node measuring 2 x 1 cm and slightly inferior to this there is a slightly enlarged lymph node measuring 1.2 x 0.8 cm. There is a slightly enlarged right level 2 lymph node measuring 1.3 x 0.9 cm. There is a slightly enlarged left level 2 lymph node measuring 1.7 x 1.1 cm. Lymph nodes more inferior at level 3 and level 4 are  normal in size. No supraclavicular adenopathy is identified. No adenopathy in chest, abdomen, or pelvis  · PET-CT 7/27/2016: No areas of hypermetabolic activity  · Excisional biopsy of right level 2 cervical node by Dr. 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 Alessandro Saravia completed 10/10/2016    History of Present Illness:   She is doing fairly well. She did have a breakthrough menstrual cycle recently, previously was not having menses due to OCPs. No other bleeding. She did notice a possible bruise on her back recently. No fevers, chills, night sweats, unintentional weight loss, adenopathy. Energy [employer]. Working full time as . Review of systems was obtained and pertinent findings reviewed above. Past medical history, social history, family history, medications, and allergies are located in the electronic medical record. Physical Exam:     Visit Vitals  /84 (BP 1 Location: Right arm, BP Patient Position: Sitting, BP Cuff Size: Large adult) Comment: .    Pulse 74   Temp 97 °F (36.1 °C) (Temporal)   Resp 16   Ht 5' 8\" (1.727 m)   Wt 235 lb (106.6 kg)   SpO2 100%   BMI 35.73 kg/m²     ECOG PS: 0  General: no distress  Eyes: anicteric sclerae  HENT: oropharynx clear  Neck: supple  Lymphatic: no cervical, supraclavicular, or inguinal adenopathy  Respiratory: normal respiratory effort  CV: no peripheral edema  GI: soft, nontender, nondistended, no masses  Skin: no rashes; no ecchymoses; no petechiae        Results:     Lab Results   Component Value Date/Time    WBC 5.6 11/11/2021 12:00 AM    HGB 13.1 11/11/2021 12:00 AM    HCT 37.9 11/11/2021 12:00 AM    PLATELET 941 59/05/9308 12:00 AM    MCV 90 11/11/2021 12:00 AM    ABS. NEUTROPHILS 3.1 11/11/2021 12:00 AM    Hgb, External 12.3 02/07/2014 12:00 AM    Hct, External 35.6 02/07/2014 12:00 AM    Platelet cnt., External 272 02/07/2014 12:00 AM     Lab Results   Component Value Date/Time    Sodium 143 11/11/2021 12:00 AM    Potassium 3.7 11/11/2021 12:00 AM    Chloride 106 11/11/2021 12:00 AM    CO2 25 11/11/2021 12:00 AM    Glucose 70 11/11/2021 12:00 AM    BUN 8 11/11/2021 12:00 AM    Creatinine 0.76 11/11/2021 12:00 AM    GFR est  11/11/2021 12:00 AM    GFR est non- 11/11/2021 12:00 AM    Calcium 9.2 11/11/2021 12:00 AM    Glucose POC 76 11/03/2017 01:35 PM     Lab Results   Component Value Date/Time    Bilirubin, total <0.2 11/11/2021 12:00 AM    ALT (SGPT) 11 11/11/2021 12:00 AM    Alk.  phosphatase 84 11/11/2021 12:00 AM    Protein, total 6.4 11/11/2021 12:00 AM    Albumin 4.3 11/11/2021 12:00 AM    Globulin 3.0 04/23/2021 09:05 AM     Sed rate (ESR) (mm/hr)   Date Value   11/11/2021 11   07/23/2019 4   01/18/2019 12   09/10/2018 6   05/07/2018 5   12/12/2017 8   08/28/2017 3   02/13/2017 6     Sed rate, automated (mm/hr)   Date Value   11/02/2020 10   05/04/2020 11   01/22/2020 25 (H)   05/17/2017 12   11/11/2016 34 (H)   08/10/2016 44 (H)         CT N/C/A/P 12/04/2017: no evidence of recurrence  CT N/C/A/P 12/17/2018: no evidence of recurrence  CT Neck 5/7/2020: no evidence of recurrence, no adenopathy or mass in neck    Assessment:   1) Nodular lymphocyte predominant Hodgkin Lymphoma  Stage IIA  She is s/p radiation with Dr. Ruperto Powers completed 10/2016, with resolution of cervical adenopathy on post-treatment CT scan. She is being followed with surveillance. She remains without evidence of disease recurrence at this time. Labs completed in November reviewed and look good. We will continue with surveillance. Follow up after completion of therapy for Hodgkin Lymphoma, per NCCN:  · Year 1&2: H&P every 3-6 months  · Year 3: H&P every 6-12 months  · Year 4+: H&P every 12 months  · CBC, CMP, ESR as indicated  · TSH annually if RT to neck  · CT at 6, 12, and 24 months, or as clinically indicated  · Annual influenza vaccine      2) Pelvic pain, ovarian cyst  Has seen GYN about this in the past.  Pain persists, planning to follow back up with GYN soon.       Plan:     · Labs in 12 months: CBC, CMP, ESR (she likes to get these with her PCP office)   · Return to see me in 12 months      Signed By: Maria T Castro MD

## 2022-02-11 ENCOUNTER — OFFICE VISIT (OUTPATIENT)
Dept: ONCOLOGY | Age: 37
End: 2022-02-11
Payer: COMMERCIAL

## 2022-02-11 VITALS
TEMPERATURE: 97 F | HEART RATE: 74 BPM | SYSTOLIC BLOOD PRESSURE: 135 MMHG | OXYGEN SATURATION: 100 % | BODY MASS INDEX: 35.61 KG/M2 | DIASTOLIC BLOOD PRESSURE: 84 MMHG | WEIGHT: 235 LBS | RESPIRATION RATE: 16 BRPM | HEIGHT: 68 IN

## 2022-02-11 DIAGNOSIS — C81.01 NODULAR LYMPHOCYTE PREDOMINANT HODGKIN LYMPHOMA OF LYMPH NODES OF NECK (HCC): Primary | ICD-10-CM

## 2022-02-11 PROCEDURE — 99214 OFFICE O/P EST MOD 30 MIN: CPT | Performed by: INTERNAL MEDICINE

## 2022-02-11 NOTE — PROGRESS NOTES
Mike Zurita is a 31 y.o. female follow up for lymphoma. 1. Have you been to the ER, urgent care clinic since your last visit? Hospitalized since your last visit?no    2. Have you seen or consulted any other health care providers outside of the [address] [place] [address] since your last visit? Include any pap smears or colon screening.  no

## 2022-03-15 ENCOUNTER — TELEPHONE (OUTPATIENT)
Dept: FAMILY MEDICINE CLINIC | Age: 37
End: 2022-03-15

## 2022-03-19 PROBLEM — D21.9 MYOMA: Status: ACTIVE | Noted: 2021-07-07

## 2022-03-20 PROBLEM — I10 ESSENTIAL HYPERTENSION: Status: ACTIVE | Noted: 2021-04-15

## 2022-04-10 ENCOUNTER — PATIENT MESSAGE (OUTPATIENT)
Dept: OBGYN CLINIC | Age: 37
End: 2022-04-10

## 2022-05-06 ENCOUNTER — OFFICE VISIT (OUTPATIENT)
Dept: OBGYN CLINIC | Age: 37
End: 2022-05-06
Payer: COMMERCIAL

## 2022-05-06 VITALS
SYSTOLIC BLOOD PRESSURE: 140 MMHG | HEART RATE: 73 BPM | BODY MASS INDEX: 36.53 KG/M2 | WEIGHT: 241 LBS | DIASTOLIC BLOOD PRESSURE: 85 MMHG | HEIGHT: 68 IN

## 2022-05-06 DIAGNOSIS — N90.89 VULVAL LESION: ICD-10-CM

## 2022-05-06 DIAGNOSIS — N93.9 ABNORMAL UTERINE BLEEDING: ICD-10-CM

## 2022-05-06 DIAGNOSIS — Z20.2 EXPOSURE TO SEXUALLY TRANSMITTED DISEASE (STD): ICD-10-CM

## 2022-05-06 DIAGNOSIS — D21.9 MYOMA: ICD-10-CM

## 2022-05-06 DIAGNOSIS — Z11.3 VENEREAL DISEASE SCREENING: ICD-10-CM

## 2022-05-06 DIAGNOSIS — Z97.5 NEXPLANON IN PLACE: ICD-10-CM

## 2022-05-06 DIAGNOSIS — R03.0 ELEVATED BLOOD PRESSURE READING: Primary | ICD-10-CM

## 2022-05-06 LAB
HCG URINE, QL. (POC): NEGATIVE
VALID INTERNAL CONTROL?: YES

## 2022-05-06 PROCEDURE — 81025 URINE PREGNANCY TEST: CPT | Performed by: OBSTETRICS & GYNECOLOGY

## 2022-05-06 PROCEDURE — 99213 OFFICE O/P EST LOW 20 MIN: CPT | Performed by: OBSTETRICS & GYNECOLOGY

## 2022-05-06 RX ORDER — GLUCOSAMINE/CHONDR SU A SOD 750-600 MG
TABLET ORAL
COMMUNITY

## 2022-05-06 RX ORDER — DROSPIRENONE 4 MG/1
1 TABLET, FILM COATED ORAL DAILY
Qty: 90 EACH | Refills: 0 | Status: SHIPPED | OUTPATIENT
Start: 2022-05-06 | End: 2022-06-05

## 2022-05-06 RX ORDER — ASCORBIC ACID 250 MG
TABLET ORAL
COMMUNITY

## 2022-05-06 NOTE — PROGRESS NOTES
[facility]  [email or web address]  [phone]    Antoinette Gamez MD, FACOG       OB/GYN Problem visit    Chief Complaint:   Chief Complaint   Patient presents with    Irregular Menses    Lesion       Last or next WWE is: 21 & 22    History of Present Illness: This is a new problem being evaluated by this provider. 2020 nexplanon inserted. Pt can still feel Nexplanon in her arm  Started having regular cycles ~2021. Flow is heavy with bad cramping, then tapers to light bleeding. Cycles last 2 weeks at a time, then 2 weeks no bleeding, then 2 weeks bleeding again. LMP ~3wks ago. She is expecting her cycle the next week. Pt reports she feels her ovary's feel soar upon pressure. Denies new partners    Pt reports painful intercourse. Hx of a cyst. Reports pain during intercourse. Reports sx since 2021. Pt reports she gets a soar on her left vulva, noticed last week, no pain, still a lesion present per pt. Pt reports hx of this & pt reports no hx of HSV. Taking 800mg ibuprofen for pain/bleeding    The patient is a 30 y.o.  female   She reports the symptoms are has worsened. Aggravating factors include none. Alleviating factors include none. She does not have other concerns. LMP: No LMP recorded. Patient has had an implant.     [facility]:  Past Medical History:   Diagnosis Date    Abnormal Pap smear     FU with colpo, negative per patient    Cancer ([facility] [address]     nodular lymphocytic hodgkins lymphoma    Chest pain     Chlamydia 2019    vaginal    KAMERON I (cervical intraepithelial neoplasia I) 2011    Depression 2015    Encounter for IUD insertion 3/5/15    Mirena    Encounter for IUD removal 2016    Essential hypertension     Hypertension     Implanon removal 2013    Nexplanon insertion 2017    Pap smear for cervical cancer screening 11/11/15; 21    Negative; neg HPV neg    Trouble in sleeping Past Surgical History:   Procedure Laterality Date    HX COLPOSCOPY  2010    per pt WNL    HX WISDOM TEETH EXTRACTION       Family History   Problem Relation Age of Onset    HIV/AIDS Mother     Hypertension Maternal Aunt     Hypertension Maternal Grandmother      Social History     Tobacco Use    Smoking status: Never Smoker    Smokeless tobacco: Never Used   Vaping Use    Vaping Use: Never used   Substance Use Topics    Alcohol use: Yes     Comment: occassionally    Drug use: No     Allergies   Allergen Reactions    Clonidine (Pf) Vertigo     Current Outpatient Medications   Medication Sig    ascorbic acid, vitamin C, (Vitamin C) 250 mg tablet Take  by mouth.  Biotin 2,500 mcg cap Take  by mouth.  drospirenone, contraceptive, (Slynd) 4 mg (28) tab Take 1 Tablet by mouth daily for 30 days.  hydroCHLOROthiazide (HYDRODIURIL) 25 mg tablet TAKE ONE TABLET BY MOUTH EVERY DAY    omeprazole (PRILOSEC) 20 mg capsule TAKE ONE CAPSULE BY MOUTH EVERY DAY    cholecalciferol, vitamin D3, (VITAMIN D3 PO) Take  by mouth.  fluticasone propionate (FLONASE) 50 mcg/actuation nasal spray Use one spray in each nostril daily. Continue for at least 2 weeks. No current facility-administered medications for this visit.        Review of Systems:  History obtained from the patient  Constitutional: negative for fevers, chills and weight loss  ENT ROS: negative for - hearing change, oral lesions or visual changes  Respiratory: negative for cough, wheezing or dyspnea on exertion  Cardiovascular: negative for chest pain, irregular heart beats, exertional chest pressure/discomfort  Gastrointestinal: negative for dysphagia, nausea and vomiting  Genito-Urinary ROS:  see HPI  Inteument/breast: negative for rash, breast lump and nipple discharge  Musculoskeletal:negative for stiff joints, neck pain and muscle weakness  Endocrine ROS: negative for - breast changes, galactorrhea or temperature intolerance  Hematological and Lymphatic ROS: negative for - blood clots, bruising or swollen lymph nodes    Physical Exam:  Visit Vitals  BP (!) 140/85   Pulse 73   Ht 5' 8\" (1.727 m)   Wt 241 lb (109.3 kg)   BMI 36.64 kg/m²       GENERAL: alert, well appearing, and in no distress  HEAD: normocephalic, atraumatic. ABDOMEN: soft, nontender, nondistended, no masses or organomegaly   EGBUS: +lesion left side, see image      VULVA: normal appearing vulva with no masses, tenderness or lesions  VAGINA: normal appearing vagina with normal color, no lesions, white discharge  CERVIX: normal appearing cervix without discharge or lesions, non tender  UTERUS: uterus is normal size, shape, consistency and nontender   ADNEXA: normal adnexa in size, nontender and no masses  NEURO: alert, oriented, normal speech  SKIN: nexplanon in place LUE  Assessment:  Encounter Diagnoses   Name Primary?  Elevated blood pressure reading Yes    Abnormal uterine bleeding     Nexplanon in place     Venereal disease screening     Vulval lesion     Exposure to sexually transmitted disease (STD)     Myoma    ho lymphoma    Plan:  The patient is advised that she should contact the office if she does not note improvement or if symptoms recur  Recommend follow up with PCP for non-gynecologic complaints and chronic medical problems. She should contact our office with any questions or concerns  She could keep her routine annual exam appointment. We discussed potential causes of symptomatic bleeding: including but not limited to hormonal, medical, infection/inflammation and structural etiologies. Disc typical bleeding on nexplanon  We discussed progesterone only and non hormonal options for contraception including but not limited to condoms, IUDs, Nexplanon, and depo provera.   Will add slynd to nexplanon , check labs and repeat us: can do with wwe  rec BP log, PCP fu if remains elevated    On this date, 5/6/2022,  I have spent 20 minutes reviewing previous notes, test results and face to face with the patient discussing the diagnosis and importance of compliance with the treatment plan as well as documenting on the day of the visit.       Orders Placed This Encounter    NUSWAB VAGINITIS + HSV ([facility])    CBC W/O DIFF    TSH 3RD GENERATION    PROLACTIN    AMB POC URINE PREGNANCY TEST, VISUAL COLOR COMPARISON    drospirenone, contraceptive, (Slynd) 4 mg (28) tab       Results for orders placed or performed in visit on 05/06/22   AMB POC URINE PREGNANCY TEST, VISUAL COLOR COMPARISON   Result Value Ref Range    VALID INTERNAL CONTROL POC Yes     HCG urine, Ql. (POC) Negative Negative

## 2022-05-07 LAB
ERYTHROCYTE [DISTWIDTH] IN BLOOD BY AUTOMATED COUNT: 12.3 % (ref 11.7–15.4)
HCT VFR BLD AUTO: 40.8 % (ref 34–46.6)
HGB BLD-MCNC: 13.7 G/DL (ref 11.1–15.9)
MCH RBC QN AUTO: 30.4 PG (ref 26.6–33)
MCHC RBC AUTO-ENTMCNC: 33.6 G/DL (ref 31.5–35.7)
MCV RBC AUTO: 91 FL (ref 79–97)
PLATELET # BLD AUTO: 341 X10E3/UL (ref 150–450)
PROLACTIN SERPL-MCNC: 22.3 NG/ML (ref 4.8–23.3)
RBC # BLD AUTO: 4.51 X10E6/UL (ref 3.77–5.28)
TSH SERPL DL<=0.005 MIU/L-ACNC: 3.53 UIU/ML (ref 0.45–4.5)
WBC # BLD AUTO: 7.5 X10E3/UL (ref 3.4–10.8)

## 2022-05-14 LAB
A VAGINAE DNA VAG QL NAA+PROBE: ABNORMAL SCORE
BVAB2 DNA VAG QL NAA+PROBE: ABNORMAL SCORE
C ALBICANS DNA VAG QL NAA+PROBE: NEGATIVE
C GLABRATA DNA VAG QL NAA+PROBE: NEGATIVE
C TRACH DNA VAG QL NAA+PROBE: NEGATIVE
HSV1 DNA SPEC QL NAA+PROBE: NEGATIVE
HSV2 DNA SPEC QL NAA+PROBE: NEGATIVE
MEGA1 DNA VAG QL NAA+PROBE: ABNORMAL SCORE
N GONORRHOEA DNA VAG QL NAA+PROBE: NEGATIVE
T VAGINALIS DNA VAG QL NAA+PROBE: POSITIVE

## 2022-05-14 RX ORDER — METRONIDAZOLE 500 MG/1
500 TABLET ORAL 2 TIMES DAILY
Qty: 14 TABLET | Refills: 0 | Status: SHIPPED | OUTPATIENT
Start: 2022-05-14 | End: 2022-05-21

## 2022-05-14 NOTE — PROGRESS NOTES
Update FS with date. trich  This is a sexually transmitted disease. Rx sent: flagyl      Intimate/sexual partner(s) need to be treated by their MD/PCP/clinic. The patient should notify them, or she can contact the health department for assistance. Rec abstinence until patient and partner(s) are treated +1 week. I recommend consistent condom use to decrease STD in the future. I recommend returning to see me to reevaluate infection in about three months. Please schedule appointment. I also recommend testing for other STDs if it hasn't been done recently: such as HIV/hepatitis and syphilis. She can RTC for lab work, or we can test at follow up visit.

## 2022-06-06 ENCOUNTER — TELEPHONE (OUTPATIENT)
Dept: OBGYN CLINIC | Age: 37
End: 2022-06-06

## 2022-06-06 RX ORDER — METRONIDAZOLE 500 MG/1
500 TABLET ORAL 2 TIMES DAILY
Qty: 14 TABLET | Refills: 0 | Status: SHIPPED | OUTPATIENT
Start: 2022-06-06 | End: 2022-06-13

## 2022-06-06 NOTE — TELEPHONE ENCOUNTER
Patient advised of MD recommendations and prescription sent and was then transferred to scheduling to set up follow up appointments    patient verbalized understanding.

## 2022-06-06 NOTE — TELEPHONE ENCOUNTER
Notify patient. Rx: Flagyl 500mg BID x 7 days, #14. Avoid alcohol during therapy and one additional day. Consistent condom use can decrease recurrence. RTC if no improvement or if symptoms recur. This is a sexually transmitted disease. Intimate/sexual partner(s) need to be treated by their MD/PCP/clinic. The patient should notify them, or she can contact the health department for assistance. Rec abstinence until patient and partner(s) are treated +1 week. I recommend consistent condom use to decrease STD in the future. I recommend returning to see me to reevaluate infection in about three months. Please schedule appointment.

## 2022-06-06 NOTE — TELEPHONE ENCOUNTER
30year old patient treated for +trich on 5/6/2022 and took her treatment but her partner did not go to MD till this past Friday 6/3/022 and they did have unprotected intercourse prior to partner getting treatment. Patient does not report any symptoms        Patient is wondering how to proceed    ?  Ov   Pharmacy confirmed    If OV please advise when    Thank you

## 2022-07-06 ENCOUNTER — PATIENT MESSAGE (OUTPATIENT)
Dept: OBGYN CLINIC | Age: 37
End: 2022-07-06

## 2022-07-06 DIAGNOSIS — I10 ESSENTIAL HYPERTENSION: ICD-10-CM

## 2022-07-07 RX ORDER — HYDROCHLOROTHIAZIDE 25 MG/1
TABLET ORAL
Qty: 30 TABLET | Refills: 0 | OUTPATIENT
Start: 2022-07-07

## 2022-07-08 RX ORDER — HYDROCHLOROTHIAZIDE 25 MG/1
25 TABLET ORAL DAILY
Qty: 20 TABLET | Refills: 0 | Status: SHIPPED | OUTPATIENT
Start: 2022-07-08 | End: 2022-07-15 | Stop reason: SDUPTHER

## 2022-07-08 NOTE — TELEPHONE ENCOUNTER
PT called and made appt for 7-15. Pt states she is starting to feel the effects of not having her BP medication. Is there anyway Dr Katlyn Evangelista can call in a temporary Rx for Pt. Please advise.

## 2022-07-15 ENCOUNTER — OFFICE VISIT (OUTPATIENT)
Dept: FAMILY MEDICINE CLINIC | Age: 37
End: 2022-07-15
Payer: COMMERCIAL

## 2022-07-15 VITALS
TEMPERATURE: 97.8 F | WEIGHT: 250.8 LBS | BODY MASS INDEX: 38.01 KG/M2 | OXYGEN SATURATION: 98 % | SYSTOLIC BLOOD PRESSURE: 119 MMHG | HEART RATE: 71 BPM | DIASTOLIC BLOOD PRESSURE: 74 MMHG | RESPIRATION RATE: 18 BRPM | HEIGHT: 68 IN

## 2022-07-15 DIAGNOSIS — E66.01 SEVERE OBESITY WITH BODY MASS INDEX (BMI) OF 35.0 TO 39.9 WITH SERIOUS COMORBIDITY (HCC): ICD-10-CM

## 2022-07-15 DIAGNOSIS — I10 ESSENTIAL HYPERTENSION: Primary | ICD-10-CM

## 2022-07-15 DIAGNOSIS — C81.01 NODULAR LYMPHOCYTE PREDOMINANT HODGKIN LYMPHOMA OF LYMPH NODES OF NECK (HCC): ICD-10-CM

## 2022-07-15 DIAGNOSIS — K21.9 GASTROESOPHAGEAL REFLUX DISEASE WITHOUT ESOPHAGITIS: Chronic | ICD-10-CM

## 2022-07-15 PROCEDURE — 99214 OFFICE O/P EST MOD 30 MIN: CPT | Performed by: FAMILY MEDICINE

## 2022-07-15 RX ORDER — OMEPRAZOLE 20 MG/1
CAPSULE, DELAYED RELEASE ORAL
Qty: 90 CAPSULE | Refills: 1 | Status: SHIPPED | OUTPATIENT
Start: 2022-07-15

## 2022-07-15 RX ORDER — HYDROCHLOROTHIAZIDE 25 MG/1
25 TABLET ORAL DAILY
Qty: 90 TABLET | Refills: 1 | Status: SHIPPED | OUTPATIENT
Start: 2022-07-15 | End: 2022-11-01

## 2022-07-15 NOTE — PROGRESS NOTES
[facility]    History of Present Illness:   Felicity Deluca is a 30 y.o. female with history of HTN, GERD, Anxiety and depression, Obesity, Hodkins Lymphoma  CC: Follow up  History provided by patient and Records    HPI:  Hypertension Follow up:  Currently Taking:   Key CAD CHF Meds             hydroCHLOROthiazide (HYDRODIURIL) 25 mg tablet (Taking) Take 1 Tablet by mouth daily. The patient reports:  taking medications as instructed, no medication side effects noted, no TIA's, no chest pain on exertion, no dyspnea on exertion, no swelling of ankles, no orthostatic dizziness or lightheadedness, no orthopnea or paroxysmal nocturnal dyspnea. BP Readings from Last 3 Encounters:   07/15/22 119/74   05/06/22 (!) 140/85   02/11/22 135/84      Gastroesophageal Reflux:  Current control of Symptoms: Controlled  Primary symptoms: heartburn  Hiatal Hernia: No  Current Medications: Prilosec  The patient has no history melena or bright red blood in the stools. The patient avoids high dose aspirin and NSAID therapy. The patient is aware of diet changes needed, elevating the head of the bed and appropriate use of antacids. Fibroids: Patient with likely fibroids, getting US of Uterus upcoming    Health Maintenance  Health Maintenance Due   Topic Date Due    COVID-19 Vaccine (1) Never done    Pneumococcal 0-64 years (3 - PPSV23 or PCV20) 12/15/2021    Depression Monitoring  05/11/2022       Past Medical, Family, and Social History:     Current Outpatient Medications on File Prior to Visit   Medication Sig Dispense Refill    ascorbic acid, vitamin C, (Vitamin C) 250 mg tablet Take  by mouth.  Biotin 2,500 mcg cap Take  by mouth.  cholecalciferol, vitamin D3, (VITAMIN D3 PO) Take  by mouth.  fluticasone propionate (FLONASE) 50 mcg/actuation nasal spray Use one spray in each nostril daily. Continue for at least 2 weeks.  1 Bottle 0    [DISCONTINUED] hydroCHLOROthiazide (HYDRODIURIL) 25 mg tablet Take 1 Tablet by mouth daily. 20 Tablet 0    [DISCONTINUED] omeprazole (PRILOSEC) 20 mg capsule TAKE ONE CAPSULE BY MOUTH EVERY DAY 90 Capsule 1     No current facility-administered medications on file prior to visit. Patient Active Problem List   Diagnosis Code    Depression F32. A    Nodular lymphocyte predominant Hodgkin lymphoma of lymph nodes of neck ([facility]) C81.01    Severe obesity with body mass index (BMI) of 35.0 to 39.9 with serious comorbidity ([facility]) E66.01    Essential hypertension I10    Myoma D21.9    Gastroesophageal reflux disease without esophagitis K21.9       Social History     Socioeconomic History    Marital status:    Tobacco Use    Smoking status: Never Smoker    Smokeless tobacco: Never Used   Vaping Use    Vaping Use: Never used   Substance and Sexual Activity    Alcohol use: Yes     Comment: occassionally    Drug use: No    Sexual activity: Yes     Partners: Male     Birth control/protection: Implant        Review of Systems   Review of Systems   Constitutional: Negative for chills and fever. Cardiovascular: Negative for chest pain and palpitations. Gastrointestinal: Negative for heartburn, nausea and vomiting. Neurological: Negative for dizziness, tingling and headaches. Objective:     Visit Vitals  /74 (BP 1 Location: Right arm, BP Patient Position: Sitting, BP Cuff Size: Large adult)   Pulse 71   Temp 97.8 °F (36.6 °C) (Oral)   Resp 18   Ht 5' 8\" (1.727 m)   Wt 250 lb 12.8 oz (113.8 kg)   SpO2 98%   BMI 38.13 kg/m²        Physical Exam  Vitals and nursing note reviewed. Constitutional:       Appearance: Normal appearance. HENT:      Head: Normocephalic and atraumatic. Cardiovascular:      Rate and Rhythm: Normal rate and regular rhythm. Pulses: Normal pulses. Heart sounds: Normal heart sounds. Pulmonary:      Effort: Pulmonary effort is normal.      Breath sounds: Normal breath sounds.    Abdominal: General: Abdomen is flat. Bowel sounds are normal.      Palpations: Abdomen is soft. Musculoskeletal:      Cervical back: Normal range of motion and neck supple. Skin:     General: Skin is warm and dry. Neurological:      Mental Status: She is alert. Pertinent Labs/Studies:      Assessment and orders:       ICD-10-CM ICD-9-CM    1. Essential hypertension  I10 401.9 hydroCHLOROthiazide (HYDRODIURIL) 25 mg tablet   2. Gastroesophageal reflux disease without esophagitis  K21.9 530.81 omeprazole (PRILOSEC) 20 mg capsule   3. Nodular lymphocyte predominant Hodgkin lymphoma of lymph nodes of neck (HCC)  C81.01 202.41    4. Severe obesity with body mass index (BMI) of 35.0 to 39.9 with serious comorbidity ([facility])  E66.01 278.01      Diagnoses and all orders for this visit:    1. Essential hypertension: Our goal is to normalize the blood pressure to decrease the risks of strokes and heart attacks. The patient is in agreement with the plan. -     hydroCHLOROthiazide (HYDRODIURIL) 25 mg tablet; Take 1 Tablet by mouth daily. 2. Gastroesophageal reflux disease without esophagitis: Refill  -     omeprazole (PRILOSEC) 20 mg capsule; TAKE ONE CAPSULE BY MOUTH EVERY DAY    3. Nodular lymphocyte predominant Hodgkin lymphoma of lymph nodes of neck ([address]    4. Severe obesity with body mass index (BMI) of 35.0 to 39.9 with serious comorbidity ([address]      Follow-up and Dispositions    · Return in about 6 months (around 1/15/2023). I have discussed the diagnosis with the patient and the intended plan as seen in the above orders. Social history, medical history, and labs were reviewed. The patient has received an after-visit summary and questions were answered concerning future plans. I have discussed medication side effects and warnings with the patient as well.     MD MICHAEL Cervantes & DAVID MOLINA [facility]  07/15/22

## 2022-07-15 NOTE — LETTER
NOTIFICATION RETURN TO WORK / SCHOOL    7/15/2022 11:52 AM    Ms. [address] [address]      To Whom It May Concern:    Eufemia Sam is currently under the care of Marilia Quijano. Please excuse any absence on 07/15/22. If there are questions or concerns please have the patient contact our office.         Sincerely,      Missy Bah MD

## 2022-07-15 NOTE — PROGRESS NOTES
1. Have you been to the ER, urgent care clinic since your last visit? Hospitalized since your last visit? No    2. Have you seen or consulted any other health care providers outside of the [address] [place] [address] since your last visit? Including any pap smears or colon screening.  No      Health Maintenance Due   Topic Date Due    COVID-19 Vaccine (1) Never done    Pneumococcal 0-64 years (3 - PPSV23 or PCV20) 12/15/2021    Depression Monitoring  05/11/2022

## 2022-09-05 ENCOUNTER — PATIENT MESSAGE (OUTPATIENT)
Dept: OBGYN CLINIC | Age: 37
End: 2022-09-05

## 2022-09-06 ENCOUNTER — OFFICE VISIT (OUTPATIENT)
Dept: FAMILY MEDICINE CLINIC | Age: 37
End: 2022-09-06
Payer: COMMERCIAL

## 2022-09-06 VITALS
DIASTOLIC BLOOD PRESSURE: 87 MMHG | WEIGHT: 243 LBS | SYSTOLIC BLOOD PRESSURE: 118 MMHG | HEART RATE: 75 BPM | TEMPERATURE: 98 F | OXYGEN SATURATION: 100 % | HEIGHT: 68 IN | RESPIRATION RATE: 22 BRPM | BODY MASS INDEX: 36.83 KG/M2

## 2022-09-06 DIAGNOSIS — R05.9 COUGH: Primary | ICD-10-CM

## 2022-09-06 DIAGNOSIS — Z20.822 EXPOSURE TO 2019 NOVEL CORONAVIRUS: ICD-10-CM

## 2022-09-06 DIAGNOSIS — U07.1 COVID-19: ICD-10-CM

## 2022-09-06 PROCEDURE — 99212 OFFICE O/P EST SF 10 MIN: CPT | Performed by: FAMILY MEDICINE

## 2022-09-06 NOTE — PROGRESS NOTES
1. \"Have you been to the ER, urgent care clinic since your last visit? Hospitalized since your last visit? \" No    2. \"Have you seen or consulted any other health care providers outside of the [address] [place] [address] since your last visit? \" No     3. For patients aged 39-70: Has the patient had a colonoscopy / FIT/ Cologuard? No      If the patient is female:    4. For patients aged 41-77: Has the patient had a mammogram within the past 2 years? No        5. For patients aged 21-65: Has the patient had a pap smear?  Yes - no Care Gap present    Health Maintenance Due   Topic Date Due    COVID-19 Vaccine (1) Never done    Pneumococcal 0-64 years (3 - PPSV23 or PCV20) 12/15/2021    Flu Vaccine (1) Never done

## 2022-09-06 NOTE — PROGRESS NOTES
Chief Complaint   Patient presents with    Positive For Covid-19     Need COVID test for work. Tested positive at home. Headache     Chills, coughing, body aches, and sneezing. Mee Coley is a 30 y.o. female who presents for   Cough, muscle aches, chills, headache. Symptoms started on 9/2. She took a COVID test on 9/3 and was positive. Work is requiring a PCR. She has a history of Hodgkin's lymphoma but is in remission. Presently not on any chemotherapy. Past Medical History:   Diagnosis Date    Abnormal Pap smear 2010    FU with colpo, negative per patient    Cancer ([facility] [address]     nodular lymphocytic hodgkins lymphoma    Chest pain     Chlamydia 01/16/2019    vaginal    KAMERON I (cervical intraepithelial neoplasia I) 9/2/2011    Depression 5/12/2015    Encounter for IUD insertion 3/5/15    Mirena    Encounter for IUD removal 06/24/2016    Essential hypertension     Hypertension     Implanon removal 9/6/2013    Nexplanon insertion 02/09/2017    Pap smear for cervical cancer screening 11/11/15; 7/7/21    Negative; neg HPV neg    Trouble in sleeping        Patient Active Problem List   Diagnosis Code    Depression F32. 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 90 Tablet 1    omeprazole (PRILOSEC) 20 mg capsule TAKE ONE CAPSULE BY MOUTH EVERY DAY 90 Capsule 1    ascorbic acid, vitamin C, (VITAMIN C) 250 mg tablet Take  by mouth. Biotin 2,500 mcg cap Take  by mouth. cholecalciferol, vitamin D3, (VITAMIN D3 PO) Take  by mouth.       fluticasone propionate (FLONASE) 50 mcg/actuation nasal spray Use one spray in each nostril daily. Continue for at least 2 weeks. 1 Bottle 0       Allergies: Allergies   Allergen Reactions    Clonidine (Pf) Vertigo       Smoker:  Social History     Tobacco Use   Smoking Status Never   Smokeless Tobacco Never       ETOH:   Social History     Substance and Sexual Activity   Alcohol Use Yes    Comment: occassionally       FH:   Family History   Problem Relation Age of Onset    HIV/AIDS Mother     Hypertension Maternal Aunt     Hypertension Maternal Grandmother        ROS:  General/Constitutional:   No fever  Nose: Nasal congestion and rhinorrhea   Respiratory:  cough       Physical Exam:  Visit Vitals  /87 (BP 1 Location: Left upper arm, BP Patient Position: Sitting, BP Cuff Size: Large adult)   Pulse 75   Temp 98 °F (36.7 °C) (Oral)   Resp 22   Ht 5' 8\" (1.727 m)   Wt 243 lb (110.2 kg)   SpO2 100%   BMI 36.95 kg/m²     General: Alert and oriented, in no acute distress. Responds to all questions appropriately. SKIN: No rash. Normal color. EYES: Conjunctiva are clear  EARS: External normal, canals clear, tympanic membranes normal.  LUNGS: Respirations unlabored; clear to auscultation bilaterally, no wheeze, rales or rhonchi. CARDIOVASCULAR: Regular, rate, and rhythm without murmurs, gallops or rubs. Assessment:    ICD-10-CM ICD-9-CM    1. Cough  R05.9 786.2 NOVEL CORONAVIRUS (COVID-19)      2. Exposure to 2019 novel coronavirus  Z20.822 V01.79 NOVEL CORONAVIRUS (COVID-19)          Plan:  [employer] test for COVID-19 ordered. NP swab obtained. Patient instructed in supportive measures- extra fluids, OTC antipyretics, OTC cough and cold medications. Patient instructed to f/u for any worsening symptoms or any concerns of shortness of breath. Special instructions given to patient to self-quarantine until results of testing are known. Patient instructed that the results can take at least 72 hours. Advised patient to follow current CDC guidelines.      Follow Up:  Get re-examined if not improved in  5-7 days or if symptoms worsen.    Note written to return on September 13 per her employer  If you get suddenly worse, go to the nearest hospital Emergency Room    Michael Velasquez MD

## 2022-09-06 NOTE — PATIENT INSTRUCTIONS
DASH Diet: Care Instructions  Your Care Instructions     The DASH diet is an eating plan that can help lower your blood pressure. DASH stands for Dietary Approaches to Stop Hypertension. Hypertension is high blood pressure. The DASH diet focuses on eating foods that are high in calcium, potassium, and magnesium. These nutrients can lower blood pressure. The foods that are highest in these nutrients are fruits, vegetables, low-fat dairy products, nuts, seeds, and legumes. But taking calcium, potassium, and magnesium supplements instead of eating foods that are high in those nutrients does not have the same effect. The DASH diet also includes whole grains, fish, and poultry. The DASH diet is one of several lifestyle changes your doctor may recommend to lower your high blood pressure. Your doctor may also want you to decrease the amount of sodium in your diet. Lowering sodium while following the DASH diet can lower blood pressure even further than just the DASH diet alone. Follow-up care is a key part of your treatment and safety. Be sure to make and go to all appointments, and call your doctor if you are having problems. It's also a good idea to know your test results and keep a list of the medicines you take. How can you care for yourself at home? Following the DASH diet  Eat 4 to 5 servings of fruit each day. A serving is 1 medium-sized piece of fruit, ½ cup chopped or canned fruit, 1/4 cup dried fruit, or 4 ounces (½ cup) of fruit juice. Choose fruit more often than fruit juice. Eat 4 to 5 servings of vegetables each day. A serving is 1 cup of lettuce or raw leafy vegetables, ½ cup of chopped or cooked vegetables, or 4 ounces (½ cup) of vegetable juice. Choose vegetables more often than vegetable juice. Get 2 to 3 servings of low-fat and fat-free dairy each day. A serving is 8 ounces of milk, 1 cup of yogurt, or 1 ½ ounces of cheese. Eat 6 to 8 servings of grains each day.  A serving is 1 slice of bread, 1 ounce of dry cereal, or ½ cup of cooked rice, pasta, or cooked cereal. Try to choose whole-grain products as much as possible. Limit lean meat, poultry, and fish to 2 servings each day. A serving is 3 ounces, about the size of a deck of cards. Eat 4 to 5 servings of nuts, seeds, and legumes (cooked dried beans, lentils, and split peas) each week. A serving is 1/3 cup of nuts, 2 tablespoons of seeds, or ½ cup of cooked beans or peas. Limit fats and oils to 2 to 3 servings each day. A serving is 1 teaspoon of vegetable oil or 2 tablespoons of salad dressing. Limit sweets and added sugars to 5 servings or less a week. A serving is 1 tablespoon jelly or jam, ½ cup sorbet, or 1 cup of lemonade. Eat less than 2,300 milligrams (mg) of sodium a day. If you limit your sodium to 1,500 mg a day, you can lower your blood pressure even more. Be aware that all of these are the suggested number of servings for people who eat 1,800 to 2,000 calories a day. Your recommended number of servings may be different if you need more or fewer calories. Tips for success  Start small. Do not try to make dramatic changes to your diet all at once. You might feel that you are missing out on your favorite foods and then be more likely to not follow the plan. Make small changes, and stick with them. Once those changes become habit, add a few more changes. Try some of the following:  Make it a goal to eat a fruit or vegetable at every meal and at snacks. This will make it easy to get the recommended amount of fruits and vegetables each day. Try yogurt topped with fruit and nuts for a snack or healthy dessert. Add lettuce, tomato, cucumber, and onion to sandwiches. Combine a ready-made pizza crust with low-fat mozzarella cheese and lots of vegetable toppings. Try using tomatoes, squash, spinach, broccoli, carrots, cauliflower, and onions.   Have a variety of cut-up vegetables with a low-fat dip as an appetizer instead of chips and dip.  Sprinkle sunflower seeds or chopped almonds over salads. Or try adding chopped walnuts or almonds to cooked vegetables. Try some vegetarian meals using beans and peas. Add garbanzo or kidney beans to salads. Make burritos and tacos with mashed ocle beans or black beans. Where can you learn more? Go to http://www.[employer].com/  Enter H967 in the search box to learn more about \"DASH Diet: Care Instructions. \"  Current as of: January 10, 2022               Content Version: 13.2  © 8083-5868 [employer]. Care instructions adapted under license by [facility] (which disclaims liability or warranty for this information). If you have questions about a medical condition or this instruction, always ask your healthcare professional. [address] any warranty or liability for your use of this information.

## 2022-09-06 NOTE — LETTER
NOTIFICATION RETURN TO WORK / SCHOOL    9/6/2022 3:15 PM    [address] [address]      To Whom It May Concern:    Aylin Owusu is currently under the care of Marilia Quijano. Patient under provider's care from 9/6/2022 through 9/12/22. She will return to work/school on:9/13/2022. If there are questions or concerns please have the patient contact our office.         Sincerely,      Avi Orta MD

## 2022-09-08 LAB
SARS-COV-2, NAA 2 DAY TAT: NORMAL
SARS-COV-2, NAA: DETECTED

## 2022-11-30 NOTE — PROGRESS NOTES
Normal, reviewed  [employer] message sent if active. no deformity, pain or tenderness. no restriction of movement

## 2022-12-13 ENCOUNTER — PATIENT MESSAGE (OUTPATIENT)
Dept: OBGYN CLINIC | Age: 37
End: 2022-12-13

## 2022-12-15 ENCOUNTER — OFFICE VISIT (OUTPATIENT)
Dept: OBGYN CLINIC | Age: 37
End: 2022-12-15

## 2022-12-15 VITALS — WEIGHT: 244 LBS | DIASTOLIC BLOOD PRESSURE: 98 MMHG | SYSTOLIC BLOOD PRESSURE: 144 MMHG | BODY MASS INDEX: 37.1 KG/M2

## 2022-12-15 DIAGNOSIS — Z11.3 VENEREAL DISEASE SCREENING: ICD-10-CM

## 2022-12-15 DIAGNOSIS — R10.2 PELVIC PAIN IN FEMALE: ICD-10-CM

## 2022-12-15 DIAGNOSIS — Z20.2 EXPOSURE TO SEXUALLY TRANSMITTED DISEASE (STD): ICD-10-CM

## 2022-12-15 DIAGNOSIS — N89.8 VAGINAL DISCHARGE: ICD-10-CM

## 2022-12-15 DIAGNOSIS — Z01.411 ENCOUNTER FOR GYNECOLOGICAL EXAMINATION (GENERAL) (ROUTINE) WITH ABNORMAL FINDINGS: Primary | ICD-10-CM

## 2022-12-15 DIAGNOSIS — D21.9 MYOMA: ICD-10-CM

## 2022-12-15 NOTE — PROGRESS NOTES
[facility]  [email or web address]  [phone]    Yudi Fisher MD, Tyrone Oconnor     Annual Gynecologic Exam:  Chief Complaint   Patient presents with    Well Woman    Ultrasound       Robin Kapoor is a ,  30 y.o. female   No LMP recorded. Patient has had an implant. She presents for her annual checkup. She is having significant pelvic pain worse when she stands up, and on no particular side  She also wants fu std testing     Per Rooming Note:    No LMP recorded. Patient has had an implant. Her periods are nonexistent due to implant   She does not have dysmenorrhea. Problems:  still having pelvic pain  Birth Control: Implant. Last Pap: normal obtained 1 year(s) ago. She does not have a history of KAMERON 2, 3 or cervical cancer. With regard to the Gardisil vaccine, she has not received it yet. Sexual history and Contraception:  Social History     Substance and Sexual Activity   Sexual Activity Yes    Partners: Male    Birth control/protection: Implant       Past Medical History:   Diagnosis Date    Abnormal Pap smear     FU with colpo, negative per patient    Cancer ([facility] [address]     nodular lymphocytic hodgkins lymphoma    Chest pain     Chlamydia 2019    vaginal    KAMERON I (cervical intraepithelial neoplasia I) 2011    Depression 2015    Encounter for IUD insertion 3/5/15    Mirena    Encounter for IUD removal 2016    Essential hypertension     Hypertension     Implanon removal 2013    Nexplanon insertion 2017    Pap smear for cervical cancer screening 11/11/15; 21    Negative; neg HPV neg    Trouble in sleeping      Current Outpatient Medications   Medication Sig    hydroCHLOROthiazide (HYDRODIURIL) 25 mg tablet TAKE 1 TABLET BY MOUTH DAILY    omeprazole (PRILOSEC) 20 mg capsule TAKE ONE CAPSULE BY MOUTH EVERY DAY    ascorbic acid, vitamin C, (VITAMIN C) 250 mg tablet Take  by mouth.     cholecalciferol, vitamin D3, (VITAMIN D3 PO) Take by mouth. fluticasone propionate (FLONASE) 50 mcg/actuation nasal spray Use one spray in each nostril daily. Continue for at least 2 weeks. Biotin 2,500 mcg cap Take  by mouth. (Patient not taking: Reported on 12/15/2022)     No current facility-administered medications for this visit.      OB History    Para Term  AB Living   5 3 3 0 2 3   SAB IAB Ectopic Molar Multiple Live Births   1 1 0   0 3      # Outcome Date GA Lbr Osorio/2nd Weight Sex Delivery Anes PTL Lv   5 Term 04/10/14 38w4d  7 lb 15.7 oz (3.62 kg) F VAGINAL DELI EPIDURAL AN N PALMER   4 Term 13 38w0d 08:00 7 lb (3.175 kg) M VAGINAL DELI EPI N PALMER   3 Term 07 40w0d 12:00 6 lb 7 oz (2.92 kg) F VAGINAL DELI EPI N PALMER   2 IAB            1 SAB              Past Surgical History:   Procedure Laterality Date    HX COLPOSCOPY      per pt WNL    HX WISDOM TEETH EXTRACTION       Family History   Problem Relation Age of Onset    HIV/AIDS Mother     Hypertension Maternal Aunt     Hypertension Maternal Grandmother      Social History     Socioeconomic History    Marital status:      Spouse name: Not on file    Number of children: Not on file    Years of education: Not on file    Highest education level: Not on file   Occupational History    Not on file   Tobacco Use    Smoking status: Never    Smokeless tobacco: Never   Vaping Use    Vaping Use: Never used   Substance and Sexual Activity    Alcohol use: Yes     Comment: occassionally    Drug use: No    Sexual activity: Yes     Partners: Male     Birth control/protection: Implant   Other Topics Concern    Not on file   Social History Narrative    Not on file     Social Determinants of Health     Financial Resource Strain: Low Risk     Difficulty of Paying Living Expenses: Not hard at all   Food Insecurity: No Food Insecurity    Worried About Running Out of Food in the Last Year: Never true    Ran Out of Food in the Last Year: Never true   Transportation Needs: Not on file Physical Activity: Not on file   Stress: Not on file   Social Connections: Not on file   Intimate Partner Violence: Not on file   Housing Stability: Not on file     Tobacco History:  reports that she has never smoked. She has never used smokeless tobacco.  Alcohol Abuse:  reports current alcohol use. Drug Abuse:  reports no history of drug use. Allergies   Allergen Reactions    Clonidine (Pf) Vertigo       Patient Active Problem List   Diagnosis Code    Depression F32. 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) E66.01    Essential hypertension I10    Myoma D21.9    Gastroesophageal reflux disease without esophagitis K21.9       Review of Systems - History obtained from the patient and patient filled out questionnaire   Constitutional/general, HEENT, CV, Resp, GI, MSK, Neuro, Psych, Heme/lymph, Skin, Breast ROS: no significant complaints except as noted on HPI    Physical Exam  Visit Vitals  BP (!) 144/98   Wt 244 lb (110.7 kg)   BMI 37.10 kg/m²       Constitutional  Appearance: well-nourished, well developed, alert, in no acute distress    HENT  Head and Face: appears normal    Neck  Inspection/Palpation: normal appearance, no masses or tenderness  Lymph Nodes: no lymphadenopathy present  Thyroid: gland size normal, nontender, no nodules or masses present on palpation    Chest  Respiratory Effort: breathing unlabored  Auscultation: normal breath sounds    Cardiovascular  Heart:   Auscultation: regular rate and rhythm without murmur    Breasts  Inspection of Breasts: breasts symmetrical, no skin changes, no discharge present, nipple appearance normal, no skin retraction present  Palpation of Breasts and Axillae: no masses present on palpation, no breast tenderness  Axillary Lymph Nodes: no lymphadenopathy present    Gastrointestinal  Abdominal Examination: abdomen non-tender to palpation, normal bowel sounds, no masses present  Liver and spleen: no hepatomegaly present, spleen not palpable  Hernias: no hernias identified    Genitourinary  External Genitalia: normal appearance for age, no discharge present, no tenderness present, no inflammatory lesions present, no masses present  Vagina: normal vaginal vault without central or paravaginal defects, white discharge present, no inflammatory lesions present, no masses present  Bladder: non-tender to palpation  Urethra: appears normal  Cervix: normal   Uterus: normal size, shape and consistency  Adnexa: no adnexal tenderness present, no adnexal masses present  Perineum: perineum within normal limits, no evidence of trauma, no rashes or skin lesions present  Anus: anus within normal limits, no hemorrhoids present  Inguinal Lymph Nodes: no lymphadenopathy present    Skin  General Inspection: no rash, no lesions identified  Nexplanon in place LUE  Neurologic/Psychiatric  Mental Status:  Orientation: grossly oriented to person, place and time  Mood and Affect: mood normal, affect appropriate    Assessment:  30 y.o.  for well woman exam  Her current medical status is satisfactory with no evidence of significant gynecologic issues. Encounter Diagnoses   Name Primary? Venereal disease screening     Exposure to sexually transmitted disease (STD)     Encounter for gynecological examination (general) (routine) with abnormal findings Yes    Myoma     Pelvic pain in female     Vaginal discharge        Plan:  The patient was counseled about diet, exercise, healthy lifestyle  I recommend annual well woman exams  We discussed current pap smear and HR HPV testing guidelines.    I recommended follow up one year for routine annual gynecologic exam or sooner prn  Handouts were given to the patient  I recommended follow up with a primary care physician for chronic medical problems and evaluation of non-gynecologic concerns and to please contact our office with any GYN questions or concerns. I recommended testing per CDC guidelines and at patient request.   We discussed potential causes of lower abdominal/pelvic pain: GYN, GI, , musculoskeletal, infectious process, adhesions, or other etiology  We discussed evaluation of lower abdominal/pelvic pain: including but not limited to observation, surgical evaluation/laparoscopy, imaging   We discussed treatment of lower abdominal/pelvic pain: including but not limited to: pain medication, hormonal management, surgical intervention, bowel regimen. Since pain can be a symptoms of an underlying abnormal process she is encouraged to contact my office with persistent symptoms for additional evaluation and treatment if needed. Observation/NSAIDS/hormonal management/surgical management: myomectomy/hysterecotmy/UAE  We discussed that she should not grow during the postmenopausal period and that myomas could cause bleeding issues, pain/cramping issues, pressure symptoms or no/mild symptoms. Pt prefers : observation/nexplanon  Unsure if she wants more children (will decide by 37 yo)  We discussed elevated blood pressure reading. I recommend BP log/home BP cuff and PCP follow up for additional management/surveillance. FU 4-6 mos if desired to assess sx  Std testing  Discussed safer sex practices, condom use and risk factors for sexually transmitted diseases. Folllow up:  [x] return for annual well woman exam in one year or sooner if she is having problems  [] follow up and ultrasound  [] 6 months  [] 3 months  [] 6 weeks   [] 1 month    Orders Placed This Encounter    [address] ([facility])    HEP B SURFACE AG    T PALLIDUM SCREEN W/REFLEX    HIV SCREEN, [facility]. W/REFLEX CONFIRM         No results found for any visits on 12/15/22.

## 2022-12-15 NOTE — PROGRESS NOTES
Calista Cardoza is a 30 y.o. female returns for an annual exam     Chief Complaint   Patient presents with    Well Woman    Ultrasound       No LMP recorded. Patient has had an implant. Her periods are nonexistent due to implant   She does not have dysmenorrhea. Problems:  still having pelvic pain  Birth Control: Implant. Last Pap: normal obtained 1 year(s) ago. She does not have a history of KAMERON 2, 3 or cervical cancer. With regard to the Gardisil vaccine, she has not received it yet. 1. Have you been to the ER, urgent care clinic, or hospitalized since your last visit? No    2. Have you seen or consulted any other health care providers outside of the [address] [place] [address] since your last visit? No    Examination chaperoned by Manuel Billings LPN.

## 2022-12-18 LAB
A VAGINAE DNA VAG QL NAA+PROBE: NORMAL SCORE
BVAB2 DNA VAG QL NAA+PROBE: NORMAL SCORE
C ALBICANS DNA VAG QL NAA+PROBE: NEGATIVE
C GLABRATA DNA VAG QL NAA+PROBE: NEGATIVE
C TRACH DNA VAG QL NAA+PROBE: NEGATIVE
MEGA1 DNA VAG QL NAA+PROBE: NORMAL SCORE
N GONORRHOEA DNA VAG QL NAA+PROBE: NEGATIVE
T VAGINALIS DNA VAG QL NAA+PROBE: NEGATIVE

## 2022-12-18 NOTE — PROGRESS NOTES
The results are normal, no clear evidence of vaginal infection  [address] message sent if active. Recommend f/u if still having symptoms/problems or has additional concerns.    Std testing neg

## 2023-01-31 ENCOUNTER — TELEPHONE (OUTPATIENT)
Dept: ONCOLOGY | Age: 38
End: 2023-01-31

## 2023-01-31 NOTE — TELEPHONE ENCOUNTER
[address] [facility] [place]  [phone]    01/31/23- Phone call placed to pt to remind pt to have labs drawn prior to her follow up appointment with . Pt verbalized understanding.

## 2023-02-01 NOTE — PROGRESS NOTES
[facility] [place] [facility] [place] [address] [address] [facility] [address] [facility]  Dorene Del: [phone]  F: [phone]      Reason for Visit:   Becky Brar is a 30 y.o. female who is seen for follow up of lymphoma. Treatment History:   US neck 5/26/2016: 2.5cm right posterior auricular mass  US guided biopsy cervical node 6/30/2016: suspicious for large b-cell lymphoma  CT Neck/C/A/P 7/25/2016: Enlarged right level 5 lymph node measuring 2 x 1 cm and slightly inferior to this there is a slightly enlarged lymph node measuring 1.2 x 0.8 cm. There is a slightly enlarged right level 2 lymph node measuring 1.3 x 0.9 cm. There is a slightly enlarged left level 2 lymph node measuring 1.7 x 1.1 cm. Lymph nodes more inferior at level 3 and level 4 are  normal in size. No supraclavicular adenopathy is identified. No adenopathy in chest, abdomen, or pelvis  PET-CT 7/27/2016: No areas of hypermetabolic activity  Excisional biopsy of right level 2 cervical node by Dr. 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 Estela Stevens completed 10/10/2016    History of Present Illness:   She had a URI last month, some diarrhea associated as well, recovered now from this. Son now in the hospital with RSV. No other new issues. No fevers, chills, night sweats, unintentional weight loss, adenopathy. [employer]. Working full time as . Review of systems was obtained and pertinent findings reviewed above. Past medical history, social history, family history, medications, and allergies are located in the electronic medical record. Physical Exam:     Visit Vitals  BP (!) 140/94 (BP 1 Location: Left upper arm, BP Patient Position: Sitting, BP Cuff Size: Large adult) Comment: .    Pulse 69   Temp 97.7 °F (36.5 °C) (Esophageal)   Resp 16   Ht 5' 8\" (1.727 m)   Wt 243 lb (110.2 kg)   SpO2 100%   BMI 36.95 kg/m²       ECOG PS: 0  General: no distress  Eyes: anicteric sclerae  HENT: oropharynx clear  Neck: supple  Lymphatic: no cervical, supraclavicular, or inguinal adenopathy  Respiratory: normal respiratory effort  CV: no peripheral edema  GI: soft, nontender, nondistended, no masses  Skin: no rashes; no ecchymoses; no petechiae        Results:     Lab Results   Component Value Date/Time    WBC 8.4 01/31/2023 03:26 PM    HGB 14.0 01/31/2023 03:26 PM    HCT 39.1 01/31/2023 03:26 PM    PLATELET 063 15/67/7686 03:26 PM    MCV 86 01/31/2023 03:26 PM    ABS. NEUTROPHILS 5.4 01/31/2023 03:26 PM    Hgb, External 12.3 02/07/2014 12:00 AM    Hct, External 35.6 02/07/2014 12:00 AM    Platelet cnt., External 272 02/07/2014 12:00 AM     Lab Results   Component Value Date/Time    Sodium 140 01/31/2023 03:26 PM    Potassium 3.2 (L) 01/31/2023 03:26 PM    Chloride 100 01/31/2023 03:26 PM    CO2 24 01/31/2023 03:26 PM    Glucose 107 (H) 01/31/2023 03:26 PM    BUN 8 01/31/2023 03:26 PM    Creatinine 0.85 01/31/2023 03:26 PM    GFR est  11/11/2021 12:00 AM    GFR est non- 11/11/2021 12:00 AM    Calcium 9.6 01/31/2023 03:26 PM    Glucose POC 76 11/03/2017 01:35 PM     Lab Results   Component Value Date/Time    Bilirubin, total 0.2 01/31/2023 03:26 PM    ALT (SGPT) 19 01/31/2023 03:26 PM    Alk.  phosphatase 104 01/31/2023 03:26 PM    Protein, total 7.2 01/31/2023 03:26 PM    Albumin 4.6 01/31/2023 03:26 PM    Globulin 3.0 04/23/2021 09:05 AM     Sed rate (ESR) (mm/hr)   Date Value   01/31/2023 24   11/11/2021 11   07/23/2019 4   01/18/2019 12   09/10/2018 6   05/07/2018 5   12/12/2017 8   08/28/2017 3   02/13/2017 6     Sed rate, automated (mm/hr)   Date Value   11/02/2020 10   05/04/2020 11   01/22/2020 25 (H)   05/17/2017 12   11/11/2016 34 (H)   08/10/2016 44 (H)         CT N/C/A/P 12/04/2017: no evidence of recurrence  CT N/C/A/P 12/17/2018: no evidence of recurrence  CT Neck 5/7/2020: no evidence of recurrence, no adenopathy or mass in neck    Assessment:   1) Nodular lymphocyte predominant Hodgkin Lymphoma  Stage IIA  She is s/p radiation with Dr. Xiomy Garcia completed 10/2016, with resolution of cervical adenopathy on post-treatment CT scan. She is being followed with surveillance. She remains without evidence of disease recurrence at this time based on history, exam, and labwork. We will continue with surveillance. She is over 6 years out from treatment. Discussed option of having PCP follow, but she prefers to follow here for now. We discussed releasing her at the 12 year natalie. Follow up after completion of therapy for Hodgkin Lymphoma, per NCCN:  Year 1&2: H&P every 3-6 months  Year 3: H&P every 6-12 months  Year 4+: H&P every 12 months  CBC, CMP, ESR as indicated  TSH annually if RT to neck  CT at 6, 12, and 24 months, or as clinically indicated  Annual influenza vaccine      2) Pelvic pain, ovarian cyst  Has seen GYN about this in the past.  Pain persists. She reports GYN has diagnosed her with fibroids, offered hysterectomy, but she has decided to hold off on this. 3) Hypokalemia  Perhaps secondary to diarrhea with her recent viral illness. Discussed increasing potassium in diet.     Plan:     Labs in 12 months: CBC, CMP, ESR (she prefers Kennis July)   Return to see me in 12 months      Signed By: Gilmar Briones MD

## 2023-02-10 ENCOUNTER — OFFICE VISIT (OUTPATIENT)
Dept: ONCOLOGY | Age: 38
End: 2023-02-10
Payer: COMMERCIAL

## 2023-02-10 VITALS
HEIGHT: 68 IN | DIASTOLIC BLOOD PRESSURE: 94 MMHG | RESPIRATION RATE: 16 BRPM | BODY MASS INDEX: 36.83 KG/M2 | OXYGEN SATURATION: 100 % | WEIGHT: 243 LBS | HEART RATE: 69 BPM | SYSTOLIC BLOOD PRESSURE: 140 MMHG | TEMPERATURE: 97.7 F

## 2023-02-10 DIAGNOSIS — C81.01 NODULAR LYMPHOCYTE PREDOMINANT HODGKIN LYMPHOMA OF LYMPH NODES OF NECK (HCC): Primary | ICD-10-CM

## 2023-03-24 ENCOUNTER — OFFICE VISIT (OUTPATIENT)
Dept: FAMILY MEDICINE CLINIC | Age: 38
End: 2023-03-24
Payer: COMMERCIAL

## 2023-03-24 VITALS
TEMPERATURE: 98.2 F | BODY MASS INDEX: 36.53 KG/M2 | HEART RATE: 85 BPM | DIASTOLIC BLOOD PRESSURE: 86 MMHG | SYSTOLIC BLOOD PRESSURE: 129 MMHG | WEIGHT: 241 LBS | HEIGHT: 68 IN | RESPIRATION RATE: 16 BRPM | OXYGEN SATURATION: 98 %

## 2023-03-24 DIAGNOSIS — J06.9 VIRAL URI: Primary | ICD-10-CM

## 2023-03-24 LAB
S PYO AG THROAT QL: NEGATIVE
VALID INTERNAL CONTROL?: YES

## 2023-03-24 PROCEDURE — 3079F DIAST BP 80-89 MM HG: CPT | Performed by: STUDENT IN AN ORGANIZED HEALTH CARE EDUCATION/TRAINING PROGRAM

## 2023-03-24 PROCEDURE — 3074F SYST BP LT 130 MM HG: CPT | Performed by: STUDENT IN AN ORGANIZED HEALTH CARE EDUCATION/TRAINING PROGRAM

## 2023-03-24 PROCEDURE — 99213 OFFICE O/P EST LOW 20 MIN: CPT | Performed by: STUDENT IN AN ORGANIZED HEALTH CARE EDUCATION/TRAINING PROGRAM

## 2023-03-24 RX ORDER — GUAIFENESIN 600 MG/1
600 TABLET, EXTENDED RELEASE ORAL 2 TIMES DAILY
Qty: 14 TABLET | Refills: 0 | Status: SHIPPED | OUTPATIENT
Start: 2023-03-24 | End: 2023-03-31

## 2023-03-24 RX ORDER — ACETAMINOPHEN 500 MG
1000 TABLET ORAL 3 TIMES DAILY
Qty: 42 TABLET | Refills: 0 | Status: SHIPPED | OUTPATIENT
Start: 2023-03-24 | End: 2023-03-31

## 2023-03-24 NOTE — LETTER
NOTIFICATION RETURN TO WORK    3/24/2023 1:42 PM    [address] [address]      To Whom It May Concern:    Dominga Moscoso is currently under the care of Marilia Quijano. She was seen in clinic on 3/24/2023. Please excuse her from work on this date. If there are questions or concerns please have the patient contact our office.         Sincerely,      Deborha Reyna MD

## 2023-03-24 NOTE — PROGRESS NOTES
Markus Bo is an 41 y.o. female who presents for 3 days of minimal nonproductive cough, sore throat, chills and minimal congestion. No fevers. No CP or SOB. No other symptoms. Has some pain on right side of her neck too. No ear pain. No ear discharge. Ins and outs at baseline. Allergies - reviewed: Allergies   Allergen Reactions    Clonidine (Pf) Vertigo         Medications - reviewed:   Current Outpatient Medications   Medication Sig    guaiFENesin ER (MUCINEX) 600 mg ER tablet Take 1 Tablet by mouth two (2) times a day for 7 days. acetaminophen (TYLENOL) 500 mg tablet Take 2 Tablets by mouth three (3) times daily for 7 days. hydroCHLOROthiazide (HYDRODIURIL) 25 mg tablet TAKE 1 TABLET BY MOUTH DAILY    omeprazole (PRILOSEC) 20 mg capsule TAKE ONE CAPSULE BY MOUTH EVERY DAY    ascorbic acid, vitamin C, (VITAMIN C) 250 mg tablet Take  by mouth. Biotin 2,500 mcg cap Take  by mouth. cholecalciferol, vitamin D3, (VITAMIN D3 PO) Take  by mouth. fluticasone propionate (FLONASE) 50 mcg/actuation nasal spray Use one spray in each nostril daily. Continue for at least 2 weeks. No current facility-administered medications for this visit.          Past Medical History - reviewed:  Past Medical History:   Diagnosis Date    Abnormal Pap smear 2010    FU with colpo, negative per patient    Cancer ([facility] [address]     nodular lymphocytic hodgkins lymphoma    Chest pain     Chlamydia 01/16/2019    vaginal    KAMERON I (cervical intraepithelial neoplasia I) 9/2/2011    Depression 5/12/2015    Encounter for IUD insertion 3/5/15    Mirena    Encounter for IUD removal 06/24/2016    Essential hypertension     Hypertension     Implanon removal 9/6/2013    Nexplanon insertion 02/09/2017    Pap smear for cervical cancer screening 11/11/15; 7/7/21    Negative; neg HPV neg    Trouble in sleeping          Past Surgical History - reviewed:   Past Surgical History:   Procedure Laterality Date    HX COLPOSCOPY  2010 per pt WNL    HX WISDOM TEETH EXTRACTION           Social History - reviewed:  Social History     Socioeconomic History    Marital status:      Spouse name: Not on file    Number of children: Not on file    Years of education: Not on file    Highest education level: Not on file   Occupational History    Not on file   Tobacco Use    Smoking status: Never    Smokeless tobacco: Never   Vaping Use    Vaping Use: Never used   Substance and Sexual Activity    Alcohol use: Yes     Comment: occassionally    Drug use: No    Sexual activity: Yes     Partners: Male     Birth control/protection: Implant   Other Topics Concern    Not on file   Social History Narrative    Not on file     Social Determinants of Health     Financial Resource Strain: Low Risk     Difficulty of Paying Living Expenses: Not hard at all   Food Insecurity: No Food Insecurity    Worried About Running Out of Food in the Last Year: Never true    Ran Out of Food in the Last Year: Never true   Transportation Needs: Not on file   Physical Activity: Not on file   Stress: Not on file   Social Connections: Not on file   Intimate Partner Violence: Not on file   Housing Stability: Not on file         Family History - reviewed:  Family History   Problem Relation Age of Onset    HIV/AIDS Mother     Hypertension Maternal Aunt     Hypertension Maternal Grandmother          Immunizations - reviewed:   Immunization History   Administered Date(s) Administered    Pneumococcal Conjugate (PCV-13) 12/06/2018    Pneumococcal Polysaccharide (PPSV-23) 12/15/2016    Tdap 04/12/2014           ROS  In HPI      Physical Exam  Visit Vitals  /86   Pulse 85   Temp 98.2 °F (36.8 °C)   Resp 16   Ht 5' 8\" (1.727 m)   Wt 241 lb (109.3 kg)   SpO2 98%   BMI 36.64 kg/m²       General appearance - alert, well appearing, and in no distress  Eyes - pupils equal, extraocular eye movements intact  Ears - bilateral TM's and external ear canals normal  Nose - normal and patent, no erythema, discharge or polyps  Mouth - mucous membranes moist, pharynx normal without lesions  Neck - supple, no significant adenopathy  Chest - clear to auscultation, no wheezes, rales or rhonchi, symmetric air entry  Heart - normal rate, regular rhythm, normal S1, S2, no murmurs, rubs, clicks or gallops  Neurological - alert, oriented, normal speech, no focal findings or movement disorder noted  Extremities - peripheral pulses normal, no pedal edema, no clubbing or cyanosis  Skin - normal coloration and turgor, no rashes, no suspicious skin lesions noted      Assessment/Plan    ICD-10-CM ICD-9-CM    1. Viral URI  J06.9 465.9 guaiFENesin ER (MUCINEX) 600 mg ER tablet      acetaminophen (TYLENOL) 500 mg tablet      AMB POC RAPID STREP A        Viral URI: No red flags. Exam and vitals wnl. Rapid flu neg. - Mucinex  - Tylenol  - RTC 7 days PRN    Follow-up and Dispositions    Return in about 1 week (around 3/31/2023), or if symptoms worsen or fail to improve. I have discussed the diagnosis with the patient and the intended plan as seen in the above orders. Patient verbalized understanding of the plan and agrees with the plan. The patient has received an after-visit summary and questions were answered concerning future plans. I have discussed medication side effects and warnings with the patient as well. Informed patient to return to the office if new symptoms arise.         Deborah Reyna MD  Family Medicine Resident

## 2023-03-24 NOTE — PROGRESS NOTES
1. \"Have you been to the ER, urgent care clinic since your last visit? Hospitalized since your last visit? \" No    2. \"Have you seen or consulted any other health care providers outside of the [address] [place] [address] since your last visit? \" No     3. For patients aged 39-70: Has the patient had a colonoscopy / FIT/ Cologuard? NA - based on age      If the patient is female:    4. For patients aged 41-77: Has the patient had a mammogram within the past 2 years? NA - based on age or sex      11. For patients aged 21-65: Has the patient had a pap smear?  Yes - no Care Gap present    Health Maintenance Due   Topic Date Due    COVID-19 Vaccine (1) Never done    Varicella Vaccine (1 of 2 - 2-dose childhood series) Never done    Shingles Vaccine (1 of 2) Never done    Pneumococcal 0-64 years (3 - PPSV23 if available, else PCV20) 12/15/2021    Flu Vaccine (1) Never done

## 2023-03-28 ENCOUNTER — TELEPHONE (OUTPATIENT)
Dept: ONCOLOGY | Age: 38
End: 2023-03-28

## 2023-03-28 NOTE — TELEPHONE ENCOUNTER
[address] at [facility]  [phone]    03/28/23- Discussed with Maurice Pereira, MCHC (mean corpuscle hemoglobin concentration) is the average hemoglobin concentration in each red blood cell. Normal is 35.7 and her level is 35.8. Reassured patient that there is no clinical concern with this lab value. She verbalized understanding, no further questions or concerns.

## 2023-03-28 NOTE — TELEPHONE ENCOUNTER
Pt LVM stating the recent labs she had noted her MCHC Elevated and she wanted clarification on this    CB# [phone]

## 2023-04-22 DIAGNOSIS — C81.01 NODULAR LYMPHOCYTE PREDOMINANT HODGKIN LYMPHOMA OF LYMPH NODES OF NECK (HCC): Primary | ICD-10-CM

## 2023-04-23 DIAGNOSIS — C81.01 NODULAR LYMPHOCYTE PREDOMINANT HODGKIN LYMPHOMA OF LYMPH NODES OF NECK (HCC): Primary | ICD-10-CM

## 2023-06-27 ENCOUNTER — TELEPHONE (OUTPATIENT)
Facility: CLINIC | Age: 38
End: 2023-06-27

## 2023-07-07 ENCOUNTER — TELEPHONE (OUTPATIENT)
Age: 38
End: 2023-07-07

## 2023-07-07 NOTE — TELEPHONE ENCOUNTER
Rec UPT if any chance of pregnancy. Can try taking ibuprofen 800mg q8hrs x48hrs. Can make office appt with any avail provider next week.

## 2023-07-10 ENCOUNTER — OFFICE VISIT (OUTPATIENT)
Age: 38
End: 2023-07-10
Payer: COMMERCIAL

## 2023-07-10 VITALS
BODY MASS INDEX: 36.95 KG/M2 | HEIGHT: 68 IN | SYSTOLIC BLOOD PRESSURE: 140 MMHG | DIASTOLIC BLOOD PRESSURE: 84 MMHG | WEIGHT: 243.8 LBS

## 2023-07-10 DIAGNOSIS — E66.01 SEVERE OBESITY (BMI 35.0-39.9) WITH COMORBIDITY (HCC): ICD-10-CM

## 2023-07-10 DIAGNOSIS — Z30.017 NEXPLANON INSERTION: ICD-10-CM

## 2023-07-10 DIAGNOSIS — D25.1 INTRAMURAL LEIOMYOMA OF UTERUS: ICD-10-CM

## 2023-07-10 DIAGNOSIS — N93.9 ABNORMAL UTERINE BLEEDING (AUB): Primary | ICD-10-CM

## 2023-07-10 PROBLEM — D25.9 UTERINE FIBROID: Status: ACTIVE | Noted: 2021-07-01

## 2023-07-10 PROCEDURE — 99213 OFFICE O/P EST LOW 20 MIN: CPT | Performed by: OBSTETRICS & GYNECOLOGY

## 2023-07-10 PROCEDURE — 3077F SYST BP >= 140 MM HG: CPT | Performed by: OBSTETRICS & GYNECOLOGY

## 2023-07-10 PROCEDURE — 3079F DIAST BP 80-89 MM HG: CPT | Performed by: OBSTETRICS & GYNECOLOGY

## 2023-07-10 PROCEDURE — 11983 REMOVE/INSERT DRUG IMPLANT: CPT | Performed by: OBSTETRICS & GYNECOLOGY

## 2023-07-10 RX ORDER — BETAMETHASONE DIPROPIONATE 0.05 %
OINTMENT (GRAM) TOPICAL
COMMUNITY
Start: 2023-07-07

## 2023-07-10 NOTE — PROGRESS NOTES
Jamison Smith is a 41 y.o. female presents for a problem visit. Chief Complaint   Patient presents with    Vaginal Bleeding    Procedure     Nexplanon remove and insertion           Device number [id number], expiration date 5/20/2025    Chart reviewed for the following:   GRACIA ROQUE MARGARET PATTIE, CMA, have reviewed the History, Physical and updated the Allergic reactions for [address] performed immediately prior to start of procedure:   GRACIA ROQUE MARGARET PATTIE, CMA, have performed the following reviews on Jamison Smith prior to the start of the procedure:            * Patient was identified by name and date of birth   * Agreement on procedure being performed was verified  * Risks and Benefits explained to the patient  * Procedure site verified and marked as necessary  * Patient was positioned for comfort  * Consent was signed and verified     Time: 11:40am    Date of procedure: 7/10/2023    Procedure performed by:  Teresa Kraus MD       Provider assisted by: Ba Harmon MA    Patient assisted by: self    How tolerated by patient: tolerated the procedure well with no complications    Post Procedural Pain Scale: 2 - Hurts Little Bit    Comments: none    Chief Complaint   Patient presents with    Vaginal Bleeding    Procedure     Nexplanon remove and insertion     No LMP recorded. Patient has had an implant. Birth Control: Nexplanon. Last Pap: normal obtained 2 year(s) ago. The patient is reporting having: Abnormal Bleeding for 3 weeks. 1. Have you been to the ER, urgent care clinic, or hospitalized since your last visit? No    2. Have you seen or consulted any other health care providers outside of the [address] [place] [address] since your last visit? No    Examination chaperoned by Raúl Black CMA.
Procedure note: Implanon/Nexplanon Replacement    Tressa Harper is a [id number],  41 y.o. female whose No LMP recorded. Patient has had an implant. .  She presents for office removal of a NEXPLANON/IMPLANON sub-dermal contraceptive implant and replacement with a new device. Procedure:  She was positioned so the site of her implant was visable and easily accessible. The implant was located by palpation. The end of the implant nearest the elbow was marked with a sterile marker. The operative site was cleansed with Betadine. Using a 27 gauge needle on a 3cc syringe and 1% lidocaine, 1 cc were infiltrated as a intradermal wheal and underneath the end of the implant closest to the elbow. Downward pressure was applied on the end of the implant nearest the axilla and a 2-3mm incision was made in the longitudinal direction of the arm at the tip of the implant closest to the elbow. The implant was then pushed gently toward the incision until the tip was visible. The fibrous capsule was opened with a combination of blunt and sharp dissection. The implant was grasped with mosquito forceps and removed intact. It measured a full 4 cm in length. The new NEXPLANON sterile applicator was carefully removed from its blister pack and kept sterile. I removed the needle cap. I visually verified the presence of NEXPLANON inside the needle tip. The skin at the insertion site was then stretched by my thumb and index finger. I then inserted the needle tip through the skin at the appropriate angle to the skin surface through the previous incision. The needle was gently inserted to its full length. The slider was then pushed all the way and then released. I then removed the needle and palpated the implant in the appropriate location. The patient also palpated the implant in place. Both Marina and I were able to confirm the presence of the Vashti Spearing in its subdermal location by palpation. The skin was cleansed and dried.  A steristrip
appropriate    ASSESSMENT   Diagnosis Orders   1. Abnormal uterine bleeding (AUB)  DISCONTINUED: etonogestrel (NEXPLANON) implant 68 mg      2. Severe obesity (BMI 35.0-39. 9) with comorbidity ([address])        3. Intramural leiomyoma of uterus        4. Nexplanon insertion  etonogestrel (NEXPLANON) implant 68 mg          PLAN  Possible that bleeding is due to  nexplanon -- will change out today  Discussed Uterine fibroid as possible source. Discussed acessa procedure. Schedule US & FU   Return for [address] [facility].

## 2023-07-11 ENCOUNTER — TELEPHONE (OUTPATIENT)
Age: 38
End: 2023-07-11

## 2023-07-11 NOTE — TELEPHONE ENCOUNTER
Jerri pt calling. Pt saw Dr. Jerome Calles yesterday for AUB & they did a nexplanon replacement. Pt reports last night she was feeling some pulling sensations in her arm, but a few min ago she starting having pain in her arm & is scared to move it, she said it was painful to extend her arm, she feels a pulling sensation all the way down to her elbow. I consulted with Dr. Jerome Calles & he said it may be the scar tissue giving her those sensations right now, he advised to use ice for a few hours & see if it has improved. I advised pt to check back with us in a few hours to see how she was feeling. Pt plans to take ibuprofen.

## 2023-07-11 NOTE — TELEPHONE ENCOUNTER
Pt calling back in as she was advised by [facility] to give us an update. Pt states the pain is not as bad as she mentioned earlier today, however, she's still having that pulling sensation in her elbow. Pt states she was told by [facility] that if pain continues to rto, but pt states she lives over 1 hr away. Please advise. .    Does pt need to be seen? Or do you have any other recc? She has tried Ibuprofen with some relief.

## 2023-07-12 NOTE — TELEPHONE ENCOUNTER
Called and advised pt of MD berriosc. Created work noted for today 7/12/23 and sent via [employer]. Pt verbalized understanding.

## 2023-07-12 NOTE — TELEPHONE ENCOUNTER
Ok to send note for today, however, if pain so severe she can't work in the future then needs to come in for exam .

## 2023-07-28 ENCOUNTER — TELEPHONE (OUTPATIENT)
Facility: CLINIC | Age: 38
End: 2023-07-28

## 2023-07-28 NOTE — TELEPHONE ENCOUNTER
Pt has no PCP listed. Last medication was Dr. Jovana Garay.  Nothing available after 2:00 when she gets off from work.        hydroCHLOROthiazide (HYDRODIURIL) 25 MG tablet    [facility]

## 2023-07-28 NOTE — TELEPHONE ENCOUNTER
Has appointment Monday with Dr. Stephenie Hatchet at 3:00 for Earache and Medications. Is it possible to get Dr. Stephenie Hatchet for PCP?

## 2023-07-31 ENCOUNTER — OFFICE VISIT (OUTPATIENT)
Facility: CLINIC | Age: 38
End: 2023-07-31
Payer: COMMERCIAL

## 2023-07-31 VITALS
DIASTOLIC BLOOD PRESSURE: 83 MMHG | TEMPERATURE: 98.1 F | OXYGEN SATURATION: 100 % | HEIGHT: 68 IN | HEART RATE: 70 BPM | WEIGHT: 246 LBS | SYSTOLIC BLOOD PRESSURE: 117 MMHG | RESPIRATION RATE: 18 BRPM | BODY MASS INDEX: 37.28 KG/M2

## 2023-07-31 DIAGNOSIS — D50.9 IRON DEFICIENCY ANEMIA, UNSPECIFIED IRON DEFICIENCY ANEMIA TYPE: ICD-10-CM

## 2023-07-31 DIAGNOSIS — I10 ESSENTIAL HYPERTENSION: Primary | ICD-10-CM

## 2023-07-31 DIAGNOSIS — C81.01 NODULAR LYMPHOCYTE PREDOMINANT HODGKIN LYMPHOMA OF LYMPH NODES OF NECK (HCC): ICD-10-CM

## 2023-07-31 DIAGNOSIS — Z13.1 DIABETES MELLITUS SCREENING: ICD-10-CM

## 2023-07-31 DIAGNOSIS — R53.82 CHRONIC FATIGUE: ICD-10-CM

## 2023-07-31 DIAGNOSIS — E78.2 MIXED HYPERLIPIDEMIA: ICD-10-CM

## 2023-07-31 DIAGNOSIS — I10 ESSENTIAL (PRIMARY) HYPERTENSION: ICD-10-CM

## 2023-07-31 LAB
ALBUMIN SERPL-MCNC: 3.5 G/DL (ref 3.5–5)
ALBUMIN/GLOB SERPL: 1.1 (ref 1.1–2.2)
ALP SERPL-CCNC: 91 U/L (ref 45–117)
ALT SERPL-CCNC: 21 U/L (ref 12–78)
ANION GAP SERPL CALC-SCNC: 3 MMOL/L (ref 5–15)
AST SERPL-CCNC: 17 U/L (ref 15–37)
BILIRUB SERPL-MCNC: 0.2 MG/DL (ref 0.2–1)
BUN SERPL-MCNC: 9 MG/DL (ref 6–20)
BUN/CREAT SERPL: 11 (ref 12–20)
CALCIUM SERPL-MCNC: 9 MG/DL (ref 8.5–10.1)
CHLORIDE SERPL-SCNC: 112 MMOL/L (ref 97–108)
CHOLEST SERPL-MCNC: 139 MG/DL
CO2 SERPL-SCNC: 27 MMOL/L (ref 21–32)
COMMENT:: NORMAL
CREAT SERPL-MCNC: 0.84 MG/DL (ref 0.55–1.02)
GLOBULIN SER CALC-MCNC: 3.1 G/DL (ref 2–4)
GLUCOSE SERPL-MCNC: 72 MG/DL (ref 65–100)
HDLC SERPL-MCNC: 33 MG/DL
HDLC SERPL: 4.2 (ref 0–5)
LDLC SERPL CALC-MCNC: 90.2 MG/DL (ref 0–100)
POTASSIUM SERPL-SCNC: 3.4 MMOL/L (ref 3.5–5.1)
PROT SERPL-MCNC: 6.6 G/DL (ref 6.4–8.2)
SODIUM SERPL-SCNC: 142 MMOL/L (ref 136–145)
SPECIMEN HOLD: NORMAL
TRIGL SERPL-MCNC: 79 MG/DL
VLDLC SERPL CALC-MCNC: 15.8 MG/DL

## 2023-07-31 PROCEDURE — 3079F DIAST BP 80-89 MM HG: CPT | Performed by: FAMILY MEDICINE

## 2023-07-31 PROCEDURE — 3074F SYST BP LT 130 MM HG: CPT | Performed by: FAMILY MEDICINE

## 2023-07-31 PROCEDURE — 99214 OFFICE O/P EST MOD 30 MIN: CPT | Performed by: FAMILY MEDICINE

## 2023-07-31 RX ORDER — HYDROCHLOROTHIAZIDE 25 MG/1
25 TABLET ORAL DAILY
Qty: 90 TABLET | Refills: 1 | Status: SHIPPED | OUTPATIENT
Start: 2023-07-31

## 2023-07-31 SDOH — ECONOMIC STABILITY: INCOME INSECURITY: HOW HARD IS IT FOR YOU TO PAY FOR THE VERY BASICS LIKE FOOD, HOUSING, MEDICAL CARE, AND HEATING?: SOMEWHAT HARD

## 2023-07-31 SDOH — ECONOMIC STABILITY: TRANSPORTATION INSECURITY
IN THE PAST 12 MONTHS, HAS LACK OF TRANSPORTATION KEPT YOU FROM MEETINGS, WORK, OR FROM GETTING THINGS NEEDED FOR DAILY LIVING?: NO

## 2023-07-31 SDOH — ECONOMIC STABILITY: FOOD INSECURITY: WITHIN THE PAST 12 MONTHS, THE FOOD YOU BOUGHT JUST DIDN'T LAST AND YOU DIDN'T HAVE MONEY TO GET MORE.: OFTEN TRUE

## 2023-07-31 SDOH — ECONOMIC STABILITY: FOOD INSECURITY: WITHIN THE PAST 12 MONTHS, YOU WORRIED THAT YOUR FOOD WOULD RUN OUT BEFORE YOU GOT MONEY TO BUY MORE.: OFTEN TRUE

## 2023-07-31 SDOH — ECONOMIC STABILITY: HOUSING INSECURITY
IN THE LAST 12 MONTHS, WAS THERE A TIME WHEN YOU DID NOT HAVE A STEADY PLACE TO SLEEP OR SLEPT IN A SHELTER (INCLUDING NOW)?: NO

## 2023-07-31 ASSESSMENT — PATIENT HEALTH QUESTIONNAIRE - PHQ9
SUM OF ALL RESPONSES TO PHQ QUESTIONS 1-9: 0
SUM OF ALL RESPONSES TO PHQ QUESTIONS 1-9: 0
10. IF YOU CHECKED OFF ANY PROBLEMS, HOW DIFFICULT HAVE THESE PROBLEMS MADE IT FOR YOU TO DO YOUR WORK, TAKE CARE OF THINGS AT HOME, OR GET ALONG WITH OTHER PEOPLE: 0
4. FEELING TIRED OR HAVING LITTLE ENERGY: 0
2. FEELING DOWN, DEPRESSED OR HOPELESS: 0
8. MOVING OR SPEAKING SO SLOWLY THAT OTHER PEOPLE COULD HAVE NOTICED. OR THE OPPOSITE, BEING SO FIGETY OR RESTLESS THAT YOU HAVE BEEN MOVING AROUND A LOT MORE THAN USUAL: 0
SUM OF ALL RESPONSES TO PHQ9 QUESTIONS 1 & 2: 0
5. POOR APPETITE OR OVEREATING: 0
7. TROUBLE CONCENTRATING ON THINGS, SUCH AS READING THE NEWSPAPER OR WATCHING TELEVISION: 0
SUM OF ALL RESPONSES TO PHQ QUESTIONS 1-9: 0
9. THOUGHTS THAT YOU WOULD BE BETTER OFF DEAD, OR OF HURTING YOURSELF: 0
6. FEELING BAD ABOUT YOURSELF - OR THAT YOU ARE A FAILURE OR HAVE LET YOURSELF OR YOUR FAMILY DOWN: 0
SUM OF ALL RESPONSES TO PHQ QUESTIONS 1-9: 0
3. TROUBLE FALLING OR STAYING ASLEEP: 0
1. LITTLE INTEREST OR PLEASURE IN DOING THINGS: 0

## 2023-07-31 ASSESSMENT — ENCOUNTER SYMPTOMS
CHEST TIGHTNESS: 0
BACK PAIN: 0

## 2023-07-31 NOTE — PROGRESS NOTES
[facility]    History of Present Illness:   Dipak Montana is a 41 y.o. female with history of HTN, GERD, Anxiety and depression, Obesity, Hodkins   CC: Follow up  History provided by patient and Records    HPI:  Hypertension Follow up: The patient reports:  taking medications as instructed, no medication side effects noted, no TIA's, no chest pain on exertion, no dyspnea on exertion, no swelling of ankles, no orthostatic dizziness or lightheadedness, no orthopnea or paroxysmal nocturnal dyspnea. BP Readings from Last 3 Encounters:   07/31/23 117/83   07/10/23 (!) 140/84   03/24/23 129/86      Gastroesophageal Reflux:  Current control of Symptoms: Controlled  Primary symptoms: heartburn  Hiatal Hernia: No  Current Medications: Prilosec  The patient has no history melena or bright red blood in the stools. The patient avoids high dose aspirin and NSAID therapy. The patient is aware of diet changes needed, elevating the head of the bed and appropriate use of antacids. Allergies: Overall controlled. Health Maintenance  Health Maintenance Due   Topic Date Due    COVID-19 Vaccine (1) Never done    Pneumococcal 0-64 years Vaccine (3 - PPSV23 if available, else PCV20) 12/15/2021    Diabetes screen  Never done       Past Medical, Family, and Social History:     Current Outpatient Medications on File Prior to Visit   Medication Sig Dispense Refill    ascorbic acid (VITAMIN C) 250 MG tablet Take by mouth      fluticasone (FLONASE) 50 MCG/ACT nasal spray Use one spray in each nostril daily. Continue for at least 2 weeks. omeprazole (PRILOSEC) 20 MG delayed release capsule Take 1 capsule by mouth daily       No current facility-administered medications on file prior to visit.        Patient Active Problem List   Diagnosis    Depression    Nodular lymphocyte predominant Hodgkin lymphoma of lymph nodes of neck ([address])    Essential hypertension    Gastroesophageal reflux disease without

## 2023-07-31 NOTE — PROGRESS NOTES
1. \"Have you been to the ER, urgent care clinic since your last visit? Hospitalized since your last visit? \" no    2. \"Have you seen or consulted any other health care providers outside of the [address] [place] [address] since your last visit? \" no   Health Maintenance Due   Topic Date Due    COVID-19 Vaccine (1) Never done    Varicella vaccine (1 of 2 - 2-dose childhood series) Never done    Shingles vaccine (1 of 2) Never done    Pneumococcal 0-64 years Vaccine (3 - PPSV23 if available, else PCV20) 12/15/2021    Diabetes screen  Never done    Depression Monitoring  07/15/2023

## 2023-08-01 LAB
25(OH)D3 SERPL-MCNC: 27.9 NG/ML (ref 30–100)
ERYTHROCYTE [DISTWIDTH] IN BLOOD BY AUTOMATED COUNT: 12.3 % (ref 11.5–14.5)
EST. AVERAGE GLUCOSE BLD GHB EST-MCNC: 91 MG/DL
FERRITIN SERPL-MCNC: 46 NG/ML (ref 26–388)
HBA1C MFR BLD: 4.8 % (ref 4–5.6)
HCT VFR BLD AUTO: 38.1 % (ref 35–47)
HGB BLD-MCNC: 12.6 G/DL (ref 11.5–16)
IRON SATN MFR SERPL: 27 % (ref 20–50)
IRON SERPL-MCNC: 77 UG/DL (ref 35–150)
MCH RBC QN AUTO: 29.9 PG (ref 26–34)
MCHC RBC AUTO-ENTMCNC: 33.1 G/DL (ref 30–36.5)
MCV RBC AUTO: 90.3 FL (ref 80–99)
NRBC # BLD: 0 K/UL (ref 0–0.01)
NRBC BLD-RTO: 0 PER 100 WBC
PLATELET # BLD AUTO: 381 K/UL (ref 150–400)
PMV BLD AUTO: 8.9 FL (ref 8.9–12.9)
RBC # BLD AUTO: 4.22 M/UL (ref 3.8–5.2)
TIBC SERPL-MCNC: 281 UG/DL (ref 250–450)
VIT B12 SERPL-MCNC: 474 PG/ML (ref 193–986)
WBC # BLD AUTO: 7 K/UL (ref 3.6–11)

## 2023-08-09 ENCOUNTER — TELEPHONE (OUTPATIENT)
Age: 38
End: 2023-08-09

## 2023-08-09 NOTE — TELEPHONE ENCOUNTER
Two patient identifiers used      41year old patient has her nexplanon reinserted on 7/10/2023 and is calling to say that she continues to have vaginal bleeding     Patient reports the vaginal bleeding is light but she is having cramping that she rates at 10 takes ibuprofen , that helps.     Patient is wondering how to proceed    Patient was advised that one can have bleeding for about 3 months after  nexplanon insertion     Patient states she did not have this with previous insertion    Pharmacy confirmed    Please advise    Thank you

## 2023-08-10 NOTE — TELEPHONE ENCOUNTER
This nurse attempted to reach the patient and left a detailed message regarding MD recommendations and confirmed appointment for 8/14/2023 including ultrasound

## 2023-08-14 ENCOUNTER — OFFICE VISIT (OUTPATIENT)
Age: 38
End: 2023-08-14
Payer: COMMERCIAL

## 2023-08-14 VITALS
SYSTOLIC BLOOD PRESSURE: 122 MMHG | WEIGHT: 246.6 LBS | BODY MASS INDEX: 37.38 KG/M2 | HEIGHT: 68 IN | DIASTOLIC BLOOD PRESSURE: 82 MMHG

## 2023-08-14 DIAGNOSIS — D25.1 INTRAMURAL LEIOMYOMA OF UTERUS: ICD-10-CM

## 2023-08-14 DIAGNOSIS — N93.9 ABNORMAL UTERINE BLEEDING (AUB): Primary | ICD-10-CM

## 2023-08-14 PROCEDURE — 3074F SYST BP LT 130 MM HG: CPT | Performed by: OBSTETRICS & GYNECOLOGY

## 2023-08-14 PROCEDURE — 99213 OFFICE O/P EST LOW 20 MIN: CPT | Performed by: OBSTETRICS & GYNECOLOGY

## 2023-08-14 PROCEDURE — 3079F DIAST BP 80-89 MM HG: CPT | Performed by: OBSTETRICS & GYNECOLOGY

## 2023-08-14 RX ORDER — ETHYNODIOL DIACETATE AND ETHINYL ESTRADIOL 1 MG-35MCG
1 KIT ORAL DAILY
Qty: 1 PACKET | Refills: 3 | Status: SHIPPED | OUTPATIENT
Start: 2023-08-14

## 2023-08-14 NOTE — PROGRESS NOTES
Fernando Ham is a 41 y.o. female presents for a problem visit. Chief Complaint   Patient presents with    Ultrasound     No LMP recorded. Patient has had an implant. Birth Control: Nexplanon. Last Pap: normal obtained 7/7/2021. The patient is reporting having: Ultrasound follow up for AUB. Ultrasound report:  TV ULTRASOUND PERFORMED. UTERUS IS ANTEVERTED, NORMAL IN SIZE AND HETEROGENEOUS IN ECHOGENICITY. THERE APPEARS TO BE MULTIPLE FIBROIDS. FIBROID 1- POSTERIOR FUNDAL MEASURING 13 X 16 X 13MM. FIBROID 2- POSTERIOR FUNDAL MEASURING 15 X 17 X 12MM. FIBROID 3- RIGHT LATERAL PEDUNCULATED MEASURING 54 X 46 X 51MM. ENDOMETRIUM MEASURES 3-4MM IN THICKNESS. NO EVIDENCE OF MASSES OR ABNORMALITIES ARE SEEN. RIGHT OVARY APPEARS TO HAVE A CYST WITH A SEPTATION THAT MEASURES 34 X 20 X 20MM. LEFT ADNEXA APPEARS WITHIN NORMAL LIMITS. FREE FLUID SEEN IN THE CDS. 1. Have you been to the ER, urgent care clinic, or hospitalized since your last visit? No    2. Have you seen or consulted any other health care providers outside of the [address] [place] [address] Avenue since your last visit? No    Examination chaperoned by Jun Hawkins CMA.

## 2023-09-19 ENCOUNTER — OFFICE VISIT (OUTPATIENT)
Facility: CLINIC | Age: 38
End: 2023-09-19
Payer: COMMERCIAL

## 2023-09-19 ENCOUNTER — TELEPHONE (OUTPATIENT)
Facility: CLINIC | Age: 38
End: 2023-09-19

## 2023-09-19 VITALS
DIASTOLIC BLOOD PRESSURE: 92 MMHG | BODY MASS INDEX: 36.98 KG/M2 | TEMPERATURE: 98.4 F | WEIGHT: 244 LBS | HEIGHT: 68 IN | OXYGEN SATURATION: 99 % | HEART RATE: 72 BPM | RESPIRATION RATE: 14 BRPM | SYSTOLIC BLOOD PRESSURE: 151 MMHG

## 2023-09-19 DIAGNOSIS — E66.01 MORBID (SEVERE) OBESITY DUE TO EXCESS CALORIES (HCC): ICD-10-CM

## 2023-09-19 DIAGNOSIS — K21.9 GASTRO-ESOPHAGEAL REFLUX DISEASE WITHOUT ESOPHAGITIS: ICD-10-CM

## 2023-09-19 DIAGNOSIS — J06.9 ACUTE UPPER RESPIRATORY INFECTION, UNSPECIFIED: Primary | ICD-10-CM

## 2023-09-19 DIAGNOSIS — I10 ESSENTIAL (PRIMARY) HYPERTENSION: ICD-10-CM

## 2023-09-19 LAB
GROUP A STREP ANTIGEN, POC: NEGATIVE
INFLUENZA A ANTIGEN, POC: NEGATIVE
INFLUENZA B ANTIGEN, POC: NEGATIVE
VALID INTERNAL CONTROL, POC: YES
VALID INTERNAL CONTROL, POC: YES

## 2023-09-19 PROCEDURE — 99214 OFFICE O/P EST MOD 30 MIN: CPT | Performed by: FAMILY MEDICINE

## 2023-09-19 PROCEDURE — 3078F DIAST BP <80 MM HG: CPT | Performed by: FAMILY MEDICINE

## 2023-09-19 PROCEDURE — 3074F SYST BP LT 130 MM HG: CPT | Performed by: FAMILY MEDICINE

## 2023-09-19 PROCEDURE — 87804 INFLUENZA ASSAY W/OPTIC: CPT | Performed by: FAMILY MEDICINE

## 2023-09-19 PROCEDURE — 87880 STREP A ASSAY W/OPTIC: CPT | Performed by: FAMILY MEDICINE

## 2023-09-19 RX ORDER — DICLOFENAC SODIUM 75 MG/1
75 TABLET, DELAYED RELEASE ORAL 2 TIMES DAILY
Qty: 60 TABLET | Refills: 0 | Status: SHIPPED | OUTPATIENT
Start: 2023-09-19

## 2023-09-19 RX ORDER — MULTIVIT-MIN/IRON/FOLIC ACID/K 18-600-40
CAPSULE ORAL
COMMUNITY

## 2023-09-26 NOTE — PROGRESS NOTES
1. \"Have you been to the ER, urgent care clinic since your last visit? Hospitalized since your last visit? \" NO    2. \"Have you seen or consulted any other health care providers outside of the [address] [place] [address] since your last visit? \" Yes dr Alfred Garcia [facility]     3. For patients aged 43-73: Has the patient had a colonoscopy / FIT/ Cologuard? N/A      If the patient is female:    4. For patients aged 43-66: Has the patient had a mammogram within the past 2 years? N/A      5. For patients aged 21-65: Has the patient had a pap smear?  YES    Health Maintenance Due   Topic Date Due    Hepatitis B vaccine (1 of 3 - 3-dose series) Never done    COVID-19 Vaccine (1) Never done    Pneumococcal 0-64 years Vaccine (3 - PPSV23 or PCV20) 12/15/2021    Flu vaccine (1) Never done
Rapid Covid-19 nasal swab done:Results-Negative.
Transportation Needs: Unknown (7/31/2023)    PRAPARE - Transportation     Lack of Transportation (Non-Medical): No   Housing Stability: Unknown (7/31/2023)    Housing Stability Vital Sign     Unstable Housing in the Last Year: No       Review of Symptoms:  Constitutional: c/o malaise, denies fever or chills  Skin: Negative for rash or lesion  Head: Negative for facial swelling or tenderness  Eyes: Negative for redness or discharge  Ears: Negative for otalgia or decreased hearing  Nose: c/o nasal congestion, denies sinus pressure  Neck: c/o sore throat, denies lymphadenopathy   Cardiovascular: Negative for chest pain or palpitations  Respiratory: c/o non-productive cough, denies wheezing or SOB  Gastrointestinal: Negative for nausea or abdominal pain  Neurologic: Negative for headache or dizziness      Vitals:    09/19/23 1512   BP: (!) 151/92   Pulse:    Resp:    Temp:    SpO2:        Physical Examination:  General: Well developed, well nourished, in no acute distress  Skin: Warm and dry sans rash or lesion  Head: Normocephalic, atraumatic  Eyes: Sclera clear, EOMI, PERRL  Ears: tympanic membranes normal in appearance  Nose: mucosal edema with rhinorrhea  Oropharynx: posterior erythema, no exudate   Neck: Normal range of motion, no lymphadenopathy  Cardiovascular: normal S1, S2, regular rate and rhythm  Respiratory: Clear to auscultation bilaterally with symmetrical, unlabored effort  Abdomen: Soft, Normal BS  Extremities: Full range of motion  Neurologic: Active, alert and oriented      Marina was seen today for otalgia, nausea and sweats. Diagnoses and all orders for this visit:    Acute upper respiratory infection, unspecified  -     AMB POC RAPID STREP A  -     AMB POC RAPID INFLUENZA TEST    Gastro-esophageal reflux disease without esophagitis    Essential (primary) hypertension    Morbid (severe) obesity due to excess calories ([address])    Other orders  -     diclofenac (VOLTAREN) 75 MG EC tablet;  Take 1

## 2023-09-28 ENCOUNTER — TELEPHONE (OUTPATIENT)
Facility: CLINIC | Age: 38
End: 2023-09-28

## 2023-09-28 NOTE — TELEPHONE ENCOUNTER
----- Message from Doris Young sent at 9/28/2023  3:25 PM EDT -----  Subject: Message to Provider    QUESTIONS  Information for Provider? Last week she was there and saw Dr. Patricia Reyes. He   gave her a note for work. They are not accepting that. He needed to put   the date on the note. Please call to advise.   ---------------------------------------------------------------------------  --------------  Reyes HOUSER  [phone]; OK to leave message on voicemail  ---------------------------------------------------------------------------  --------------  SCRIPT ANSWERS  Relationship to Patient?  Self

## 2023-11-24 ENCOUNTER — TELEPHONE (OUTPATIENT)
Age: 38
End: 2023-11-24

## 2023-11-27 RX ORDER — ETHYNODIOL DIACETATE AND ETHINYL ESTRADIOL 1 MG-35MCG
1 KIT ORAL DAILY
Qty: 28 TABLET | Refills: 3 | Status: SHIPPED | OUTPATIENT
Start: 2023-11-27

## 2023-11-27 NOTE — TELEPHONE ENCOUNTER
42 yo last ae with TP 12/15/22, next ae scheduled for 2/15/24  Rx pended for review  Per last ae TP recommended: We discussed elevated blood pressure reading. I recommend BP log/home BP cuff and PCP follow up for additional management/surveillance.     BP  (!) 144/98         Thank you

## 2023-11-27 NOTE — TELEPHONE ENCOUNTER
PT called, left vm to let know rx was sent and reviewed by MD and that MD wants to know if she has managed her bp and seen her PCP for this. Will also send a [facility] message.   LORENA

## 2023-12-04 ENCOUNTER — TELEPHONE (OUTPATIENT)
Facility: CLINIC | Age: 38
End: 2023-12-04

## 2023-12-05 ENCOUNTER — TELEPHONE (OUTPATIENT)
Age: 38
End: 2023-12-05

## 2023-12-05 NOTE — TELEPHONE ENCOUNTER
PT name and  verified    42 yo last ov/ae with TP 12/15/22, was scheduled for fu/us 2/15/24    PT calling and rescheduled her mammo/ae/us to 24 and was calling back after messages left for her to update MD on her bp, which was recommended at her last ae and a reading of   BP  (!) 144/98       PT states she has not monitored her bp and does have a PCP appt this Thurs 23 about her bp. Advised PT to see what PCP says and what her reading is at ov, to do at home log, which was recommended at last ae 12/15/22:  Per last ae TP recommended: We discussed elevated blood pressure reading. I recommend BP log/home BP cuff and PCP follow up for additional '    PT advised to update  either via phone or [facility] on bp after her ov with her PCP. PT verbalizes understanding. PT did get ocp refilled 23 and should have enough to get her to her appts 24.       LORENA

## 2023-12-07 ENCOUNTER — OFFICE VISIT (OUTPATIENT)
Facility: CLINIC | Age: 38
End: 2023-12-07
Payer: COMMERCIAL

## 2023-12-07 VITALS
OXYGEN SATURATION: 99 % | BODY MASS INDEX: 37.13 KG/M2 | SYSTOLIC BLOOD PRESSURE: 135 MMHG | TEMPERATURE: 98.4 F | DIASTOLIC BLOOD PRESSURE: 84 MMHG | WEIGHT: 245 LBS | HEIGHT: 68 IN | RESPIRATION RATE: 20 BRPM | HEART RATE: 70 BPM

## 2023-12-07 DIAGNOSIS — M25.562 PATELLOFEMORAL ARTHRALGIA OF LEFT KNEE: ICD-10-CM

## 2023-12-07 DIAGNOSIS — I10 ESSENTIAL HYPERTENSION: Primary | ICD-10-CM

## 2023-12-07 PROCEDURE — 3079F DIAST BP 80-89 MM HG: CPT | Performed by: FAMILY MEDICINE

## 2023-12-07 PROCEDURE — 3075F SYST BP GE 130 - 139MM HG: CPT | Performed by: FAMILY MEDICINE

## 2023-12-07 PROCEDURE — 99213 OFFICE O/P EST LOW 20 MIN: CPT | Performed by: FAMILY MEDICINE

## 2023-12-07 NOTE — PROGRESS NOTES
[facility]  Chief Complaint   Patient presents with    Blood Pressure Check    Knee Pain     Left x3 weeks         History of Present Illness:   Fredrick Ramos is a 41 y.o. female       HPI:  Here for HTN. BP have been high at home. [facility] put her on ocps for fibroids. She also has mirena. Has been on hctz for some time. Taking meds as directed. Also c/o popping in L knee x 3 weeks. Some pain getting up from seated position. No trauma. Used to take diclofenac for her back but it upset her stomach so stopped it.      Health Maintenance  Health Maintenance Due   Topic Date Due    Hepatitis B vaccine (1 of 3 - 3-dose series) Never done    COVID-19 Vaccine (1) Never done    Pneumococcal 0-64 years Vaccine (3 - PPSV23 or PCV20) 12/15/2021       Past Medical, Family, and Social History:     Past Medical History:   Diagnosis Date    Abnormal Pap smear     FU with colpo, negative per patient    Chest pain     Chlamydia 2019    vaginal    CHRIS I (cervical intraepithelial neoplasia I) 2011    Depression 2015    Encounter for IUD insertion 3/5/15    Mirena    Encounter for IUD removal 2016    Essential hypertension     Hodgkin lymphoma ([address]) 2017    nodular lymphocytic hodgkins lymphoma    Hypertension     Implanon removal 2013    Nexplanon insertion 2017.   7/10/23    Pap smear for cervical cancer screening 11/11/15; 21    Negative; neg HPV neg    Trouble in sleeping     Uterine fibroid 2021    3 cm      5.1cm      Past Surgical History:   Procedure Laterality Date    COLPOSCOPY      per pt WNL    INDUCED       WISDOM TOOTH EXTRACTION         Current Outpatient Medications on File Prior to Visit   Medication Sig Dispense Refill    [facility]  1-35 MG-MCG per tablet TAKE ONE TABLET BY MOUTH EVERY DAY 28 tablet 3    Vitamin D, Cholecalciferol, 25 MCG (1000 UT) TABS Take by mouth      hydroCHLOROthiazide (HYDRODIURIL) 25 MG

## 2023-12-07 NOTE — PROGRESS NOTES
1. \"Have you been to the ER, urgent care clinic since your last visit? Hospitalized since your last visit? \" NO    2. \"Have you seen or consulted any other health care providers outside of the [address] [place] [address] since your last visit? \" no    3. For patients aged 43-73: Has the patient had a colonoscopy / FIT/ Cologuard? N/A      If the patient is female:    4. For patients aged 43-66: Has the patient had a mammogram within the past 2 years? N/A      5. For patients aged 21-65: Has the patient had a pap smear?  YES    Health Maintenance Due   Topic Date Due    Hepatitis B vaccine (1 of 3 - 3-dose series) Never done    COVID-19 Vaccine (1) Never done    Pneumococcal 0-64 years Vaccine (3 - PPSV23 or PCV20) 12/15/2021

## 2023-12-15 RX ORDER — OMEPRAZOLE 20 MG/1
20 CAPSULE, DELAYED RELEASE ORAL DAILY
Qty: 90 CAPSULE | Refills: 0 | Status: SHIPPED | OUTPATIENT
Start: 2023-12-15

## 2024-01-18 ENCOUNTER — OFFICE VISIT (OUTPATIENT)
Facility: CLINIC | Age: 39
End: 2024-01-18
Payer: COMMERCIAL

## 2024-01-18 VITALS
HEART RATE: 75 BPM | SYSTOLIC BLOOD PRESSURE: 121 MMHG | BODY MASS INDEX: 37.47 KG/M2 | WEIGHT: 247.2 LBS | OXYGEN SATURATION: 100 % | HEIGHT: 68 IN | RESPIRATION RATE: 18 BRPM | DIASTOLIC BLOOD PRESSURE: 87 MMHG | TEMPERATURE: 98 F

## 2024-01-18 DIAGNOSIS — K21.9 GASTROESOPHAGEAL REFLUX DISEASE WITHOUT ESOPHAGITIS: Primary | ICD-10-CM

## 2024-01-18 DIAGNOSIS — I10 ESSENTIAL (PRIMARY) HYPERTENSION: ICD-10-CM

## 2024-01-18 DIAGNOSIS — C81.01 NODULAR LYMPHOCYTE PREDOMINANT HODGKIN LYMPHOMA OF LYMPH NODES OF NECK (HCC): ICD-10-CM

## 2024-01-18 DIAGNOSIS — F33.42 RECURRENT MAJOR DEPRESSIVE DISORDER, IN FULL REMISSION (HCC): ICD-10-CM

## 2024-01-18 PROCEDURE — 3079F DIAST BP 80-89 MM HG: CPT | Performed by: FAMILY MEDICINE

## 2024-01-18 PROCEDURE — 99214 OFFICE O/P EST MOD 30 MIN: CPT | Performed by: FAMILY MEDICINE

## 2024-01-18 PROCEDURE — 3074F SYST BP LT 130 MM HG: CPT | Performed by: FAMILY MEDICINE

## 2024-01-18 RX ORDER — HYDROCHLOROTHIAZIDE 25 MG/1
25 TABLET ORAL DAILY
Qty: 90 TABLET | Refills: 1 | Status: SHIPPED | OUTPATIENT
Start: 2024-01-18 | End: 2024-01-19

## 2024-01-18 RX ORDER — AMLODIPINE BESYLATE 5 MG/1
5 TABLET ORAL DAILY
Qty: 90 TABLET | Refills: 1 | Status: SHIPPED | OUTPATIENT
Start: 2024-01-18

## 2024-01-18 RX ORDER — AMLODIPINE BESYLATE 5 MG/1
5 TABLET ORAL DAILY
COMMUNITY
Start: 2023-12-21 | End: 2024-01-18 | Stop reason: SDUPTHER

## 2024-01-18 ASSESSMENT — PATIENT HEALTH QUESTIONNAIRE - PHQ9
2. FEELING DOWN, DEPRESSED OR HOPELESS: 0
SUM OF ALL RESPONSES TO PHQ QUESTIONS 1-9: 0
1. LITTLE INTEREST OR PLEASURE IN DOING THINGS: 0
SUM OF ALL RESPONSES TO PHQ9 QUESTIONS 1 & 2: 0
3. TROUBLE FALLING OR STAYING ASLEEP: 0
4. FEELING TIRED OR HAVING LITTLE ENERGY: 0
6. FEELING BAD ABOUT YOURSELF - OR THAT YOU ARE A FAILURE OR HAVE LET YOURSELF OR YOUR FAMILY DOWN: 0
10. IF YOU CHECKED OFF ANY PROBLEMS, HOW DIFFICULT HAVE THESE PROBLEMS MADE IT FOR YOU TO DO YOUR WORK, TAKE CARE OF THINGS AT HOME, OR GET ALONG WITH OTHER PEOPLE: 0
8. MOVING OR SPEAKING SO SLOWLY THAT OTHER PEOPLE COULD HAVE NOTICED. OR THE OPPOSITE, BEING SO FIGETY OR RESTLESS THAT YOU HAVE BEEN MOVING AROUND A LOT MORE THAN USUAL: 0
SUM OF ALL RESPONSES TO PHQ QUESTIONS 1-9: 0
9. THOUGHTS THAT YOU WOULD BE BETTER OFF DEAD, OR OF HURTING YOURSELF: 0
SUM OF ALL RESPONSES TO PHQ QUESTIONS 1-9: 0
SUM OF ALL RESPONSES TO PHQ QUESTIONS 1-9: 0
7. TROUBLE CONCENTRATING ON THINGS, SUCH AS READING THE NEWSPAPER OR WATCHING TELEVISION: 0
5. POOR APPETITE OR OVEREATING: 0

## 2024-01-18 ASSESSMENT — ENCOUNTER SYMPTOMS
ABDOMINAL PAIN: 0
COUGH: 0

## 2024-01-18 NOTE — PROGRESS NOTES
HENT:      Head: Normocephalic and atraumatic.   Cardiovascular:      Rate and Rhythm: Normal rate and regular rhythm.      Pulses: Normal pulses.      Heart sounds: Normal heart sounds.   Pulmonary:      Effort: Pulmonary effort is normal.      Breath sounds: Normal breath sounds.   Abdominal:      General: Abdomen is flat. Bowel sounds are normal.      Palpations: Abdomen is soft.   Musculoskeletal:      Cervical back: Normal range of motion and neck supple.   Skin:     General: Skin is warm and dry.   Neurological:      Mental Status: She is alert.          Pertinent Labs/Studies:      Assessment and orders:       ICD-10-CM    1. Gastroesophageal reflux disease without esophagitis  K21.9       2. Essential (primary) hypertension  I10 amLODIPine (NORVASC) 5 MG tablet     hydroCHLOROthiazide (HYDRODIURIL) 25 MG tablet      3. 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 Recurrent major depressive disorder, in full remission ([facility])  F33.42           1. Essential (primary) hypertension  Our goal is to normalize the blood pressure to decrease the risks of strokes and heart attacks. The patient is in agreement with the plan.   - amLODIPine (NORVASC) 5 MG tablet; Take 1 tablet by mouth daily  Dispense: 90 tablet; Refill: 1  - hydroCHLOROthiazide (HYDRODIURIL) 25 MG tablet; Take 1 tablet by mouth daily  Dispense: 90 tablet; Refill: 1    2. Gastroesophageal reflux disease without esophagitis  Stable    3. 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 Recurrent major depressive disorder, in full remission (HCC)  Stable       Follow-up and Dispositions    Return in about 6 months (around 7/18/2024).           I have discussed the diagnosis with the patient and the intended plan as seen in the above orders.  Social history, medical history, and labs were reviewed.  The patient has received an after-visit summary and

## 2024-01-19 DIAGNOSIS — I10 ESSENTIAL (PRIMARY) HYPERTENSION: ICD-10-CM

## 2024-01-19 RX ORDER — HYDROCHLOROTHIAZIDE 25 MG/1
25 TABLET ORAL DAILY
Qty: 90 TABLET | Refills: 1 | Status: SHIPPED | OUTPATIENT
Start: 2024-01-19

## 2024-01-23 ENCOUNTER — TELEPHONE (OUTPATIENT)
Age: 39
End: 2024-01-23

## 2024-01-23 NOTE — TELEPHONE ENCOUNTER
[facility] [place] [facility]  [phone]    01/23/24 [facility] message sent to pt to remind her to have labs drawn prior to upcoming appointment with .

## 2024-02-02 NOTE — PROGRESS NOTES
[facility] [place] [facility]  [address], [address] [facility] [address]  W: [phone]  F: [phone]      Reason for Visit:   Marina Mcgee is a 38 y.o. female who is seen today for evaluation of lymphoma.    Hematology/Oncology Treatment History:   US neck 5/26/2016: 2.5cm right posterior auricular mass  US guided biopsy cervical node 6/30/2016: suspicious for large b-cell lymphoma  CT Neck/C/A/P 7/25/2016: Enlarged right level 5 lymph node measuring 2 x 1 cm and slightly inferior to this there is a slightly enlarged lymph  node measuring 1.2 x 0.8 cm. There is a slightly enlarged right level 2 lymph node measuring 1.3 x 0.9 cm. There is a slightly enlarged left level 2 lymph node measuring 1.7 x 1.1 cm. Lymph nodes more inferior at level 3 and level 4 are  normal in size.  No supraclavicular adenopathy is identified.  No adenopathy in chest, abdomen, or pelvis  PET-CT 7/27/2016: No areas of hypermetabolic activity  Excisional biopsy of right level 2 cervical node by Dr. Rodriguez 8/3/2016: Nodular lymphocyte predominant Hodgkin Lymphoma  Stage IIA Nodular lymphocyte predominant Hodgkin Lymphoma  Radiation to cervical nodes by Dr. Mathur completed 10/10/2016    History of Present Illness:   She reports some issues with fibroids causing pain and bleeding.  She is seeing GYN, has been taking OCPs for this, but stopped due to issues with HTN.  Planning US soon and considering surgery.      She is otherwise doing well. No pain elsewhere.  No fevers, chills, night sweats, unintentional weight loss, adenopathy.  She does report a GI illness about a week ago with nausea, diarrhea, and chills for 2 days. Resolved since then.  She denies recurrent infections.        Review of systems was obtained and pertinent findings reviewed above. Past medical history, social history, family history, medications, and allergies are located in the electronic medical record.    Physical Exam:   Visit

## 2024-02-07 DIAGNOSIS — C81.01 NODULAR LYMPHOCYTE PREDOMINANT HODGKIN LYMPHOMA OF LYMPH NODES OF NECK (HCC): ICD-10-CM

## 2024-02-08 LAB
ALBUMIN SERPL-MCNC: 3.7 G/DL (ref 3.5–5)
ALBUMIN/GLOB SERPL: 1.3 (ref 1.1–2.2)
ALP SERPL-CCNC: 98 U/L (ref 45–117)
ALT SERPL-CCNC: 19 U/L (ref 12–78)
ANION GAP SERPL CALC-SCNC: 6 MMOL/L (ref 5–15)
AST SERPL-CCNC: 14 U/L (ref 15–37)
BASOPHILS # BLD: 0.1 K/UL (ref 0–0.1)
BASOPHILS NFR BLD: 1 % (ref 0–1)
BILIRUB SERPL-MCNC: 0.3 MG/DL (ref 0.2–1)
BUN SERPL-MCNC: 11 MG/DL (ref 6–20)
BUN/CREAT SERPL: 13 (ref 12–20)
CALCIUM SERPL-MCNC: 9.1 MG/DL (ref 8.5–10.1)
CHLORIDE SERPL-SCNC: 108 MMOL/L (ref 97–108)
CO2 SERPL-SCNC: 28 MMOL/L (ref 21–32)
CREAT SERPL-MCNC: 0.82 MG/DL (ref 0.55–1.02)
DIFFERENTIAL METHOD BLD: ABNORMAL
EOSINOPHIL # BLD: 0.2 K/UL (ref 0–0.4)
EOSINOPHIL NFR BLD: 2 % (ref 0–7)
ERYTHROCYTE [DISTWIDTH] IN BLOOD BY AUTOMATED COUNT: 12.6 % (ref 11.5–14.5)
ERYTHROCYTE [SEDIMENTATION RATE] IN BLOOD: 14 MM/HR (ref 0–20)
GLOBULIN SER CALC-MCNC: 2.8 G/DL (ref 2–4)
GLUCOSE SERPL-MCNC: 72 MG/DL (ref 65–100)
HCT VFR BLD AUTO: 36.8 % (ref 35–47)
HGB BLD-MCNC: 12.3 G/DL (ref 11.5–16)
IMM GRANULOCYTES # BLD AUTO: 0 K/UL (ref 0–0.04)
IMM GRANULOCYTES NFR BLD AUTO: 0 % (ref 0–0.5)
LYMPHOCYTES # BLD: 2.2 K/UL (ref 0.8–3.5)
LYMPHOCYTES NFR BLD: 33 % (ref 12–49)
MCH RBC QN AUTO: 29.7 PG (ref 26–34)
MCHC RBC AUTO-ENTMCNC: 33.4 G/DL (ref 30–36.5)
MCV RBC AUTO: 88.9 FL (ref 80–99)
MONOCYTES # BLD: 0.6 K/UL (ref 0–1)
MONOCYTES NFR BLD: 8 % (ref 5–13)
NEUTS SEG # BLD: 3.8 K/UL (ref 1.8–8)
NEUTS SEG NFR BLD: 56 % (ref 32–75)
NRBC # BLD: 0 K/UL (ref 0–0.01)
NRBC BLD-RTO: 0 PER 100 WBC
PLATELET # BLD AUTO: 376 K/UL (ref 150–400)
PMV BLD AUTO: 8.6 FL (ref 8.9–12.9)
POTASSIUM SERPL-SCNC: 3.5 MMOL/L (ref 3.5–5.1)
PROT SERPL-MCNC: 6.5 G/DL (ref 6.4–8.2)
RBC # BLD AUTO: 4.14 M/UL (ref 3.8–5.2)
SODIUM SERPL-SCNC: 142 MMOL/L (ref 136–145)
WBC # BLD AUTO: 6.8 K/UL (ref 3.6–11)

## 2024-02-09 ENCOUNTER — OFFICE VISIT (OUTPATIENT)
Age: 39
End: 2024-02-09

## 2024-02-09 VITALS
SYSTOLIC BLOOD PRESSURE: 141 MMHG | WEIGHT: 249 LBS | HEIGHT: 68 IN | BODY MASS INDEX: 37.74 KG/M2 | HEART RATE: 62 BPM | TEMPERATURE: 97.7 F | OXYGEN SATURATION: 100 % | DIASTOLIC BLOOD PRESSURE: 89 MMHG | RESPIRATION RATE: 20 BRPM

## 2024-02-09 DIAGNOSIS — C81.01 NODULAR LYMPHOCYTE PREDOMINANT HODGKIN LYMPHOMA, LYMPH NODES OF HEAD, FACE, AND NECK (HCC): Primary | ICD-10-CM

## 2024-02-09 NOTE — PROGRESS NOTES
Marina Mcgee is a 38 y.o. female follow up for lymphoma.      1. Have you been to the ER, urgent care clinic since your last visit?  Hospitalized since your last visit?no    2. Have you seen or consulted any other health care providers outside of the [facility] since your last visit?  Include any pap smears or colon screening. no

## 2024-02-20 ENCOUNTER — OFFICE VISIT (OUTPATIENT)
Age: 39
End: 2024-02-20
Payer: COMMERCIAL

## 2024-02-20 VITALS
SYSTOLIC BLOOD PRESSURE: 134 MMHG | DIASTOLIC BLOOD PRESSURE: 85 MMHG | HEIGHT: 68 IN | WEIGHT: 243.8 LBS | BODY MASS INDEX: 36.95 KG/M2

## 2024-02-20 DIAGNOSIS — D25.1 INTRAMURAL LEIOMYOMA OF UTERUS: ICD-10-CM

## 2024-02-20 DIAGNOSIS — Z01.419 WELL WOMAN EXAM WITH ROUTINE GYNECOLOGICAL EXAM: Primary | ICD-10-CM

## 2024-02-20 PROCEDURE — 3075F SYST BP GE 130 - 139MM HG: CPT | Performed by: OBSTETRICS & GYNECOLOGY

## 2024-02-20 PROCEDURE — 99213 OFFICE O/P EST LOW 20 MIN: CPT | Performed by: OBSTETRICS & GYNECOLOGY

## 2024-02-20 PROCEDURE — 99395 PREV VISIT EST AGE 18-39: CPT | Performed by: OBSTETRICS & GYNECOLOGY

## 2024-02-20 PROCEDURE — 3079F DIAST BP 80-89 MM HG: CPT | Performed by: OBSTETRICS & GYNECOLOGY

## 2024-02-20 NOTE — PROGRESS NOTES
UTERINE FIBROID NOTE    Chief Complaint   Annual Exam, Follow-up, and Ultrasound      HPI  History:  39 y.o. female,  No LMP recorded. Patient has had an implant., presents with a history of symptomatic uterine fibroids.  She was seen last fall and found to have fibroids with one more predominant mass and 2 smaller ones.  She has been having some intermittent pains when she sits up & gets this twinge of pain.   Ultrasound was done today to evaluate these complaints and shows:   TV ULTRASOUND PERFORMED. UTERUS IS ANTEVERTED, NORMAL IN SIZE AND HETEROGENEOUS IN ECHOGENICITY. THERE APPEARS TO BE MULTIPLE FIBROIDS. THE MOST PROMINENT MEASURED. FIBROID 1- POSTERIOR RIGHT INTRAMURAL MEASURING 12 X 13 X 14MM. FIBROID 2- LEFT LATERAL INTRAMURAL MEASURING 16 X 14 X 15MM. FIBROID 3- RIGHT LATERAL SUBSEROSAL MEASURING 57 X 51 X 62MM. THE ENDOMETRIUM APPEARS HETEROGENEOUS AND MEASURES 7-8MM IN THICKNESS. BLOODFLOW IS PRESENT. RIGHT OVARY APPEARS WITHIN NORMAL LIMITS. LEFT OVARY APPEARS WITHIN NORMAL LIMITS. THERE APPEARS TO BE A DOMINANT FOLLICLE. NO FREE FLUID SEEN IN THE CDS.      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 
Marina Mcgee is a 39 y.o. female returns for an annual exam     Chief Complaint   Patient presents with    Annual Exam    Follow-up    Ultrasound       No LMP recorded. Patient has had an implant.  Her periods are absent in flow.  Problems: no problems  Birth Control: Nexplanon.  Last Pap: normal obtained 7/7/2021.  She does not have a history of CHRIS 2, 3 or cervical cancer.   With regard to the Gardisil vaccine, she has not received it yet  Patient had mammogram in the office today.      Ultrasound follow up for fibroid.  Ultrasound report: TV ULTRASOUND PERFORMED. UTERUS IS ANTEVERTED, NORMAL IN SIZE AND HETEROGENEOUS IN ECHOGENICITY. THERE APPEARS TO BE MULTIPLE FIBROIDS. THE MOST PROMINENT MEASURED. FIBROID 1- POSTERIOR RIGHT INTRAMURAL MEASURING 12 X 13 X 14MM. FIBROID 2- LEFT LATERAL INTRAMURAL MEASURING 16 X 14 X 15MM. FIBROID 3- RIGHT LATERAL SUBSEROSAL MEASURING 57 X 51 X 62MM. THE ENDOMETRIUM APPEARS HETEROGENEOUS AND MEASURES 7-8MM IN THICKNESS. BLOODFLOW IS PRESENT. RIGHT OVARY APPEARS WITHIN NORMAL LIMITS. LEFT OVARY APPEARS WITHIN NORMAL LIMITS. THERE APPEARS TO BE A DOMINANT FOLLICLE. NO FREE FLUID SEEN IN THE CDS.        1. Have you been to the ER, urgent care clinic, or hospitalized since your last visit? 12/2023 [place] elevated BP.      2. Have you seen or consulted any other health care providers outside of the [facility] since your last visit? 12/20/23 PCP follow up back pain.      Examination chaperoned by NATHAN FAGAN CMA.  
murmur    Breasts  Inspection of Breasts: breasts symmetrical, no skin changes, no discharge present, nipple appearance normal, no skin retraction present  Palpation of Breasts and Axillae: no masses present on palpation, no breast tenderness  Axillary Lymph Nodes: no lymphadenopathy present    Gastrointestinal  Abdominal Examination: abdomen non-tender to palpation, normal bowel sounds, no masses present  Liver and spleen: no hepatomegaly present, spleen not palpable  Hernias: no hernias identified    Genitourinary  External Genitalia: normal appearance for age, no discharge present, no tenderness present, no inflammatory lesions present, no masses present, no atrophy present  Vagina: normal vaginal vault without central or paravaginal defects, no discharge present, no inflammatory lesions present, no masses present  Bladder: non-tender to palpation  Urethra: appears normal  Cervix: normal   Uterus: enlarged size, irregular shape and consistency  Adnexa: no adnexal tenderness present, no adnexal masses present  Perineum: perineum within normal limits, no evidence of trauma, no rashes or skin lesions present  Anus: anus within normal limits, no hemorrhoids present  Inguinal Lymph Nodes: no lymphadenopathy present    Skin  General Inspection: no rash, no lesions identified    Neurologic/Psychiatric  Mental Status:  Orientation: grossly oriented to person, place and time  Mood and Affect: mood normal, affect appropriate    Labs:  No results found for this or any previous visit (from the past 12 hour(s)).    Assessment:   Diagnosis Orders   1. Well woman exam with routine gynecological exam        2. Intramural leiomyoma of uterus            Plan:  Counseled re: diet, exercise, healthy lifestyle  Rec screening mammo at 42  Return in about 1 year (around 2/20/2025) for Annual.  Data Unavailable

## 2024-02-24 PROBLEM — N87.0 DYSPLASIA OF CERVIX, LOW GRADE (CIN 1): Status: ACTIVE | Noted: 2024-02-24

## 2024-02-24 PROBLEM — R87.810 CERVICAL HIGH RISK HPV (HUMAN PAPILLOMAVIRUS) TEST POSITIVE: Status: ACTIVE | Noted: 2024-02-24

## 2024-02-27 ENCOUNTER — PREP FOR PROCEDURE (OUTPATIENT)
Facility: HOSPITAL | Age: 39
End: 2024-02-27

## 2024-02-27 PROBLEM — D25.9 FIBROID, UTERINE: Status: ACTIVE | Noted: 2024-02-27

## 2024-03-06 NOTE — PROGRESS NOTES
Marina Mcgee is a 39 y.o. female presents for a problem visit.    Chief Complaint   Patient presents with    Procedure     Colposcope       Problems: Abnormal Pap       No LMP recorded. Patient has had an implant.    Birth Control: Nexplanon.    Last Pap: abnormal LSIL HPV obtained 2/20/2024.        1. Have you been to the ER, urgent care clinic, or hospitalized since your last visit? No    2. Have you seen or consulted any other health care providers outside of the [facility] since your last visit? No      Chart reviewed for the following:   GRACIA ROQUE MARGARET PATTIE, CMA, have reviewed the History, Physical and updated the Allergic reactions for Marina Mcgee     TIME OUT performed immediately prior to start of procedure:   GRACIA ROQUE MARGARET PATTIE, CMA, have performed the following reviews on Marina Mcgee prior to the start of the procedure:            * Patient was identified by name and date of birth   * Agreement on procedure being performed was verified  * Risks and Benefits explained to the patient  * Procedure site verified and marked as necessary  * Patient was positioned for comfort  * Consent was signed and verified     Time: 11:00am      Date of procedure: 3/7/2024    Procedure performed by:  Stas Perry MD       Provider assisted by: Sarah Chappell MA    Patient assisted by: self    How tolerated by patient: tolerated the procedure well with no complications    Post Procedural Pain Scale: 2 - Hurts Little Bit    Comments: none    Examination chaperoned by NATHAN CHAPPELL CMA.

## 2024-03-07 ENCOUNTER — PROCEDURE VISIT (OUTPATIENT)
Age: 39
End: 2024-03-07

## 2024-03-07 VITALS
SYSTOLIC BLOOD PRESSURE: 129 MMHG | WEIGHT: 241.6 LBS | HEIGHT: 68 IN | DIASTOLIC BLOOD PRESSURE: 84 MMHG | BODY MASS INDEX: 36.62 KG/M2

## 2024-03-07 DIAGNOSIS — D25.1 INTRAMURAL LEIOMYOMA OF UTERUS: ICD-10-CM

## 2024-03-07 DIAGNOSIS — R87.810 PAP SMEAR OF CERVIX SHOWS HIGH RISK HPV PRESENT: ICD-10-CM

## 2024-03-07 DIAGNOSIS — R87.612 LGSIL ON PAP SMEAR OF CERVIX: Primary | ICD-10-CM

## 2024-03-07 PROBLEM — D25.9 FIBROID, UTERINE: Status: ACTIVE | Noted: 2021-07-01

## 2024-03-07 NOTE — PROGRESS NOTES
PROCEDURE NOTE  COLPOSCOPY    Marina Mcgee is a ,  39 y.o. female Black /  No LMP recorded. Patient has had an implant.  She presents for colposcopy for evaluation of a cervical abnormality with a pap smear abnormality consisting of LSIL and HPV. After being presented with the risks, benefits and alternatives has she signed a consent for the procedure. She states that she understands the need for the procedure and has no further questions. She was informed that she may experience discomfort.    Procedure:  She was positioned in the dorsal lithotomy position and a speculum was inserted into the vagina. Dilute acetic acid was applied to the cervix. The colposcope was used to visualize the cervix. Procedure: The transformation zone was completely visualized.    Findings:   This colposcopy was satisfactory. The procedure was notable for white epithelium on the cervix. Biopsies were taken from the cervix . See accompanying diagram for biopsy sites. Monsels was applied to the cervix.    Physical Exam  Genitourinary:      Vulva and urethral meatus normal.      No lesions in the vagina.      Right Labia: No lesions.       No vaginal discharge or bleeding.         Neurological:      Mental Status: She is alert.   Vitals and nursing note reviewed. Exam conducted with a chaperone present.       Endocervical currettage: An endocervical curettage was performed.     Post Procedure Status: The patient tolerated the procedure well with minimal discomfort.   She was observed for 10 minutes and released in good condition.    ASSESSMENT:   Diagnosis Orders   1. LGSIL on Pap smear of cervix  Surgical Pathology      2. Pap smear of cervix shows high risk HPV present  Surgical Pathology      3. Intramural leiomyoma of uterus            PLAN:  Return in about 6 months (around 2024) for Repap.

## 2024-03-26 ENCOUNTER — PREP FOR PROCEDURE (OUTPATIENT)
Facility: HOSPITAL | Age: 39
End: 2024-03-26

## 2024-03-26 DIAGNOSIS — D25.9 UTERINE LEIOMYOMA, UNSPECIFIED LOCATION: Primary | ICD-10-CM

## 2024-04-23 ENCOUNTER — ANESTHESIA EVENT (OUTPATIENT)
Facility: HOSPITAL | Age: 39
End: 2024-04-23
Payer: COMMERCIAL

## 2024-04-25 ENCOUNTER — ANESTHESIA (OUTPATIENT)
Facility: HOSPITAL | Age: 39
End: 2024-04-25
Payer: COMMERCIAL

## 2024-04-25 ENCOUNTER — HOSPITAL ENCOUNTER (OUTPATIENT)
Facility: HOSPITAL | Age: 39
Setting detail: OUTPATIENT SURGERY
Discharge: HOME | End: 2024-04-25
Attending: OBSTETRICS & GYNECOLOGY | Admitting: OBSTETRICS & GYNECOLOGY
Payer: COMMERCIAL

## 2024-04-25 VITALS
TEMPERATURE: 98.6 F | RESPIRATION RATE: 24 BRPM | BODY MASS INDEX: 37.56 KG/M2 | OXYGEN SATURATION: 100 % | HEART RATE: 70 BPM | DIASTOLIC BLOOD PRESSURE: 74 MMHG | WEIGHT: 247.8 LBS | HEIGHT: 68 IN | SYSTOLIC BLOOD PRESSURE: 111 MMHG

## 2024-04-25 DIAGNOSIS — D25.9 UTERINE LEIOMYOMA, UNSPECIFIED LOCATION: ICD-10-CM

## 2024-04-25 DIAGNOSIS — G89.18 POST-OP PAIN: Primary | ICD-10-CM

## 2024-04-25 LAB — HCG UR QL: NEGATIVE

## 2024-04-25 PROCEDURE — 6360000002 HC RX W HCPCS: Performed by: NURSE ANESTHETIST, CERTIFIED REGISTERED

## 2024-04-25 PROCEDURE — 88305 TISSUE EXAM BY PATHOLOGIST: CPT

## 2024-04-25 PROCEDURE — 2500000003 HC RX 250 WO HCPCS: Performed by: OBSTETRICS & GYNECOLOGY

## 2024-04-25 PROCEDURE — 3700000001 HC ADD 15 MINUTES (ANESTHESIA): Performed by: OBSTETRICS & GYNECOLOGY

## 2024-04-25 PROCEDURE — 58674 LAPS ABLTJ UTERINE FIBROIDS: CPT | Performed by: OBSTETRICS & GYNECOLOGY

## 2024-04-25 PROCEDURE — 6370000000 HC RX 637 (ALT 250 FOR IP): Performed by: STUDENT IN AN ORGANIZED HEALTH CARE EDUCATION/TRAINING PROGRAM

## 2024-04-25 PROCEDURE — 2580000003 HC RX 258: Performed by: OBSTETRICS & GYNECOLOGY

## 2024-04-25 PROCEDURE — 81025 URINE PREGNANCY TEST: CPT

## 2024-04-25 PROCEDURE — 6360000002 HC RX W HCPCS: Performed by: ANESTHESIOLOGY

## 2024-04-25 PROCEDURE — 2580000003 HC RX 258: Performed by: ANESTHESIOLOGY

## 2024-04-25 PROCEDURE — 7100000001 HC PACU RECOVERY - ADDTL 15 MIN: Performed by: OBSTETRICS & GYNECOLOGY

## 2024-04-25 PROCEDURE — 3600000004 HC SURGERY LEVEL 4 BASE: Performed by: OBSTETRICS & GYNECOLOGY

## 2024-04-25 PROCEDURE — 3700000000 HC ANESTHESIA ATTENDED CARE: Performed by: OBSTETRICS & GYNECOLOGY

## 2024-04-25 PROCEDURE — 2709999900 HC NON-CHARGEABLE SUPPLY: Performed by: OBSTETRICS & GYNECOLOGY

## 2024-04-25 PROCEDURE — 3600000014 HC SURGERY LEVEL 4 ADDTL 15MIN: Performed by: OBSTETRICS & GYNECOLOGY

## 2024-04-25 PROCEDURE — 2500000003 HC RX 250 WO HCPCS: Performed by: NURSE ANESTHETIST, CERTIFIED REGISTERED

## 2024-04-25 PROCEDURE — 7100000000 HC PACU RECOVERY - FIRST 15 MIN: Performed by: OBSTETRICS & GYNECOLOGY

## 2024-04-25 PROCEDURE — 6360000002 HC RX W HCPCS: Performed by: OBSTETRICS & GYNECOLOGY

## 2024-04-25 RX ORDER — DIPHENHYDRAMINE HYDROCHLORIDE 50 MG/ML
12.5 INJECTION INTRAMUSCULAR; INTRAVENOUS
Status: DISCONTINUED | OUTPATIENT
Start: 2024-04-25 | End: 2024-04-25 | Stop reason: HOSPADM

## 2024-04-25 RX ORDER — OXYCODONE HYDROCHLORIDE AND ACETAMINOPHEN 5; 325 MG/1; MG/1
1 TABLET ORAL EVERY 6 HOURS PRN
Qty: 20 TABLET | Refills: 0 | Status: SHIPPED | OUTPATIENT
Start: 2024-04-25 | End: 2024-05-02

## 2024-04-25 RX ORDER — ONDANSETRON 2 MG/ML
INJECTION INTRAMUSCULAR; INTRAVENOUS PRN
Status: DISCONTINUED | OUTPATIENT
Start: 2024-04-25 | End: 2024-04-25 | Stop reason: SDUPTHER

## 2024-04-25 RX ORDER — PROPOFOL 10 MG/ML
INJECTION, EMULSION INTRAVENOUS PRN
Status: DISCONTINUED | OUTPATIENT
Start: 2024-04-25 | End: 2024-04-25 | Stop reason: SDUPTHER

## 2024-04-25 RX ORDER — ONDANSETRON 2 MG/ML
4 INJECTION INTRAMUSCULAR; INTRAVENOUS
Status: COMPLETED | OUTPATIENT
Start: 2024-04-25 | End: 2024-04-25

## 2024-04-25 RX ORDER — MIDAZOLAM HYDROCHLORIDE 1 MG/ML
INJECTION INTRAMUSCULAR; INTRAVENOUS PRN
Status: DISCONTINUED | OUTPATIENT
Start: 2024-04-25 | End: 2024-04-25 | Stop reason: SDUPTHER

## 2024-04-25 RX ORDER — ROCURONIUM BROMIDE 10 MG/ML
INJECTION, SOLUTION INTRAVENOUS PRN
Status: DISCONTINUED | OUTPATIENT
Start: 2024-04-25 | End: 2024-04-25 | Stop reason: SDUPTHER

## 2024-04-25 RX ORDER — NALOXONE HYDROCHLORIDE 0.4 MG/ML
INJECTION, SOLUTION INTRAMUSCULAR; INTRAVENOUS; SUBCUTANEOUS PRN
Status: DISCONTINUED | OUTPATIENT
Start: 2024-04-25 | End: 2024-04-25 | Stop reason: HOSPADM

## 2024-04-25 RX ORDER — KETOROLAC TROMETHAMINE 30 MG/ML
INJECTION, SOLUTION INTRAMUSCULAR; INTRAVENOUS PRN
Status: DISCONTINUED | OUTPATIENT
Start: 2024-04-25 | End: 2024-04-25 | Stop reason: SDUPTHER

## 2024-04-25 RX ORDER — OXYCODONE HYDROCHLORIDE AND ACETAMINOPHEN 5; 325 MG/1; MG/1
1 TABLET ORAL ONCE
Status: COMPLETED | OUTPATIENT
Start: 2024-04-25 | End: 2024-04-25

## 2024-04-25 RX ORDER — BUPIVACAINE HYDROCHLORIDE AND EPINEPHRINE 5; 5 MG/ML; UG/ML
INJECTION, SOLUTION PERINEURAL PRN
Status: DISCONTINUED | OUTPATIENT
Start: 2024-04-25 | End: 2024-04-25 | Stop reason: ALTCHOICE

## 2024-04-25 RX ORDER — IBUPROFEN 800 MG/1
800 TABLET ORAL EVERY 6 HOURS PRN
Qty: 60 TABLET | Refills: 1 | Status: SHIPPED | OUTPATIENT
Start: 2024-04-25 | End: 2024-05-25

## 2024-04-25 RX ORDER — MIDAZOLAM HYDROCHLORIDE 2 MG/2ML
2 INJECTION, SOLUTION INTRAMUSCULAR; INTRAVENOUS
Status: DISCONTINUED | OUTPATIENT
Start: 2024-04-25 | End: 2024-04-25 | Stop reason: HOSPADM

## 2024-04-25 RX ORDER — DEXAMETHASONE SODIUM PHOSPHATE 4 MG/ML
INJECTION, SOLUTION INTRA-ARTICULAR; INTRALESIONAL; INTRAMUSCULAR; INTRAVENOUS; SOFT TISSUE PRN
Status: DISCONTINUED | OUTPATIENT
Start: 2024-04-25 | End: 2024-04-25 | Stop reason: SDUPTHER

## 2024-04-25 RX ORDER — FENTANYL CITRATE 50 UG/ML
100 INJECTION, SOLUTION INTRAMUSCULAR; INTRAVENOUS
Status: DISCONTINUED | OUTPATIENT
Start: 2024-04-25 | End: 2024-04-25 | Stop reason: HOSPADM

## 2024-04-25 RX ORDER — LIDOCAINE HYDROCHLORIDE 10 MG/ML
1 INJECTION, SOLUTION EPIDURAL; INFILTRATION; INTRACAUDAL; PERINEURAL
Status: DISCONTINUED | OUTPATIENT
Start: 2024-04-25 | End: 2024-04-25 | Stop reason: HOSPADM

## 2024-04-25 RX ORDER — FENTANYL CITRATE 50 UG/ML
INJECTION, SOLUTION INTRAMUSCULAR; INTRAVENOUS PRN
Status: DISCONTINUED | OUTPATIENT
Start: 2024-04-25 | End: 2024-04-25 | Stop reason: SDUPTHER

## 2024-04-25 RX ORDER — LIDOCAINE HYDROCHLORIDE 20 MG/ML
INJECTION, SOLUTION EPIDURAL; INFILTRATION; INTRACAUDAL; PERINEURAL PRN
Status: DISCONTINUED | OUTPATIENT
Start: 2024-04-25 | End: 2024-04-25 | Stop reason: SDUPTHER

## 2024-04-25 RX ORDER — SODIUM CHLORIDE, SODIUM LACTATE, POTASSIUM CHLORIDE, CALCIUM CHLORIDE 600; 310; 30; 20 MG/100ML; MG/100ML; MG/100ML; MG/100ML
INJECTION, SOLUTION INTRAVENOUS CONTINUOUS
Status: DISCONTINUED | OUTPATIENT
Start: 2024-04-25 | End: 2024-04-25 | Stop reason: HOSPADM

## 2024-04-25 RX ADMIN — KETOROLAC TROMETHAMINE 30 MG: 30 INJECTION, SOLUTION INTRAMUSCULAR at 14:53

## 2024-04-25 RX ADMIN — HYDROMORPHONE HYDROCHLORIDE 0.5 MG: 1 INJECTION, SOLUTION INTRAMUSCULAR; INTRAVENOUS; SUBCUTANEOUS at 15:51

## 2024-04-25 RX ADMIN — DEXAMETHASONE SODIUM PHOSPHATE 8 MG: 4 INJECTION, SOLUTION INTRAMUSCULAR; INTRAVENOUS at 13:35

## 2024-04-25 RX ADMIN — SODIUM CHLORIDE, POTASSIUM CHLORIDE, SODIUM LACTATE AND CALCIUM CHLORIDE: 600; 310; 30; 20 INJECTION, SOLUTION INTRAVENOUS at 11:37

## 2024-04-25 RX ADMIN — LIDOCAINE HYDROCHLORIDE 100 MG: 20 INJECTION, SOLUTION EPIDURAL; INFILTRATION; INTRACAUDAL; PERINEURAL at 13:25

## 2024-04-25 RX ADMIN — ROCURONIUM BROMIDE 15 MG: 10 INJECTION INTRAVENOUS at 14:37

## 2024-04-25 RX ADMIN — ONDANSETRON HYDROCHLORIDE 4 MG: 2 SOLUTION INTRAMUSCULAR; INTRAVENOUS at 13:35

## 2024-04-25 RX ADMIN — WATER 2000 MG: 1 INJECTION INTRAMUSCULAR; INTRAVENOUS; SUBCUTANEOUS at 13:30

## 2024-04-25 RX ADMIN — HYDROMORPHONE HYDROCHLORIDE 0.5 MG: 1 INJECTION, SOLUTION INTRAMUSCULAR; INTRAVENOUS; SUBCUTANEOUS at 15:44

## 2024-04-25 RX ADMIN — ONDANSETRON 4 MG: 2 INJECTION INTRAMUSCULAR; INTRAVENOUS at 17:18

## 2024-04-25 RX ADMIN — ROCURONIUM BROMIDE 50 MG: 10 INJECTION INTRAVENOUS at 13:25

## 2024-04-25 RX ADMIN — FENTANYL CITRATE 50 MCG: 50 INJECTION, SOLUTION INTRAMUSCULAR; INTRAVENOUS at 13:25

## 2024-04-25 RX ADMIN — FENTANYL CITRATE 50 MCG: 50 INJECTION, SOLUTION INTRAMUSCULAR; INTRAVENOUS at 13:51

## 2024-04-25 RX ADMIN — OXYCODONE AND ACETAMINOPHEN 1 TABLET: 5; 325 TABLET ORAL at 16:12

## 2024-04-25 RX ADMIN — MIDAZOLAM HYDROCHLORIDE 2 MG: 1 INJECTION, SOLUTION INTRAMUSCULAR; INTRAVENOUS at 13:18

## 2024-04-25 RX ADMIN — SUGAMMADEX 200 MG: 100 INJECTION, SOLUTION INTRAVENOUS at 14:53

## 2024-04-25 RX ADMIN — HYDROMORPHONE HYDROCHLORIDE 0.5 MG: 1 INJECTION, SOLUTION INTRAMUSCULAR; INTRAVENOUS; SUBCUTANEOUS at 16:04

## 2024-04-25 RX ADMIN — PROPOFOL 200 MG: 10 INJECTION, EMULSION INTRAVENOUS at 13:25

## 2024-04-25 ASSESSMENT — PAIN DESCRIPTION - DESCRIPTORS
DESCRIPTORS: ACHING
DESCRIPTORS: CRAMPING
DESCRIPTORS: ACHING

## 2024-04-25 ASSESSMENT — PAIN SCALES - GENERAL
PAINLEVEL_OUTOF10: 4
PAINLEVEL_OUTOF10: 6
PAINLEVEL_OUTOF10: 3
PAINLEVEL_OUTOF10: 3
PAINLEVEL_OUTOF10: 8
PAINLEVEL_OUTOF10: 4

## 2024-04-25 ASSESSMENT — PAIN DESCRIPTION - LOCATION
LOCATION: ABDOMEN

## 2024-04-25 ASSESSMENT — PAIN - FUNCTIONAL ASSESSMENT: PAIN_FUNCTIONAL_ASSESSMENT: 0-10

## 2024-04-25 NOTE — OP NOTE
OPERATIVE REPORT   ACCESSA FIBROID ABLATION       Name: Marina Mcgee   Medical Record Number: [id number]   YOB: 1985  Date of Surgery: 4/25/2024    Preoperative Diagnosis: Fibroid, uterine [D25.9]    Postoperative Diagnosis: Same    Procedure: Procedure(s):  LAPAROSCOPIC RADIOFREQUENCY ABLATION PROCEDURE ACCESSA AND OPEN MYOMECTOMY    OR Staff:  Surgeon(s):  Stas Perry II, MD  Surgical Assistant: Erickson Olivarez   Anesthesiologist: Mehul Brown DO  CRNA: Henry Whitmore APRN - CRNA     Anesthesia: General    Findings: see below    Estimated Blood Loss:  <50    Drains: * No LDAs found *    Pathology /Specimens: Pedunculated uterine fibroid    Implants:  * No implants in log *    DVT Prophylaxis: SCD Hose    Antibiotic Prophylaxis: Ancef    Complications: None    INDICATIONS:    Informed consent was obtained for the above procedure, and the patient stated that she had no further questions.    PROCEDURE:  The patient was prepped and draped in the dorsal lithotomy position after institution of general anesthesia. A surgical time out was performed.       A single sided speculum was placed into the vagina and the cervix grasped with a single toothed tenaculum.  The Azalea uterine manipulator was inserted through the cervix and other instruments removed from the vagina.      Attention was turned to the abdomen.  Veress needle was placed through an incision in the inferior edge of the umbilicus.  Pneumoperitoneum was obtained without difficulty.  The umbilical 5 mm port was placed, then a 12 mm port suprapubically in the midline at the level of the fundus under direct visualization and without difficulty.     The pelvis was visualized.  The uterus was enlarged and multifibroid there was a predominant pedunculated fibroid with a thick stalk arising off the posterior wall of the uterus and adhered to the right pelvic sidewall. The adnexae were normal.  The cul-de-sac was normal.  Upper abdominal  the incisions and dermabond was applied.  The patient was awakened and taken to the recovery room in good condition.        Signed By:  Stas Perry MD     April 25, 2024

## 2024-04-25 NOTE — ANESTHESIA PRE PROCEDURE
04/25/24 125/78   03/07/24 129/84   02/20/24 134/85       NPO Status: Time of last liquid consumption: 2030                        Time of last solid consumption: 2030                        Date of last liquid consumption: 04/24/24                        Date of last solid food consumption: 04/24/24    BMI:   Wt Readings from Last 3 Encounters:   04/25/24 112.4 kg (247 lb 12.8 oz)   03/07/24 109.6 kg (241 lb 9.6 oz)   02/20/24 110.6 kg (243 lb 12.8 oz)     Body mass index is 37.68 kg/m².    CBC:   Lab Results   Component Value Date/Time    WBC 6.8 02/07/2024 02:00 PM    RBC 4.14 02/07/2024 02:00 PM    HGB 12.3 02/07/2024 02:00 PM    HCT 36.8 02/07/2024 02:00 PM    MCV 88.9 02/07/2024 02:00 PM    RDW 12.6 02/07/2024 02:00 PM     02/07/2024 02:00 PM       CMP:   Lab Results   Component Value Date/Time     02/07/2024 02:00 PM    K 3.5 02/07/2024 02:00 PM     02/07/2024 02:00 PM    CO2 28 02/07/2024 02:00 PM    BUN 11 02/07/2024 02:00 PM    CREATININE 0.82 02/07/2024 02:00 PM    GFRAA 118 11/11/2021 12:00 AM    AGRATIO 1.3 02/07/2024 02:00 PM    AGRATIO 1.8 01/31/2023 03:26 PM    LABGLOM >60 02/07/2024 02:00 PM    LABGLOM 92 01/31/2023 03:26 PM    GLUCOSE 72 02/07/2024 02:00 PM    PROT 6.5 02/07/2024 02:00 PM    CALCIUM 9.1 02/07/2024 02:00 PM    BILITOT 0.3 02/07/2024 02:00 PM    ALKPHOS 98 02/07/2024 02:00 PM    ALKPHOS 104 01/31/2023 03:26 PM    AST 14 02/07/2024 02:00 PM    ALT 19 02/07/2024 02:00 PM       POC Tests: No results for input(s): \"POCGLU\", \"POCNA\", \"POCK\", \"POCCL\", \"POCBUN\", \"POCHEMO\", \"POCHCT\" in the last 72 hours.    Coags: No results found for: \"PROTIME\", \"INR\", \"APTT\"    HCG (If Applicable):   Lab Results   Component Value Date    PREGTESTUR Negative 04/25/2024        ABGs: No results found for: \"PHART\", \"PO2ART\", \"IIM4XWO\", \"FRU6MYR\", \"BEART\", \"E8GXSRCF\"     Type & Screen (If Applicable):  No results found for: \"LABABO\", \"LABRH\"    Drug/Infectious Status (If Applicable):  Lab

## 2024-04-25 NOTE — DISCHARGE SUMMARY
Gynecology Surgical Discharge Summary     Name: Marina Mcgee MRN: [id number]  SSN: [id number]    YOB: 1985  Age: 39 y.o.  Sex: female      Admit date: 4/25/2024    Discharge Date: 4/25/2024      Attending Physician: Stas Perry II, MD     Admission Diagnoses: Fibroid, uterine [D25.9]    Discharge Diagnoses: Fibroid, uterine [D25.9]   Principal Problem:    Fibroid, uterine  Resolved Problems:    * No resolved hospital problems. *       Procedures: Procedure(s):  LAPAROSCOPIC RADIOFREQUENCY ABLATION PROCEDURE    Hospital Course: Normal hospital course for this procedure.  The patient was released to her home in good condition.    Significant Diagnostic Studies:   Recent Results (from the past 24 hour(s))   POC Pregnancy Urine Qual    Collection Time: 04/25/24 11:17 AM   Result Value Ref Range    Preg Test, Ur Negative NEG         Patient Instructions:     Diet, activity, wound care: See printed instructions.    Current Discharge Medication List        START taking these medications    Details   ibuprofen (ADVIL;MOTRIN) 800 MG tablet Take 1 tablet by mouth every 6 hours as needed for Pain  Qty: 60 tablet, Refills: 1  Start date: 4/25/2024, End date: 5/25/2024      oxyCODONE-acetaminophen (PERCOCET) 5-325 MG per tablet Take 1 tablet by mouth every 6 hours as needed for Pain for up to 7 days. Intended supply: 3 days. Take lowest dose possible to manage pain Max Daily Amount: 4 tablets  Qty: 20 tablet, Refills: 0  Start date: 4/25/2024, End date: 5/2/2024    Comments: Reduce doses taken as pain becomes manageable Reduce doses taken as pain becomes manageable  Associated Diagnoses: Post-op pain           CONTINUE these medications which have NOT CHANGED    Details   Multiple Vitamin (MULTIVITAMIN ADULT PO) Take by mouth      hydroCHLOROthiazide (HYDRODIURIL) 25 MG tablet TAKE ONE TABLET BY MOUTH EVERY DAY  Qty: 90 tablet, Refills: 1    Comments: This prescription was filled on 10/31/2023. Any refills  authorized will be placed on file.  Associated Diagnoses: Essential (primary) hypertension      amLODIPine (NORVASC) 5 MG tablet Take 1 tablet by mouth daily  Qty: 90 tablet, Refills: 1    Associated Diagnoses: Essential (primary) hypertension      omeprazole (PRILOSEC) 20 MG delayed release capsule TAKE ONE CAPSULE BY MOUTH EVERY DAY  Qty: 90 capsule, Refills: 0      Vitamin D, Cholecalciferol, 25 MCG (1000 UT) TABS Take by mouth      ascorbic acid (VITAMIN C) 250 MG tablet Take by mouth      fluticasone (FLONASE) 50 MCG/ACT nasal spray Use one spray in each nostril daily. Continue for at least 2 weeks.           I spent 10 minutes discharging the patient in face to face contact.  Follow-up Information       Follow up With Specialties Details Why Contact Info    Stas Perry II, MD Obstetrics & Gynecology, Gynecology, Obstetrics Follow up in 2 week(s) Post Op Checkup [id number] [address] [id number] [phone]             Signed By:  Stas Perry MD     April 25, 2024

## 2024-04-25 NOTE — H&P
Gynecology Preop History and Physical    Name: Marina Mcgee MRN: [id number] SSN: [id number]    YOB: 1985  Age: 39 y.o.  Sex: female       Subjective:      Chief complaint: uterine fibroids    Marina Mcgee is 39 y.o. year old Black /   female who presents for uterine fibroids.  Previous evaluation has been done with ultrasound, which revealed multiple fibroids showing progressive growth.   She is now admitted for outpatient surgery consisting of Procedure(s) (LRB):  LAPAROSCOPIC RADIOFREQUENCY ABLATION PROCEDURE (N/A).    The current method of family planning is Nexplanon.    Problem List:  Patient Active Problem List    Diagnosis Date Noted    Dysplasia of cervix, low grade (CHRIS 1) 2024     Overview Note:     Colpo 3/7/24 -- Negative  repap 6 mo      Cervical high risk HPV (human papillomavirus) test positive 2024    Gastroesophageal reflux disease without esophagitis 07/15/2022    Uterine fibroid 2021     Overview Note:       3 cm         5.1cm        5.4, 1.7, 1.6  24  6.2, 1.6,,1.4            Fibroid, uterine 2021     Overview Note:      3 cm    5.1cm       Essential (primary) hypertension 04/15/2021    Severe obesity with body mass index (BMI) of 35.0 to 39.9 with serious comorbidity (HCC) 2018    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

## 2024-04-25 NOTE — DISCHARGE INSTRUCTIONS
DISCHARGE SUMMARY from your Nurse      PATIENT INSTRUCTIONS    After general anesthesia or intravenous sedation, for 24 hours or while taking prescription Narcotics:  Limit your activities  Do not drive and operate hazardous machinery  Do not make important personal or business decisions  Do  not drink alcoholic beverages  If you have not urinated within 8 hours after discharge, please contact your surgeon on call.    Report the following to your surgeon:  Excessive pain, swelling, redness or odor of or around the surgical area  Temperature over 100.5  Nausea and vomiting lasting longer than 4 hours or if unable to take medications  Any signs of decreased circulation or nerve impairment to extremity: change in color, persistent  numbness, tingling, coldness or increase pain  Any questions      GOOD HELP TO FIGHT AN INFECTION  Here are a few tip to help reduce the chance of getting an infection after surgery:  Wash Your Hands  Good handwashing is the most important thing you and your caregiver can do.  Wash before and after caring for any wounds.  Dry your hand with a clean towel.  Wash with soap and water for at least 20 seconds. A TIP: sing the \"Happy Birthday\" song through one time while washing to help with the timing.  Use a hand  in between washings.    Shower  When your surgeon says it is OK to take a shower, use a new bar of antibacterial soap (if that is what you use, and keep that bar of soap ONLY for your use), or antibacterial body wash.  Use a clean wash cloth or sponge when you bathe.  Dry off with a clean towel  after every bath - be careful around any wounds, skin staples, sutures or surgical glue over/on wounds.  Do not enter swimming pools, hot tubs, lakes, rivers and/or ocean until wounds are healed and your doctor/surgeon says it is OK.    Use Clean Sheets  Sleep on freshly laundered sheets after your surgery.  Keep the surgery site covered with a clean, dry bandage (if instructed to do  transportation.  If you are sick:  Leave your home only if you need to get medical care. But call the doctor's office first so they know you're coming. And wear a face mask, if you have one.  If you have a face mask, wear it whenever you're around other people. It can help stop the spread of the virus when you cough or sneeze.  Clean and disinfect your home every day. Use household  and disinfectant wipes or sprays. Take special care to clean things that you grab with your hands. These include doorknobs, remote controls, phones, and handles on your refrigerator and microwave. And don't forget countertops, tabletops, bathrooms, and computer keyboards.  When to call for help  Call 911 anytime you think you may need emergency care. For example, call if:  You have severe trouble breathing. (You can't talk at all.)  You have constant chest pain or pressure.  You are severely dizzy or lightheaded.  You are confused or can't think clearly.  Your face and lips have a blue color.  You pass out (lose consciousness) or are very hard to wake up.  Call your doctor now if you develop symptoms such as:  Shortness of breath.  Fever.  Cough.  If you need to get care, call ahead to the doctor's office for instructions before you go. Make sure you wear a face mask, if you have one, to prevent exposing other people to the virus.  Where can you get the latest information?  The following health organizations are tracking and studying this virus. Their websites contain the most up-to-date information. You'll also learn what to do if you think you may have been exposed to the virus.  U.S. Centers for Disease Control and Prevention (CDC): The CDC provides updated news about the disease and travel advice. The website also tells you how to prevent the spread of infection. www.cdc.gov  World Health Organization (WHO): WHO offers information about the virus outbreaks. WHO also has travel advice. www.who.int  Current as of: April 1, 2020

## 2024-04-26 ENCOUNTER — PATIENT MESSAGE (OUTPATIENT)
Age: 39
End: 2024-04-26

## 2024-04-26 ENCOUNTER — TELEPHONE (OUTPATIENT)
Age: 39
End: 2024-04-26

## 2024-04-26 NOTE — TELEPHONE ENCOUNTER
Stas Perry II, MD   to Me         4/26/24 11:05 AM  Yes please & ty        Letter composed as per MD verbal order and sent to patient via her my chart    Patient advised and checked and she has received the letter that she requested    Patient verbalized understanding.

## 2024-04-26 NOTE — TELEPHONE ENCOUNTER
Two patient identifiers used    39 year old patient had surgery yesterday   Primary    Procedure Laterality Anesthesia   LAPAROSCOPIC RADIOFREQUENCY ABLATION PROCEDURE ACCESSA AND OPEN MYOMECTOMY            Patient states she is doing ok , but sore  Patient is calling to ask for letter for work that she had the procedure and is going to be out a week and return to work on 5/3/2024 with out restrictions      ? Ok to write letter as described above and send to patient via her [employer]      Please advise    Thank you

## 2024-04-26 NOTE — TELEPHONE ENCOUNTER
From: Marina Mcgee  To: Dr. Stas Perry  Sent: 4/26/2024 8:25 AM EDT  Subject: Doctors Note for work     Good morning! Can I please have a doctors note so I can show my job. Thank you for EVERYTHING yesterday and in advance.

## 2024-05-01 ENCOUNTER — TELEPHONE (OUTPATIENT)
Age: 39
End: 2024-05-01

## 2024-05-01 NOTE — PROGRESS NOTES
Marina Mcgee is a 39 y.o. female presents for a problem visit.    Chief Complaint   Patient presents with    Post-Op Check     Patient's last menstrual period was 03/01/2024 (approximate).  Birth Control: Nexplanon.  Last Pap: abnormal LSIL HPV obtained 2/20/2024. Colposcope 3/7/2024 negative.      The patient is reporting having: Post-Op for LAPAROSCOPIC RADIOFREQUENCY ABLATION PROCEDURE ACCESSA AND OPEN MYOMECTOMY on 4/25/2024. Patient states lower incision painful.  No fever feels nauseated since surgery.            1. Have you been to the ER, urgent care clinic, or hospitalized since your last visit? No    2. Have you seen or consulted any other health care providers outside of the [facility] since your last visit? No    Examination chaperoned by NATHAN FAGAN CMA.

## 2024-05-01 NOTE — TELEPHONE ENCOUNTER
Stas Perry II, MD   to Me       5/1/24  9:49 AM  Yes I expected this to be quite tender and was surprised she was saying it wasn't intially.  I would get an abdominal binder at [facility] and see if that will help during healing           Patient advised of MD recommendations and will keep appointment for tomorrow as well.    Patient verbalized understanding.

## 2024-05-01 NOTE — TELEPHONE ENCOUNTER
Two patient identifiers used        39 year old patient had surgery on 4/25/2024   Procedure Laterality Anesthesia   LAPAROSCOPIC RADIOFREQUENCY ABLATION PROCEDURE ACCESSA AND OPEN MYOMECTOMY         Patient calling to complain of the lower incision still quite painful if she has to cough ,patient reports the discomfort is at 10 at that time    Patient reports the incision is intact, no redness, there is swelling since the surgery     Patient is taking the Advil and  having pain relief except when she has coughing , she has to hold something and she has had to have her children help her get out of a chair    Currently patient is on the schedule to be seen tomorrow      ? Is that ok or do you have other recommendations    Please advise  Thank you

## 2024-05-02 ENCOUNTER — OFFICE VISIT (OUTPATIENT)
Age: 39
End: 2024-05-02

## 2024-05-02 VITALS
DIASTOLIC BLOOD PRESSURE: 78 MMHG | WEIGHT: 249.6 LBS | SYSTOLIC BLOOD PRESSURE: 122 MMHG | HEIGHT: 68 IN | BODY MASS INDEX: 37.83 KG/M2

## 2024-05-02 DIAGNOSIS — Z09 POSTOP CHECK: Primary | ICD-10-CM

## 2024-05-02 PROCEDURE — 99024 POSTOP FOLLOW-UP VISIT: CPT | Performed by: OBSTETRICS & GYNECOLOGY

## 2024-05-02 RX ORDER — ONDANSETRON 4 MG/1
4 TABLET, FILM COATED ORAL 3 TIMES DAILY PRN
Qty: 20 TABLET | Refills: 0 | Status: SHIPPED | OUTPATIENT
Start: 2024-05-02

## 2024-05-02 NOTE — PROGRESS NOTES
POSTOP EVALUATION     Marina Mcgee is a 39 y.o. female returns for a routine post-operative follow-up visit following Laparoscopic Radiofrequency Ablation Procedure Accessa And Open Myomectomy.  This was done on 2024  (about 7  days ago).  She had an minilap incision for large pedunculated myoma and has been very tender in that region.  This all started after she choked on something and had acute worsening of pain.    Path was benign.   Has also had some nausea.  No vomiting.  Has been super constipated so stopped percocet and started miralax with good results. Is Eating.      Past Surgical History:   Procedure Laterality Date    COLPOSCOPY      per pt WNL    INDUCED       LAPAROSCOPY N/A 2024    LAPAROSCOPIC RADIOFREQUENCY ABLATION PROCEDURE ACCESSA AND OPEN MYOMECTOMY performed by Stas Perry II, MD at [facility] MAIN OR    WISDOM TOOTH EXTRACTION          PHYSICAL EXAMINATION    Gastrointestinal  Abdominal Examination: abdomen non-tender to palpation, incisions healing well, normal bowel sounds, no masses present.  Incision seems intact and healing well with no signs of infection or herniation.  Fascia palpates intact  Liver and spleen: no hepatomegaly present, spleen not palpable  Hernias: no hernias identified    Skin  General Inspection: no rash, no lesions identified    Neurologic/Psychiatric  Mental Status:  Orientation: grossly oriented to person, place and time  Mood and Affect: mood normal, affect appropriate    Assessment:   Diagnosis Orders   1. Postop check            Plan:  Rx Zofran  Out of work x2 more weeks   - note given  Return in about 2 weeks (around 2024) for Post op Check.

## 2024-05-08 NOTE — PROGRESS NOTES
Marina Mcgee is a 39 y.o. female presents for a problem visit.    Chief Complaint   Patient presents with    Post-Op Check     Patient's last menstrual period was 03/01/2024 (approximate).  Birth Control: Nexplanon.  Last Pap: abnormal LSIL HPV obtained 2/20/2024. Colposcope 3/7/2024 negative.      The patient is reporting having: Post-Op for  Post-Op for LAPAROSCOPIC RADIOFREQUENCY ABLATION PROCEDURE ACCESSA AND OPEN MYOMECTOMY on 4/25/2024 .          1. Have you been to the ER, urgent care clinic, or hospitalized since your last visit? No    2. Have you seen or consulted any other health care providers outside of the [facility] since your last visit? No    Examination chaperoned by NATHAN FAGAN CMA.

## 2024-05-09 ENCOUNTER — OFFICE VISIT (OUTPATIENT)
Age: 39
End: 2024-05-09

## 2024-05-09 ENCOUNTER — TELEPHONE (OUTPATIENT)
Age: 39
End: 2024-05-09

## 2024-05-09 VITALS
SYSTOLIC BLOOD PRESSURE: 139 MMHG | BODY MASS INDEX: 38.04 KG/M2 | WEIGHT: 251 LBS | HEIGHT: 68 IN | DIASTOLIC BLOOD PRESSURE: 91 MMHG

## 2024-05-09 DIAGNOSIS — Z09 POSTOP CHECK: Primary | ICD-10-CM

## 2024-05-09 PROCEDURE — 99024 POSTOP FOLLOW-UP VISIT: CPT | Performed by: OBSTETRICS & GYNECOLOGY

## 2024-05-09 NOTE — TELEPHONE ENCOUNTER
Pt called and requested that her return to work date should be updated  on a new letter to 5/27/2024 .

## 2024-05-09 NOTE — PROGRESS NOTES
POSTOP EVALUATION     Marina Mcgee is a 39 y.o. female returns for a routine post-operative follow-up visit following Laparoscopic Radiofrequency Ablation Procedure Accessa And Open Myomectomy.  This was done on 2024  (about 14  days ago).   Pain is improving but still nauseated.  Still having some light bleeding and crampng.   Still too sore to work due to high impact job.     Past Surgical History:   Procedure Laterality Date    COLPOSCOPY      per pt WNL    INDUCED       LAPAROSCOPY N/A 2024    LAPAROSCOPIC RADIOFREQUENCY ABLATION PROCEDURE ACCESSA AND OPEN MYOMECTOMY performed by Stas Perry II, MD at [facility] MAIN OR    WISDOM TOOTH EXTRACTION            PHYSICAL EXAMINATION    Gastrointestinal  Abdominal Examination: abdomen non-tender to palpation, incisions healing well, normal bowel sounds, no masses present, derma bond removed.  Suture clipped  Liver and spleen: no hepatomegaly present, spleen not palpable  Hernias: no hernias identified    Genitourinary  External Genitalia: normal appearance for age, no discharge present, no tenderness present, no inflammatory lesions present, no masses present, no atrophy present  Vagina: normal vaginal vault without central or paravaginal defects, no discharge present, no inflammatory lesions present, no masses present, vaginal cuff in tact  Bladder: non-tender to palpation  Urethra: appears normal  Cervix: normal   Uterus: normal size & shape.  Adnexa: no adnexal tenderness present, no adnexal masses present  Perineum: perineum within normal limits, no evidence of trauma, no rashes or skin lesions present  Skin  General Inspection: no rash, no lesions identified    Neurologic/Psychiatric  Mental Status:  Orientation: grossly oriented to person, place and time  Mood and Affect: mood normal, affect appropriate    Assessment:   Diagnosis Orders   1. Postop check            Plan:  Return to work on 24  Return in about 4 months (around

## 2024-06-10 DIAGNOSIS — K21.9 GASTROESOPHAGEAL REFLUX DISEASE WITHOUT ESOPHAGITIS: Primary | ICD-10-CM

## 2024-06-10 RX ORDER — OMEPRAZOLE 20 MG/1
20 CAPSULE, DELAYED RELEASE ORAL DAILY
Qty: 90 CAPSULE | Refills: 1 | Status: SHIPPED | OUTPATIENT
Start: 2024-06-10

## 2024-07-05 DIAGNOSIS — I10 ESSENTIAL (PRIMARY) HYPERTENSION: ICD-10-CM

## 2024-07-05 RX ORDER — HYDROCHLOROTHIAZIDE 25 MG/1
25 TABLET ORAL DAILY
Qty: 90 TABLET | Refills: 1 | Status: SHIPPED | OUTPATIENT
Start: 2024-07-05

## 2024-07-05 RX ORDER — AMLODIPINE BESYLATE 5 MG/1
5 TABLET ORAL DAILY
Qty: 90 TABLET | Refills: 1 | Status: SHIPPED | OUTPATIENT
Start: 2024-07-05

## 2024-07-18 ENCOUNTER — OFFICE VISIT (OUTPATIENT)
Facility: CLINIC | Age: 39
End: 2024-07-18
Payer: COMMERCIAL

## 2024-07-18 VITALS
WEIGHT: 250 LBS | DIASTOLIC BLOOD PRESSURE: 65 MMHG | OXYGEN SATURATION: 97 % | HEART RATE: 64 BPM | SYSTOLIC BLOOD PRESSURE: 107 MMHG | RESPIRATION RATE: 20 BRPM | BODY MASS INDEX: 37.89 KG/M2 | HEIGHT: 68 IN | TEMPERATURE: 97.4 F

## 2024-07-18 DIAGNOSIS — F33.42 RECURRENT MAJOR DEPRESSIVE DISORDER, IN FULL REMISSION (HCC): ICD-10-CM

## 2024-07-18 DIAGNOSIS — K21.9 GASTROESOPHAGEAL REFLUX DISEASE WITHOUT ESOPHAGITIS: ICD-10-CM

## 2024-07-18 DIAGNOSIS — E66.01 SEVERE OBESITY (BMI 35.0-39.9) WITH COMORBIDITY (HCC): ICD-10-CM

## 2024-07-18 DIAGNOSIS — C81.01 NODULAR LYMPHOCYTE PREDOMINANT HODGKIN LYMPHOMA OF LYMPH NODES OF NECK (HCC): ICD-10-CM

## 2024-07-18 DIAGNOSIS — I10 ESSENTIAL (PRIMARY) HYPERTENSION: Primary | ICD-10-CM

## 2024-07-18 PROCEDURE — 99214 OFFICE O/P EST MOD 30 MIN: CPT | Performed by: FAMILY MEDICINE

## 2024-07-18 PROCEDURE — 3074F SYST BP LT 130 MM HG: CPT | Performed by: FAMILY MEDICINE

## 2024-07-18 PROCEDURE — 3078F DIAST BP <80 MM HG: CPT | Performed by: FAMILY MEDICINE

## 2024-07-18 RX ORDER — ESCITALOPRAM OXALATE 5 MG/1
5 TABLET ORAL DAILY
Qty: 90 TABLET | Refills: 1 | Status: SHIPPED | OUTPATIENT
Start: 2024-07-18

## 2024-07-18 ASSESSMENT — ENCOUNTER SYMPTOMS
APNEA: 0
ABDOMINAL DISTENTION: 0
CHEST TIGHTNESS: 0
ABDOMINAL PAIN: 0

## 2024-07-18 NOTE — PROGRESS NOTES
[facility]    History of Present Illness:   Marina Mcgee is a 39 y.o. female with history of HTN, GERD, Depression, Obesity, Hodgkins lymphoma   CC: Follow up  History provided by patient and Records    HPI:  Hypertension Follow up:  The patient reports:  taking medications as instructed, no medication side effects noted, no TIA's, no chest pain on exertion, no dyspnea on exertion, no swelling of ankles, no orthostatic dizziness or lightheadedness, no orthopnea or paroxysmal nocturnal dyspnea.     BP Readings from Last 3 Encounters:   07/18/24 107/65   05/09/24 (!) 139/91   05/02/24 122/78      Gastroesophageal Reflux:  Current control of Symptoms: Controlled  Primary symptoms: heartburn  Hiatal Hernia: No  Current Medications: Prilosec  The patient has no history melena or bright red blood in the stools.  The patient avoids high dose aspirin and NSAID therapy.  The patient is aware of diet changes needed, elevating the head of the bed and appropriate use of antacids.      Depression/Anxiety: Patient noting some stressors at work in particular and noting some mood swings, with anxiety and irritability and occasional episodes of depressed mood as well.  Has effected work.    Health Maintenance  Health Maintenance Due   Topic Date Due    Hepatitis B vaccine (1 of 3 - 3-dose series) Never done    COVID-19 Vaccine (1) Never done    Pneumococcal 0-64 years Vaccine (3 of 3 - PPSV23 or PCV20) 12/15/2021    DTaP/Tdap/Td vaccine (2 - Td or Tdap) 04/12/2024       Past Medical, Family, and Social History:     Current Outpatient Medications on File Prior to Visit   Medication Sig Dispense Refill    amLODIPine (NORVASC) 5 MG tablet TAKE ONE TABLET BY MOUTH DAILY 90 tablet 1    hydroCHLOROthiazide (HYDRODIURIL) 25 MG tablet TAKE ONE TABLET BY MOUTH DAILY 90 tablet 1    omeprazole (PRILOSEC) 20 MG delayed release capsule TAKE ONE CAPSULE BY MOUTH EVERY DAY 90 capsule 1    Multiple Vitamins-Minerals

## 2025-01-16 DIAGNOSIS — I10 ESSENTIAL (PRIMARY) HYPERTENSION: ICD-10-CM

## 2025-01-16 DIAGNOSIS — K21.9 GASTROESOPHAGEAL REFLUX DISEASE WITHOUT ESOPHAGITIS: ICD-10-CM

## 2025-01-16 RX ORDER — HYDROCHLOROTHIAZIDE 25 MG/1
25 TABLET ORAL DAILY
Qty: 90 TABLET | Refills: 1 | Status: SHIPPED | OUTPATIENT
Start: 2025-01-16

## 2025-01-16 RX ORDER — AMLODIPINE BESYLATE 5 MG/1
5 TABLET ORAL DAILY
Qty: 90 TABLET | Refills: 1 | Status: SHIPPED | OUTPATIENT
Start: 2025-01-16

## 2025-01-24 ENCOUNTER — TELEPHONE (OUTPATIENT)
Age: 40
End: 2025-01-24

## 2025-01-24 NOTE — TELEPHONE ENCOUNTER
[facility]  [phone]    Phone call placed to pt to remind pt to have labs drawn prior to her follow up appointment with .VM left for pt.

## 2025-01-29 DIAGNOSIS — C81.01 NODULAR LYMPHOCYTE PREDOMINANT HODGKIN LYMPHOMA, LYMPH NODES OF HEAD, FACE, AND NECK (HCC): ICD-10-CM

## 2025-01-30 LAB
ALBUMIN SERPL-MCNC: 3.7 G/DL (ref 3.5–5)
ALBUMIN/GLOB SERPL: 1.1 (ref 1.1–2.2)
ALP SERPL-CCNC: 120 U/L (ref 45–117)
ALT SERPL-CCNC: 24 U/L (ref 12–78)
ANION GAP SERPL CALC-SCNC: 10 MMOL/L (ref 2–12)
AST SERPL-CCNC: 17 U/L (ref 15–37)
BASOPHILS # BLD: 0.05 K/UL (ref 0–0.1)
BASOPHILS NFR BLD: 0.6 % (ref 0–1)
BILIRUB SERPL-MCNC: 0.3 MG/DL (ref 0.2–1)
BUN SERPL-MCNC: 8 MG/DL (ref 6–20)
BUN/CREAT SERPL: 8 (ref 12–20)
CALCIUM SERPL-MCNC: 9.1 MG/DL (ref 8.5–10.1)
CHLORIDE SERPL-SCNC: 103 MMOL/L (ref 97–108)
CO2 SERPL-SCNC: 26 MMOL/L (ref 21–32)
CREAT SERPL-MCNC: 0.95 MG/DL (ref 0.55–1.02)
DIFFERENTIAL METHOD BLD: ABNORMAL
EOSINOPHIL # BLD: 0.03 K/UL (ref 0–0.4)
EOSINOPHIL NFR BLD: 0.3 % (ref 0–7)
ERYTHROCYTE [DISTWIDTH] IN BLOOD BY AUTOMATED COUNT: 12 % (ref 11.5–14.5)
ERYTHROCYTE [SEDIMENTATION RATE] IN BLOOD: 18 MM/HR (ref 0–20)
GLOBULIN SER CALC-MCNC: 3.3 G/DL (ref 2–4)
GLUCOSE SERPL-MCNC: 70 MG/DL (ref 65–100)
HCT VFR BLD AUTO: 38.8 % (ref 35–47)
HGB BLD-MCNC: 13.2 G/DL (ref 11.5–16)
IMM GRANULOCYTES # BLD AUTO: 0.02 K/UL (ref 0–0.04)
IMM GRANULOCYTES NFR BLD AUTO: 0.2 % (ref 0–0.5)
LYMPHOCYTES # BLD: 2.68 K/UL (ref 0.8–3.5)
LYMPHOCYTES NFR BLD: 30.5 % (ref 12–49)
MCH RBC QN AUTO: 30.1 PG (ref 26–34)
MCHC RBC AUTO-ENTMCNC: 34 G/DL (ref 30–36.5)
MCV RBC AUTO: 88.4 FL (ref 80–99)
MONOCYTES # BLD: 0.58 K/UL (ref 0–1)
MONOCYTES NFR BLD: 6.6 % (ref 5–13)
NEUTS SEG # BLD: 5.44 K/UL (ref 1.8–8)
NEUTS SEG NFR BLD: 61.8 % (ref 32–75)
NRBC # BLD: 0 K/UL (ref 0–0.01)
NRBC BLD-RTO: 0 PER 100 WBC
PLATELET # BLD AUTO: 440 K/UL (ref 150–400)
PMV BLD AUTO: 8.8 FL (ref 8.9–12.9)
POTASSIUM SERPL-SCNC: 3 MMOL/L (ref 3.5–5.1)
PROT SERPL-MCNC: 7 G/DL (ref 6.4–8.2)
RBC # BLD AUTO: 4.39 M/UL (ref 3.8–5.2)
SODIUM SERPL-SCNC: 139 MMOL/L (ref 136–145)
WBC # BLD AUTO: 8.8 K/UL (ref 3.6–11)

## 2025-02-07 ENCOUNTER — OFFICE VISIT (OUTPATIENT)
Age: 40
End: 2025-02-07
Payer: COMMERCIAL

## 2025-02-07 VITALS
HEIGHT: 68 IN | SYSTOLIC BLOOD PRESSURE: 125 MMHG | DIASTOLIC BLOOD PRESSURE: 84 MMHG | RESPIRATION RATE: 20 BRPM | TEMPERATURE: 98.1 F | OXYGEN SATURATION: 100 % | BODY MASS INDEX: 37.74 KG/M2 | HEART RATE: 62 BPM | WEIGHT: 249 LBS

## 2025-02-07 DIAGNOSIS — M79.604 PAIN OF RIGHT LOWER EXTREMITY: ICD-10-CM

## 2025-02-07 DIAGNOSIS — C81.01 NODULAR LYMPHOCYTE PREDOMINANT HODGKIN LYMPHOMA, LYMPH NODES OF HEAD, FACE, AND NECK (HCC): Primary | ICD-10-CM

## 2025-02-07 DIAGNOSIS — D75.839 THROMBOCYTOSIS: ICD-10-CM

## 2025-02-07 DIAGNOSIS — C81.01 NODULAR LYMPHOCYTE PREDOMINANT HODGKIN LYMPHOMA, LYMPH NODES OF HEAD, FACE, AND NECK (HCC): ICD-10-CM

## 2025-02-07 PROCEDURE — 99214 OFFICE O/P EST MOD 30 MIN: CPT | Performed by: INTERNAL MEDICINE

## 2025-02-07 PROCEDURE — 3074F SYST BP LT 130 MM HG: CPT | Performed by: INTERNAL MEDICINE

## 2025-02-07 PROCEDURE — 3079F DIAST BP 80-89 MM HG: CPT | Performed by: INTERNAL MEDICINE

## 2025-02-07 NOTE — PROGRESS NOTES
Marina Mcgee is a 39 y.o. female follow up for lymphoma.        1. Have you been to the ER, urgent care clinic since your last visit?  Hospitalized since your last visit?{no    2. Have you seen or consulted any other health care providers outside of the [facility] since your last visit?  Include any pap smears or colon screening. no  
clinically indicated  Annual influenza vaccine       Thrombocytosis  Mild with . Check iron studies.       Pelvic pain, ovarian cyst, fibroids  Following with GYN.  Now s/p D&C for fibroids and pain has resolved.       Hypokalemia  Persists on recent labs. She was unable to tolerate KCL prescription and has been working on potassium in her diet. Unfortunately, her potassium level remains low. I advised her to discuss with her PCP.      Right leg pain  This may be cramping related to her hypokalemia. Obtain doppler to rule out DVT.      Plan:     Labs today: iron profile, ferritin  Right LE Doppler  Labs before next visit: CBC, CMP, ESR  Return to see us in 12 months      The patient will continue to be seen long-term as part of ongoing care related to her serious or complex medical condition.    Signed By: Sagar Jacob MD

## 2025-02-08 LAB
FERRITIN SERPL-MCNC: 40 NG/ML (ref 8–252)
IRON SATN MFR SERPL: 29 % (ref 20–50)
IRON SERPL-MCNC: 88 UG/DL (ref 35–150)
TIBC SERPL-MCNC: 301 UG/DL (ref 250–450)

## 2025-03-07 ENCOUNTER — HOSPITAL ENCOUNTER (OUTPATIENT)
Dept: VASCULAR SURGERY | Facility: HOSPITAL | Age: 40
Discharge: HOME | End: 2025-03-09
Attending: INTERNAL MEDICINE
Payer: COMMERCIAL

## 2025-03-07 DIAGNOSIS — C81.01 NODULAR LYMPHOCYTE PREDOMINANT HODGKIN LYMPHOMA, LYMPH NODES OF HEAD, FACE, AND NECK (HCC): ICD-10-CM

## 2025-03-07 DIAGNOSIS — M79.604 PAIN OF RIGHT LOWER EXTREMITY: ICD-10-CM

## 2025-03-07 PROCEDURE — 93971 EXTREMITY STUDY: CPT

## 2025-04-01 ENCOUNTER — OFFICE VISIT (OUTPATIENT)
Facility: CLINIC | Age: 40
End: 2025-04-01
Payer: COMMERCIAL

## 2025-04-01 VITALS
TEMPERATURE: 97.9 F | WEIGHT: 256 LBS | OXYGEN SATURATION: 100 % | RESPIRATION RATE: 18 BRPM | DIASTOLIC BLOOD PRESSURE: 71 MMHG | BODY MASS INDEX: 38.8 KG/M2 | HEART RATE: 62 BPM | SYSTOLIC BLOOD PRESSURE: 110 MMHG | HEIGHT: 68 IN

## 2025-04-01 DIAGNOSIS — R53.83 OTHER FATIGUE: Primary | ICD-10-CM

## 2025-04-01 DIAGNOSIS — F33.1 MODERATE EPISODE OF RECURRENT MAJOR DEPRESSIVE DISORDER (HCC): ICD-10-CM

## 2025-04-01 DIAGNOSIS — F41.9 ANXIETY: ICD-10-CM

## 2025-04-01 PROCEDURE — 3078F DIAST BP <80 MM HG: CPT

## 2025-04-01 PROCEDURE — 99213 OFFICE O/P EST LOW 20 MIN: CPT

## 2025-04-01 PROCEDURE — 3074F SYST BP LT 130 MM HG: CPT

## 2025-04-01 RX ORDER — SERTRALINE HYDROCHLORIDE 25 MG/1
25 TABLET, FILM COATED ORAL DAILY
Qty: 90 TABLET | Refills: 1 | Status: SHIPPED | OUTPATIENT
Start: 2025-04-01

## 2025-04-01 SDOH — ECONOMIC STABILITY: FOOD INSECURITY: WITHIN THE PAST 12 MONTHS, THE FOOD YOU BOUGHT JUST DIDN'T LAST AND YOU DIDN'T HAVE MONEY TO GET MORE.: NEVER TRUE

## 2025-04-01 SDOH — ECONOMIC STABILITY: FOOD INSECURITY: WITHIN THE PAST 12 MONTHS, YOU WORRIED THAT YOUR FOOD WOULD RUN OUT BEFORE YOU GOT MONEY TO BUY MORE.: NEVER TRUE

## 2025-04-01 ASSESSMENT — ANXIETY QUESTIONNAIRES
5. BEING SO RESTLESS THAT IT IS HARD TO SIT STILL: MORE THAN HALF THE DAYS
7. FEELING AFRAID AS IF SOMETHING AWFUL MIGHT HAPPEN: NEARLY EVERY DAY
1. FEELING NERVOUS, ANXIOUS, OR ON EDGE: NEARLY EVERY DAY
GAD7 TOTAL SCORE: 19
2. NOT BEING ABLE TO STOP OR CONTROL WORRYING: NEARLY EVERY DAY
3. WORRYING TOO MUCH ABOUT DIFFERENT THINGS: NEARLY EVERY DAY
IF YOU CHECKED OFF ANY PROBLEMS ON THIS QUESTIONNAIRE, HOW DIFFICULT HAVE THESE PROBLEMS MADE IT FOR YOU TO DO YOUR WORK, TAKE CARE OF THINGS AT HOME, OR GET ALONG WITH OTHER PEOPLE: SOMEWHAT DIFFICULT
6. BECOMING EASILY ANNOYED OR IRRITABLE: NEARLY EVERY DAY
4. TROUBLE RELAXING: MORE THAN HALF THE DAYS

## 2025-04-01 ASSESSMENT — PATIENT HEALTH QUESTIONNAIRE - PHQ9
6. FEELING BAD ABOUT YOURSELF - OR THAT YOU ARE A FAILURE OR HAVE LET YOURSELF OR YOUR FAMILY DOWN: SEVERAL DAYS
8. MOVING OR SPEAKING SO SLOWLY THAT OTHER PEOPLE COULD HAVE NOTICED. OR THE OPPOSITE, BEING SO FIGETY OR RESTLESS THAT YOU HAVE BEEN MOVING AROUND A LOT MORE THAN USUAL: MORE THAN HALF THE DAYS
SUM OF ALL RESPONSES TO PHQ QUESTIONS 1-9: 15
SUM OF ALL RESPONSES TO PHQ QUESTIONS 1-9: 15
3. TROUBLE FALLING OR STAYING ASLEEP: MORE THAN HALF THE DAYS
1. LITTLE INTEREST OR PLEASURE IN DOING THINGS: MORE THAN HALF THE DAYS
SUM OF ALL RESPONSES TO PHQ QUESTIONS 1-9: 15
4. FEELING TIRED OR HAVING LITTLE ENERGY: NEARLY EVERY DAY
10. IF YOU CHECKED OFF ANY PROBLEMS, HOW DIFFICULT HAVE THESE PROBLEMS MADE IT FOR YOU TO DO YOUR WORK, TAKE CARE OF THINGS AT HOME, OR GET ALONG WITH OTHER PEOPLE: SOMEWHAT DIFFICULT
SUM OF ALL RESPONSES TO PHQ QUESTIONS 1-9: 15
7. TROUBLE CONCENTRATING ON THINGS, SUCH AS READING THE NEWSPAPER OR WATCHING TELEVISION: MORE THAN HALF THE DAYS
9. THOUGHTS THAT YOU WOULD BE BETTER OFF DEAD, OR OF HURTING YOURSELF: NOT AT ALL
5. POOR APPETITE OR OVEREATING: MORE THAN HALF THE DAYS
2. FEELING DOWN, DEPRESSED OR HOPELESS: SEVERAL DAYS

## 2025-04-01 NOTE — PROGRESS NOTES
Chief Complaint   Patient presents with    Fatigue     Reports went to ED in February with dizziness and fatigue. Dx with hypokalemia. Reports fatigue is intermittent and worsening since January.          \"Have you been to the ER, urgent care clinic since your last visit?  Hospitalized since your last visit?\"    YES- [facility] 2/25    “Have you seen or consulted any other health care providers outside of [facility] since your last visit?”    NO            Click Here for Release of Records Request     Health Maintenance Due   Topic Date Due    COVID-19 Vaccine (1) Never done    Hepatitis B vaccine (1 of 3 - 19+ 3-dose series) Never done    Shingles vaccine (1 of 2) Never done    Pneumococcal 0-49 years Vaccine (3 of 3 - PPSV23, PCV20 or PCV21) 12/15/2021    DTaP/Tdap/Td vaccine (2 - Td or Tdap) 04/12/2024    Depression Monitoring  01/18/2025

## 2025-04-01 NOTE — PROGRESS NOTES
[facility]   [facility]    Subjective:  CC:   Chief Complaint   Patient presents with    Fatigue     Reports went to ED in February with dizziness and fatigue. Dx with hypokalemia. Reports fatigue is intermittent and worsening since January.        HPI:  Marina Mcgee is 40 y.o. female who presents today for ER follow up.     Went to ER with dizziness and fatigue. Found to be hypokalemic and given supplement. Fatigue is worsening since beginning of year. Oncology advised seeing PCP.     Sleep - Not sleeping well. Gets home from work around 8 pm and goes to bed at 10 pm and has to get up at 330-4 am.     Doesn't get off work until about 730-8 pm sometimes.       Objective:    Vitals:    04/01/25 1543   BP: 110/71   BP Site: Right Upper Arm   Patient Position: Sitting   BP Cuff Size: Large Adult   Pulse: 62   Resp: 18   Temp: 97.9 °F (36.6 °C)   TempSrc: Oral   SpO2: 100%   Weight: 116.1 kg (256 lb)   Height: 1.727 m (5' 8\")       Physical Exam  Vitals and nursing note reviewed.   Constitutional:       General: She is not in acute distress.     Appearance: She is not ill-appearing.   Cardiovascular:      Rate and Rhythm: Normal rate.   Pulmonary:      Effort: Pulmonary effort is normal.   Skin:     General: Skin is warm and dry.   Neurological:      Mental Status: She is alert.   Psychiatric:         Attention and Perception: Attention normal.         Mood and Affect: Mood normal.         Speech: Speech normal.         Behavior: Behavior normal.         Thought Content: Thought content normal. Thought content does not include homicidal or suicidal ideation.         Cognition and Memory: Cognition normal.         Judgment: Judgment normal.       PHQ-9 Total Score: 15 (4/1/2025  4:10 PM)  Thoughts that you would be better off dead, or of hurting yourself in some way: 0 (4/1/2025  4:10 PM)        4/1/2025     4:00 PM   ANTON 7 SCORE   ANTON-7 Total Score 19       No results

## 2025-04-07 DIAGNOSIS — R53.83 OTHER FATIGUE: ICD-10-CM

## 2025-04-08 LAB
ALBUMIN SERPL-MCNC: 3.8 G/DL (ref 3.5–5)
ALBUMIN/GLOB SERPL: 1.2 (ref 1.1–2.2)
ALP SERPL-CCNC: 127 U/L (ref 45–117)
ALT SERPL-CCNC: 24 U/L (ref 12–78)
ANION GAP SERPL CALC-SCNC: 4 MMOL/L (ref 2–12)
AST SERPL-CCNC: 27 U/L (ref 15–37)
BILIRUB SERPL-MCNC: 0.4 MG/DL (ref 0.2–1)
BUN SERPL-MCNC: 6 MG/DL (ref 6–20)
BUN/CREAT SERPL: 7 (ref 12–20)
CALCIUM SERPL-MCNC: 9.5 MG/DL (ref 8.5–10.1)
CHLORIDE SERPL-SCNC: 104 MMOL/L (ref 97–108)
CO2 SERPL-SCNC: 30 MMOL/L (ref 21–32)
CREAT SERPL-MCNC: 0.84 MG/DL (ref 0.55–1.02)
GLOBULIN SER CALC-MCNC: 3.2 G/DL (ref 2–4)
GLUCOSE SERPL-MCNC: 72 MG/DL (ref 65–100)
POTASSIUM SERPL-SCNC: 3.3 MMOL/L (ref 3.5–5.1)
PROT SERPL-MCNC: 7 G/DL (ref 6.4–8.2)
SODIUM SERPL-SCNC: 138 MMOL/L (ref 136–145)
TSH SERPL DL<=0.05 MIU/L-ACNC: 3.55 UIU/ML (ref 0.36–3.74)

## 2025-04-15 ENCOUNTER — RESULTS FOLLOW-UP (OUTPATIENT)
Facility: CLINIC | Age: 40
End: 2025-04-15

## 2025-04-29 ENCOUNTER — OFFICE VISIT (OUTPATIENT)
Facility: CLINIC | Age: 40
End: 2025-04-29
Payer: COMMERCIAL

## 2025-04-29 VITALS
WEIGHT: 259 LBS | HEART RATE: 71 BPM | SYSTOLIC BLOOD PRESSURE: 134 MMHG | DIASTOLIC BLOOD PRESSURE: 79 MMHG | HEIGHT: 68 IN | BODY MASS INDEX: 39.25 KG/M2 | TEMPERATURE: 97.3 F | OXYGEN SATURATION: 96 % | RESPIRATION RATE: 16 BRPM

## 2025-04-29 DIAGNOSIS — F51.04 PSYCHOPHYSIOLOGICAL INSOMNIA: Primary | ICD-10-CM

## 2025-04-29 DIAGNOSIS — F41.9 ANXIETY: ICD-10-CM

## 2025-04-29 DIAGNOSIS — E87.6 LOW POTASSIUM SYNDROME: ICD-10-CM

## 2025-04-29 PROCEDURE — 3075F SYST BP GE 130 - 139MM HG: CPT | Performed by: FAMILY MEDICINE

## 2025-04-29 PROCEDURE — 3078F DIAST BP <80 MM HG: CPT | Performed by: FAMILY MEDICINE

## 2025-04-29 PROCEDURE — 99214 OFFICE O/P EST MOD 30 MIN: CPT | Performed by: FAMILY MEDICINE

## 2025-04-29 RX ORDER — TRAZODONE HYDROCHLORIDE 50 MG/1
50 TABLET ORAL NIGHTLY
Qty: 90 TABLET | Refills: 1 | Status: SHIPPED | OUTPATIENT
Start: 2025-04-29

## 2025-04-29 RX ORDER — POTASSIUM CHLORIDE 750 MG/1
10 TABLET, EXTENDED RELEASE ORAL DAILY
Qty: 90 TABLET | Refills: 1 | Status: SHIPPED | OUTPATIENT
Start: 2025-04-29

## 2025-04-29 ASSESSMENT — ANXIETY QUESTIONNAIRES
IF YOU CHECKED OFF ANY PROBLEMS ON THIS QUESTIONNAIRE, HOW DIFFICULT HAVE THESE PROBLEMS MADE IT FOR YOU TO DO YOUR WORK, TAKE CARE OF THINGS AT HOME, OR GET ALONG WITH OTHER PEOPLE: SOMEWHAT DIFFICULT
2. NOT BEING ABLE TO STOP OR CONTROL WORRYING: NEARLY EVERY DAY
7. FEELING AFRAID AS IF SOMETHING AWFUL MIGHT HAPPEN: MORE THAN HALF THE DAYS
3. WORRYING TOO MUCH ABOUT DIFFERENT THINGS: NEARLY EVERY DAY
GAD7 TOTAL SCORE: 17
6. BECOMING EASILY ANNOYED OR IRRITABLE: NEARLY EVERY DAY
1. FEELING NERVOUS, ANXIOUS, OR ON EDGE: MORE THAN HALF THE DAYS
4. TROUBLE RELAXING: MORE THAN HALF THE DAYS
5. BEING SO RESTLESS THAT IT IS HARD TO SIT STILL: MORE THAN HALF THE DAYS

## 2025-04-29 ASSESSMENT — PATIENT HEALTH QUESTIONNAIRE - PHQ9
2. FEELING DOWN, DEPRESSED OR HOPELESS: SEVERAL DAYS
3. TROUBLE FALLING OR STAYING ASLEEP: NEARLY EVERY DAY
SUM OF ALL RESPONSES TO PHQ QUESTIONS 1-9: 13
SUM OF ALL RESPONSES TO PHQ QUESTIONS 1-9: 13
5. POOR APPETITE OR OVEREATING: SEVERAL DAYS
10. IF YOU CHECKED OFF ANY PROBLEMS, HOW DIFFICULT HAVE THESE PROBLEMS MADE IT FOR YOU TO DO YOUR WORK, TAKE CARE OF THINGS AT HOME, OR GET ALONG WITH OTHER PEOPLE: SOMEWHAT DIFFICULT
9. THOUGHTS THAT YOU WOULD BE BETTER OFF DEAD, OR OF HURTING YOURSELF: NOT AT ALL
SUM OF ALL RESPONSES TO PHQ QUESTIONS 1-9: 13
7. TROUBLE CONCENTRATING ON THINGS, SUCH AS READING THE NEWSPAPER OR WATCHING TELEVISION: MORE THAN HALF THE DAYS
4. FEELING TIRED OR HAVING LITTLE ENERGY: SEVERAL DAYS
8. MOVING OR SPEAKING SO SLOWLY THAT OTHER PEOPLE COULD HAVE NOTICED. OR THE OPPOSITE, BEING SO FIGETY OR RESTLESS THAT YOU HAVE BEEN MOVING AROUND A LOT MORE THAN USUAL: MORE THAN HALF THE DAYS
6. FEELING BAD ABOUT YOURSELF - OR THAT YOU ARE A FAILURE OR HAVE LET YOURSELF OR YOUR FAMILY DOWN: SEVERAL DAYS
SUM OF ALL RESPONSES TO PHQ QUESTIONS 1-9: 13
1. LITTLE INTEREST OR PLEASURE IN DOING THINGS: MORE THAN HALF THE DAYS

## 2025-04-29 NOTE — PROGRESS NOTES
[facility]    History of Present Illness:   Marina Mcgee is a 40 y.o. female with history of HTN, GERD, Depression, Obesity, Hodgkins lymphoma   CC: Follow up  History provided by patient and Records    HPI:  Hypertension Follow up:  The patient reports:  taking medications as instructed, no medication side effects noted, no TIA's, no chest pain on exertion, no dyspnea on exertion, no swelling of ankles, no orthostatic dizziness or lightheadedness, no orthopnea or paroxysmal nocturnal dyspnea.     BP Readings from Last 3 Encounters:   04/29/25 134/79   04/01/25 110/71   02/07/25 125/84      Gastroesophageal Reflux:  Current control of Symptoms: Controlled  Primary symptoms: heartburn  Hiatal Hernia: No  Current Medications: Prilosec  The patient has no history melena or bright red blood in the stools.  The patient avoids high dose aspirin and NSAID therapy.  The patient is aware of diet changes needed, elevating the head of the bed and appropriate use of antacids.      Depression/Anxiety: Patient notes Zoloft does help with and during the day with controlling anxiety and racing thoughts.  Able to concentrate and overall improved.  But notices still has issues getting to sleep, due to racing thoughts.  Also has boyfriend that snores.    Health Maintenance  Health Maintenance Due   Topic Date Due    COVID-19 Vaccine (1) Never done    Hepatitis B vaccine (1 of 3 - 19+ 3-dose series) Never done    Shingles vaccine (1 of 2) Never done    Pneumococcal 0-49 years Vaccine (3 of 3 - PPSV23, PCV20 or PCV21) 12/15/2021    DTaP/Tdap/Td vaccine (2 - Td or Tdap) 04/12/2024       Past Medical, Family, and Social History:     Current Outpatient Medications on File Prior to Visit   Medication Sig Dispense Refill    sertraline (ZOLOFT) 25 MG tablet Take 1 tablet by mouth daily 90 tablet 1    amLODIPine (NORVASC) 5 MG tablet TAKE ONE TABLET BY MOUTH EVERY DAY 90 tablet 1    hydroCHLOROthiazide (HYDRODIURIL)

## 2025-04-29 NOTE — PROGRESS NOTES
\"Have you been to the ER, urgent care clinic since your last visit?  Hospitalized since your last visit?\"    no    “Have you seen or consulted any other health care providers outside our system since your last visit?”    no              Goals that were addressed and/or need to be completed during or after this appointment include   Health Maintenance Due   Topic Date Due    COVID-19 Vaccine (1) Never done    Hepatitis B vaccine (1 of 3 - 19+ 3-dose series) Never done    Shingles vaccine (1 of 2) Never done    Pneumococcal 0-49 years Vaccine (3 of 3 - PPSV23, PCV20 or PCV21) 12/15/2021    DTaP/Tdap/Td vaccine (2 - Td or Tdap) 04/12/2024

## 2025-07-14 DIAGNOSIS — I10 ESSENTIAL (PRIMARY) HYPERTENSION: ICD-10-CM

## 2025-07-14 RX ORDER — HYDROCHLOROTHIAZIDE 25 MG/1
25 TABLET ORAL DAILY
Qty: 90 TABLET | Refills: 1 | Status: SHIPPED | OUTPATIENT
Start: 2025-07-14

## 2025-07-18 DIAGNOSIS — I10 ESSENTIAL (PRIMARY) HYPERTENSION: ICD-10-CM

## 2025-07-18 RX ORDER — AMLODIPINE BESYLATE 5 MG/1
5 TABLET ORAL DAILY
Qty: 90 TABLET | Refills: 1 | Status: SHIPPED | OUTPATIENT
Start: 2025-07-18

## 2025-07-23 ENCOUNTER — OFFICE VISIT (OUTPATIENT)
Facility: CLINIC | Age: 40
End: 2025-07-23
Payer: COMMERCIAL

## 2025-07-23 VITALS
RESPIRATION RATE: 18 BRPM | SYSTOLIC BLOOD PRESSURE: 112 MMHG | HEIGHT: 68 IN | BODY MASS INDEX: 38.8 KG/M2 | WEIGHT: 256 LBS | HEART RATE: 78 BPM | OXYGEN SATURATION: 98 % | DIASTOLIC BLOOD PRESSURE: 78 MMHG | TEMPERATURE: 98.3 F

## 2025-07-23 DIAGNOSIS — I10 ESSENTIAL (PRIMARY) HYPERTENSION: ICD-10-CM

## 2025-07-23 DIAGNOSIS — K21.9 GASTROESOPHAGEAL REFLUX DISEASE WITHOUT ESOPHAGITIS: ICD-10-CM

## 2025-07-23 DIAGNOSIS — F33.1 MODERATE EPISODE OF RECURRENT MAJOR DEPRESSIVE DISORDER (HCC): Primary | ICD-10-CM

## 2025-07-23 DIAGNOSIS — F41.9 ANXIETY: ICD-10-CM

## 2025-07-23 PROCEDURE — 3074F SYST BP LT 130 MM HG: CPT | Performed by: FAMILY MEDICINE

## 2025-07-23 PROCEDURE — 99214 OFFICE O/P EST MOD 30 MIN: CPT | Performed by: FAMILY MEDICINE

## 2025-07-23 PROCEDURE — 3078F DIAST BP <80 MM HG: CPT | Performed by: FAMILY MEDICINE

## 2025-07-23 RX ORDER — HYDROXYZINE PAMOATE 50 MG/1
50 CAPSULE ORAL NIGHTLY
COMMUNITY
Start: 2025-07-19 | End: 2025-07-23 | Stop reason: SDUPTHER

## 2025-07-23 RX ORDER — HYDROXYZINE PAMOATE 50 MG/1
50 CAPSULE ORAL NIGHTLY
Qty: 90 CAPSULE | Refills: 1 | Status: SHIPPED | OUTPATIENT
Start: 2025-07-23

## 2025-07-23 ASSESSMENT — ENCOUNTER SYMPTOMS
CHEST TIGHTNESS: 0
APNEA: 0
ABDOMINAL DISTENTION: 0
ABDOMINAL PAIN: 0

## 2025-07-23 NOTE — PROGRESS NOTES
[facility]    History of Present Illness:   Marina Mcgee is a 40 y.o. female with history of HTN, GERD, Depression/anxiety, Obesity, Hodgkins lymphoma   CC: Follow up, Anxiety  History provided by patient and Records    HPI:  Patient reports that stress became uncontrolled and ended up going to the ER for evaluation.  Patient was treated with Hydroxyzine which helped with sleeping for 1 night, but not effective now.  Noting that she has been taking Zoloft which has helped with the anxiety during the day, but has trouble sleeping, and noting on days when going to work she will see \"spiders\" on the wall.  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  Patient does talk to her sister, but does not want to \"burden them\".  Patient notes at time she wants to lash out at her boyfriend as well, even when he tries to be supportive.  Has tried doing some activities outside to help as well.  Went out of work 7/18/25.    Health Maintenance  Health Maintenance Due   Topic Date Due    COVID-19 Vaccine (1) Never done    Hepatitis B vaccine (1 of 3 - 19+ 3-dose series) Never done    Shingles vaccine (1 of 2) Never done    Pneumococcal 0-49 years Vaccine (3 of 3 - PPSV23, PCV20 or PCV21) 12/15/2021    DTaP/Tdap/Td vaccine (2 - Td or Tdap) 04/12/2024       Past Medical, Family, and Social History:     Current Outpatient Medications on File Prior to Visit   Medication Sig Dispense Refill    amLODIPine (NORVASC) 5 MG tablet TAKE ONE TABLET BY MOUTH EVERY DAY 90 tablet 1    hydroCHLOROthiazide (HYDRODIURIL) 25 MG tablet TAKE ONE TABLET BY MOUTH EVERY DAY 90 tablet 1    potassium chloride (KLOR-CON M) 10 MEQ extended release tablet Take 1 tablet by mouth daily 90 tablet 1    omeprazole (PRILOSEC) 20 MG delayed release capsule TAKE ONE CAPSULE BY MOUTH EVERY DAY 90

## 2025-07-23 NOTE — PROGRESS NOTES
Have you been to the ER, urgent care clinic since your last visit?  Hospitalized since your last visit?   YES  Have you seen or consulted any other health care providers outside our system since your last visit?   NO

## 2025-08-06 ENCOUNTER — TELEPHONE (OUTPATIENT)
Facility: CLINIC | Age: 40
End: 2025-08-06

## 2025-08-06 DIAGNOSIS — F33.1 MODERATE EPISODE OF RECURRENT MAJOR DEPRESSIVE DISORDER (HCC): Primary | ICD-10-CM

## 2025-08-06 DIAGNOSIS — F41.9 ANXIETY: ICD-10-CM

## 2025-08-08 RX ORDER — FLUOXETINE 10 MG/1
10 CAPSULE ORAL DAILY
Qty: 90 CAPSULE | Refills: 1 | Status: SHIPPED | OUTPATIENT
Start: 2025-08-08

## 2025-08-13 ENCOUNTER — OFFICE VISIT (OUTPATIENT)
Facility: CLINIC | Age: 40
End: 2025-08-13
Payer: COMMERCIAL

## 2025-08-13 VITALS
HEIGHT: 68 IN | RESPIRATION RATE: 16 BRPM | TEMPERATURE: 98 F | WEIGHT: 257 LBS | SYSTOLIC BLOOD PRESSURE: 127 MMHG | DIASTOLIC BLOOD PRESSURE: 87 MMHG | HEART RATE: 71 BPM | OXYGEN SATURATION: 100 % | BODY MASS INDEX: 38.95 KG/M2

## 2025-08-13 DIAGNOSIS — R11.0 NAUSEA: ICD-10-CM

## 2025-08-13 DIAGNOSIS — I10 ESSENTIAL (PRIMARY) HYPERTENSION: ICD-10-CM

## 2025-08-13 DIAGNOSIS — F41.9 ANXIETY: Primary | ICD-10-CM

## 2025-08-13 DIAGNOSIS — F33.42 RECURRENT MAJOR DEPRESSIVE DISORDER, IN FULL REMISSION: ICD-10-CM

## 2025-08-13 PROCEDURE — 99214 OFFICE O/P EST MOD 30 MIN: CPT | Performed by: FAMILY MEDICINE

## 2025-08-13 PROCEDURE — 3079F DIAST BP 80-89 MM HG: CPT | Performed by: FAMILY MEDICINE

## 2025-08-13 PROCEDURE — 3074F SYST BP LT 130 MM HG: CPT | Performed by: FAMILY MEDICINE

## 2025-08-13 RX ORDER — ONDANSETRON 4 MG/1
4 TABLET, FILM COATED ORAL 3 TIMES DAILY PRN
Qty: 30 TABLET | Refills: 0 | Status: SHIPPED | OUTPATIENT
Start: 2025-08-13

## 2025-08-13 ASSESSMENT — ENCOUNTER SYMPTOMS
APNEA: 0
ABDOMINAL PAIN: 0
ABDOMINAL DISTENTION: 0
CHEST TIGHTNESS: 0

## 2025-08-14 ENCOUNTER — PATIENT MESSAGE (OUTPATIENT)
Facility: CLINIC | Age: 40
End: 2025-08-14

## 2025-08-22 ENCOUNTER — TELEPHONE (OUTPATIENT)
Facility: CLINIC | Age: 40
End: 2025-08-22

## (undated) DEVICE — SOLUTION IRRIG 1000ML 0.9% SOD CHL USP POUR PLAS BTL

## (undated) DEVICE — TROCAR: Brand: KII® SLEEVE

## (undated) DEVICE — PAD POSITIONING WNG STD KIT W/BODY STRP LF DISP

## (undated) DEVICE — SEALER ONE-STEP 37CM LIGASURE MARYLAND XP

## (undated) DEVICE — GYN LAPAROSCOPY-SFMC: Brand: MEDLINE INDUSTRIES, INC.

## (undated) DEVICE — LIQUIBAND RAPID ADHESIVE 36/CS 0.8ML: Brand: MEDLINE

## (undated) DEVICE — TISSUE RETRIEVAL SYSTEM: Brand: INZII RETRIEVAL SYSTEM

## (undated) DEVICE — GLOVE ORANGE PI 8   MSG9080

## (undated) DEVICE — LAPAROSCOPIC TROCAR SLEEVE/SINGLE USE: Brand: KII® OPTICAL ACCESS SYSTEM

## (undated) DEVICE — VCARE LARGE, UTERINE MANIPULATOR, VAGINAL-CERVICAL-AHLUWALIA'S-RETRACTOR-ELEVATOR: Brand: VCARE

## (undated) DEVICE — SUTURE VICRYL SZ 0 L36IN ABSRB UD CT-1 L36MM 1/2 CIR TAPR PNT VCP946H

## (undated) DEVICE — SOLUTION IRRIG 1000ML STRL H2O USP PLAS POUR BTL

## (undated) DEVICE — Device